# Patient Record
Sex: MALE | Race: WHITE | NOT HISPANIC OR LATINO | Employment: OTHER | URBAN - METROPOLITAN AREA
[De-identification: names, ages, dates, MRNs, and addresses within clinical notes are randomized per-mention and may not be internally consistent; named-entity substitution may affect disease eponyms.]

---

## 2017-02-07 ENCOUNTER — OFFICE VISIT (OUTPATIENT)
Dept: CARDIOLOGY | Facility: HOSPITAL | Age: 67
End: 2017-02-07

## 2017-02-07 VITALS
WEIGHT: 215 LBS | HEIGHT: 67 IN | BODY MASS INDEX: 33.74 KG/M2 | HEART RATE: 68 BPM | TEMPERATURE: 97 F | RESPIRATION RATE: 22 BRPM | OXYGEN SATURATION: 99 % | SYSTOLIC BLOOD PRESSURE: 113 MMHG | DIASTOLIC BLOOD PRESSURE: 68 MMHG

## 2017-02-07 DIAGNOSIS — I10 ESSENTIAL HYPERTENSION: Chronic | ICD-10-CM

## 2017-02-07 DIAGNOSIS — I25.5 ISCHEMIC CARDIOMYOPATHY: Primary | Chronic | ICD-10-CM

## 2017-02-07 DIAGNOSIS — R00.1 BRADYCARDIA: ICD-10-CM

## 2017-02-07 DIAGNOSIS — R53.82 CHRONIC FATIGUE: ICD-10-CM

## 2017-02-07 DIAGNOSIS — J44.9 CHRONIC OBSTRUCTIVE PULMONARY DISEASE, UNSPECIFIED COPD TYPE (HCC): ICD-10-CM

## 2017-02-07 DIAGNOSIS — G47.10 HYPERSOMNOLENCE: ICD-10-CM

## 2017-02-07 DIAGNOSIS — E66.9 OBESITY (BMI 30.0-34.9): ICD-10-CM

## 2017-02-07 DIAGNOSIS — E55.9 VITAMIN D DEFICIENCY: ICD-10-CM

## 2017-02-07 DIAGNOSIS — N28.9 RENAL INSUFFICIENCY: ICD-10-CM

## 2017-02-07 PROBLEM — E66.811 OBESITY (BMI 30.0-34.9): Status: ACTIVE | Noted: 2017-02-07

## 2017-02-07 PROCEDURE — 99213 OFFICE O/P EST LOW 20 MIN: CPT | Performed by: NURSE PRACTITIONER

## 2017-02-07 RX ORDER — ALLOPURINOL 300 MG/1
300 TABLET ORAL DAILY
COMMUNITY
End: 2019-07-26

## 2017-02-07 NOTE — PROGRESS NOTES
[facility]      Encounter Date:02/07/2017     Antony Monahan  [address] [place] [address]  [phone]    1950    Franky Carrington MD    Antony Monahan is a 66 y.o. male.      Subjective:     Chief Complaint:  Follow-up (ICM:  Patient complaints of Fatigue)       HPI     Patient with a history of ischemic cardiomyopathy, reported last EF 30% 7/30/2016. Patient underwent angioplasty and stenting for PAD on 9/7/2016. Being seen for follow-up of ischemic cardiomyopathy.  Life vest has been removed.  Pt reported improved EF 45% (data deficient).  Last office visit patient had noted to be bradycardic (40-50).   Coreg decreased to 6.25 mg twice a day.  Patient now notes HR 60-70, less dizziness.  Chest pain:  2-3 times per week, seconds induration, none worsening, occurs at rest.  Dyspnea with exertions:  Mild -moderate, intermittent, associated with wheezing.  Does not use inhalers regularly.  Able to walk in today without difficulty or dyspnea.  Fatigue is baseline moderate.  Has not improved, but has not worsened.  Has noticed weight gain, 12 lbs in 4 months.  Good appetite, not following special diet.  Just started on new med for new dx of DM II.  Reports occasional SOB at night, snoring.  No hx of sleep study.      Patient Active Problem List   Diagnosis   • ST elevation myocardial infarction involving left anterior descending (LAD) coronary artery   • Hyperlipidemia LDL goal <70   • PAD (peripheral artery disease)   • COPD (chronic obstructive pulmonary disease)   • Ischemic cardiomyopathy   • Hypothyroid   • Essential hypertension   • Coronary artery disease involving native coronary artery of native heart   • Claudication   • Bradycardia   • Renal insufficiency   • Chronic fatigue   • Vitamin D deficiency   • Hypersomnolence         Past Surgical History   Procedure Laterality Date   • Cardiac catheterization N/A 7/29/2016     Procedure: Left  Heart Cath;  Surgeon: Drew Gurrola MD;  Location:  [facility];  Service:    • Knee surgery Left 08/17/2015   • Wrist surgery     • Interventional radiology procedure N/A 9/14/2016     Procedure: AORTIC Aortagram with Runoff;  Surgeon: Drew Gurrola MD;  Location:  [facility];  Service:        No Known Allergies      Current Outpatient Prescriptions:   •  allopurinol (ZYLOPRIM) 300 MG tablet, Take 300 mg by mouth Daily., Disp: , Rfl:   •  aspirin 81 MG chewable tablet, Chew 81 mg daily. Non-compliant, Disp: , Rfl:   •  carvedilol (COREG) 12.5 MG tablet, Take 0.5 tablets by mouth 2 (Two) Times a Day., Disp: 30 tablet, Rfl: 3  •  clopidogrel (PLAVIX) 75 MG tablet, Take 1 tablet by mouth daily., Disp: 30 tablet, Rfl: 11  •  Empagliflozin 10 MG tablet, Take 10 mg by mouth Daily., Disp: , Rfl:   •  febuxostat (ULORIC) 80 MG tablet tablet, Take 80 mg by mouth daily., Disp: , Rfl:   •  furosemide (LASIX) 40 MG tablet, Take 40 mg by mouth 2 (Two) Times a Day., Disp: , Rfl:   •  levothyroxine (SYNTHROID, LEVOTHROID) 88 MCG tablet, Take 88 mcg by mouth daily., Disp: , Rfl:   •  mometasone-formoterol (DULERA 200) 200-5 MCG/ACT inhaler, Inhale 2 puffs 2 (two) times a day. EXTREMELY NON-COMPLIANT, Disp: , Rfl:   •  NIFEdipine XL (PROCARDIA XL) 90 MG 24 hr tablet, Take 90 mg by mouth daily., Disp: , Rfl:   •  nitroglycerin (NITROSTAT) 0.4 MG SL tablet, Place 1 tablet under the tongue every 5 (five) minutes as needed for chest pain. Take no more than 3 doses in 15 minutes., Disp: 25 tablet, Rfl: 12  •  pantoprazole (PROTONIX) 40 MG EC tablet, Take 40 mg by mouth Daily., Disp: , Rfl:   •  potassium chloride (K-DUR,KLOR-CON) 20 MEQ CR tablet, Take 20 mEq by mouth 2 (Two) Times a Day As Needed., Disp: , Rfl:   •  ranitidine (ZANTAC) 150 MG tablet, Take 150 mg by mouth 2 (two) times a day. For GERD, Disp: , Rfl:   •  rosuvastatin (CRESTOR) 40 MG tablet, Take 40 mg by mouth every  night., Disp: , Rfl:   •  sacubitril-valsartan (ENTRESTO) 24-26 MG tablet, Take 1 tablet by mouth every 12 (twelve) hours., Disp: 60 tablet, Rfl: 11  •  spironolactone (ALDACTONE) 25 MG tablet, Take 1 tablet by mouth daily., Disp: 30 tablet, Rfl: 11  •  Tiotropium Bromide Monohydrate (SPIRIVA RESPIMAT) 2.5 MCG/ACT aerosol solution, Inhale 1 puff 2 (two) times a day., Disp: , Rfl:     The following portions of the patient's history were reviewed and updated as appropriate: allergies, current medications, past family history, past medical history, past social history, past surgical history and problem list.    Review of Systems   Constitution: Positive for weakness, malaise/fatigue and weight gain (12 lbs). Negative for chills, decreased appetite, diaphoresis, fever, night sweats and weight loss.   HENT: Positive for headaches. Negative for congestion and nosebleeds.    Eyes: Positive for blurred vision. Negative for visual disturbance and visual halos.   Cardiovascular: Positive for chest pain (occassional, seconds in duration, non-worsening, 2-3 times per week), dyspnea on exertion (Mild-moderate with exertion) and orthopnea. Negative for claudication, cyanosis, irregular heartbeat, leg swelling, near-syncope, palpitations, paroxysmal nocturnal dyspnea and syncope.   Respiratory: Positive for snoring and wheezing. Negative for cough, hemoptysis, shortness of breath, sleep disturbances due to breathing and sputum production.    Endocrine: Positive for polyuria. Negative for cold intolerance, heat intolerance, polydipsia and polyphagia.   Hematologic/Lymphatic: Does not bruise/bleed easily.   Skin: Positive for dry skin, itching and rash.   Musculoskeletal: Positive for joint pain, muscle cramps and muscle weakness. Negative for falls, joint swelling and myalgias.   Gastrointestinal: Negative for bloating, abdominal pain, constipation, diarrhea, dysphagia, heartburn, melena, nausea and vomiting.   Genitourinary:  "Positive for nocturia. Negative for dysuria, flank pain and hematuria.   Neurological: Positive for difficulty with concentration, dizziness (improved) and loss of balance (improved). Negative for excessive daytime sleepiness.   Psychiatric/Behavioral: Positive for depression. Negative for altered mental status. The patient is nervous/anxious.    Allergic/Immunologic: Negative for environmental allergies.       Objective:     Vitals:    02/07/17 0823 02/07/17 0824 02/07/17 0825   BP: 112/60 114/62 113/68   BP Location: Right arm Left arm Left arm   Patient Position: Sitting Sitting Standing   Cuff Size: Adult     Pulse: 65  68   Resp: 22     Temp: 97 °F (36.1 °C)     TempSrc: Temporal Artery      SpO2: 99%     Weight: 215 lb (97.5 kg)     Height: 67\" (170.2 cm)           Physical Exam   Constitutional: He is oriented to person, place, and time. He appears well-developed and well-nourished. No distress.   HENT:   Mouth/Throat: Oropharynx is clear and moist.   Eyes: Conjunctivae are normal. No scleral icterus.   Neck: No hepatojugular reflux and no JVD present. Carotid bruit is not present. No tracheal deviation present.   Cardiovascular: Normal rate, regular rhythm, normal heart sounds and intact distal pulses.  Exam reveals no friction rub.    No murmur heard.  Pulmonary/Chest: Effort normal and breath sounds normal.   Abdominal: Soft. Bowel sounds are normal. He exhibits no distension. There is no tenderness.   Musculoskeletal: He exhibits no edema.   Lymphadenopathy:     He has no cervical adenopathy.   Neurological: He is alert and oriented to person, place, and time.   Skin: Skin is warm, dry and intact. No rash noted. No cyanosis or erythema. No pallor.   Psychiatric: He has a normal mood and affect. His behavior is normal. Thought content normal.   Vitals reviewed.      Lab and Diagnostic Review:    11/9/2016:    Sodium 145, potassium 4.1, chloride 111, carboxy 27, glucose 139, BUN 30, creatinine 1.4  AST 21, " ALT 29  Uric acid 9.4  Vitamin D 23  WBC 5.2, hemoglobin 12.4, hematocrit 38.3, platelet 162  Hemoglobin A1c 6.4  LDL 74, HDL 39, triglyceride 194, cholesterol 152    Reported having labs drawn yesterday with PCP.  Will request for review.  Assessment and Plan:         1. Ischemic cardiomyopathy  BB, ASA, Plavix, statin, entresto, aldactone    stable    2. Bradycardia  HRs have improved.  Today 68    3. Essential hypertension  controlled    4. Chronic obstructive pulmonary disease, unspecified COPD type  Encouraged to use inhalers as prescribed.    5. Renal insufficiency  Last Creatinine in november 1.4.  Requesting labs from PCP to review.    6.  Fatigue  Low Vitamine D noted in November.  Not being replaced.  F/u with PCP  Hx of snoring, and day time fatigue.  Should consider sleep study.  Pt has declined sleep study in the past.  Pt wants to consider.    7.   Obesity:  Discussed diet and exercise modifications for weight loss.  Discussed dietitian.  Pt declined at this time.           *Please note that portions of this note were completed with a voice recognition program. Efforts were made to edit the dictations, but occasionally words are mistranscribed.

## 2017-02-10 ENCOUNTER — DOCUMENTATION (OUTPATIENT)
Dept: CARDIOLOGY | Facility: HOSPITAL | Age: 67
End: 2017-02-10

## 2017-02-10 NOTE — PROGRESS NOTES
Received recent lab work from primary care provider's office completed on 2/6/2017 and reviewed today.    Vitamin B12 434  Sodium 143, potassium 4.3, chloride 106, carbon dioxide 28, glucose 106, BUN 22, creatinine 1.2, AST 19, ALT 35  T3 free 3 0.3, T4 free 1.05, T3 uptake 29.8, T4 9 0.4, TSH 2.9  Uric acid 5.2  Vitamin D 25 hydroxy 12  WBC 4.9, hemoglobin 12.6, hematocrit 40.1, platelet 168  Hemoglobin A1c 6.5

## 2017-05-08 ENCOUNTER — TRANSCRIBE ORDERS (OUTPATIENT)
Dept: ADMINISTRATIVE | Facility: HOSPITAL | Age: 67
End: 2017-05-08

## 2017-05-08 DIAGNOSIS — I70.219 EXTREMITY ATHEROSCLEROSIS WITH INTERMITTENT CLAUDICATION (HCC): Primary | ICD-10-CM

## 2017-05-09 ENCOUNTER — HOSPITAL ENCOUNTER (OUTPATIENT)
Facility: HOSPITAL | Age: 67
Setting detail: HOSPITAL OUTPATIENT SURGERY
Discharge: HOME | End: 2017-05-09
Attending: INTERNAL MEDICINE | Admitting: INTERNAL MEDICINE

## 2017-05-09 VITALS
TEMPERATURE: 96.5 F | HEIGHT: 67 IN | WEIGHT: 211.64 LBS | SYSTOLIC BLOOD PRESSURE: 144 MMHG | DIASTOLIC BLOOD PRESSURE: 100 MMHG | RESPIRATION RATE: 18 BRPM | HEART RATE: 67 BPM | OXYGEN SATURATION: 95 % | BODY MASS INDEX: 33.22 KG/M2

## 2017-05-09 DIAGNOSIS — I70.219 EXTREMITY ATHEROSCLEROSIS WITH INTERMITTENT CLAUDICATION (HCC): ICD-10-CM

## 2017-05-09 LAB
ANION GAP SERPL CALCULATED.3IONS-SCNC: 5 MMOL/L (ref 3–11)
BUN BLD-MCNC: 42 MG/DL (ref 9–23)
BUN/CREAT SERPL: 28 (ref 7–25)
CALCIUM SPEC-SCNC: 10.1 MG/DL (ref 8.7–10.4)
CHLORIDE SERPL-SCNC: 112 MMOL/L (ref 99–109)
CO2 SERPL-SCNC: 25 MMOL/L (ref 20–31)
CREAT BLD-MCNC: 1.5 MG/DL (ref 0.6–1.3)
DEPRECATED RDW RBC AUTO: 55.7 FL (ref 37–54)
ERYTHROCYTE [DISTWIDTH] IN BLOOD BY AUTOMATED COUNT: 16.1 % (ref 11.3–14.5)
GFR SERPL CREATININE-BSD FRML MDRD: 47 ML/MIN/1.73
GLUCOSE BLD-MCNC: 115 MG/DL (ref 70–100)
HCT VFR BLD AUTO: 44.9 % (ref 38.9–50.9)
HGB BLD-MCNC: 14.2 G/DL (ref 13.1–17.5)
MCH RBC QN AUTO: 29.7 PG (ref 27–31)
MCHC RBC AUTO-ENTMCNC: 31.6 G/DL (ref 32–36)
MCV RBC AUTO: 93.9 FL (ref 80–99)
PLATELET # BLD AUTO: 177 10*3/MM3 (ref 150–450)
PMV BLD AUTO: 12.6 FL (ref 6–12)
POTASSIUM BLD-SCNC: 4.5 MMOL/L (ref 3.5–5.5)
RBC # BLD AUTO: 4.78 10*6/MM3 (ref 4.2–5.76)
SODIUM BLD-SCNC: 142 MMOL/L (ref 132–146)
WBC NRBC COR # BLD: 5.86 10*3/MM3 (ref 3.5–10.8)

## 2017-05-09 PROCEDURE — C1769 GUIDE WIRE: HCPCS | Performed by: INTERNAL MEDICINE

## 2017-05-09 PROCEDURE — C1725 CATH, TRANSLUMIN NON-LASER: HCPCS | Performed by: INTERNAL MEDICINE

## 2017-05-09 PROCEDURE — 75716 ARTERY X-RAYS ARMS/LEGS: CPT | Performed by: INTERNAL MEDICINE

## 2017-05-09 PROCEDURE — 25010000002 FENTANYL CITRATE (PF) 100 MCG/2ML SOLUTION: Performed by: INTERNAL MEDICINE

## 2017-05-09 PROCEDURE — C1876 STENT, NON-COA/NON-COV W/DEL: HCPCS | Performed by: INTERNAL MEDICINE

## 2017-05-09 PROCEDURE — 0 IOPAMIDOL PER 1 ML: Performed by: INTERNAL MEDICINE

## 2017-05-09 PROCEDURE — 25010000002 MIDAZOLAM PER 1 MG: Performed by: INTERNAL MEDICINE

## 2017-05-09 PROCEDURE — 85027 COMPLETE CBC AUTOMATED: CPT | Performed by: INTERNAL MEDICINE

## 2017-05-09 PROCEDURE — C1894 INTRO/SHEATH, NON-LASER: HCPCS | Performed by: INTERNAL MEDICINE

## 2017-05-09 PROCEDURE — 80048 BASIC METABOLIC PNL TOTAL CA: CPT | Performed by: INTERNAL MEDICINE

## 2017-05-09 PROCEDURE — 25010000002 BIVALIRUDIN PER 1 MG: Performed by: INTERNAL MEDICINE

## 2017-05-09 PROCEDURE — 36415 COLL VENOUS BLD VENIPUNCTURE: CPT

## 2017-05-09 DEVICE — IMPLANTABLE DEVICE
Type: IMPLANTABLE DEVICE | Status: FUNCTIONAL
Brand: CORDIS S.M.A.R.T. CONTROL VASCULAR STENT SYSTEM

## 2017-05-09 RX ORDER — NICOTINE POLACRILEX 4 MG
15 LOZENGE BUCCAL
Status: DISCONTINUED | OUTPATIENT
Start: 2017-05-09 | End: 2017-05-10 | Stop reason: HOSPADM

## 2017-05-09 RX ORDER — TEMAZEPAM 7.5 MG/1
7.5 CAPSULE ORAL NIGHTLY PRN
Status: DISCONTINUED | OUTPATIENT
Start: 2017-05-09 | End: 2017-05-10 | Stop reason: HOSPADM

## 2017-05-09 RX ORDER — ACETAMINOPHEN 325 MG/1
650 TABLET ORAL EVERY 4 HOURS PRN
Status: DISCONTINUED | OUTPATIENT
Start: 2017-05-09 | End: 2017-05-10 | Stop reason: HOSPADM

## 2017-05-09 RX ORDER — LIDOCAINE HYDROCHLORIDE 10 MG/ML
INJECTION, SOLUTION INFILTRATION; PERINEURAL AS NEEDED
Status: DISCONTINUED | OUTPATIENT
Start: 2017-05-09 | End: 2017-05-09 | Stop reason: HOSPADM

## 2017-05-09 RX ORDER — MIDAZOLAM HYDROCHLORIDE 1 MG/ML
INJECTION INTRAMUSCULAR; INTRAVENOUS AS NEEDED
Status: DISCONTINUED | OUTPATIENT
Start: 2017-05-09 | End: 2017-05-09 | Stop reason: HOSPADM

## 2017-05-09 RX ORDER — SODIUM CHLORIDE 9 MG/ML
250 INJECTION, SOLUTION INTRAVENOUS CONTINUOUS
Status: ACTIVE | OUTPATIENT
Start: 2017-05-09 | End: 2017-05-09

## 2017-05-09 RX ORDER — ALPRAZOLAM 0.25 MG/1
0.25 TABLET ORAL 3 TIMES DAILY PRN
Status: DISCONTINUED | OUTPATIENT
Start: 2017-05-09 | End: 2017-05-10 | Stop reason: HOSPADM

## 2017-05-09 RX ORDER — ASPIRIN 325 MG
325 TABLET, DELAYED RELEASE (ENTERIC COATED) ORAL DAILY
Status: DISCONTINUED | OUTPATIENT
Start: 2017-05-09 | End: 2017-05-10 | Stop reason: HOSPADM

## 2017-05-09 RX ORDER — OXYCODONE AND ACETAMINOPHEN 10; 325 MG/1; MG/1
1 TABLET ORAL EVERY 4 HOURS PRN
Status: DISCONTINUED | OUTPATIENT
Start: 2017-05-09 | End: 2017-05-10 | Stop reason: HOSPADM

## 2017-05-09 RX ORDER — HYDROCODONE BITARTRATE AND ACETAMINOPHEN 5; 325 MG/1; MG/1
1 TABLET ORAL EVERY 4 HOURS PRN
Status: DISCONTINUED | OUTPATIENT
Start: 2017-05-09 | End: 2017-05-10 | Stop reason: HOSPADM

## 2017-05-09 RX ORDER — ASPIRIN 81 MG/1
81 TABLET ORAL DAILY
Status: ON HOLD | COMMUNITY
End: 2021-07-12

## 2017-05-09 RX ORDER — FENTANYL CITRATE 50 UG/ML
INJECTION, SOLUTION INTRAMUSCULAR; INTRAVENOUS AS NEEDED
Status: DISCONTINUED | OUTPATIENT
Start: 2017-05-09 | End: 2017-05-09 | Stop reason: HOSPADM

## 2017-05-09 RX ORDER — DEXTROSE MONOHYDRATE 25 G/50ML
25 INJECTION, SOLUTION INTRAVENOUS
Status: DISCONTINUED | OUTPATIENT
Start: 2017-05-09 | End: 2017-05-10 | Stop reason: HOSPADM

## 2017-05-09 RX ORDER — FEBUXOSTAT 40 MG/1
40 TABLET, FILM COATED ORAL EVERY MORNING
COMMUNITY
End: 2019-07-26

## 2017-05-09 RX ADMIN — ASPIRIN 325 MG: 325 TABLET, DELAYED RELEASE ORAL at 14:08

## 2018-08-29 ENCOUNTER — TRANSCRIBE ORDERS (OUTPATIENT)
Dept: ADMINISTRATIVE | Facility: HOSPITAL | Age: 68
End: 2018-08-29

## 2018-08-29 DIAGNOSIS — I70.213 INTERMITTENT CLAUDICATION OF BOTH LOWER EXTREMITIES DUE TO ATHEROSCLEROSIS (HCC): Primary | ICD-10-CM

## 2018-09-05 ENCOUNTER — HOSPITAL ENCOUNTER (OUTPATIENT)
Facility: HOSPITAL | Age: 68
Setting detail: HOSPITAL OUTPATIENT SURGERY
Discharge: HOME | End: 2018-09-05
Attending: INTERNAL MEDICINE | Admitting: INTERNAL MEDICINE

## 2018-09-05 VITALS
SYSTOLIC BLOOD PRESSURE: 171 MMHG | RESPIRATION RATE: 16 BRPM | OXYGEN SATURATION: 100 % | BODY MASS INDEX: 31.07 KG/M2 | DIASTOLIC BLOOD PRESSURE: 87 MMHG | HEART RATE: 47 BPM | WEIGHT: 197.97 LBS | HEIGHT: 67 IN | TEMPERATURE: 97 F

## 2018-09-05 DIAGNOSIS — I70.213 INTERMITTENT CLAUDICATION OF BOTH LOWER EXTREMITIES DUE TO ATHEROSCLEROSIS (HCC): ICD-10-CM

## 2018-09-05 LAB
ANION GAP SERPL CALCULATED.3IONS-SCNC: 2 MMOL/L (ref 3–11)
BUN BLD-MCNC: 32 MG/DL (ref 9–23)
BUN/CREAT SERPL: 31.4 (ref 7–25)
CALCIUM SPEC-SCNC: 8.7 MG/DL (ref 8.7–10.4)
CHLORIDE SERPL-SCNC: 111 MMOL/L (ref 99–109)
CO2 SERPL-SCNC: 24 MMOL/L (ref 20–31)
CREAT BLD-MCNC: 1.02 MG/DL (ref 0.6–1.3)
DEPRECATED RDW RBC AUTO: 49.7 FL (ref 37–54)
ERYTHROCYTE [DISTWIDTH] IN BLOOD BY AUTOMATED COUNT: 14.9 % (ref 11.3–14.5)
GFR SERPL CREATININE-BSD FRML MDRD: 72 ML/MIN/1.73
GLUCOSE BLD-MCNC: 106 MG/DL (ref 70–100)
HCT VFR BLD AUTO: 46 % (ref 38.9–50.9)
HGB BLD-MCNC: 15 G/DL (ref 13.1–17.5)
MCH RBC QN AUTO: 29.9 PG (ref 27–31)
MCHC RBC AUTO-ENTMCNC: 32.6 G/DL (ref 32–36)
MCV RBC AUTO: 91.8 FL (ref 80–99)
PLATELET # BLD AUTO: 157 10*3/MM3 (ref 150–450)
PMV BLD AUTO: 12.8 FL (ref 6–12)
POTASSIUM BLD-SCNC: 4.5 MMOL/L (ref 3.5–5.5)
RBC # BLD AUTO: 5.01 10*6/MM3 (ref 4.2–5.76)
SODIUM BLD-SCNC: 137 MMOL/L (ref 132–146)
WBC NRBC COR # BLD: 9.26 10*3/MM3 (ref 3.5–10.8)

## 2018-09-05 PROCEDURE — 0 IOPAMIDOL PER 1 ML: Performed by: INTERNAL MEDICINE

## 2018-09-05 PROCEDURE — 36415 COLL VENOUS BLD VENIPUNCTURE: CPT

## 2018-09-05 PROCEDURE — C1874 STENT, COATED/COV W/DEL SYS: HCPCS | Performed by: INTERNAL MEDICINE

## 2018-09-05 PROCEDURE — C1769 GUIDE WIRE: HCPCS | Performed by: INTERNAL MEDICINE

## 2018-09-05 PROCEDURE — 75716 ARTERY X-RAYS ARMS/LEGS: CPT | Performed by: INTERNAL MEDICINE

## 2018-09-05 PROCEDURE — 80048 BASIC METABOLIC PNL TOTAL CA: CPT | Performed by: INTERNAL MEDICINE

## 2018-09-05 PROCEDURE — 75774 ARTERY X-RAY EACH VESSEL: CPT | Performed by: INTERNAL MEDICINE

## 2018-09-05 PROCEDURE — C1894 INTRO/SHEATH, NON-LASER: HCPCS | Performed by: INTERNAL MEDICINE

## 2018-09-05 PROCEDURE — C1887 CATHETER, GUIDING: HCPCS | Performed by: INTERNAL MEDICINE

## 2018-09-05 PROCEDURE — 25010000002 BIVALIRUDIN 5 MG/ML: Performed by: INTERNAL MEDICINE

## 2018-09-05 PROCEDURE — C1725 CATH, TRANSLUMIN NON-LASER: HCPCS | Performed by: INTERNAL MEDICINE

## 2018-09-05 PROCEDURE — 25010000002 FENTANYL CITRATE (PF) 100 MCG/2ML SOLUTION: Performed by: INTERNAL MEDICINE

## 2018-09-05 PROCEDURE — 25010000002 MIDAZOLAM PER 1 MG: Performed by: INTERNAL MEDICINE

## 2018-09-05 PROCEDURE — 85027 COMPLETE CBC AUTOMATED: CPT | Performed by: INTERNAL MEDICINE

## 2018-09-05 DEVICE — STNT PERIPH ZILVER PTX 6F 7X100MM 125CM BX/1: Type: IMPLANTABLE DEVICE | Status: FUNCTIONAL

## 2018-09-05 DEVICE — STNT PERIPH ZILVER PTX 6F 8X40MM 125CM BX/1: Type: IMPLANTABLE DEVICE | Status: FUNCTIONAL

## 2018-09-05 RX ORDER — DEXTROSE MONOHYDRATE 25 G/50ML
25 INJECTION, SOLUTION INTRAVENOUS
Status: DISCONTINUED | OUTPATIENT
Start: 2018-09-05 | End: 2018-09-06 | Stop reason: HOSPADM

## 2018-09-05 RX ORDER — FENTANYL CITRATE 50 UG/ML
INJECTION, SOLUTION INTRAMUSCULAR; INTRAVENOUS AS NEEDED
Status: DISCONTINUED | OUTPATIENT
Start: 2018-09-05 | End: 2018-09-05 | Stop reason: HOSPADM

## 2018-09-05 RX ORDER — OXYCODONE AND ACETAMINOPHEN 10; 325 MG/1; MG/1
1 TABLET ORAL EVERY 4 HOURS PRN
Status: DISCONTINUED | OUTPATIENT
Start: 2018-09-05 | End: 2018-09-06 | Stop reason: HOSPADM

## 2018-09-05 RX ORDER — MIDAZOLAM HYDROCHLORIDE 1 MG/ML
INJECTION INTRAMUSCULAR; INTRAVENOUS AS NEEDED
Status: DISCONTINUED | OUTPATIENT
Start: 2018-09-05 | End: 2018-09-05 | Stop reason: HOSPADM

## 2018-09-05 RX ORDER — LIDOCAINE HYDROCHLORIDE 10 MG/ML
INJECTION, SOLUTION EPIDURAL; INFILTRATION; INTRACAUDAL; PERINEURAL AS NEEDED
Status: DISCONTINUED | OUTPATIENT
Start: 2018-09-05 | End: 2018-09-05 | Stop reason: HOSPADM

## 2018-09-05 RX ORDER — SODIUM CHLORIDE 9 MG/ML
250 INJECTION, SOLUTION INTRAVENOUS ONCE AS NEEDED
Status: DISCONTINUED | OUTPATIENT
Start: 2018-09-05 | End: 2018-09-06 | Stop reason: HOSPADM

## 2018-09-05 RX ORDER — TEMAZEPAM 7.5 MG/1
7.5 CAPSULE ORAL NIGHTLY PRN
Status: DISCONTINUED | OUTPATIENT
Start: 2018-09-05 | End: 2018-09-06 | Stop reason: HOSPADM

## 2018-09-05 RX ORDER — ASPIRIN 325 MG
325 TABLET, DELAYED RELEASE (ENTERIC COATED) ORAL DAILY
Status: DISCONTINUED | OUTPATIENT
Start: 2018-09-05 | End: 2018-09-06 | Stop reason: HOSPADM

## 2018-09-05 RX ORDER — ACETAMINOPHEN 325 MG/1
650 TABLET ORAL EVERY 4 HOURS PRN
Status: DISCONTINUED | OUTPATIENT
Start: 2018-09-05 | End: 2018-09-06 | Stop reason: HOSPADM

## 2018-09-05 RX ORDER — NICOTINE POLACRILEX 4 MG
15 LOZENGE BUCCAL
Status: DISCONTINUED | OUTPATIENT
Start: 2018-09-05 | End: 2018-09-06 | Stop reason: HOSPADM

## 2018-09-05 RX ORDER — CLOPIDOGREL BISULFATE 75 MG/1
75 TABLET ORAL DAILY
Qty: 30 TABLET | Refills: 11 | Status: SHIPPED | OUTPATIENT
Start: 2018-09-05

## 2018-09-05 RX ORDER — SODIUM CHLORIDE 9 MG/ML
250 INJECTION, SOLUTION INTRAVENOUS CONTINUOUS
Status: ACTIVE | OUTPATIENT
Start: 2018-09-05 | End: 2018-09-05

## 2018-09-05 RX ORDER — CLOPIDOGREL BISULFATE 75 MG/1
TABLET ORAL AS NEEDED
Status: DISCONTINUED | OUTPATIENT
Start: 2018-09-05 | End: 2018-09-05 | Stop reason: HOSPADM

## 2018-09-05 RX ORDER — CARVEDILOL 25 MG/1
37.5 TABLET ORAL 2 TIMES DAILY WITH MEALS
COMMUNITY

## 2018-09-05 RX ORDER — HYDROCODONE BITARTRATE AND ACETAMINOPHEN 5; 325 MG/1; MG/1
1 TABLET ORAL EVERY 4 HOURS PRN
Status: DISCONTINUED | OUTPATIENT
Start: 2018-09-05 | End: 2018-09-06 | Stop reason: HOSPADM

## 2018-09-05 RX ORDER — ALPRAZOLAM 0.25 MG/1
0.25 TABLET ORAL 3 TIMES DAILY PRN
Status: DISCONTINUED | OUTPATIENT
Start: 2018-09-05 | End: 2018-09-06 | Stop reason: HOSPADM

## 2018-09-05 RX ADMIN — ASPIRIN 325 MG: 325 TABLET, DELAYED RELEASE ORAL at 11:57

## 2019-07-25 ENCOUNTER — APPOINTMENT (OUTPATIENT)
Dept: GENERAL RADIOLOGY | Facility: HOSPITAL | Age: 69
End: 2019-07-25

## 2019-07-25 ENCOUNTER — HOSPITAL ENCOUNTER (EMERGENCY)
Facility: HOSPITAL | Age: 69
Discharge: HOME | End: 2019-07-25
Attending: EMERGENCY MEDICINE | Admitting: EMERGENCY MEDICINE

## 2019-07-25 VITALS
HEIGHT: 67 IN | RESPIRATION RATE: 16 BRPM | HEART RATE: 54 BPM | DIASTOLIC BLOOD PRESSURE: 72 MMHG | OXYGEN SATURATION: 96 % | BODY MASS INDEX: 30.45 KG/M2 | WEIGHT: 194 LBS | TEMPERATURE: 97.7 F | SYSTOLIC BLOOD PRESSURE: 113 MMHG

## 2019-07-25 DIAGNOSIS — R42 DIZZINESS: Primary | ICD-10-CM

## 2019-07-25 DIAGNOSIS — E86.0 DEHYDRATION: ICD-10-CM

## 2019-07-25 DIAGNOSIS — I95.1 ORTHOSTATIC HYPOTENSION: ICD-10-CM

## 2019-07-25 DIAGNOSIS — N28.9 RENAL INSUFFICIENCY: ICD-10-CM

## 2019-07-25 LAB
ALBUMIN SERPL-MCNC: 4.4 G/DL (ref 3.5–5.2)
ALBUMIN/GLOB SERPL: 1.3 G/DL
ALP SERPL-CCNC: 71 U/L (ref 39–117)
ALT SERPL W P-5'-P-CCNC: 16 U/L (ref 1–41)
ANION GAP SERPL CALCULATED.3IONS-SCNC: 12 MMOL/L (ref 5–15)
AST SERPL-CCNC: 20 U/L (ref 1–40)
BACTERIA UR QL AUTO: NORMAL /HPF
BASOPHILS # BLD AUTO: 0.03 10*3/MM3 (ref 0–0.2)
BASOPHILS NFR BLD AUTO: 0.5 % (ref 0–1.5)
BILIRUB SERPL-MCNC: 0.3 MG/DL (ref 0.2–1.2)
BILIRUB UR QL STRIP: NEGATIVE
BUN BLD-MCNC: 35 MG/DL (ref 8–23)
BUN/CREAT SERPL: 24.8 (ref 7–25)
CALCIUM SPEC-SCNC: 9.4 MG/DL (ref 8.6–10.5)
CHLORIDE SERPL-SCNC: 103 MMOL/L (ref 98–107)
CLARITY UR: CLEAR
CO2 SERPL-SCNC: 24 MMOL/L (ref 22–29)
COLOR UR: YELLOW
CREAT BLD-MCNC: 1.41 MG/DL (ref 0.76–1.27)
DEPRECATED RDW RBC AUTO: 49.1 FL (ref 37–54)
EOSINOPHIL # BLD AUTO: 0.27 10*3/MM3 (ref 0–0.4)
EOSINOPHIL NFR BLD AUTO: 4.4 % (ref 0.3–6.2)
ERYTHROCYTE [DISTWIDTH] IN BLOOD BY AUTOMATED COUNT: 14.6 % (ref 12.3–15.4)
GFR SERPL CREATININE-BSD FRML MDRD: 50 ML/MIN/1.73
GLOBULIN UR ELPH-MCNC: 3.5 GM/DL
GLUCOSE BLD-MCNC: 104 MG/DL (ref 65–99)
GLUCOSE UR STRIP-MCNC: NEGATIVE MG/DL
HCT VFR BLD AUTO: 47.4 % (ref 37.5–51)
HGB BLD-MCNC: 14.9 G/DL (ref 13–17.7)
HGB UR QL STRIP.AUTO: ABNORMAL
HOLD SPECIMEN: NORMAL
HOLD SPECIMEN: NORMAL
HYALINE CASTS UR QL AUTO: NORMAL /LPF
IMM GRANULOCYTES # BLD AUTO: 0.01 10*3/MM3 (ref 0–0.05)
IMM GRANULOCYTES NFR BLD AUTO: 0.2 % (ref 0–0.5)
KETONES UR QL STRIP: NEGATIVE
LEUKOCYTE ESTERASE UR QL STRIP.AUTO: NEGATIVE
LYMPHOCYTES # BLD AUTO: 1.46 10*3/MM3 (ref 0.7–3.1)
LYMPHOCYTES NFR BLD AUTO: 24.1 % (ref 19.6–45.3)
MAGNESIUM SERPL-MCNC: 2.5 MG/DL (ref 1.6–2.4)
MCH RBC QN AUTO: 28.5 PG (ref 26.6–33)
MCHC RBC AUTO-ENTMCNC: 31.4 G/DL (ref 31.5–35.7)
MCV RBC AUTO: 90.8 FL (ref 79–97)
MONOCYTES # BLD AUTO: 0.51 10*3/MM3 (ref 0.1–0.9)
MONOCYTES NFR BLD AUTO: 8.4 % (ref 5–12)
NEUTROPHILS # BLD AUTO: 3.79 10*3/MM3 (ref 1.7–7)
NEUTROPHILS NFR BLD AUTO: 62.4 % (ref 42.7–76)
NITRITE UR QL STRIP: NEGATIVE
NRBC BLD AUTO-RTO: 0 /100 WBC (ref 0–0.2)
PH UR STRIP.AUTO: <=5 [PH] (ref 5–8)
PLATELET # BLD AUTO: 147 10*3/MM3 (ref 140–450)
PMV BLD AUTO: 13 FL (ref 6–12)
POTASSIUM BLD-SCNC: 4.5 MMOL/L (ref 3.5–5.2)
PROT SERPL-MCNC: 7.9 G/DL (ref 6–8.5)
PROT UR QL STRIP: NEGATIVE
RBC # BLD AUTO: 5.22 10*6/MM3 (ref 4.14–5.8)
RBC # UR: NORMAL /HPF
REF LAB TEST METHOD: NORMAL
SODIUM BLD-SCNC: 139 MMOL/L (ref 136–145)
SP GR UR STRIP: 1.01 (ref 1–1.03)
SQUAMOUS #/AREA URNS HPF: NORMAL /HPF
TROPONIN T SERPL-MCNC: <0.01 NG/ML (ref 0–0.03)
UROBILINOGEN UR QL STRIP: ABNORMAL
WBC NRBC COR # BLD: 6.07 10*3/MM3 (ref 3.4–10.8)
WBC UR QL AUTO: NORMAL /HPF
WHOLE BLOOD HOLD SPECIMEN: NORMAL
WHOLE BLOOD HOLD SPECIMEN: NORMAL

## 2019-07-25 PROCEDURE — 80053 COMPREHEN METABOLIC PANEL: CPT | Performed by: EMERGENCY MEDICINE

## 2019-07-25 PROCEDURE — 85025 COMPLETE CBC W/AUTO DIFF WBC: CPT | Performed by: EMERGENCY MEDICINE

## 2019-07-25 PROCEDURE — 71045 X-RAY EXAM CHEST 1 VIEW: CPT

## 2019-07-25 PROCEDURE — 93005 ELECTROCARDIOGRAM TRACING: CPT

## 2019-07-25 PROCEDURE — 93005 ELECTROCARDIOGRAM TRACING: CPT | Performed by: EMERGENCY MEDICINE

## 2019-07-25 PROCEDURE — 84484 ASSAY OF TROPONIN QUANT: CPT | Performed by: EMERGENCY MEDICINE

## 2019-07-25 PROCEDURE — 81001 URINALYSIS AUTO W/SCOPE: CPT | Performed by: EMERGENCY MEDICINE

## 2019-07-25 PROCEDURE — 83735 ASSAY OF MAGNESIUM: CPT | Performed by: EMERGENCY MEDICINE

## 2019-07-25 PROCEDURE — 99284 EMERGENCY DEPT VISIT MOD MDM: CPT

## 2019-07-25 RX ORDER — SODIUM CHLORIDE 0.9 % (FLUSH) 0.9 %
10 SYRINGE (ML) INJECTION AS NEEDED
Status: DISCONTINUED | OUTPATIENT
Start: 2019-07-25 | End: 2019-07-25 | Stop reason: HOSPADM

## 2019-07-25 RX ADMIN — SODIUM CHLORIDE 1000 ML: 9 INJECTION, SOLUTION INTRAVENOUS at 16:20

## 2019-07-26 ENCOUNTER — HOSPITAL ENCOUNTER (INPATIENT)
Facility: HOSPITAL | Age: 69
LOS: 5 days | Discharge: HOME | End: 2019-07-31
Attending: EMERGENCY MEDICINE | Admitting: INTERNAL MEDICINE

## 2019-07-26 ENCOUNTER — APPOINTMENT (OUTPATIENT)
Dept: GENERAL RADIOLOGY | Facility: HOSPITAL | Age: 69
End: 2019-07-26

## 2019-07-26 ENCOUNTER — APPOINTMENT (OUTPATIENT)
Dept: CT IMAGING | Facility: HOSPITAL | Age: 69
End: 2019-07-26

## 2019-07-26 DIAGNOSIS — R55 NEAR SYNCOPE: ICD-10-CM

## 2019-07-26 DIAGNOSIS — R94.31 ABNORMAL EKG: ICD-10-CM

## 2019-07-26 DIAGNOSIS — I49.3 FREQUENT PVCS: ICD-10-CM

## 2019-07-26 DIAGNOSIS — R51.9 HEADACHE, UNSPECIFIED HEADACHE TYPE: ICD-10-CM

## 2019-07-26 DIAGNOSIS — R42 DIZZINESS: Primary | ICD-10-CM

## 2019-07-26 DIAGNOSIS — I47.20 VT (VENTRICULAR TACHYCARDIA) (HCC): ICD-10-CM

## 2019-07-26 PROBLEM — I49.9 ARRHYTHMIA: Status: ACTIVE | Noted: 2019-07-26

## 2019-07-26 PROBLEM — I95.9 HYPOTENSION: Status: ACTIVE | Noted: 2019-07-26

## 2019-07-26 LAB
ALBUMIN SERPL-MCNC: 4.5 G/DL (ref 3.5–5.2)
ALBUMIN/GLOB SERPL: 1.3 G/DL
ALP SERPL-CCNC: 71 U/L (ref 39–117)
ALT SERPL W P-5'-P-CCNC: 16 U/L (ref 1–41)
ANION GAP SERPL CALCULATED.3IONS-SCNC: 12 MMOL/L (ref 5–15)
AST SERPL-CCNC: 22 U/L (ref 1–40)
BASOPHILS # BLD AUTO: 0.05 10*3/MM3 (ref 0–0.2)
BASOPHILS NFR BLD AUTO: 0.8 % (ref 0–1.5)
BILIRUB SERPL-MCNC: 0.3 MG/DL (ref 0.2–1.2)
BUN BLD-MCNC: 30 MG/DL (ref 8–23)
BUN/CREAT SERPL: 28 (ref 7–25)
CALCIUM SPEC-SCNC: 9.8 MG/DL (ref 8.6–10.5)
CHLORIDE SERPL-SCNC: 105 MMOL/L (ref 98–107)
CO2 SERPL-SCNC: 24 MMOL/L (ref 22–29)
CREAT BLD-MCNC: 1.07 MG/DL (ref 0.76–1.27)
DEPRECATED RDW RBC AUTO: 48 FL (ref 37–54)
EOSINOPHIL # BLD AUTO: 0.24 10*3/MM3 (ref 0–0.4)
EOSINOPHIL NFR BLD AUTO: 4 % (ref 0.3–6.2)
ERYTHROCYTE [DISTWIDTH] IN BLOOD BY AUTOMATED COUNT: 14.6 % (ref 12.3–15.4)
GFR SERPL CREATININE-BSD FRML MDRD: 68 ML/MIN/1.73
GLOBULIN UR ELPH-MCNC: 3.5 GM/DL
GLUCOSE BLD-MCNC: 108 MG/DL (ref 65–99)
GLUCOSE BLDC GLUCOMTR-MCNC: 127 MG/DL (ref 70–130)
HCT VFR BLD AUTO: 48.3 % (ref 37.5–51)
HGB BLD-MCNC: 15.5 G/DL (ref 13–17.7)
IMM GRANULOCYTES # BLD AUTO: 0.02 10*3/MM3 (ref 0–0.05)
IMM GRANULOCYTES NFR BLD AUTO: 0.3 % (ref 0–0.5)
INR PPP: 1.02 (ref 0.85–1.16)
LYMPHOCYTES # BLD AUTO: 1.4 10*3/MM3 (ref 0.7–3.1)
LYMPHOCYTES NFR BLD AUTO: 23.3 % (ref 19.6–45.3)
MAGNESIUM SERPL-MCNC: 2.4 MG/DL (ref 1.6–2.4)
MCH RBC QN AUTO: 28.9 PG (ref 26.6–33)
MCHC RBC AUTO-ENTMCNC: 32.1 G/DL (ref 31.5–35.7)
MCV RBC AUTO: 89.9 FL (ref 79–97)
MONOCYTES # BLD AUTO: 0.4 10*3/MM3 (ref 0.1–0.9)
MONOCYTES NFR BLD AUTO: 6.7 % (ref 5–12)
NEUTROPHILS # BLD AUTO: 3.89 10*3/MM3 (ref 1.7–7)
NEUTROPHILS NFR BLD AUTO: 64.9 % (ref 42.7–76)
NRBC BLD AUTO-RTO: 0 /100 WBC (ref 0–0.2)
NT-PROBNP SERPL-MCNC: 1001 PG/ML (ref 5–900)
PLATELET # BLD AUTO: 138 10*3/MM3 (ref 140–450)
PMV BLD AUTO: 13.3 FL (ref 6–12)
POTASSIUM BLD-SCNC: 4.6 MMOL/L (ref 3.5–5.2)
PROT SERPL-MCNC: 8 G/DL (ref 6–8.5)
PROTHROMBIN TIME: 12.9 SECONDS (ref 11.2–14.3)
RBC # BLD AUTO: 5.37 10*6/MM3 (ref 4.14–5.8)
SODIUM BLD-SCNC: 141 MMOL/L (ref 136–145)
T4 FREE SERPL-MCNC: 1.29 NG/DL (ref 0.93–1.7)
TROPONIN T SERPL-MCNC: <0.01 NG/ML (ref 0–0.03)
TSH SERPL DL<=0.05 MIU/L-ACNC: 3.51 MIU/ML (ref 0.27–4.2)
WBC NRBC COR # BLD: 6 10*3/MM3 (ref 3.4–10.8)

## 2019-07-26 PROCEDURE — 85610 PROTHROMBIN TIME: CPT | Performed by: EMERGENCY MEDICINE

## 2019-07-26 PROCEDURE — 84443 ASSAY THYROID STIM HORMONE: CPT | Performed by: EMERGENCY MEDICINE

## 2019-07-26 PROCEDURE — 84439 ASSAY OF FREE THYROXINE: CPT | Performed by: EMERGENCY MEDICINE

## 2019-07-26 PROCEDURE — 70450 CT HEAD/BRAIN W/O DYE: CPT

## 2019-07-26 PROCEDURE — 85025 COMPLETE CBC W/AUTO DIFF WBC: CPT | Performed by: EMERGENCY MEDICINE

## 2019-07-26 PROCEDURE — 71045 X-RAY EXAM CHEST 1 VIEW: CPT

## 2019-07-26 PROCEDURE — 93005 ELECTROCARDIOGRAM TRACING: CPT

## 2019-07-26 PROCEDURE — 99285 EMERGENCY DEPT VISIT HI MDM: CPT

## 2019-07-26 PROCEDURE — 80053 COMPREHEN METABOLIC PANEL: CPT | Performed by: EMERGENCY MEDICINE

## 2019-07-26 PROCEDURE — G0378 HOSPITAL OBSERVATION PER HR: HCPCS

## 2019-07-26 PROCEDURE — 99223 1ST HOSP IP/OBS HIGH 75: CPT | Performed by: FAMILY MEDICINE

## 2019-07-26 PROCEDURE — 93005 ELECTROCARDIOGRAM TRACING: CPT | Performed by: EMERGENCY MEDICINE

## 2019-07-26 PROCEDURE — 83880 ASSAY OF NATRIURETIC PEPTIDE: CPT | Performed by: EMERGENCY MEDICINE

## 2019-07-26 PROCEDURE — 84484 ASSAY OF TROPONIN QUANT: CPT | Performed by: EMERGENCY MEDICINE

## 2019-07-26 PROCEDURE — 82962 GLUCOSE BLOOD TEST: CPT

## 2019-07-26 PROCEDURE — 63710000001 INSULIN LISPRO (HUMAN) PER 5 UNITS: Performed by: NURSE PRACTITIONER

## 2019-07-26 PROCEDURE — 83735 ASSAY OF MAGNESIUM: CPT | Performed by: EMERGENCY MEDICINE

## 2019-07-26 RX ORDER — NICOTINE POLACRILEX 4 MG
15 LOZENGE BUCCAL
Status: DISCONTINUED | OUTPATIENT
Start: 2019-07-26 | End: 2019-07-31 | Stop reason: HOSPADM

## 2019-07-26 RX ORDER — CLOPIDOGREL BISULFATE 75 MG/1
75 TABLET ORAL DAILY
Status: DISCONTINUED | OUTPATIENT
Start: 2019-07-27 | End: 2019-07-31 | Stop reason: HOSPADM

## 2019-07-26 RX ORDER — SODIUM CHLORIDE 0.9 % (FLUSH) 0.9 %
3 SYRINGE (ML) INJECTION EVERY 12 HOURS SCHEDULED
Status: DISCONTINUED | OUTPATIENT
Start: 2019-07-26 | End: 2019-07-31 | Stop reason: HOSPADM

## 2019-07-26 RX ORDER — TEMAZEPAM 15 MG/1
15 CAPSULE ORAL NIGHTLY PRN
Status: DISCONTINUED | OUTPATIENT
Start: 2019-07-26 | End: 2019-07-31 | Stop reason: HOSPADM

## 2019-07-26 RX ORDER — CHOLECALCIFEROL (VITAMIN D3) 125 MCG
5 CAPSULE ORAL NIGHTLY
Status: DISCONTINUED | OUTPATIENT
Start: 2019-07-26 | End: 2019-07-31 | Stop reason: HOSPADM

## 2019-07-26 RX ORDER — ASPIRIN 81 MG/1
81 TABLET ORAL EVERY MORNING
Status: DISCONTINUED | OUTPATIENT
Start: 2019-07-27 | End: 2019-07-31 | Stop reason: HOSPADM

## 2019-07-26 RX ORDER — FUROSEMIDE 20 MG/1
10 TABLET ORAL DAILY
Status: DISCONTINUED | OUTPATIENT
Start: 2019-07-27 | End: 2019-07-31 | Stop reason: HOSPADM

## 2019-07-26 RX ORDER — IPRATROPIUM BROMIDE AND ALBUTEROL SULFATE 2.5; .5 MG/3ML; MG/3ML
3 SOLUTION RESPIRATORY (INHALATION) EVERY 4 HOURS PRN
Status: DISCONTINUED | OUTPATIENT
Start: 2019-07-26 | End: 2019-07-31 | Stop reason: HOSPADM

## 2019-07-26 RX ORDER — PANTOPRAZOLE SODIUM 40 MG/1
40 TABLET, DELAYED RELEASE ORAL EVERY MORNING
Status: DISCONTINUED | OUTPATIENT
Start: 2019-07-27 | End: 2019-07-31 | Stop reason: HOSPADM

## 2019-07-26 RX ORDER — SODIUM CHLORIDE 0.9 % (FLUSH) 0.9 %
3-10 SYRINGE (ML) INJECTION AS NEEDED
Status: DISCONTINUED | OUTPATIENT
Start: 2019-07-26 | End: 2019-07-31 | Stop reason: HOSPADM

## 2019-07-26 RX ORDER — ONDANSETRON 2 MG/ML
4 INJECTION INTRAMUSCULAR; INTRAVENOUS EVERY 6 HOURS PRN
Status: DISCONTINUED | OUTPATIENT
Start: 2019-07-26 | End: 2019-07-31 | Stop reason: HOSPADM

## 2019-07-26 RX ORDER — ROSUVASTATIN CALCIUM 20 MG/1
40 TABLET, COATED ORAL NIGHTLY
Status: DISCONTINUED | OUTPATIENT
Start: 2019-07-26 | End: 2019-07-31 | Stop reason: HOSPADM

## 2019-07-26 RX ORDER — ONDANSETRON 4 MG/1
4 TABLET, FILM COATED ORAL EVERY 6 HOURS PRN
Status: DISCONTINUED | OUTPATIENT
Start: 2019-07-26 | End: 2019-07-31 | Stop reason: HOSPADM

## 2019-07-26 RX ORDER — DEXTROSE MONOHYDRATE 25 G/50ML
25 INJECTION, SOLUTION INTRAVENOUS
Status: DISCONTINUED | OUTPATIENT
Start: 2019-07-26 | End: 2019-07-31 | Stop reason: HOSPADM

## 2019-07-26 RX ORDER — NITROGLYCERIN 0.4 MG/1
0.4 TABLET SUBLINGUAL
Status: DISCONTINUED | OUTPATIENT
Start: 2019-07-26 | End: 2019-07-31 | Stop reason: HOSPADM

## 2019-07-26 RX ADMIN — SODIUM CHLORIDE 500 ML: 9 INJECTION, SOLUTION INTRAVENOUS at 18:50

## 2019-07-26 RX ADMIN — MELATONIN TAB 5 MG 5 MG: 5 TAB at 23:09

## 2019-07-26 RX ADMIN — ROSUVASTATIN CALCIUM 40 MG: 20 TABLET, FILM COATED ORAL at 23:11

## 2019-07-26 RX ADMIN — SODIUM CHLORIDE, PRESERVATIVE FREE 3 ML: 5 INJECTION INTRAVENOUS at 23:09

## 2019-07-26 RX ADMIN — TEMAZEPAM 15 MG: 15 CAPSULE ORAL at 23:08

## 2019-07-26 RX ADMIN — SACUBITRIL AND VALSARTAN 1 TABLET: 49; 51 TABLET, FILM COATED ORAL at 23:08

## 2019-07-27 ENCOUNTER — APPOINTMENT (OUTPATIENT)
Dept: CARDIOLOGY | Facility: HOSPITAL | Age: 69
End: 2019-07-27

## 2019-07-27 LAB
ANION GAP SERPL CALCULATED.3IONS-SCNC: 11 MMOL/L (ref 5–15)
BASOPHILS # BLD AUTO: 0.04 10*3/MM3 (ref 0–0.2)
BASOPHILS NFR BLD AUTO: 0.8 % (ref 0–1.5)
BH CV ECHO MEAS - AO MAX PG (FULL): 6.5 MMHG
BH CV ECHO MEAS - AO MAX PG: 9.6 MMHG
BH CV ECHO MEAS - AO MEAN PG (FULL): 3.7 MMHG
BH CV ECHO MEAS - AO MEAN PG: 5.2 MMHG
BH CV ECHO MEAS - AO ROOT AREA (BSA CORRECTED): 1.3
BH CV ECHO MEAS - AO ROOT AREA: 5 CM^2
BH CV ECHO MEAS - AO ROOT DIAM: 2.5 CM
BH CV ECHO MEAS - AO V2 MAX: 155.3 CM/SEC
BH CV ECHO MEAS - AO V2 MEAN: 103.7 CM/SEC
BH CV ECHO MEAS - AO V2 VTI: 38.7 CM
BH CV ECHO MEAS - ASC AORTA: 2.9 CM
BH CV ECHO MEAS - AVA(I,A): 1.6 CM^2
BH CV ECHO MEAS - AVA(I,D): 1.6 CM^2
BH CV ECHO MEAS - AVA(V,A): 1.9 CM^2
BH CV ECHO MEAS - AVA(V,D): 1.9 CM^2
BH CV ECHO MEAS - BSA(HAYCOCK): 2.1 M^2
BH CV ECHO MEAS - BSA: 2 M^2
BH CV ECHO MEAS - BZI_BMI: 30.7 KILOGRAMS/M^2
BH CV ECHO MEAS - BZI_METRIC_HEIGHT: 170.2 CM
BH CV ECHO MEAS - BZI_METRIC_WEIGHT: 88.9 KG
BH CV ECHO MEAS - EDV(CUBED): 169.5 ML
BH CV ECHO MEAS - EDV(MOD-SP2): 206 ML
BH CV ECHO MEAS - EDV(MOD-SP4): 175 ML
BH CV ECHO MEAS - EDV(TEICH): 149.5 ML
BH CV ECHO MEAS - EF(CUBED): 66.5 %
BH CV ECHO MEAS - EF(MOD-BP): 47 %
BH CV ECHO MEAS - EF(MOD-SP2): 48.5 %
BH CV ECHO MEAS - EF(MOD-SP4): 46.9 %
BH CV ECHO MEAS - EF(TEICH): 57.4 %
BH CV ECHO MEAS - ESV(CUBED): 56.9 ML
BH CV ECHO MEAS - ESV(MOD-SP2): 106 ML
BH CV ECHO MEAS - ESV(MOD-SP4): 93 ML
BH CV ECHO MEAS - ESV(TEICH): 63.7 ML
BH CV ECHO MEAS - FS: 30.5 %
BH CV ECHO MEAS - IVS/LVPW: 1
BH CV ECHO MEAS - IVSD: 1.1 CM
BH CV ECHO MEAS - LA DIMENSION: 4.6 CM
BH CV ECHO MEAS - LA/AO: 1.8
BH CV ECHO MEAS - LAD MAJOR: 5.5 CM
BH CV ECHO MEAS - LAT PEAK E' VEL: 6.1 CM/SEC
BH CV ECHO MEAS - LATERAL E/E' RATIO: 11.5
BH CV ECHO MEAS - LV DIASTOLIC VOL/BSA (35-75): 87.3 ML/M^2
BH CV ECHO MEAS - LV MASS(C)D: 246.2 GRAMS
BH CV ECHO MEAS - LV MASS(C)DI: 122.8 GRAMS/M^2
BH CV ECHO MEAS - LV MAX PG: 3.1 MMHG
BH CV ECHO MEAS - LV MEAN PG: 1.4 MMHG
BH CV ECHO MEAS - LV SYSTOLIC VOL/BSA (12-30): 46.4 ML/M^2
BH CV ECHO MEAS - LV V1 MAX: 88.4 CM/SEC
BH CV ECHO MEAS - LV V1 MEAN: 53.7 CM/SEC
BH CV ECHO MEAS - LV V1 VTI: 18.9 CM
BH CV ECHO MEAS - LVIDD: 5.5 CM
BH CV ECHO MEAS - LVIDS: 3.8 CM
BH CV ECHO MEAS - LVLD AP2: 9.2 CM
BH CV ECHO MEAS - LVLD AP4: 8.5 CM
BH CV ECHO MEAS - LVLS AP2: 7.1 CM
BH CV ECHO MEAS - LVLS AP4: 7.7 CM
BH CV ECHO MEAS - LVOT AREA (M): 3.1 CM^2
BH CV ECHO MEAS - LVOT AREA: 3.3 CM^2
BH CV ECHO MEAS - LVOT DIAM: 2 CM
BH CV ECHO MEAS - LVPWD: 1.1 CM
BH CV ECHO MEAS - MED PEAK E' VEL: 4.2 CM/SEC
BH CV ECHO MEAS - MEDIAL E/E' RATIO: 9.7
BH CV ECHO MEAS - MV A MAX VEL: 90.3 CM/SEC
BH CV ECHO MEAS - MV DEC TIME: 0.26 SEC
BH CV ECHO MEAS - MV E MAX VEL: 72.1 CM/SEC
BH CV ECHO MEAS - MV E/A: 0.8
BH CV ECHO MEAS - PA ACC SLOPE: 924.6 CM/SEC^2
BH CV ECHO MEAS - PA ACC TIME: 0.09 SEC
BH CV ECHO MEAS - PA MAX PG: 4.5 MMHG
BH CV ECHO MEAS - PA PR(ACCEL): 40.2 MMHG
BH CV ECHO MEAS - PA V2 MAX: 105.9 CM/SEC
BH CV ECHO MEAS - RVDD: 2.3 CM
BH CV ECHO MEAS - SI(AO): 96.5 ML/M^2
BH CV ECHO MEAS - SI(CUBED): 56.2 ML/M^2
BH CV ECHO MEAS - SI(LVOT): 30.8 ML/M^2
BH CV ECHO MEAS - SI(MOD-SP2): 49.9 ML/M^2
BH CV ECHO MEAS - SI(MOD-SP4): 40.9 ML/M^2
BH CV ECHO MEAS - SI(TEICH): 42.8 ML/M^2
BH CV ECHO MEAS - SV(AO): 193.5 ML
BH CV ECHO MEAS - SV(CUBED): 112.6 ML
BH CV ECHO MEAS - SV(LVOT): 61.7 ML
BH CV ECHO MEAS - SV(MOD-SP2): 100 ML
BH CV ECHO MEAS - SV(MOD-SP4): 82 ML
BH CV ECHO MEAS - SV(TEICH): 85.8 ML
BH CV ECHO MEAS - TAPSE (>1.6): 3 CM2
BH CV ECHO MEAS - TV MAX PG: 1.1 MMHG
BH CV ECHO MEAS - TV V2 MAX: 53.3 CM/SEC
BH CV ECHO MEASUREMENTS AVERAGE E/E' RATIO: 14
BH CV XLRA - RV BASE: 2.4 CM
BH CV XLRA - RV LENGTH: 7.1 CM
BH CV XLRA - RV MID: 2.3 CM
BH CV XLRA - TDI S': 14.7 CM/SEC
BUN BLD-MCNC: 29 MG/DL (ref 8–23)
BUN/CREAT SERPL: 26.1 (ref 7–25)
CALCIUM SPEC-SCNC: 9 MG/DL (ref 8.6–10.5)
CHLORIDE SERPL-SCNC: 107 MMOL/L (ref 98–107)
CHOLEST SERPL-MCNC: 108 MG/DL (ref 0–200)
CO2 SERPL-SCNC: 23 MMOL/L (ref 22–29)
CREAT BLD-MCNC: 1.11 MG/DL (ref 0.76–1.27)
DEPRECATED RDW RBC AUTO: 48 FL (ref 37–54)
EOSINOPHIL # BLD AUTO: 0.16 10*3/MM3 (ref 0–0.4)
EOSINOPHIL NFR BLD AUTO: 3.4 % (ref 0.3–6.2)
ERYTHROCYTE [DISTWIDTH] IN BLOOD BY AUTOMATED COUNT: 14.6 % (ref 12.3–15.4)
GFR SERPL CREATININE-BSD FRML MDRD: 65 ML/MIN/1.73
GLUCOSE BLD-MCNC: 102 MG/DL (ref 65–99)
GLUCOSE BLDC GLUCOMTR-MCNC: 103 MG/DL (ref 70–130)
GLUCOSE BLDC GLUCOMTR-MCNC: 110 MG/DL (ref 70–130)
GLUCOSE BLDC GLUCOMTR-MCNC: 126 MG/DL (ref 70–130)
GLUCOSE BLDC GLUCOMTR-MCNC: 137 MG/DL (ref 70–130)
GLUCOSE BLDC GLUCOMTR-MCNC: 95 MG/DL (ref 70–130)
HBA1C MFR BLD: 6.2 % (ref 4.8–5.6)
HCT VFR BLD AUTO: 42.5 % (ref 37.5–51)
HDLC SERPL-MCNC: 30 MG/DL (ref 40–60)
HGB BLD-MCNC: 13.6 G/DL (ref 13–17.7)
IMM GRANULOCYTES # BLD AUTO: 0.02 10*3/MM3 (ref 0–0.05)
IMM GRANULOCYTES NFR BLD AUTO: 0.4 % (ref 0–0.5)
LDLC SERPL CALC-MCNC: 46 MG/DL (ref 0–100)
LDLC/HDLC SERPL: 1.54 {RATIO}
LEFT ATRIUM VOLUME INDEX: 28.5 ML/M2
LYMPHOCYTES # BLD AUTO: 1.16 10*3/MM3 (ref 0.7–3.1)
LYMPHOCYTES NFR BLD AUTO: 24.4 % (ref 19.6–45.3)
MAXIMAL PREDICTED HEART RATE: 150 BPM
MCH RBC QN AUTO: 28.9 PG (ref 26.6–33)
MCHC RBC AUTO-ENTMCNC: 32 G/DL (ref 31.5–35.7)
MCV RBC AUTO: 90.2 FL (ref 79–97)
MONOCYTES # BLD AUTO: 0.41 10*3/MM3 (ref 0.1–0.9)
MONOCYTES NFR BLD AUTO: 8.6 % (ref 5–12)
NEUTROPHILS # BLD AUTO: 2.97 10*3/MM3 (ref 1.7–7)
NEUTROPHILS NFR BLD AUTO: 62.4 % (ref 42.7–76)
NRBC BLD AUTO-RTO: 0 /100 WBC (ref 0–0.2)
PLATELET # BLD AUTO: 121 10*3/MM3 (ref 140–450)
PMV BLD AUTO: 13 FL (ref 6–12)
POTASSIUM BLD-SCNC: 3.9 MMOL/L (ref 3.5–5.2)
RBC # BLD AUTO: 4.71 10*6/MM3 (ref 4.14–5.8)
SODIUM BLD-SCNC: 141 MMOL/L (ref 136–145)
STRESS TARGET HR: 128 BPM
TRIGL SERPL-MCNC: 159 MG/DL (ref 0–150)
TROPONIN T SERPL-MCNC: <0.01 NG/ML (ref 0–0.03)
TROPONIN T SERPL-MCNC: <0.01 NG/ML (ref 0–0.03)
VLDLC SERPL-MCNC: 31.8 MG/DL
WBC NRBC COR # BLD: 4.76 10*3/MM3 (ref 3.4–10.8)

## 2019-07-27 PROCEDURE — 83036 HEMOGLOBIN GLYCOSYLATED A1C: CPT | Performed by: NURSE PRACTITIONER

## 2019-07-27 PROCEDURE — 93306 TTE W/DOPPLER COMPLETE: CPT | Performed by: INTERNAL MEDICINE

## 2019-07-27 PROCEDURE — 80048 BASIC METABOLIC PNL TOTAL CA: CPT | Performed by: NURSE PRACTITIONER

## 2019-07-27 PROCEDURE — 84484 ASSAY OF TROPONIN QUANT: CPT | Performed by: NURSE PRACTITIONER

## 2019-07-27 PROCEDURE — 93005 ELECTROCARDIOGRAM TRACING: CPT | Performed by: NURSE PRACTITIONER

## 2019-07-27 PROCEDURE — 93880 EXTRACRANIAL BILAT STUDY: CPT

## 2019-07-27 PROCEDURE — 25010000002 SULFUR HEXAFLUORIDE MICROSPH 60.7-25 MG RECONSTITUTED SUSPENSION: Performed by: NURSE PRACTITIONER

## 2019-07-27 PROCEDURE — 93306 TTE W/DOPPLER COMPLETE: CPT

## 2019-07-27 PROCEDURE — 99233 SBSQ HOSP IP/OBS HIGH 50: CPT | Performed by: INTERNAL MEDICINE

## 2019-07-27 PROCEDURE — 82962 GLUCOSE BLOOD TEST: CPT

## 2019-07-27 PROCEDURE — 25010000002 ENOXAPARIN PER 10 MG: Performed by: NURSE PRACTITIONER

## 2019-07-27 PROCEDURE — 93010 ELECTROCARDIOGRAM REPORT: CPT | Performed by: INTERNAL MEDICINE

## 2019-07-27 PROCEDURE — 80061 LIPID PANEL: CPT | Performed by: NURSE PRACTITIONER

## 2019-07-27 PROCEDURE — 99222 1ST HOSP IP/OBS MODERATE 55: CPT | Performed by: INTERNAL MEDICINE

## 2019-07-27 PROCEDURE — 85025 COMPLETE CBC W/AUTO DIFF WBC: CPT | Performed by: NURSE PRACTITIONER

## 2019-07-27 RX ORDER — NICOTINE 21 MG/24HR
1 PATCH, TRANSDERMAL 24 HOURS TRANSDERMAL
Status: DISCONTINUED | OUTPATIENT
Start: 2019-07-27 | End: 2019-07-31 | Stop reason: HOSPADM

## 2019-07-27 RX ORDER — ACETAMINOPHEN 325 MG/1
650 TABLET ORAL EVERY 6 HOURS PRN
Status: DISCONTINUED | OUTPATIENT
Start: 2019-07-27 | End: 2019-07-31 | Stop reason: HOSPADM

## 2019-07-27 RX ADMIN — NICOTINE 1 PATCH: 21 PATCH, EXTENDED RELEASE TRANSDERMAL at 18:44

## 2019-07-27 RX ADMIN — SACUBITRIL AND VALSARTAN 1 TABLET: 49; 51 TABLET, FILM COATED ORAL at 21:41

## 2019-07-27 RX ADMIN — SODIUM CHLORIDE, PRESERVATIVE FREE 3 ML: 5 INJECTION INTRAVENOUS at 21:42

## 2019-07-27 RX ADMIN — MELATONIN TAB 5 MG 5 MG: 5 TAB at 21:42

## 2019-07-27 RX ADMIN — SULFUR HEXAFLUORIDE 2 ML: KIT at 10:00

## 2019-07-27 RX ADMIN — TEMAZEPAM 15 MG: 15 CAPSULE ORAL at 21:41

## 2019-07-27 RX ADMIN — ONDANSETRON HYDROCHLORIDE 4 MG: 4 TABLET, FILM COATED ORAL at 19:43

## 2019-07-27 RX ADMIN — ENOXAPARIN SODIUM 40 MG: 40 INJECTION SUBCUTANEOUS at 10:00

## 2019-07-27 RX ADMIN — PANTOPRAZOLE SODIUM 40 MG: 40 TABLET, DELAYED RELEASE ORAL at 06:23

## 2019-07-27 RX ADMIN — ROSUVASTATIN CALCIUM 40 MG: 20 TABLET, FILM COATED ORAL at 21:41

## 2019-07-27 RX ADMIN — SACUBITRIL AND VALSARTAN 1 TABLET: 49; 51 TABLET, FILM COATED ORAL at 09:59

## 2019-07-27 RX ADMIN — FUROSEMIDE 10 MG: 20 TABLET ORAL at 10:00

## 2019-07-27 RX ADMIN — ACETAMINOPHEN 650 MG: 325 TABLET, FILM COATED ORAL at 18:44

## 2019-07-27 RX ADMIN — SODIUM CHLORIDE, PRESERVATIVE FREE 3 ML: 5 INJECTION INTRAVENOUS at 10:02

## 2019-07-27 RX ADMIN — CLOPIDOGREL BISULFATE 75 MG: 75 TABLET ORAL at 09:59

## 2019-07-27 RX ADMIN — ASPIRIN 81 MG: 81 TABLET, COATED ORAL at 06:23

## 2019-07-28 ENCOUNTER — APPOINTMENT (OUTPATIENT)
Dept: CT IMAGING | Facility: HOSPITAL | Age: 69
End: 2019-07-28

## 2019-07-28 LAB
ANION GAP SERPL CALCULATED.3IONS-SCNC: 13 MMOL/L (ref 5–15)
BUN BLD-MCNC: 22 MG/DL (ref 8–23)
BUN/CREAT SERPL: 18.3 (ref 7–25)
CALCIUM SPEC-SCNC: 8.7 MG/DL (ref 8.6–10.5)
CHLORIDE SERPL-SCNC: 106 MMOL/L (ref 98–107)
CO2 SERPL-SCNC: 23 MMOL/L (ref 22–29)
CREAT BLD-MCNC: 1.2 MG/DL (ref 0.76–1.27)
GFR SERPL CREATININE-BSD FRML MDRD: 60 ML/MIN/1.73
GLUCOSE BLD-MCNC: 120 MG/DL (ref 65–99)
GLUCOSE BLDC GLUCOMTR-MCNC: 108 MG/DL (ref 70–130)
GLUCOSE BLDC GLUCOMTR-MCNC: 115 MG/DL (ref 70–130)
GLUCOSE BLDC GLUCOMTR-MCNC: 92 MG/DL (ref 70–130)
GLUCOSE BLDC GLUCOMTR-MCNC: 98 MG/DL (ref 70–130)
POTASSIUM BLD-SCNC: 3.9 MMOL/L (ref 3.5–5.2)
SODIUM BLD-SCNC: 142 MMOL/L (ref 136–145)

## 2019-07-28 PROCEDURE — 82962 GLUCOSE BLOOD TEST: CPT

## 2019-07-28 PROCEDURE — 70498 CT ANGIOGRAPHY NECK: CPT

## 2019-07-28 PROCEDURE — 99221 1ST HOSP IP/OBS SF/LOW 40: CPT | Performed by: PSYCHIATRY & NEUROLOGY

## 2019-07-28 PROCEDURE — 70496 CT ANGIOGRAPHY HEAD: CPT

## 2019-07-28 PROCEDURE — 99232 SBSQ HOSP IP/OBS MODERATE 35: CPT | Performed by: INTERNAL MEDICINE

## 2019-07-28 PROCEDURE — 80048 BASIC METABOLIC PNL TOTAL CA: CPT | Performed by: INTERNAL MEDICINE

## 2019-07-28 PROCEDURE — 0 IOPAMIDOL PER 1 ML: Performed by: INTERNAL MEDICINE

## 2019-07-28 PROCEDURE — 25010000002 ENOXAPARIN PER 10 MG: Performed by: NURSE PRACTITIONER

## 2019-07-28 RX ADMIN — SODIUM CHLORIDE, PRESERVATIVE FREE 3 ML: 5 INJECTION INTRAVENOUS at 09:24

## 2019-07-28 RX ADMIN — ROSUVASTATIN CALCIUM 40 MG: 20 TABLET, FILM COATED ORAL at 21:56

## 2019-07-28 RX ADMIN — CLOPIDOGREL BISULFATE 75 MG: 75 TABLET ORAL at 09:20

## 2019-07-28 RX ADMIN — SODIUM CHLORIDE, PRESERVATIVE FREE 3 ML: 5 INJECTION INTRAVENOUS at 21:57

## 2019-07-28 RX ADMIN — TEMAZEPAM 15 MG: 15 CAPSULE ORAL at 21:56

## 2019-07-28 RX ADMIN — SACUBITRIL AND VALSARTAN 1 TABLET: 49; 51 TABLET, FILM COATED ORAL at 21:56

## 2019-07-28 RX ADMIN — IOPAMIDOL 75 ML: 755 INJECTION, SOLUTION INTRAVENOUS at 16:26

## 2019-07-28 RX ADMIN — FUROSEMIDE 10 MG: 20 TABLET ORAL at 09:21

## 2019-07-28 RX ADMIN — ASPIRIN 81 MG: 81 TABLET, COATED ORAL at 06:12

## 2019-07-28 RX ADMIN — MELATONIN TAB 5 MG 5 MG: 5 TAB at 21:56

## 2019-07-28 RX ADMIN — ENOXAPARIN SODIUM 40 MG: 40 INJECTION SUBCUTANEOUS at 09:21

## 2019-07-28 RX ADMIN — PANTOPRAZOLE SODIUM 40 MG: 40 TABLET, DELAYED RELEASE ORAL at 06:12

## 2019-07-28 RX ADMIN — SACUBITRIL AND VALSARTAN 1 TABLET: 49; 51 TABLET, FILM COATED ORAL at 09:20

## 2019-07-29 ENCOUNTER — APPOINTMENT (OUTPATIENT)
Dept: CARDIOLOGY | Facility: HOSPITAL | Age: 69
End: 2019-07-29

## 2019-07-29 LAB
ANION GAP SERPL CALCULATED.3IONS-SCNC: 11 MMOL/L (ref 5–15)
BH CV XLRA MEAS LEFT CCA RATIO VEL: 65.6 CM/SEC
BH CV XLRA MEAS LEFT DIST CCA EDV: 14.9 CM/SEC
BH CV XLRA MEAS LEFT DIST CCA PSV: 51.3 CM/SEC
BH CV XLRA MEAS LEFT DIST ICA EDV: 59.1 CM/SEC
BH CV XLRA MEAS LEFT DIST ICA PSV: 135.8 CM/SEC
BH CV XLRA MEAS LEFT ICA RATIO VEL: 278 CM/SEC
BH CV XLRA MEAS LEFT ICA/CCA RATIO: 4.2
BH CV XLRA MEAS LEFT MID CCA EDV: 18.7 CM/SEC
BH CV XLRA MEAS LEFT MID CCA PSV: 66.2 CM/SEC
BH CV XLRA MEAS LEFT MID ICA EDV: 111.2 CM/SEC
BH CV XLRA MEAS LEFT MID ICA PSV: 280.4 CM/SEC
BH CV XLRA MEAS LEFT PROX CCA EDV: 18.7 CM/SEC
BH CV XLRA MEAS LEFT PROX CCA PSV: 80.5 CM/SEC
BH CV XLRA MEAS LEFT PROX ECA EDV: 16.8 CM/SEC
BH CV XLRA MEAS LEFT PROX ECA PSV: 217.8 CM/SEC
BH CV XLRA MEAS LEFT PROX ICA EDV: 21.5 CM/SEC
BH CV XLRA MEAS LEFT PROX ICA PSV: 64.5 CM/SEC
BH CV XLRA MEAS LEFT PROX SCLA PSV: 99.3 CM/SEC
BH CV XLRA MEAS LEFT VERTEBRAL A EDV: 24.7 CM/SEC
BH CV XLRA MEAS LEFT VERTEBRAL A PSV: 79.7 CM/SEC
BH CV XLRA MEAS RIGHT CCA RATIO VEL: 90.4 CM/SEC
BH CV XLRA MEAS RIGHT DIST CCA EDV: 13.8 CM/SEC
BH CV XLRA MEAS RIGHT DIST CCA PSV: 60.7 CM/SEC
BH CV XLRA MEAS RIGHT DIST ICA EDV: 56.7 CM/SEC
BH CV XLRA MEAS RIGHT DIST ICA PSV: 136.2 CM/SEC
BH CV XLRA MEAS RIGHT ICA RATIO VEL: 199 CM/SEC
BH CV XLRA MEAS RIGHT ICA/CCA RATIO: 2.2
BH CV XLRA MEAS RIGHT MID CCA EDV: 13.2 CM/SEC
BH CV XLRA MEAS RIGHT MID CCA PSV: 91 CM/SEC
BH CV XLRA MEAS RIGHT MID ICA EDV: 88.4 CM/SEC
BH CV XLRA MEAS RIGHT MID ICA PSV: 200.4 CM/SEC
BH CV XLRA MEAS RIGHT PROX CCA EDV: 13.2 CM/SEC
BH CV XLRA MEAS RIGHT PROX CCA PSV: 82.2 CM/SEC
BH CV XLRA MEAS RIGHT PROX ECA EDV: 14.3 CM/SEC
BH CV XLRA MEAS RIGHT PROX ECA PSV: 103.7 CM/SEC
BH CV XLRA MEAS RIGHT PROX ICA EDV: 20.4 CM/SEC
BH CV XLRA MEAS RIGHT PROX ICA PSV: 61.2 CM/SEC
BH CV XLRA MEAS RIGHT PROX SCLA PSV: 62.3 CM/SEC
BH CV XLRA MEAS RIGHT VERTEBRAL A EDV: 16 CM/SEC
BH CV XLRA MEAS RIGHT VERTEBRAL A PSV: 43.6 CM/SEC
BUN BLD-MCNC: 23 MG/DL (ref 8–23)
BUN/CREAT SERPL: 18.4 (ref 7–25)
CALCIUM SPEC-SCNC: 9.2 MG/DL (ref 8.6–10.5)
CHLORIDE SERPL-SCNC: 106 MMOL/L (ref 98–107)
CO2 SERPL-SCNC: 25 MMOL/L (ref 22–29)
CREAT BLD-MCNC: 1.25 MG/DL (ref 0.76–1.27)
DEPRECATED RDW RBC AUTO: 48.9 FL (ref 37–54)
ERYTHROCYTE [DISTWIDTH] IN BLOOD BY AUTOMATED COUNT: 14.5 % (ref 12.3–15.4)
GFR SERPL CREATININE-BSD FRML MDRD: 57 ML/MIN/1.73
GLUCOSE BLD-MCNC: 103 MG/DL (ref 65–99)
GLUCOSE BLDC GLUCOMTR-MCNC: 101 MG/DL (ref 70–130)
GLUCOSE BLDC GLUCOMTR-MCNC: 102 MG/DL (ref 70–130)
GLUCOSE BLDC GLUCOMTR-MCNC: 114 MG/DL (ref 70–130)
GLUCOSE BLDC GLUCOMTR-MCNC: 121 MG/DL (ref 70–130)
HCT VFR BLD AUTO: 43.8 % (ref 37.5–51)
HGB BLD-MCNC: 13.9 G/DL (ref 13–17.7)
MAGNESIUM SERPL-MCNC: 2.1 MG/DL (ref 1.6–2.4)
MCH RBC QN AUTO: 28.8 PG (ref 26.6–33)
MCHC RBC AUTO-ENTMCNC: 31.7 G/DL (ref 31.5–35.7)
MCV RBC AUTO: 90.9 FL (ref 79–97)
PLATELET # BLD AUTO: 131 10*3/MM3 (ref 140–450)
PMV BLD AUTO: 12.7 FL (ref 6–12)
POTASSIUM BLD-SCNC: 4.2 MMOL/L (ref 3.5–5.2)
RBC # BLD AUTO: 4.82 10*6/MM3 (ref 4.14–5.8)
SODIUM BLD-SCNC: 142 MMOL/L (ref 136–145)
TROPONIN T SERPL-MCNC: <0.01 NG/ML (ref 0–0.03)
WBC NRBC COR # BLD: 5.61 10*3/MM3 (ref 3.4–10.8)

## 2019-07-29 PROCEDURE — 36223 PLACE CATH CAROTID/INOM ART: CPT | Performed by: RADIOLOGY

## 2019-07-29 PROCEDURE — 25010000002 AMIODARONE IN DEXTROSE 5% 150-4.21 MG/100ML-% SOLUTION: Performed by: INTERNAL MEDICINE

## 2019-07-29 PROCEDURE — 25010000002 ENOXAPARIN PER 10 MG: Performed by: NURSE PRACTITIONER

## 2019-07-29 PROCEDURE — 80048 BASIC METABOLIC PNL TOTAL CA: CPT | Performed by: INTERNAL MEDICINE

## 2019-07-29 PROCEDURE — 85027 COMPLETE CBC AUTOMATED: CPT | Performed by: INTERNAL MEDICINE

## 2019-07-29 PROCEDURE — C1725 CATH, TRANSLUMIN NON-LASER: HCPCS | Performed by: INTERNAL MEDICINE

## 2019-07-29 PROCEDURE — 82962 GLUCOSE BLOOD TEST: CPT

## 2019-07-29 PROCEDURE — 25010000002 HEPARIN (PORCINE) PER 1000 UNITS: Performed by: INTERNAL MEDICINE

## 2019-07-29 PROCEDURE — 93005 ELECTROCARDIOGRAM TRACING: CPT | Performed by: INTERNAL MEDICINE

## 2019-07-29 PROCEDURE — B240ZZ3 ULTRASONOGRAPHY OF SINGLE CORONARY ARTERY, INTRAVASCULAR: ICD-10-PCS | Performed by: RADIOLOGY

## 2019-07-29 PROCEDURE — 83735 ASSAY OF MAGNESIUM: CPT | Performed by: INTERNAL MEDICINE

## 2019-07-29 PROCEDURE — B2151ZZ FLUOROSCOPY OF LEFT HEART USING LOW OSMOLAR CONTRAST: ICD-10-PCS | Performed by: RADIOLOGY

## 2019-07-29 PROCEDURE — 93010 ELECTROCARDIOGRAM REPORT: CPT | Performed by: INTERNAL MEDICINE

## 2019-07-29 PROCEDURE — 93458 L HRT ARTERY/VENTRICLE ANGIO: CPT | Performed by: INTERNAL MEDICINE

## 2019-07-29 PROCEDURE — 84484 ASSAY OF TROPONIN QUANT: CPT | Performed by: INTERNAL MEDICINE

## 2019-07-29 PROCEDURE — 36225 PLACE CATH SUBCLAVIAN ART: CPT | Performed by: RADIOLOGY

## 2019-07-29 PROCEDURE — 25010000002 FENTANYL CITRATE (PF) 100 MCG/2ML SOLUTION: Performed by: INTERNAL MEDICINE

## 2019-07-29 PROCEDURE — 25010000003 LIDOCAINE 1 % SOLUTION: Performed by: INTERNAL MEDICINE

## 2019-07-29 PROCEDURE — B2111ZZ FLUOROSCOPY OF MULTIPLE CORONARY ARTERIES USING LOW OSMOLAR CONTRAST: ICD-10-PCS | Performed by: RADIOLOGY

## 2019-07-29 PROCEDURE — 92978 ENDOLUMINL IVUS OCT C 1ST: CPT | Performed by: INTERNAL MEDICINE

## 2019-07-29 PROCEDURE — 0 IODIXANOL PER 1 ML: Performed by: INTERNAL MEDICINE

## 2019-07-29 PROCEDURE — C1874 STENT, COATED/COV W/DEL SYS: HCPCS | Performed by: INTERNAL MEDICINE

## 2019-07-29 PROCEDURE — 99221 1ST HOSP IP/OBS SF/LOW 40: CPT | Performed by: PHYSICIAN ASSISTANT

## 2019-07-29 PROCEDURE — 25010000002 AMIODARONE IN DEXTROSE 5% 360-4.14 MG/200ML-% SOLUTION: Performed by: INTERNAL MEDICINE

## 2019-07-29 PROCEDURE — C1769 GUIDE WIRE: HCPCS | Performed by: INTERNAL MEDICINE

## 2019-07-29 PROCEDURE — 99231 SBSQ HOSP IP/OBS SF/LOW 25: CPT | Performed by: PSYCHIATRY & NEUROLOGY

## 2019-07-29 PROCEDURE — 027034Z DILATION OF CORONARY ARTERY, ONE ARTERY WITH DRUG-ELUTING INTRALUMINAL DEVICE, PERCUTANEOUS APPROACH: ICD-10-PCS | Performed by: RADIOLOGY

## 2019-07-29 PROCEDURE — C9600 PERC DRUG-EL COR STENT SING: HCPCS | Performed by: INTERNAL MEDICINE

## 2019-07-29 PROCEDURE — 99233 SBSQ HOSP IP/OBS HIGH 50: CPT | Performed by: INTERNAL MEDICINE

## 2019-07-29 PROCEDURE — 25010000002 MIDAZOLAM PER 1 MG: Performed by: INTERNAL MEDICINE

## 2019-07-29 PROCEDURE — C1753 CATH, INTRAVAS ULTRASOUND: HCPCS | Performed by: INTERNAL MEDICINE

## 2019-07-29 PROCEDURE — 4A023N7 MEASUREMENT OF CARDIAC SAMPLING AND PRESSURE, LEFT HEART, PERCUTANEOUS APPROACH: ICD-10-PCS | Performed by: RADIOLOGY

## 2019-07-29 PROCEDURE — C1887 CATHETER, GUIDING: HCPCS | Performed by: INTERNAL MEDICINE

## 2019-07-29 PROCEDURE — 25010000002 PHENYLEPHRINE PER 1 ML: Performed by: INTERNAL MEDICINE

## 2019-07-29 PROCEDURE — 25010000002 BIVALIRUDIN TRIFLUOROACETATE 250 MG RECONSTITUTED SOLUTION 1 EACH VIAL: Performed by: INTERNAL MEDICINE

## 2019-07-29 PROCEDURE — 0 IOPAMIDOL PER 1 ML: Performed by: INTERNAL MEDICINE

## 2019-07-29 PROCEDURE — C1894 INTRO/SHEATH, NON-LASER: HCPCS | Performed by: INTERNAL MEDICINE

## 2019-07-29 DEVICE — XIENCE SIERRA™ EVEROLIMUS ELUTING CORONARY STENT SYSTEM 2.50 MM X 08 MM / RAPID-EXCHANGE
Type: IMPLANTABLE DEVICE | Status: FUNCTIONAL
Brand: XIENCE SIERRA™

## 2019-07-29 RX ORDER — ALPRAZOLAM 0.25 MG/1
0.25 TABLET ORAL 3 TIMES DAILY PRN
Status: DISCONTINUED | OUTPATIENT
Start: 2019-07-29 | End: 2019-07-31 | Stop reason: HOSPADM

## 2019-07-29 RX ORDER — IODIXANOL 320 MG/ML
INJECTION, SOLUTION INTRAVASCULAR AS NEEDED
Status: DISCONTINUED | OUTPATIENT
Start: 2019-07-29 | End: 2019-07-29 | Stop reason: HOSPADM

## 2019-07-29 RX ORDER — TEMAZEPAM 7.5 MG/1
7.5 CAPSULE ORAL NIGHTLY PRN
Status: DISCONTINUED | OUTPATIENT
Start: 2019-07-29 | End: 2019-07-31 | Stop reason: HOSPADM

## 2019-07-29 RX ORDER — HYDROCODONE BITARTRATE AND ACETAMINOPHEN 5; 325 MG/1; MG/1
1 TABLET ORAL EVERY 4 HOURS PRN
Status: DISCONTINUED | OUTPATIENT
Start: 2019-07-29 | End: 2019-07-31 | Stop reason: HOSPADM

## 2019-07-29 RX ORDER — MIDAZOLAM HYDROCHLORIDE 1 MG/ML
INJECTION INTRAMUSCULAR; INTRAVENOUS AS NEEDED
Status: DISCONTINUED | OUTPATIENT
Start: 2019-07-29 | End: 2019-07-29 | Stop reason: HOSPADM

## 2019-07-29 RX ORDER — ACETAMINOPHEN 325 MG/1
650 TABLET ORAL EVERY 4 HOURS PRN
Status: DISCONTINUED | OUTPATIENT
Start: 2019-07-29 | End: 2019-07-31 | Stop reason: HOSPADM

## 2019-07-29 RX ORDER — LIDOCAINE HYDROCHLORIDE 10 MG/ML
INJECTION, SOLUTION INFILTRATION; PERINEURAL AS NEEDED
Status: DISCONTINUED | OUTPATIENT
Start: 2019-07-29 | End: 2019-07-29 | Stop reason: HOSPADM

## 2019-07-29 RX ORDER — FENTANYL CITRATE 50 UG/ML
INJECTION, SOLUTION INTRAMUSCULAR; INTRAVENOUS AS NEEDED
Status: DISCONTINUED | OUTPATIENT
Start: 2019-07-29 | End: 2019-07-29 | Stop reason: HOSPADM

## 2019-07-29 RX ADMIN — SACUBITRIL AND VALSARTAN 1 TABLET: 49; 51 TABLET, FILM COATED ORAL at 20:19

## 2019-07-29 RX ADMIN — AMIODARONE HYDROCHLORIDE 1 MG/MIN: 1.8 INJECTION, SOLUTION INTRAVENOUS at 20:19

## 2019-07-29 RX ADMIN — CLOPIDOGREL BISULFATE 75 MG: 75 TABLET ORAL at 08:29

## 2019-07-29 RX ADMIN — ROSUVASTATIN CALCIUM 40 MG: 20 TABLET, FILM COATED ORAL at 20:19

## 2019-07-29 RX ADMIN — PANTOPRAZOLE SODIUM 40 MG: 40 TABLET, DELAYED RELEASE ORAL at 08:29

## 2019-07-29 RX ADMIN — ASPIRIN 81 MG: 81 TABLET, COATED ORAL at 08:29

## 2019-07-29 RX ADMIN — AMIODARONE HYDROCHLORIDE 150 MG: 1.5 INJECTION, SOLUTION INTRAVENOUS at 20:19

## 2019-07-29 RX ADMIN — FUROSEMIDE 10 MG: 20 TABLET ORAL at 08:29

## 2019-07-29 RX ADMIN — MELATONIN TAB 5 MG 5 MG: 5 TAB at 20:19

## 2019-07-30 PROBLEM — I47.20 VT (VENTRICULAR TACHYCARDIA) (HCC): Status: ACTIVE | Noted: 2019-07-26

## 2019-07-30 LAB
ANION GAP SERPL CALCULATED.3IONS-SCNC: 13 MMOL/L (ref 5–15)
BUN BLD-MCNC: 23 MG/DL (ref 8–23)
BUN/CREAT SERPL: 21.7 (ref 7–25)
CALCIUM SPEC-SCNC: 8.9 MG/DL (ref 8.6–10.5)
CHLORIDE SERPL-SCNC: 104 MMOL/L (ref 98–107)
CO2 SERPL-SCNC: 23 MMOL/L (ref 22–29)
CREAT BLD-MCNC: 1.06 MG/DL (ref 0.76–1.27)
DEPRECATED RDW RBC AUTO: 48.3 FL (ref 37–54)
ERYTHROCYTE [DISTWIDTH] IN BLOOD BY AUTOMATED COUNT: 14.6 % (ref 12.3–15.4)
GFR SERPL CREATININE-BSD FRML MDRD: 69 ML/MIN/1.73
GLUCOSE BLD-MCNC: 100 MG/DL (ref 65–99)
GLUCOSE BLDC GLUCOMTR-MCNC: 108 MG/DL (ref 70–130)
GLUCOSE BLDC GLUCOMTR-MCNC: 118 MG/DL (ref 70–130)
GLUCOSE BLDC GLUCOMTR-MCNC: 207 MG/DL (ref 70–130)
GLUCOSE BLDC GLUCOMTR-MCNC: 78 MG/DL (ref 70–130)
GLUCOSE BLDC GLUCOMTR-MCNC: 97 MG/DL (ref 70–130)
HCT VFR BLD AUTO: 41.3 % (ref 37.5–51)
HGB BLD-MCNC: 13.5 G/DL (ref 13–17.7)
MCH RBC QN AUTO: 29.5 PG (ref 26.6–33)
MCHC RBC AUTO-ENTMCNC: 32.7 G/DL (ref 31.5–35.7)
MCV RBC AUTO: 90.2 FL (ref 79–97)
PLATELET # BLD AUTO: 114 10*3/MM3 (ref 140–450)
PMV BLD AUTO: 13.7 FL (ref 6–12)
POTASSIUM BLD-SCNC: 4.2 MMOL/L (ref 3.5–5.2)
RBC # BLD AUTO: 4.58 10*6/MM3 (ref 4.14–5.8)
SODIUM BLD-SCNC: 140 MMOL/L (ref 136–145)
WBC NRBC COR # BLD: 5.84 10*3/MM3 (ref 3.4–10.8)

## 2019-07-30 PROCEDURE — 25010000002 AMIODARONE IN DEXTROSE 5% 360-4.14 MG/200ML-% SOLUTION: Performed by: INTERNAL MEDICINE

## 2019-07-30 PROCEDURE — 33249 INSJ/RPLCMT DEFIB W/LEAD(S): CPT | Performed by: INTERNAL MEDICINE

## 2019-07-30 PROCEDURE — 99232 SBSQ HOSP IP/OBS MODERATE 35: CPT | Performed by: RADIOLOGY

## 2019-07-30 PROCEDURE — 25010000002 MIDAZOLAM PER 1 MG: Performed by: INTERNAL MEDICINE

## 2019-07-30 PROCEDURE — 25010000002 ONDANSETRON PER 1 MG: Performed by: INTERNAL MEDICINE

## 2019-07-30 PROCEDURE — 82962 GLUCOSE BLOOD TEST: CPT

## 2019-07-30 PROCEDURE — 99152 MOD SED SAME PHYS/QHP 5/>YRS: CPT | Performed by: INTERNAL MEDICINE

## 2019-07-30 PROCEDURE — 93641 EP EVL 1/2CHMB PAC CVDFB TST: CPT | Performed by: INTERNAL MEDICINE

## 2019-07-30 PROCEDURE — 25010000003 LIDOCAINE 1 % SOLUTION: Performed by: INTERNAL MEDICINE

## 2019-07-30 PROCEDURE — 25010000002 VANCOMYCIN: Performed by: INTERNAL MEDICINE

## 2019-07-30 PROCEDURE — 25010000002 FENTANYL CITRATE (PF) 100 MCG/2ML SOLUTION: Performed by: INTERNAL MEDICINE

## 2019-07-30 PROCEDURE — C1721 AICD, DUAL CHAMBER: HCPCS | Performed by: INTERNAL MEDICINE

## 2019-07-30 PROCEDURE — 02H63KZ INSERTION OF DEFIBRILLATOR LEAD INTO RIGHT ATRIUM, PERCUTANEOUS APPROACH: ICD-10-PCS | Performed by: INTERNAL MEDICINE

## 2019-07-30 PROCEDURE — 02HK3KZ INSERTION OF DEFIBRILLATOR LEAD INTO RIGHT VENTRICLE, PERCUTANEOUS APPROACH: ICD-10-PCS | Performed by: INTERNAL MEDICINE

## 2019-07-30 PROCEDURE — 85027 COMPLETE CBC AUTOMATED: CPT | Performed by: INTERNAL MEDICINE

## 2019-07-30 PROCEDURE — 99222 1ST HOSP IP/OBS MODERATE 55: CPT | Performed by: INTERNAL MEDICINE

## 2019-07-30 PROCEDURE — 0JH608Z INSERTION OF DEFIBRILLATOR GENERATOR INTO CHEST SUBCUTANEOUS TISSUE AND FASCIA, OPEN APPROACH: ICD-10-PCS | Performed by: INTERNAL MEDICINE

## 2019-07-30 PROCEDURE — 99231 SBSQ HOSP IP/OBS SF/LOW 25: CPT | Performed by: PSYCHIATRY & NEUROLOGY

## 2019-07-30 PROCEDURE — 99233 SBSQ HOSP IP/OBS HIGH 50: CPT | Performed by: HOSPITALIST

## 2019-07-30 PROCEDURE — C1892 INTRO/SHEATH,FIXED,PEEL-AWAY: HCPCS | Performed by: INTERNAL MEDICINE

## 2019-07-30 PROCEDURE — 80048 BASIC METABOLIC PNL TOTAL CA: CPT | Performed by: INTERNAL MEDICINE

## 2019-07-30 PROCEDURE — C1898 LEAD, PMKR, OTHER THAN TRANS: HCPCS | Performed by: INTERNAL MEDICINE

## 2019-07-30 PROCEDURE — C1777 LEAD, AICD, ENDO SINGLE COIL: HCPCS | Performed by: INTERNAL MEDICINE

## 2019-07-30 PROCEDURE — 99153 MOD SED SAME PHYS/QHP EA: CPT | Performed by: INTERNAL MEDICINE

## 2019-07-30 DEVICE — ICD FORTIFY ASSURA NEXTGEN DR DF4 CONN: Type: IMPLANTABLE DEVICE | Status: FUNCTIONAL

## 2019-07-30 DEVICE — LD DEFIB DURATA SJ4 58CM 7122Q58: Type: IMPLANTABLE DEVICE | Status: FUNCTIONAL

## 2019-07-30 DEVICE — LD PM TENDRIL STS 6F52CM 2088TC52: Type: IMPLANTABLE DEVICE | Status: FUNCTIONAL

## 2019-07-30 RX ORDER — ONDANSETRON 2 MG/ML
INJECTION INTRAMUSCULAR; INTRAVENOUS AS NEEDED
Status: DISCONTINUED | OUTPATIENT
Start: 2019-07-30 | End: 2019-07-30 | Stop reason: HOSPADM

## 2019-07-30 RX ORDER — FENTANYL CITRATE 50 UG/ML
INJECTION, SOLUTION INTRAMUSCULAR; INTRAVENOUS AS NEEDED
Status: DISCONTINUED | OUTPATIENT
Start: 2019-07-30 | End: 2019-07-30 | Stop reason: HOSPADM

## 2019-07-30 RX ORDER — SODIUM CHLORIDE 0.9 % (FLUSH) 0.9 %
3-10 SYRINGE (ML) INJECTION AS NEEDED
Status: DISCONTINUED | OUTPATIENT
Start: 2019-07-30 | End: 2019-07-31 | Stop reason: HOSPADM

## 2019-07-30 RX ORDER — SODIUM CHLORIDE 0.9 % (FLUSH) 0.9 %
3 SYRINGE (ML) INJECTION EVERY 12 HOURS SCHEDULED
Status: DISCONTINUED | OUTPATIENT
Start: 2019-07-30 | End: 2019-07-31 | Stop reason: HOSPADM

## 2019-07-30 RX ORDER — CARVEDILOL 6.25 MG/1
6.25 TABLET ORAL 2 TIMES DAILY WITH MEALS
Status: DISCONTINUED | OUTPATIENT
Start: 2019-07-30 | End: 2019-07-31 | Stop reason: HOSPADM

## 2019-07-30 RX ORDER — LIDOCAINE HYDROCHLORIDE 10 MG/ML
INJECTION, SOLUTION INFILTRATION; PERINEURAL AS NEEDED
Status: DISCONTINUED | OUTPATIENT
Start: 2019-07-30 | End: 2019-07-30 | Stop reason: HOSPADM

## 2019-07-30 RX ORDER — ACETAMINOPHEN 325 MG/1
650 TABLET ORAL EVERY 4 HOURS PRN
Status: DISCONTINUED | OUTPATIENT
Start: 2019-07-30 | End: 2019-07-31 | Stop reason: HOSPADM

## 2019-07-30 RX ORDER — BUPIVACAINE HYDROCHLORIDE 5 MG/ML
INJECTION, SOLUTION EPIDURAL; INTRACAUDAL AS NEEDED
Status: DISCONTINUED | OUTPATIENT
Start: 2019-07-30 | End: 2019-07-30 | Stop reason: HOSPADM

## 2019-07-30 RX ORDER — PROMETHAZINE HYDROCHLORIDE 25 MG/ML
12.5 INJECTION, SOLUTION INTRAMUSCULAR; INTRAVENOUS EVERY 4 HOURS PRN
Status: DISCONTINUED | OUTPATIENT
Start: 2019-07-30 | End: 2019-07-31 | Stop reason: HOSPADM

## 2019-07-30 RX ORDER — AMIODARONE HYDROCHLORIDE 200 MG/1
400 TABLET ORAL EVERY 12 HOURS SCHEDULED
Status: DISCONTINUED | OUTPATIENT
Start: 2019-07-30 | End: 2019-07-31 | Stop reason: HOSPADM

## 2019-07-30 RX ORDER — ONDANSETRON 2 MG/ML
4 INJECTION INTRAMUSCULAR; INTRAVENOUS EVERY 6 HOURS PRN
Status: DISCONTINUED | OUTPATIENT
Start: 2019-07-30 | End: 2019-07-31 | Stop reason: HOSPADM

## 2019-07-30 RX ORDER — CEFAZOLIN SODIUM IN 0.9 % NACL 3 G/100 ML
3 INTRAVENOUS SOLUTION, PIGGYBACK (ML) INTRAVENOUS
Status: DISCONTINUED | OUTPATIENT
Start: 2019-07-31 | End: 2019-07-31 | Stop reason: HOSPADM

## 2019-07-30 RX ORDER — HYDROCODONE BITARTRATE AND ACETAMINOPHEN 5; 325 MG/1; MG/1
1 TABLET ORAL EVERY 4 HOURS PRN
Status: DISCONTINUED | OUTPATIENT
Start: 2019-07-30 | End: 2019-07-31 | Stop reason: HOSPADM

## 2019-07-30 RX ORDER — SODIUM CHLORIDE 9 MG/ML
INJECTION, SOLUTION INTRAVENOUS CONTINUOUS PRN
Status: COMPLETED | OUTPATIENT
Start: 2019-07-30 | End: 2019-07-30

## 2019-07-30 RX ORDER — MIDAZOLAM HYDROCHLORIDE 1 MG/ML
INJECTION INTRAMUSCULAR; INTRAVENOUS AS NEEDED
Status: DISCONTINUED | OUTPATIENT
Start: 2019-07-30 | End: 2019-07-30 | Stop reason: HOSPADM

## 2019-07-30 RX ADMIN — ACETAMINOPHEN 650 MG: 325 TABLET, FILM COATED ORAL at 09:47

## 2019-07-30 RX ADMIN — AMIODARONE HYDROCHLORIDE 0.5 MG/MIN: 1.8 INJECTION, SOLUTION INTRAVENOUS at 13:03

## 2019-07-30 RX ADMIN — CARVEDILOL 6.25 MG: 6.25 TABLET, FILM COATED ORAL at 17:41

## 2019-07-30 RX ADMIN — PANTOPRAZOLE SODIUM 40 MG: 40 TABLET, DELAYED RELEASE ORAL at 05:15

## 2019-07-30 RX ADMIN — HYDROCODONE BITARTRATE AND ACETAMINOPHEN 1 TABLET: 5; 325 TABLET ORAL at 17:54

## 2019-07-30 RX ADMIN — ACETAMINOPHEN 650 MG: 325 TABLET, FILM COATED ORAL at 20:55

## 2019-07-30 RX ADMIN — SACUBITRIL AND VALSARTAN 1 TABLET: 49; 51 TABLET, FILM COATED ORAL at 20:40

## 2019-07-30 RX ADMIN — VANCOMYCIN HYDROCHLORIDE 1750 MG: 10 INJECTION, POWDER, LYOPHILIZED, FOR SOLUTION INTRAVENOUS at 15:36

## 2019-07-30 RX ADMIN — SODIUM CHLORIDE, PRESERVATIVE FREE 3 ML: 5 INJECTION INTRAVENOUS at 09:36

## 2019-07-30 RX ADMIN — SACUBITRIL AND VALSARTAN 1 TABLET: 49; 51 TABLET, FILM COATED ORAL at 09:35

## 2019-07-30 RX ADMIN — CLOPIDOGREL BISULFATE 75 MG: 75 TABLET ORAL at 09:36

## 2019-07-30 RX ADMIN — FUROSEMIDE 10 MG: 20 TABLET ORAL at 09:36

## 2019-07-30 RX ADMIN — MELATONIN TAB 5 MG 5 MG: 5 TAB at 20:39

## 2019-07-30 RX ADMIN — AMIODARONE HYDROCHLORIDE 400 MG: 200 TABLET ORAL at 20:39

## 2019-07-30 RX ADMIN — ASPIRIN 81 MG: 81 TABLET, COATED ORAL at 05:16

## 2019-07-30 RX ADMIN — AMIODARONE HYDROCHLORIDE 1 MG/MIN: 1.8 INJECTION, SOLUTION INTRAVENOUS at 02:19

## 2019-07-30 RX ADMIN — ROSUVASTATIN CALCIUM 40 MG: 20 TABLET, FILM COATED ORAL at 20:40

## 2019-07-30 RX ADMIN — ALPRAZOLAM 0.25 MG: 0.25 TABLET ORAL at 09:47

## 2019-07-31 ENCOUNTER — APPOINTMENT (OUTPATIENT)
Dept: GENERAL RADIOLOGY | Facility: HOSPITAL | Age: 69
End: 2019-07-31

## 2019-07-31 VITALS
DIASTOLIC BLOOD PRESSURE: 73 MMHG | RESPIRATION RATE: 16 BRPM | TEMPERATURE: 98 F | HEIGHT: 67 IN | WEIGHT: 192.9 LBS | SYSTOLIC BLOOD PRESSURE: 116 MMHG | HEART RATE: 60 BPM | BODY MASS INDEX: 30.28 KG/M2 | OXYGEN SATURATION: 94 %

## 2019-07-31 LAB
GLUCOSE BLDC GLUCOMTR-MCNC: 157 MG/DL (ref 70–130)
GLUCOSE BLDC GLUCOMTR-MCNC: 174 MG/DL (ref 70–130)

## 2019-07-31 PROCEDURE — 71046 X-RAY EXAM CHEST 2 VIEWS: CPT

## 2019-07-31 PROCEDURE — 99239 HOSP IP/OBS DSCHRG MGMT >30: CPT | Performed by: HOSPITALIST

## 2019-07-31 PROCEDURE — 82962 GLUCOSE BLOOD TEST: CPT

## 2019-07-31 PROCEDURE — 99024 POSTOP FOLLOW-UP VISIT: CPT | Performed by: INTERNAL MEDICINE

## 2019-07-31 PROCEDURE — 25010000002 VANCOMYCIN 10 G RECONSTITUTED SOLUTION: Performed by: INTERNAL MEDICINE

## 2019-07-31 RX ORDER — AMIODARONE HYDROCHLORIDE 400 MG/1
400 TABLET ORAL EVERY 12 HOURS SCHEDULED
Qty: 10 TABLET | Refills: 0 | Status: SHIPPED | OUTPATIENT
Start: 2019-07-31 | End: 2019-08-05

## 2019-07-31 RX ORDER — AMIODARONE HYDROCHLORIDE 200 MG/1
200 TABLET ORAL DAILY
Qty: 30 TABLET | Refills: 5 | Status: SHIPPED | OUTPATIENT
Start: 2019-08-05 | End: 2020-11-23

## 2019-07-31 RX ORDER — AMIODARONE HYDROCHLORIDE 400 MG/1
400 TABLET ORAL EVERY 12 HOURS SCHEDULED
Qty: 10 TABLET | Refills: 0 | Status: SHIPPED | OUTPATIENT
Start: 2019-07-31 | End: 2019-07-31

## 2019-07-31 RX ORDER — AMIODARONE HYDROCHLORIDE 200 MG/1
200 TABLET ORAL DAILY
Qty: 30 TABLET | Refills: 5 | Status: SHIPPED | OUTPATIENT
Start: 2019-08-05 | End: 2019-07-31 | Stop reason: SDUPTHER

## 2019-07-31 RX ORDER — NICOTINE 21 MG/24HR
1 PATCH, TRANSDERMAL 24 HOURS TRANSDERMAL
Qty: 30 PATCH | Refills: 0 | Status: SHIPPED | OUTPATIENT
Start: 2019-08-01 | End: 2019-08-31

## 2019-07-31 RX ADMIN — CLOPIDOGREL BISULFATE 75 MG: 75 TABLET ORAL at 08:20

## 2019-07-31 RX ADMIN — CARVEDILOL 6.25 MG: 6.25 TABLET, FILM COATED ORAL at 08:20

## 2019-07-31 RX ADMIN — SODIUM CHLORIDE, PRESERVATIVE FREE 3 ML: 5 INJECTION INTRAVENOUS at 08:20

## 2019-07-31 RX ADMIN — INSULIN LISPRO 2 UNITS: 100 INJECTION, SOLUTION INTRAVENOUS; SUBCUTANEOUS at 09:10

## 2019-07-31 RX ADMIN — SODIUM CHLORIDE, PRESERVATIVE FREE 3 ML: 5 INJECTION INTRAVENOUS at 08:21

## 2019-07-31 RX ADMIN — PANTOPRAZOLE SODIUM 40 MG: 40 TABLET, DELAYED RELEASE ORAL at 06:24

## 2019-07-31 RX ADMIN — AMIODARONE HYDROCHLORIDE 400 MG: 200 TABLET ORAL at 08:20

## 2019-07-31 RX ADMIN — FUROSEMIDE 10 MG: 20 TABLET ORAL at 08:20

## 2019-07-31 RX ADMIN — ASPIRIN 81 MG: 81 TABLET, COATED ORAL at 06:24

## 2019-07-31 RX ADMIN — VANCOMYCIN HYDROCHLORIDE 1250 MG: 10 INJECTION, POWDER, LYOPHILIZED, FOR SOLUTION INTRAVENOUS at 04:24

## 2019-07-31 RX ADMIN — ALPRAZOLAM 0.25 MG: 0.25 TABLET ORAL at 09:10

## 2019-07-31 RX ADMIN — HYDROCODONE BITARTRATE AND ACETAMINOPHEN 1 TABLET: 5; 325 TABLET ORAL at 06:24

## 2019-07-31 RX ADMIN — SACUBITRIL AND VALSARTAN 1 TABLET: 49; 51 TABLET, FILM COATED ORAL at 08:20

## 2019-08-01 ENCOUNTER — READMISSION MANAGEMENT (OUTPATIENT)
Dept: CALL CENTER | Facility: HOSPITAL | Age: 69
End: 2019-08-01

## 2019-08-02 ENCOUNTER — READMISSION MANAGEMENT (OUTPATIENT)
Dept: CALL CENTER | Facility: HOSPITAL | Age: 69
End: 2019-08-02

## 2019-08-02 NOTE — OUTREACH NOTE
Medical Week 1 Survey      Responses   Facility patient discharged from?  [facility]   Does the patient have one of the following disease processes/diagnoses(primary or secondary)?  Other   Is there a successful TCM telephone encounter documented?  No   Week 1 attempt successful?  Yes   Call start time  1225   Call end time  1232   Discharge diagnosis  Cardiac related syncope,  heart cath,  carotid angiography,  and ICD placement   Is patient permission given to speak with other caregiver?  No   Meds reviewed with patient/caregiver?  Yes   Is the patient having any side effects they believe may be caused by any medication additions or changes?  No   Does the patient have all medications ordered at discharge?  Yes   Is the patient taking all medications as directed (includes completed medication regime)?  Yes   Does the patient have a primary care provider?   Yes   Does the patient have an appointment with their PCP within 7 days of discharge?  Yes   Comments regarding PCP  Franky Carrington MD, Aug 7, 2019 10:00AM    Has the patient kept scheduled appointments due by today?  N/A   Comments  Reviewed all upcoming appts with patient.    Has home health visited the patient within 72 hours of discharge?  N/A   Psychosocial issues?  No   Did the patient receive a copy of their discharge instructions?  Yes   Nursing interventions  Reviewed instructions with patient   What is the patient's perception of their health status since discharge?  Improving   Is the patient/caregiver able to teach back signs and symptoms related to disease process for when to call PCP?  Yes   Is the patient/caregiver able to teach back signs and symptoms related to disease process for when to call 911?  Yes   Is the patient/caregiver able to teach back the hierarchy of who to call/visit for symptoms/problems? PCP, Specialist, Home health nurse, Urgent Care, ED, 911  Yes   Additional teach back comments  Reviewed activity restrictions post  ICD placement. Patient states aware not to raise arm above head or do any heaving lifting, strenuous push / pull with affected arm.    Week 1 call completed?  Yes   Wrap up additional comments  Patient states ICD site sore, but no other signs of infection.           Lizette Mi RN

## 2019-08-02 NOTE — OUTREACH NOTE
Prep Survey      Responses   Facility patient discharged from?  [facility]   Is patient eligible?  Yes   Discharge diagnosis  Cardiac related syncope,  heart cath,  carotid angiography,  and ICD placement   Does the patient have one of the following disease processes/diagnoses(primary or secondary)?  Other   Does the patient have Home health ordered?  No   Is there a DME ordered?  No   Prep survey completed?  Yes          Lilian Hammond RN

## 2019-08-08 ENCOUNTER — OFFICE VISIT (OUTPATIENT)
Dept: CARDIOLOGY | Facility: CLINIC | Age: 69
End: 2019-08-08

## 2019-08-08 DIAGNOSIS — I47.20 VT (VENTRICULAR TACHYCARDIA) (HCC): Primary | ICD-10-CM

## 2019-08-08 NOTE — PROGRESS NOTES
WOUND CHECK    2019    Antony SHANE Monahan, : 1950    WOUND CHECK    B/P: 148/86 right arm (Sitting)     Pulse: 62    Patient has fever: [] Temperature if indicated:     Wound Location: left infraclavicular    Dressing Removed [x]        Old Dressing Appearance:  Clean, dry [x]                 Old, bloody drainage []                            Moist, serous drainage []                Moist, thick yellow/green drainage []       Wound Appearance: Redness []                  Drainage []                  Culture obtained []        Color: N/A     Consistency: none     Amount: none         Gloves used, wound cleansed with sterile 4x4 and peroxide [x]       MD notified [] MD orders:     Antibiotic started []  If checked, type   Other:     Appointment for follow-up scheduled for 3 months post procedure [x]    Future Appointments   Date Time Provider Department Center   2019  1:30 PM Claude Atkins MD [facility] None           Karen Lewis MA, 19      MD Signature:______________________________ Completed By/Date:

## 2019-08-09 ENCOUNTER — READMISSION MANAGEMENT (OUTPATIENT)
Dept: CALL CENTER | Facility: HOSPITAL | Age: 69
End: 2019-08-09

## 2019-08-09 NOTE — OUTREACH NOTE
Medical Week 2 Survey      Responses   Facility patient discharged from?  [facility]   Does the patient have one of the following disease processes/diagnoses(primary or secondary)?  Other   Week 2 attempt successful?  Yes   Call start time  1551   Discharge diagnosis  Cardiac related syncope,  heart cath,  carotid angiography,  and ICD placement   Call end time  1556   Meds reviewed with patient/caregiver?  Yes   Is the patient having any side effects they believe may be caused by any medication additions or changes?  No   Does the patient have all medications ordered at discharge?  Yes   Is the patient taking all medications as directed (includes completed medication regime)?  Yes   Does the patient have a primary care provider?   Yes   Does the patient have an appointment with their PCP within 7 days of discharge?  Yes   Has the patient kept scheduled appointments due by today?  Yes   Has home health visited the patient within 72 hours of discharge?  N/A   Psychosocial issues?  No   Comments  pt states has been having elevated BP's, saw MD and BP meds dosage adjusted, plans to continue monitor and document BP's to be checked at appt on 8/12/19   Did the patient receive a copy of their discharge instructions?  Yes   Nursing interventions  Reviewed instructions with patient   What is the patient's perception of their health status since discharge?  Improving   Is the patient/caregiver able to teach back signs and symptoms related to disease process for when to call PCP?  Yes   Is the patient/caregiver able to teach back signs and symptoms related to disease process for when to call 911?  Yes   Is the patient/caregiver able to teach back the hierarchy of who to call/visit for symptoms/problems? PCP, Specialist, Home health nurse, Urgent Care, ED, 911  Yes   Week 2 Call Completed?  Yes          Karly Collins RN

## 2019-08-16 ENCOUNTER — READMISSION MANAGEMENT (OUTPATIENT)
Dept: CALL CENTER | Facility: HOSPITAL | Age: 69
End: 2019-08-16

## 2019-08-16 NOTE — OUTREACH NOTE
Medical Week 3 Survey      Responses   Facility patient discharged from?  [facility]   Does the patient have one of the following disease processes/diagnoses(primary or secondary)?  Other   Week 3 attempt successful?  No   Unsuccessful attempts  Attempt 1          Efrain Oneill RN

## 2019-08-19 ENCOUNTER — READMISSION MANAGEMENT (OUTPATIENT)
Dept: CALL CENTER | Facility: HOSPITAL | Age: 69
End: 2019-08-19

## 2019-08-28 ENCOUNTER — OFFICE VISIT (OUTPATIENT)
Dept: NEUROSURGERY | Facility: CLINIC | Age: 69
End: 2019-08-28

## 2019-08-28 VITALS
HEIGHT: 67 IN | SYSTOLIC BLOOD PRESSURE: 146 MMHG | DIASTOLIC BLOOD PRESSURE: 72 MMHG | WEIGHT: 197 LBS | BODY MASS INDEX: 30.92 KG/M2 | TEMPERATURE: 97 F

## 2019-08-28 DIAGNOSIS — I65.22 CAROTID STENOSIS, ASYMPTOMATIC, LEFT: Primary | ICD-10-CM

## 2019-08-28 PROCEDURE — 99024 POSTOP FOLLOW-UP VISIT: CPT | Performed by: RADIOLOGY

## 2019-08-28 NOTE — PROGRESS NOTES
NAME: YAKOV GEE   DOS: 2019  : 1950  PCP: Franky Carrington MD    Chief Complaint:    Chief Complaint   Patient presents with   • Carotid Artery Disease       History of Present Illness:  69 y.o. male who was recently hospitalized in [facility] with complaints of dizziness and near syncope, with ultimately runs of ventricular tachycardia.  As part of his work-up/evaluation, he had noninvasive imaging study suggesting high-grade bilateral carotid stenoses.  He did undergo coronary angiography with PCI on 2019, and had a diagnostic carotid angiogram performed at the same setting.  This demonstrated significant plaque at the bilateral carotid bifurcations, with a 70% left carotid lesion with associated large ulceration.  He ultimately had an ICD placed for his arrhythmia, and presents today for follow-up of his carotid artery disease.  He denies any symptoms of unilateral weakness/numbness, no speech or vision changes.      Past Medical History:  Past Medical History:   Diagnosis Date   • CAD (coronary artery disease)    • COPD (chronic obstructive pulmonary disease) (CMS/[facility]) 2016   • HTN (hypertension) 2016   • Hypercholesterolemia 2016   • Hypothyroid 2016   • Obesity    • PAD (peripheral artery disease) (CMS/[facility]) 2016   • Systolic CHF (CMS/[facility]) 2016   • Tobacco abuse 2016       Past Surgical History:  Past Surgical History:   Procedure Laterality Date   • CARDIAC CATHETERIZATION N/A 2016    Procedure: Left Heart Cath;  Surgeon: Drew Gurrola MD;  Location:  [facility];  Service:    • CARDIAC CATHETERIZATION N/A 2019    Procedure: LEFT HEART CATH;  Surgeon: Drew Gurrola MD;  Location:  [facility];  Service: Cardiovascular   • CARDIAC ELECTROPHYSIOLOGY PROCEDURE N/A 2019    Procedure: ICD SC new;  Surgeon: Musa Harden MD;  Location:  [facility] EP [facility];   Service: Cardiology   • INTERVENTIONAL RADIOLOGY PROCEDURE N/A 9/14/2016    Procedure: AORTIC Aortagram with Runoff;  Surgeon: Drew Gurrola MD;  Location:  [facility];  Service:    • INTERVENTIONAL RADIOLOGY PROCEDURE N/A 5/9/2017    Procedure: Abdominal Aortagram with Runoff;  Surgeon: Drew Gurrola MD;  Location:  [facility];  Service:    • INTERVENTIONAL RADIOLOGY PROCEDURE N/A 9/5/2018    Procedure: Abdominal Aortagram with Runoff;  Surgeon: Drew Gurrola MD;  Location:  [facility];  Service: Cardiovascular   • INTERVENTIONAL RADIOLOGY PROCEDURE Bilateral 7/29/2019    Procedure: Carotid Cerebral Angiogram;  Surgeon: Claude Atkins MD;  Location:  [facility];  Service: Interventional Radiology   • KNEE SURGERY Left 08/17/2015   • OTHER SURGICAL HISTORY      L LEG STENT   • WRIST SURGERY         Review of Systems:        Review of Systems   Constitutional: Positive for chills, diaphoresis and fatigue.   HENT: Positive for tinnitus.    Eyes: Positive for photophobia, pain, discharge, redness, itching and visual disturbance.   Respiratory: Positive for shortness of breath and wheezing.    Gastrointestinal: Positive for nausea.   Musculoskeletal: Positive for arthralgias, back pain, gait problem, myalgias, neck pain and neck stiffness.   Neurological: Positive for dizziness, weakness, light-headedness, numbness and headaches.   All other systems reviewed and are negative.       Medications    Current Outpatient Medications:   •  amiodarone (PACERONE) 200 MG tablet, Take 1 tablet by mouth Daily. (Patient taking differently: Take 100 mg by mouth 2 (Two) Times a Day.), Disp: 30 tablet, Rfl: 5  •  sacubitril-valsartan (ENTRESTO) 49-51 MG tablet, Take 1 tablet by mouth 2 (Two) Times a Day. Patient takes 2 tablets bid, Disp: , Rfl:   •  Semaglutide (OZEMPIC) 1 MG/DOSE solution pen-injector, Inject 0.5 mg under the skin into the  appropriate area as directed 1 (One) Time Per Week. Thursday , Disp: , Rfl:   •  aspirin 81 MG EC tablet, Take 81 mg by mouth Every Morning., Disp: , Rfl:   •  carvedilol (COREG) 12.5 MG tablet, Take 12.5 mg by mouth 2 (Two) Times a Day With Meals., Disp: , Rfl:   •  clopidogrel (PLAVIX) 75 MG tablet, Take 1 tablet by mouth Daily., Disp: 30 tablet, Rfl: 11  •  furosemide (LASIX) 40 MG tablet, Take 40 mg by mouth 2 (Two) Times a Day., Disp: , Rfl:   •  nicotine (NICODERM CQ) 21 MG/24HR patch, Place 1 patch on the skin as directed by provider Daily for 30 days., Disp: 30 patch, Rfl: 0  •  nitroglycerin (NITROSTAT) 0.4 MG SL tablet, Place 1 tablet under the tongue every 5 (five) minutes as needed for chest pain. Take no more than 3 doses in 15 minutes., Disp: 25 tablet, Rfl: 12  •  pantoprazole (PROTONIX) 40 MG EC tablet, Take 40 mg by mouth Every Morning., Disp: , Rfl:   •  rosuvastatin (CRESTOR) 40 MG tablet, Take 40 mg by mouth every night., Disp: , Rfl:     Allergies:  No Known Allergies    Social Hx:  Social History     Tobacco Use   • Smoking status: Former Smoker     Packs/day: 2.00     Years: 50.00     Pack years: 100.00     Types: Cigarettes     Last attempt to quit: 07/2016     Years since quitting: 3.1   • Smokeless tobacco: Never Used   Substance Use Topics   • Alcohol use: Yes     Comment: drinks on rare occasions   • Drug use: No       Family Hx:  Family History   Problem Relation Age of Onset   • Hypertension Mother    • Hypertension Father    • Heart failure Father    • Heart attack Father    • Diabetes Sister    • Arrhythmia Sister        Review of Imaging:  Carotid angiogram dated 7/29/2019 was reviewed along with its corresponding radiologic report.  Again seen is a high-grade (greater than 80%) stenosis involving the left internal carotid artery origin, with associated ulceration.  There is heterogeneous plaque formation at the right carotid bifurcation, but without hemodynamically significant (less  "than 60%) stenosis.    Physical Examination:  Vitals:    08/28/19 1319   BP: 146/72   Temp: 97 °F (36.1 °C)        General Appearance:   Well developed, well nourished, well groomed, alert, and cooperative.  Cardiovascular: Regular rate and rhythm. No carotid bruits      Neurological examination:  Neurologic Exam     Mental Status   Oriented to person, place, and time.   Speech: speech is normal   Level of consciousness: alert    Cranial Nerves   Cranial nerves II through XII intact.     Motor Exam     Strength   Strength 5/5 throughout.     Sensory Exam   Light touch normal.     Gait, Coordination, and Reflexes     Gait  Gait: normal      Diagnoses/Plan:    Mr. Monahan is a 69 y.o. male asymptomatic, high-grade left carotid stenosis found during evaluation of symptomatic ventricular tachycardia..  He has undergone recent coronary intervention with PCI, as well as ICD placement.  He remains neurologically intact, and doing well on a dual antiplatelet/statin regimen.  He is scheduled to follow-up with Dr. Gurrola in the next month or so, and if he is \"cleared\" from a cardiac standpoint, we would pursue left carotid angioplasty/stent placement thereafter.  He will contact our office to schedule the procedure after seeing Dr. Gurrola.  Given his extensive cardiac history, I do think he is a relative \"high surgical risk\" for endarterectomy, and the patient/family agree and wish to pursue stent placement rather than not CEA.              "

## 2019-09-11 DIAGNOSIS — I65.22 CAROTID STENOSIS, ASYMPTOMATIC, LEFT: Primary | ICD-10-CM

## 2019-09-11 DIAGNOSIS — I65.22 SYMPTOMATIC STENOSIS OF LEFT CAROTID ARTERY WITHOUT INFARCTION: ICD-10-CM

## 2019-09-13 PROBLEM — I65.22 SYMPTOMATIC STENOSIS OF LEFT CAROTID ARTERY WITHOUT INFARCTION: Status: ACTIVE | Noted: 2019-09-13

## 2019-09-25 ENCOUNTER — APPOINTMENT (OUTPATIENT)
Dept: CARDIOLOGY | Facility: HOSPITAL | Age: 69
End: 2019-09-25

## 2019-09-25 ENCOUNTER — HOSPITAL ENCOUNTER (INPATIENT)
Facility: HOSPITAL | Age: 69
LOS: 1 days | Discharge: HOME | End: 2019-09-26
Attending: RADIOLOGY | Admitting: RADIOLOGY

## 2019-09-25 DIAGNOSIS — I65.22 SYMPTOMATIC STENOSIS OF LEFT CAROTID ARTERY WITHOUT INFARCTION: ICD-10-CM

## 2019-09-25 LAB
ACT BLD: 230 SECONDS (ref 82–152)
ANION GAP SERPL CALCULATED.3IONS-SCNC: 10 MMOL/L (ref 5–15)
BUN BLD-MCNC: 20 MG/DL (ref 8–23)
BUN/CREAT SERPL: 17.7 (ref 7–25)
CALCIUM SPEC-SCNC: 8.8 MG/DL (ref 8.6–10.5)
CHLORIDE SERPL-SCNC: 108 MMOL/L (ref 98–107)
CO2 SERPL-SCNC: 28 MMOL/L (ref 22–29)
CREAT BLD-MCNC: 1.13 MG/DL (ref 0.76–1.27)
DEPRECATED RDW RBC AUTO: 54 FL (ref 37–54)
ERYTHROCYTE [DISTWIDTH] IN BLOOD BY AUTOMATED COUNT: 15.7 % (ref 12.3–15.4)
GFR SERPL CREATININE-BSD FRML MDRD: 64 ML/MIN/1.73
GLUCOSE BLD-MCNC: 112 MG/DL (ref 65–99)
GLUCOSE BLDC GLUCOMTR-MCNC: 138 MG/DL (ref 70–130)
GLUCOSE BLDC GLUCOMTR-MCNC: 94 MG/DL (ref 70–130)
GLUCOSE BLDC GLUCOMTR-MCNC: 95 MG/DL (ref 70–130)
HCT VFR BLD AUTO: 40.8 % (ref 37.5–51)
HGB BLD-MCNC: 12.4 G/DL (ref 13–17.7)
MCH RBC QN AUTO: 28.4 PG (ref 26.6–33)
MCHC RBC AUTO-ENTMCNC: 30.4 G/DL (ref 31.5–35.7)
MCV RBC AUTO: 93.6 FL (ref 79–97)
PLATELET # BLD AUTO: 161 10*3/MM3 (ref 140–450)
PMV BLD AUTO: 12.3 FL (ref 6–12)
POTASSIUM BLD-SCNC: 3.9 MMOL/L (ref 3.5–5.2)
RBC # BLD AUTO: 4.36 10*6/MM3 (ref 4.14–5.8)
SODIUM BLD-SCNC: 146 MMOL/L (ref 136–145)
WBC NRBC COR # BLD: 6.31 10*3/MM3 (ref 3.4–10.8)

## 2019-09-25 PROCEDURE — 99232 SBSQ HOSP IP/OBS MODERATE 35: CPT | Performed by: INTERNAL MEDICINE

## 2019-09-25 PROCEDURE — C1760 CLOSURE DEV, VASC: HCPCS | Performed by: RADIOLOGY

## 2019-09-25 PROCEDURE — C1769 GUIDE WIRE: HCPCS | Performed by: RADIOLOGY

## 2019-09-25 PROCEDURE — 80048 BASIC METABOLIC PNL TOTAL CA: CPT | Performed by: RADIOLOGY

## 2019-09-25 PROCEDURE — 37215 TRANSCATH STENT CCA W/EPS: CPT | Performed by: RADIOLOGY

## 2019-09-25 PROCEDURE — C1725 CATH, TRANSLUMIN NON-LASER: HCPCS | Performed by: RADIOLOGY

## 2019-09-25 PROCEDURE — 36415 COLL VENOUS BLD VENIPUNCTURE: CPT

## 2019-09-25 PROCEDURE — 85347 COAGULATION TIME ACTIVATED: CPT

## 2019-09-25 PROCEDURE — 85027 COMPLETE CBC AUTOMATED: CPT | Performed by: RADIOLOGY

## 2019-09-25 PROCEDURE — 25010000002 FENTANYL CITRATE (PF) 100 MCG/2ML SOLUTION: Performed by: RADIOLOGY

## 2019-09-25 PROCEDURE — C1894 INTRO/SHEATH, NON-LASER: HCPCS | Performed by: RADIOLOGY

## 2019-09-25 PROCEDURE — 25010000002 HEPARIN (PORCINE) PER 1000 UNITS: Performed by: RADIOLOGY

## 2019-09-25 PROCEDURE — 82962 GLUCOSE BLOOD TEST: CPT

## 2019-09-25 PROCEDURE — C1884 EMBOLIZATION PROTECT SYST: HCPCS | Performed by: RADIOLOGY

## 2019-09-25 PROCEDURE — 93880 EXTRACRANIAL BILAT STUDY: CPT

## 2019-09-25 PROCEDURE — 0 IODIXANOL PER 1 ML: Performed by: RADIOLOGY

## 2019-09-25 PROCEDURE — 25010000002 MIDAZOLAM PER 1 MG: Performed by: RADIOLOGY

## 2019-09-25 PROCEDURE — 037L3ZZ DILATION OF LEFT INTERNAL CAROTID ARTERY, PERCUTANEOUS APPROACH: ICD-10-PCS | Performed by: RADIOLOGY

## 2019-09-25 PROCEDURE — C1876 STENT, NON-COA/NON-COV W/DEL: HCPCS | Performed by: RADIOLOGY

## 2019-09-25 DEVICE — CLOSED CELL SELF-EXPANDING STENT
Type: IMPLANTABLE DEVICE | Status: FUNCTIONAL
Brand: CAROTID WALLSTENT®

## 2019-09-25 RX ORDER — SODIUM CHLORIDE 0.9 % (FLUSH) 0.9 %
10 SYRINGE (ML) INJECTION AS NEEDED
Status: DISCONTINUED | OUTPATIENT
Start: 2019-09-25 | End: 2019-09-26 | Stop reason: HOSPADM

## 2019-09-25 RX ORDER — HYDROCODONE BITARTRATE AND ACETAMINOPHEN 5; 325 MG/1; MG/1
1 TABLET ORAL EVERY 6 HOURS PRN
Status: DISCONTINUED | OUTPATIENT
Start: 2019-09-25 | End: 2019-09-26 | Stop reason: HOSPADM

## 2019-09-25 RX ORDER — LEVOTHYROXINE SODIUM 0.2 MG/1
200 TABLET ORAL DAILY
Status: ON HOLD | COMMUNITY
End: 2021-07-12

## 2019-09-25 RX ORDER — LEVOTHYROXINE SODIUM 0.1 MG/1
100 TABLET ORAL
Status: DISCONTINUED | OUTPATIENT
Start: 2019-09-26 | End: 2019-09-26 | Stop reason: HOSPADM

## 2019-09-25 RX ORDER — HEPARIN SODIUM 1000 [USP'U]/ML
INJECTION, SOLUTION INTRAVENOUS; SUBCUTANEOUS AS NEEDED
Status: DISCONTINUED | OUTPATIENT
Start: 2019-09-25 | End: 2019-09-25 | Stop reason: HOSPADM

## 2019-09-25 RX ORDER — SODIUM CHLORIDE 9 MG/ML
50 INJECTION, SOLUTION INTRAVENOUS CONTINUOUS
Status: DISCONTINUED | OUTPATIENT
Start: 2019-09-25 | End: 2019-09-25

## 2019-09-25 RX ORDER — CLOPIDOGREL BISULFATE 75 MG/1
75 TABLET ORAL DAILY
Status: DISCONTINUED | OUTPATIENT
Start: 2019-09-26 | End: 2019-09-26 | Stop reason: HOSPADM

## 2019-09-25 RX ORDER — ASPIRIN 81 MG/1
81 TABLET ORAL EVERY MORNING
Status: DISCONTINUED | OUTPATIENT
Start: 2019-09-25 | End: 2019-09-25 | Stop reason: SDUPTHER

## 2019-09-25 RX ORDER — SODIUM CHLORIDE 0.9 % (FLUSH) 0.9 %
3 SYRINGE (ML) INJECTION EVERY 12 HOURS SCHEDULED
Status: DISCONTINUED | OUTPATIENT
Start: 2019-09-25 | End: 2019-09-26 | Stop reason: HOSPADM

## 2019-09-25 RX ORDER — CARVEDILOL 12.5 MG/1
12.5 TABLET ORAL 2 TIMES DAILY WITH MEALS
Status: DISCONTINUED | OUTPATIENT
Start: 2019-09-25 | End: 2019-09-26 | Stop reason: HOSPADM

## 2019-09-25 RX ORDER — NITROGLYCERIN 0.4 MG/1
0.4 TABLET SUBLINGUAL
Status: DISCONTINUED | OUTPATIENT
Start: 2019-09-25 | End: 2019-09-26 | Stop reason: HOSPADM

## 2019-09-25 RX ORDER — SODIUM CHLORIDE 9 MG/ML
50 INJECTION, SOLUTION INTRAVENOUS CONTINUOUS
Status: ACTIVE | OUTPATIENT
Start: 2019-09-25 | End: 2019-09-25

## 2019-09-25 RX ORDER — ROSUVASTATIN CALCIUM 20 MG/1
40 TABLET, COATED ORAL NIGHTLY
Status: DISCONTINUED | OUTPATIENT
Start: 2019-09-25 | End: 2019-09-26 | Stop reason: HOSPADM

## 2019-09-25 RX ORDER — PHENYLEPHRINE HCL IN 0.9% NACL 0.5 MG/5ML
.5-3 SYRINGE (ML) INTRAVENOUS
Status: DISCONTINUED | OUTPATIENT
Start: 2019-09-25 | End: 2019-09-26 | Stop reason: HOSPADM

## 2019-09-25 RX ORDER — MIDAZOLAM HYDROCHLORIDE 1 MG/ML
INJECTION INTRAMUSCULAR; INTRAVENOUS AS NEEDED
Status: DISCONTINUED | OUTPATIENT
Start: 2019-09-25 | End: 2019-09-25 | Stop reason: HOSPADM

## 2019-09-25 RX ORDER — PANTOPRAZOLE SODIUM 40 MG/1
40 TABLET, DELAYED RELEASE ORAL EVERY MORNING
Status: DISCONTINUED | OUTPATIENT
Start: 2019-09-25 | End: 2019-09-26 | Stop reason: HOSPADM

## 2019-09-25 RX ORDER — FUROSEMIDE 40 MG/1
40 TABLET ORAL 2 TIMES DAILY
Status: DISCONTINUED | OUTPATIENT
Start: 2019-09-25 | End: 2019-09-26 | Stop reason: HOSPADM

## 2019-09-25 RX ORDER — AMIODARONE HYDROCHLORIDE 200 MG/1
200 TABLET ORAL DAILY
Status: DISCONTINUED | OUTPATIENT
Start: 2019-09-26 | End: 2019-09-26 | Stop reason: HOSPADM

## 2019-09-25 RX ORDER — IODIXANOL 320 MG/ML
INJECTION, SOLUTION INTRAVASCULAR AS NEEDED
Status: DISCONTINUED | OUTPATIENT
Start: 2019-09-25 | End: 2019-09-25 | Stop reason: HOSPADM

## 2019-09-25 RX ORDER — LIDOCAINE HYDROCHLORIDE 10 MG/ML
INJECTION, SOLUTION EPIDURAL; INFILTRATION; INTRACAUDAL; PERINEURAL AS NEEDED
Status: DISCONTINUED | OUTPATIENT
Start: 2019-09-25 | End: 2019-09-25 | Stop reason: HOSPADM

## 2019-09-25 RX ORDER — ASPIRIN 325 MG
325 TABLET, DELAYED RELEASE (ENTERIC COATED) ORAL DAILY
Status: DISCONTINUED | OUTPATIENT
Start: 2019-09-25 | End: 2019-09-26 | Stop reason: HOSPADM

## 2019-09-25 RX ORDER — FENTANYL CITRATE 50 UG/ML
INJECTION, SOLUTION INTRAMUSCULAR; INTRAVENOUS AS NEEDED
Status: DISCONTINUED | OUTPATIENT
Start: 2019-09-25 | End: 2019-09-25 | Stop reason: HOSPADM

## 2019-09-25 RX ADMIN — CARVEDILOL 12.5 MG: 12.5 TABLET, FILM COATED ORAL at 17:59

## 2019-09-25 RX ADMIN — SACUBITRIL AND VALSARTAN 2 TABLET: 49; 51 TABLET, FILM COATED ORAL at 21:13

## 2019-09-25 RX ADMIN — ROSUVASTATIN CALCIUM 40 MG: 20 TABLET, COATED ORAL at 21:13

## 2019-09-25 RX ADMIN — HYDROCODONE BITARTRATE AND ACETAMINOPHEN 1 TABLET: 5; 325 TABLET ORAL at 12:12

## 2019-09-25 RX ADMIN — FUROSEMIDE 40 MG: 40 TABLET ORAL at 21:13

## 2019-09-25 RX ADMIN — SODIUM CHLORIDE 50 ML/HR: 9 INJECTION, SOLUTION INTRAVENOUS at 09:57

## 2019-09-26 VITALS
DIASTOLIC BLOOD PRESSURE: 87 MMHG | SYSTOLIC BLOOD PRESSURE: 159 MMHG | TEMPERATURE: 98.2 F | RESPIRATION RATE: 16 BRPM | WEIGHT: 199 LBS | HEIGHT: 67 IN | HEART RATE: 63 BPM | OXYGEN SATURATION: 100 % | BODY MASS INDEX: 31.23 KG/M2

## 2019-09-26 LAB
ANION GAP SERPL CALCULATED.3IONS-SCNC: 11 MMOL/L (ref 5–15)
BASOPHILS # BLD AUTO: 0.03 10*3/MM3 (ref 0–0.2)
BASOPHILS NFR BLD AUTO: 0.5 % (ref 0–1.5)
BH CV MID LEFT ICA HIDDEN LRR: 1 CM
BH CV VAS CAROTID LEFT DISTAL STENT EDV: 49.5 CM/S
BH CV VAS CAROTID LEFT DISTAL STENT: 136 CM/S
BH CV VAS CAROTID LEFT DISTAL TO STENT EDV: 64.4 CM/S
BH CV VAS CAROTID LEFT DISTAL TO STENT: 159 CM/S
BH CV VAS CAROTID LEFT MID STENT EDV: 50.3 CM/S
BH CV VAS CAROTID LEFT MID STENT: 111 CM/S
BH CV VAS CAROTID LEFT PROXIMAL STENT EDV: 12.8 CM/S
BH CV VAS CAROTID LEFT PROXIMAL STENT: 36.5 CM/S
BH CV VAS CAROTID LEFT PROXIMAL TO STENT: 41.2 CM/S
BH CV VAS CAROTID LEFT STENT NATIVE VESSEL PROXIMAL ED: 10.3 CM/S
BH CV XLRA MEAS LEFT DIST CCA EDV: 13.6 CM/SEC
BH CV XLRA MEAS LEFT DIST CCA PSV: 48.2 CM/SEC
BH CV XLRA MEAS LEFT DIST ICA EDV: 14.9 CM/SEC
BH CV XLRA MEAS LEFT DIST ICA PSV: 64.4 CM/SEC
BH CV XLRA MEAS LEFT ICA/CCA RATIO: 2.2
BH CV XLRA MEAS LEFT MID CCA EDV: 19.6 CM/SEC
BH CV XLRA MEAS LEFT MID CCA PSV: 62 CM/SEC
BH CV XLRA MEAS LEFT MID ICA EDV: 50.3 CM/SEC
BH CV XLRA MEAS LEFT MID ICA PSV: 136.7 CM/SEC
BH CV XLRA MEAS LEFT PROX CCA EDV: 18.7 CM/SEC
BH CV XLRA MEAS LEFT PROX CCA PSV: 64.3 CM/SEC
BH CV XLRA MEAS LEFT PROX ECA EDV: 21 CM/SEC
BH CV XLRA MEAS LEFT PROX ECA PSV: 139 CM/SEC
BH CV XLRA MEAS LEFT PROX ICA EDV: 13.1 CM/SEC
BH CV XLRA MEAS LEFT PROX ICA PSV: 36.7 CM/SEC
BH CV XLRA MEAS LEFT PROX SCLA EDV: 13.1 CM/SEC
BH CV XLRA MEAS LEFT PROX SCLA PSV: 111.8 CM/SEC
BH CV XLRA MEAS LEFT VERTEBRAL A EDV: 32.8 CM/SEC
BH CV XLRA MEAS LEFT VERTEBRAL A PSV: 78.2 CM/SEC
BH CV XLRA MEAS RIGHT DIST CCA EDV: 12.6 CM/SEC
BH CV XLRA MEAS RIGHT DIST CCA PSV: 60.7 CM/SEC
BH CV XLRA MEAS RIGHT DIST ICA EDV: 30.3 CM/SEC
BH CV XLRA MEAS RIGHT DIST ICA PSV: 71.1 CM/SEC
BH CV XLRA MEAS RIGHT ICA/CCA RATIO: 2.5
BH CV XLRA MEAS RIGHT MID CCA EDV: 12.8 CM/SEC
BH CV XLRA MEAS RIGHT MID CCA PSV: 86.4 CM/SEC
BH CV XLRA MEAS RIGHT MID ICA EDV: 33.9 CM/SEC
BH CV XLRA MEAS RIGHT MID ICA PSV: 101.8 CM/SEC
BH CV XLRA MEAS RIGHT PROX CCA EDV: 16.8 CM/SEC
BH CV XLRA MEAS RIGHT PROX CCA PSV: 108.2 CM/SEC
BH CV XLRA MEAS RIGHT PROX ECA EDV: 9.3 CM/SEC
BH CV XLRA MEAS RIGHT PROX ECA PSV: 58.4 CM/SEC
BH CV XLRA MEAS RIGHT PROX ICA EDV: 70.7 CM/SEC
BH CV XLRA MEAS RIGHT PROX ICA PSV: 216 CM/SEC
BH CV XLRA MEAS RIGHT PROX SCLA EDV: 0.79 CM/SEC
BH CV XLRA MEAS RIGHT PROX SCLA PSV: 101.4 CM/SEC
BH CV XLRA MEAS RIGHT VERTEBRAL A EDV: 22.1 CM/SEC
BH CV XLRA MEAS RIGHT VERTEBRAL A PSV: 67.8 CM/SEC
BH CVPROX LEFT ICA HIDDEN LRR: 1 CM
BUN BLD-MCNC: 18 MG/DL (ref 8–23)
BUN/CREAT SERPL: 17.8 (ref 7–25)
CALCIUM SPEC-SCNC: 8.6 MG/DL (ref 8.6–10.5)
CHLORIDE SERPL-SCNC: 107 MMOL/L (ref 98–107)
CO2 SERPL-SCNC: 26 MMOL/L (ref 22–29)
CREAT BLD-MCNC: 1.01 MG/DL (ref 0.76–1.27)
DEPRECATED RDW RBC AUTO: 54.2 FL (ref 37–54)
EOSINOPHIL # BLD AUTO: 0.2 10*3/MM3 (ref 0–0.4)
EOSINOPHIL NFR BLD AUTO: 3.6 % (ref 0.3–6.2)
ERYTHROCYTE [DISTWIDTH] IN BLOOD BY AUTOMATED COUNT: 15.9 % (ref 12.3–15.4)
GFR SERPL CREATININE-BSD FRML MDRD: 73 ML/MIN/1.73
GLUCOSE BLD-MCNC: 126 MG/DL (ref 65–99)
GLUCOSE BLDC GLUCOMTR-MCNC: 131 MG/DL (ref 70–130)
HCT VFR BLD AUTO: 38.3 % (ref 37.5–51)
HGB BLD-MCNC: 11.8 G/DL (ref 13–17.7)
IMM GRANULOCYTES # BLD AUTO: 0.02 10*3/MM3 (ref 0–0.05)
IMM GRANULOCYTES NFR BLD AUTO: 0.4 % (ref 0–0.5)
LEFT ARM BP: NORMAL MMHG
LYMPHOCYTES # BLD AUTO: 0.9 10*3/MM3 (ref 0.7–3.1)
LYMPHOCYTES NFR BLD AUTO: 16.3 % (ref 19.6–45.3)
MCH RBC QN AUTO: 28.6 PG (ref 26.6–33)
MCHC RBC AUTO-ENTMCNC: 30.8 G/DL (ref 31.5–35.7)
MCV RBC AUTO: 93 FL (ref 79–97)
MONOCYTES # BLD AUTO: 0.4 10*3/MM3 (ref 0.1–0.9)
MONOCYTES NFR BLD AUTO: 7.2 % (ref 5–12)
NEUTROPHILS # BLD AUTO: 3.98 10*3/MM3 (ref 1.7–7)
NEUTROPHILS NFR BLD AUTO: 72 % (ref 42.7–76)
NRBC BLD AUTO-RTO: 0 /100 WBC (ref 0–0.2)
PLATELET # BLD AUTO: 146 10*3/MM3 (ref 140–450)
PMV BLD AUTO: 12.6 FL (ref 6–12)
POTASSIUM BLD-SCNC: 3.9 MMOL/L (ref 3.5–5.2)
RBC # BLD AUTO: 4.12 10*6/MM3 (ref 4.14–5.8)
SODIUM BLD-SCNC: 144 MMOL/L (ref 136–145)
WBC NRBC COR # BLD: 5.53 10*3/MM3 (ref 3.4–10.8)

## 2019-09-26 PROCEDURE — 99024 POSTOP FOLLOW-UP VISIT: CPT | Performed by: RADIOLOGY

## 2019-09-26 PROCEDURE — 85025 COMPLETE CBC W/AUTO DIFF WBC: CPT | Performed by: RADIOLOGY

## 2019-09-26 PROCEDURE — 80048 BASIC METABOLIC PNL TOTAL CA: CPT | Performed by: RADIOLOGY

## 2019-09-26 PROCEDURE — 82962 GLUCOSE BLOOD TEST: CPT

## 2019-09-26 RX ADMIN — LEVOTHYROXINE SODIUM 100 MCG: 100 TABLET ORAL at 08:27

## 2019-09-26 RX ADMIN — AMIODARONE HYDROCHLORIDE 200 MG: 200 TABLET ORAL at 08:24

## 2019-09-26 RX ADMIN — SACUBITRIL AND VALSARTAN 2 TABLET: 49; 51 TABLET, FILM COATED ORAL at 08:24

## 2019-09-26 RX ADMIN — ASPIRIN 325 MG: 325 TABLET, COATED ORAL at 08:24

## 2019-09-26 RX ADMIN — CARVEDILOL 12.5 MG: 12.5 TABLET, FILM COATED ORAL at 08:24

## 2019-09-26 RX ADMIN — FUROSEMIDE 40 MG: 40 TABLET ORAL at 08:24

## 2019-09-26 RX ADMIN — CLOPIDOGREL BISULFATE 75 MG: 75 TABLET ORAL at 08:24

## 2019-09-26 RX ADMIN — PANTOPRAZOLE SODIUM 40 MG: 40 TABLET, DELAYED RELEASE ORAL at 08:24

## 2019-09-27 ENCOUNTER — READMISSION MANAGEMENT (OUTPATIENT)
Dept: CALL CENTER | Facility: HOSPITAL | Age: 69
End: 2019-09-27

## 2019-09-27 NOTE — OUTREACH NOTE
Prep Survey      Responses   Facility patient discharged from?  [facility]   Is patient eligible?  Yes   Discharge diagnosis  Carotid stenosis, asymptomatic, left, s/p carotid stent    Does the patient have one of the following disease processes/diagnoses(primary or secondary)?  Other   Does the patient have Home health ordered?  No   Is there a DME ordered?  No   Comments regarding appointments  Please see AVS   Prep survey completed?  Yes          Cheryl Ramos RN

## 2019-09-30 ENCOUNTER — READMISSION MANAGEMENT (OUTPATIENT)
Dept: CALL CENTER | Facility: HOSPITAL | Age: 69
End: 2019-09-30

## 2019-09-30 NOTE — OUTREACH NOTE
Medical Week 1 Survey      Responses   Facility patient discharged from?  [facility]   Does the patient have one of the following disease processes/diagnoses(primary or secondary)?  Other   Is there a successful TCM telephone encounter documented?  No   Week 1 attempt successful?  Yes   Call start time  1549   Call end time  1552   Discharge diagnosis  Carotid stenosis, asymptomatic, left, s/p carotid stent    Meds reviewed with patient/caregiver?  Yes   Is the patient having any side effects they believe may be caused by any medication additions or changes?  No   Does the patient have all medications ordered at discharge?  Yes   Is the patient taking all medications as directed (includes completed medication regime)?  Yes   Does the patient have a primary care provider?   Yes   Comments regarding PCP  Pt states he rescheduled today's appt with Dr. Carrington in 2 weeks   Has the patient kept scheduled appointments due by today?  Yes   Comments  Pt is aware of appts   Psychosocial issues?  No   Comments  Pt states he is doing well.  Good appetite.  No medication issues.  He checks BP.  Denies concerns/questions during this call.    Did the patient receive a copy of their discharge instructions?  Yes   Nursing interventions  Reviewed instructions with patient   What is the patient's perception of their health status since discharge?  Improving   Is the patient/caregiver able to teach back signs and symptoms related to disease process for when to call PCP?  Yes   Is the patient/caregiver able to teach back signs and symptoms related to disease process for when to call 911?  Yes   Is the patient/caregiver able to teach back the hierarchy of who to call/visit for symptoms/problems? PCP, Specialist, Home health nurse, Urgent Care, ED, 911  Yes   Week 1 call completed?  Yes          Ashleigh Alba RN

## 2019-10-09 ENCOUNTER — READMISSION MANAGEMENT (OUTPATIENT)
Dept: CALL CENTER | Facility: HOSPITAL | Age: 69
End: 2019-10-09

## 2019-10-09 NOTE — OUTREACH NOTE
Medical Week 2 Survey      Responses   Facility patient discharged from?  [facility]   Does the patient have one of the following disease processes/diagnoses(primary or secondary)?  Other   Week 2 attempt successful?  Yes   Call start time  0946   Discharge diagnosis  Carotid stenosis, asymptomatic, left, s/p carotid stent    Call end time  0949   Meds reviewed with patient/caregiver?  Yes   Is the patient having any side effects they believe may be caused by any medication additions or changes?  No   Does the patient have all medications ordered at discharge?  Yes   Is the patient taking all medications as directed (includes completed medication regime)?  Yes   Does the patient have a primary care provider?   Yes   Does the patient have an appointment with their PCP within 7 days of discharge?  Yes   Has the patient kept scheduled appointments due by today?  Yes   Has home health visited the patient within 72 hours of discharge?  N/A   Psychosocial issues?  No   Comments  Pt states he is doing well.  Good appetite.  No medication issues.  He checks BP.  Denies concerns/questions during this call.    Did the patient receive a copy of their discharge instructions?  Yes   Nursing interventions  Reviewed instructions with patient   What is the patient's perception of their health status since discharge?  Improving   Is the patient/caregiver able to teach back signs and symptoms related to disease process for when to call PCP?  Yes   Is the patient/caregiver able to teach back signs and symptoms related to disease process for when to call 911?  Yes   Is the patient/caregiver able to teach back the hierarchy of who to call/visit for symptoms/problems? PCP, Specialist, Home health nurse, Urgent Care, ED, 911  Yes   Week 2 Call Completed?  Yes          Efrain Oneill RN

## 2019-10-18 ENCOUNTER — READMISSION MANAGEMENT (OUTPATIENT)
Dept: CALL CENTER | Facility: HOSPITAL | Age: 69
End: 2019-10-18

## 2019-10-18 NOTE — OUTREACH NOTE
Medical Week 3 Survey      Responses   Facility patient discharged from?  [facility]   Does the patient have one of the following disease processes/diagnoses(primary or secondary)?  Other   Week 3 attempt successful?  Yes   Call start time  1414   Call end time  1416   Discharge diagnosis  Carotid stenosis, asymptomatic, left, s/p carotid stent    Person spoke with today (if not patient) and relationship  Jessica-wife   Meds reviewed with patient/caregiver?  Yes   Is the patient having any side effects they believe may be caused by any medication additions or changes?  No   Is the patient taking all medications as directed (includes completed medication regime)?  Yes   Has the patient kept scheduled appointments due by today?  Yes   Psychosocial issues?  No   What is the patient's perception of their health status since discharge?  Returned to baseline/stable   If the patient is a current smoker, are they able to teach back resources for cessation?  -- [Pt is a smoker sometimes.]   Week 3 Call Completed?  Yes   Wrap up additional comments  Pt has cardiac rehab deisy dickens, f          Lilian Hammond RN

## 2019-10-28 ENCOUNTER — READMISSION MANAGEMENT (OUTPATIENT)
Dept: CALL CENTER | Facility: HOSPITAL | Age: 69
End: 2019-10-28

## 2019-10-28 NOTE — OUTREACH NOTE
Medical Week 4 Survey      Responses   Facility patient discharged from?  [facility]   Does the patient have one of the following disease processes/diagnoses(primary or secondary)?  Other   Week 4 attempt successful?  Yes   Call start time  0924   Call end time  0926   Discharge diagnosis  Carotid stenosis, asymptomatic, left, s/p carotid stent    Is patient permission given to speak with other caregiver?  Yes   Person spoke with today (if not patient) and relationship  Jessica-wife   Meds reviewed with patient/caregiver?  Yes   Is the patient having any side effects they believe may be caused by any medication additions or changes?  No   Is the patient taking all medications as directed (includes completed medication regime)?  Yes   Has the patient kept scheduled appointments due by today?  Yes   Comments  Participating in cardiac rehab    Is the patient still receiving Home Health Services?  N/A   Psychosocial issues?  No   Comments  Wife reports he is doing good. No further eposides of dizziness or syncope. Healing well from ICD placement.    What is the patient's perception of their health status since discharge?  Improving   Is the patient/caregiver able to teach back signs and symptoms related to disease process for when to call PCP?  Yes   Is the patient/caregiver able to teach back signs and symptoms related to disease process for when to call 911?  Yes   Is the patient/caregiver able to teach back the hierarchy of who to call/visit for symptoms/problems? PCP, Specialist, Home health nurse, Urgent Care, ED, 911  Yes   Week 4 Call Completed?  Yes   Would the patient like one additional call?  No   Graduated  Yes   Did the patient feel the follow up calls were helpful during their recovery period?  Yes   Was the number of calls appropriate?  Yes   Wrap up additional comments  Pt has cardiac rehab deisy dickens, f          Efrain Oneill RN

## 2019-12-09 ENCOUNTER — OFFICE VISIT (OUTPATIENT)
Dept: NEUROSURGERY | Facility: CLINIC | Age: 69
End: 2019-12-09

## 2019-12-09 VITALS
SYSTOLIC BLOOD PRESSURE: 110 MMHG | HEIGHT: 67 IN | TEMPERATURE: 97.4 F | WEIGHT: 210 LBS | BODY MASS INDEX: 32.96 KG/M2 | DIASTOLIC BLOOD PRESSURE: 70 MMHG

## 2019-12-09 DIAGNOSIS — I65.22 CAROTID STENOSIS, ASYMPTOMATIC, LEFT: Primary | ICD-10-CM

## 2019-12-09 PROCEDURE — 99024 POSTOP FOLLOW-UP VISIT: CPT | Performed by: RADIOLOGY

## 2019-12-09 NOTE — PROGRESS NOTES
"NAME: YAKOV GEE   DOS: 2019  : 1950  PCP: Franky Carrington MD    Chief Complaint:    Chief Complaint   Patient presents with   • Carotid Artery Disease     s/p left carotid stent 19       History of Present Illness:  69 y.o. male status post angioplasty/stent placement for high-grade left internal carotid stenosis (with sizable ulceration) on 2019.  He had been experiencing some syncope and dizziness, but this was attributed to his cardiac disease and this was deemed a \"asymptomatic\" lesion.  He is done very well since his surgery, and he has occasional vertiginous symptoms when lying flat (likely inner ear related), but denies any stroke or TIA-like symptoms.  Specifically, no unilateral weakness/numbness, no speech or vision changes.  He is on lifelong dual antiplatelet regimen.  He presents today for routine follow-up.    Past Medical History:  Past Medical History:   Diagnosis Date   • CAD (coronary artery disease)    • COPD (chronic obstructive pulmonary disease) (CMS/[facility]) 2016   • Diabetes mellitus (CMS/[facility])    • HTN (hypertension) 2016   • Hypercholesterolemia 2016   • Hypothyroid 2016   • Obesity    • PAD (peripheral artery disease) (CMS/[facility]) 2016   • Systolic CHF (CMS/[facility]) 2016   • Tobacco abuse 2016       Past Surgical History:  Past Surgical History:   Procedure Laterality Date   • CARDIAC CATHETERIZATION N/A 2016    Procedure: Left Heart Cath;  Surgeon: Drew Gurrola MD;  Location:  [facility];  Service:    • CARDIAC CATHETERIZATION N/A 2019    Procedure: LEFT HEART CATH;  Surgeon: Drew Gurrola MD;  Location:  [facility];  Service: Cardiovascular   • CARDIAC ELECTROPHYSIOLOGY PROCEDURE N/A 2019    Procedure: ICD SC new;  Surgeon: Musa Harden MD;  Location:  [facility] EP INVASIVE [facility];  Service: Cardiology   • INTERVENTIONAL RADIOLOGY PROCEDURE N/A 2016 "    Procedure: AORTIC Aortagram with Runoff;  Surgeon: Drew Gurrola MD;  Location:  [facility];  Service:    • INTERVENTIONAL RADIOLOGY PROCEDURE N/A 5/9/2017    Procedure: Abdominal Aortagram with Runoff;  Surgeon: Drew Gurrola MD;  Location:  [facility];  Service:    • INTERVENTIONAL RADIOLOGY PROCEDURE N/A 9/5/2018    Procedure: Abdominal Aortagram with Runoff;  Surgeon: Drew Gurrola MD;  Location:  [facility];  Service: Cardiovascular   • INTERVENTIONAL RADIOLOGY PROCEDURE Bilateral 7/29/2019    Procedure: Carotid Cerebral Angiogram;  Surgeon: Claude Atkins MD;  Location:  [facility];  Service: Interventional Radiology   • KNEE SURGERY Left 08/17/2015   • OTHER SURGICAL HISTORY      L LEG STENT   • ND TCAT IV STENT CRV CRTD ART EMBOLIC PROTECJ N/A 9/25/2019    Left Carotid Stent; Dr. Claude Atkins   • WRIST SURGERY         Review of Systems:        Review of Systems   Eyes: Positive for discharge.   Respiratory: Positive for stridor.    Musculoskeletal: Positive for back pain.   Neurological: Positive for dizziness, light-headedness and numbness.   All other systems reviewed and are negative.       Medications    Current Outpatient Medications:   •  amiodarone (PACERONE) 200 MG tablet, Take 1 tablet by mouth Daily. (Patient taking differently: Take 100 mg by mouth 2 (Two) Times a Day.), Disp: 30 tablet, Rfl: 5  •  aspirin 81 MG EC tablet, Take 81 mg by mouth Every Morning., Disp: , Rfl:   •  carvedilol (COREG) 12.5 MG tablet, Take 12.5 mg by mouth 2 (Two) Times a Day With Meals., Disp: , Rfl:   •  clopidogrel (PLAVIX) 75 MG tablet, Take 1 tablet by mouth Daily., Disp: 30 tablet, Rfl: 11  •  furosemide (LASIX) 40 MG tablet, Take 40 mg by mouth 2 (Two) Times a Day., Disp: , Rfl:   •  levothyroxine (SYNTHROID, LEVOTHROID) 100 MCG tablet, Take 100 mcg by mouth Daily., Disp: , Rfl:   •  nitroglycerin (NITROSTAT) 0.4 MG  SL tablet, Place 1 tablet under the tongue every 5 (five) minutes as needed for chest pain. Take no more than 3 doses in 15 minutes., Disp: 25 tablet, Rfl: 12  •  pantoprazole (PROTONIX) 40 MG EC tablet, Take 40 mg by mouth Every Morning., Disp: , Rfl:   •  rosuvastatin (CRESTOR) 40 MG tablet, Take 40 mg by mouth every night., Disp: , Rfl:   •  sacubitril-valsartan (ENTRESTO) 49-51 MG tablet, Take 2 tablets by mouth 2 (Two) Times a Day., Disp: , Rfl:   •  Semaglutide (OZEMPIC) 1 MG/DOSE solution pen-injector, Inject 0.25 mg under the skin into the appropriate area as directed 1 (One) Time Per Week. Thursday , Disp: , Rfl:     Allergies:  No Known Allergies    Social Hx:  Social History     Tobacco Use   • Smoking status: Former Smoker     Packs/day: 2.00     Years: 50.00     Pack years: 100.00     Types: Cigarettes     Last attempt to quit: 07/2016     Years since quitting: 3.4   • Smokeless tobacco: Never Used   Substance Use Topics   • Alcohol use: Yes     Frequency: Never     Comment: drinks on rare occasions   • Drug use: No       Family Hx:  Family History   Problem Relation Age of Onset   • Hypertension Mother    • Hypertension Father    • Heart failure Father    • Heart attack Father    • Diabetes Sister    • Arrhythmia Sister        Review of Imaging:  No new imaging    Physical Examination:  Vitals:    12/09/19 1243   BP: 110/70   Temp: 97.4 °F (36.3 °C)        General Appearance:   Well developed, well nourished, well groomed, alert, and cooperative.  Cardiovascular: Regular rate and rhythm. No carotid bruits      Neurological examination:  Neurologic Exam     Mental Status   Oriented to person, place, and time.   Speech: speech is normal   Level of consciousness: alert    Cranial Nerves   Cranial nerves II through XII intact.     Motor Exam     Strength   Strength 5/5 throughout.     Sensory Exam   Light touch normal.     Gait, Coordination, and Reflexes     Gait  Gait: normal      Diagnoses/Plan:      Hero is a 69 y.o. male status post angioplasty/stent placement for high-grade, asymptomatic left carotid stenosis on 9/29/2019.  He is done well since his procedure, with no stroke or TIA-like symptoms.  He is on a lifelong dual antiplatelet regimen.  I plan on seeing him back in mid April 2020 (same day as Dr. Gurrola's cardiology clinic visit), with carotid duplex to ensure stability and exclude any recurrent/progression of disease and might necessitate further treatment/intervention.

## 2020-07-30 ENCOUNTER — TELEPHONE (OUTPATIENT)
Dept: NEUROSURGERY | Facility: CLINIC | Age: 70
End: 2020-07-30

## 2020-08-03 ENCOUNTER — OFFICE VISIT (OUTPATIENT)
Dept: NEUROSURGERY | Facility: CLINIC | Age: 70
End: 2020-08-03

## 2020-08-03 VITALS
DIASTOLIC BLOOD PRESSURE: 70 MMHG | BODY MASS INDEX: 35.31 KG/M2 | TEMPERATURE: 97.5 F | HEIGHT: 67 IN | WEIGHT: 225 LBS | SYSTOLIC BLOOD PRESSURE: 132 MMHG

## 2020-08-03 DIAGNOSIS — I65.22 CAROTID STENOSIS, ASYMPTOMATIC, LEFT: Primary | ICD-10-CM

## 2020-08-03 PROCEDURE — 99213 OFFICE O/P EST LOW 20 MIN: CPT | Performed by: RADIOLOGY

## 2020-08-03 NOTE — PROGRESS NOTES
"NAME: YAKOV GEE   DOS: 8/3/2020  : 1950  PCP: Franky Carrington MD    Chief Complaint:    Chief Complaint   Patient presents with   • Carotid Artery Disease     s/p left carotid stent 19       History of Present Illness:  70 y.o. male status post angioplasty/stent placement for high-grade left internal carotid stenosis (with sizable ulceration) on 2019.  He had been experiencing some syncope and dizziness, but this was attributed to his cardiac disease and this was deemed a \"asymptomatic\" lesion.  He is done very well since his surgery, denying any stroke or TIA-like symptoms.  Specifically, no unilateral weakness/numbness, no speech or vision changes.  He is on lifelong dual antiplatelet regimen.  He presents today for routine follow-up.    Past Medical History:  Past Medical History:   Diagnosis Date   • CAD (coronary artery disease)    • COPD (chronic obstructive pulmonary disease) (CMS/[facility]) 2016   • Diabetes mellitus (CMS/[facility])    • HTN (hypertension) 2016   • Hypercholesterolemia 2016   • Hypothyroid 2016   • Obesity    • PAD (peripheral artery disease) (CMS/[facility]) 2016   • Systolic CHF (CMS/[facility]) 2016   • Tobacco abuse 2016       Past Surgical History:  Past Surgical History:   Procedure Laterality Date   • CARDIAC CATHETERIZATION N/A 2016    Procedure: Left Heart Cath;  Surgeon: Drew Gurrola MD;  Location:  [facility];  Service:    • CARDIAC CATHETERIZATION N/A 2019    Procedure: LEFT HEART CATH;  Surgeon: Drew Gurrola MD;  Location:  [facility];  Service: Cardiovascular   • CARDIAC ELECTROPHYSIOLOGY PROCEDURE N/A 2019    Procedure: ICD SC new;  Surgeon: Musa Harden MD;  Location:  [facility];  Service: Cardiology   • INTERVENTIONAL RADIOLOGY PROCEDURE N/A 2016    Procedure: AORTIC Aortagram with Runoff;  Surgeon: Drew Gurrola MD;  Location:  " [facility];  Service:    • INTERVENTIONAL RADIOLOGY PROCEDURE N/A 5/9/2017    Procedure: Abdominal Aortagram with Runoff;  Surgeon: Drew Gurrola MD;  Location:  [facility];  Service:    • INTERVENTIONAL RADIOLOGY PROCEDURE N/A 9/5/2018    Procedure: Abdominal Aortagram with Runoff;  Surgeon: Drew Gurrola MD;  Location:  [facility];  Service: Cardiovascular   • INTERVENTIONAL RADIOLOGY PROCEDURE Bilateral 7/29/2019    Procedure: Carotid Cerebral Angiogram;  Surgeon: Claude Atkins MD;  Location:  [facility];  Service: Interventional Radiology   • KNEE SURGERY Left 08/17/2015   • OTHER SURGICAL HISTORY      L LEG STENT   • UT TCAT IV STENT CRV CRTD ART EMBOLIC PROTECJ N/A 9/25/2019    Left Carotid Stent; Dr. Claude Atkins   • WRIST SURGERY         Review of Systems:        Review of Systems   HENT: Positive for congestion.    Eyes: Positive for discharge.   Respiratory: Positive for shortness of breath and wheezing.    Cardiovascular: Positive for leg swelling.   Musculoskeletal: Positive for arthralgias, back pain and neck pain.   Allergic/Immunologic: Positive for environmental allergies.   Neurological: Positive for numbness.   Psychiatric/Behavioral: Positive for sleep disturbance.   All other systems reviewed and are negative.       Medications    Current Outpatient Medications:   •  amiodarone (PACERONE) 200 MG tablet, Take 1 tablet by mouth Daily. (Patient taking differently: Take 100 mg by mouth 2 (Two) Times a Day.), Disp: 30 tablet, Rfl: 5  •  aspirin 81 MG EC tablet, Take 81 mg by mouth Every Morning., Disp: , Rfl:   •  carvedilol (COREG) 12.5 MG tablet, Take 12.5 mg by mouth 2 (Two) Times a Day With Meals., Disp: , Rfl:   •  clopidogrel (PLAVIX) 75 MG tablet, Take 1 tablet by mouth Daily., Disp: 30 tablet, Rfl: 11  •  furosemide (LASIX) 40 MG tablet, Take 40 mg by mouth 2 (Two) Times a Day., Disp: , Rfl:   •   levothyroxine (SYNTHROID, LEVOTHROID) 100 MCG tablet, Take 100 mcg by mouth Daily., Disp: , Rfl:   •  nitroglycerin (NITROSTAT) 0.4 MG SL tablet, Place 1 tablet under the tongue every 5 (five) minutes as needed for chest pain. Take no more than 3 doses in 15 minutes., Disp: 25 tablet, Rfl: 12  •  pantoprazole (PROTONIX) 40 MG EC tablet, Take 40 mg by mouth Every Morning., Disp: , Rfl:   •  rosuvastatin (CRESTOR) 40 MG tablet, Take 40 mg by mouth every night., Disp: , Rfl:   •  sacubitril-valsartan (ENTRESTO) 49-51 MG tablet, Take 2 tablets by mouth 2 (Two) Times a Day., Disp: , Rfl:   •  Semaglutide (OZEMPIC) 1 MG/DOSE solution pen-injector, Inject 0.25 mg under the skin into the appropriate area as directed 1 (One) Time Per Week. Thursday , Disp: , Rfl:     Allergies:  No Known Allergies    Social Hx:  Social History     Tobacco Use   • Smoking status: Former Smoker     Packs/day: 2.00     Years: 50.00     Pack years: 100.00     Types: Cigarettes     Last attempt to quit: 2016     Years since quittin.0   • Smokeless tobacco: Never Used   Substance Use Topics   • Alcohol use: Yes     Frequency: Never     Comment: drinks on rare occasions   • Drug use: No       Family Hx:  Family History   Problem Relation Age of Onset   • Hypertension Mother    • Hypertension Father    • Heart failure Father    • Heart attack Father    • Diabetes Sister    • Arrhythmia Sister        Review of Imaging:  Carotid duplex from the [place] [facility] office dated 2020 was reviewed along with its corresponding radiologic report.  Comparison is made to carotid duplex from [facility] following stent on 2019.  While the left carotid stent remains patent, there are increasing velocities concerning for recurrent, high-grade stenosis.  Peak velocities within the left carotid vasculature are 237/121 cm/s, with an ICA/CCA ratio of 7.5 (was 137/50 cm/s, ratio 2.2).  Given differences in technique, there is  stable plaque formation at the right carotid bifurcation, previously shown to be an approximately 50% stenosis on catheter angiography, with peak velocities of 217/66 cm/s, ratio 3.0 (was 216/71 cm/s, ratio 2.5).    Physical Examination:  Vitals:    08/03/20 1323   BP: 132/70   Temp: 97.5 °F (36.4 °C)        General Appearance:   Well developed, well nourished, well groomed, alert, and cooperative.  Cardiovascular: Regular rate and rhythm. No carotid bruits      Neurological examination:  Neurologic Exam     Mental Status   Oriented to person, place, and time.   Speech: speech is normal   Level of consciousness: alert    Cranial Nerves   Cranial nerves II through XII intact.     Motor Exam     Strength   Strength 5/5 throughout.     Sensory Exam   Light touch normal.     Gait, Coordination, and Reflexes     Gait  Gait: normal      Diagnoses/Plan:    Mr. Monahan is a 70 y.o. male status post angioplasty/stent placement for high-grade, asymptomatic left carotid stenosis on 9/29/2019.  He is done well since his procedure, with no stroke or TIA-like symptoms.  Carotid duplex on 7/23/2020 demonstrates increasing velocities within the left carotid stent, concerning for recurrent, high-grade stenosis.  I discussed this at length with Mr. Monahan, and gave him the option of having a catheter angiogram +/- additional intervention versus short interim follow-up with carotid duplex in 3 months time.  Given the current coronavirus pandemic, he wishes to avoid hospitalization (if at all possible), and has opted for the short interim follow-up.  I did review the signs/symptoms of TIA/stroke with Mr. Monahan, and he will contact our office in/or call 911 if he experiences any symptoms during the interim.  He is on lifelong dual antiplatelet/statin therapy, and will remain on these indefinitely.

## 2020-08-10 ENCOUNTER — TRANSCRIBE ORDERS (OUTPATIENT)
Dept: ADMINISTRATIVE | Facility: HOSPITAL | Age: 70
End: 2020-08-10

## 2020-08-10 DIAGNOSIS — I70.213 ATHEROSCLEROSIS OF NATIVE ARTERY OF BOTH LOWER EXTREMITIES WITH INTERMITTENT CLAUDICATION (HCC): Primary | ICD-10-CM

## 2020-08-18 ENCOUNTER — HOSPITAL ENCOUNTER (OUTPATIENT)
Facility: HOSPITAL | Age: 70
Setting detail: HOSPITAL OUTPATIENT SURGERY
Discharge: HOME | End: 2020-08-18
Attending: INTERNAL MEDICINE | Admitting: INTERNAL MEDICINE

## 2020-08-18 VITALS
WEIGHT: 224.65 LBS | DIASTOLIC BLOOD PRESSURE: 92 MMHG | BODY MASS INDEX: 35.26 KG/M2 | HEIGHT: 67 IN | SYSTOLIC BLOOD PRESSURE: 172 MMHG | OXYGEN SATURATION: 94 % | HEART RATE: 60 BPM | RESPIRATION RATE: 16 BRPM | TEMPERATURE: 97.4 F

## 2020-08-18 DIAGNOSIS — I70.213 ATHEROSCLEROSIS OF NATIVE ARTERY OF BOTH LOWER EXTREMITIES WITH INTERMITTENT CLAUDICATION (HCC): ICD-10-CM

## 2020-08-18 LAB
ANION GAP SERPL CALCULATED.3IONS-SCNC: 9 MMOL/L (ref 5–15)
BUN SERPL-MCNC: 27 MG/DL (ref 8–23)
BUN/CREAT SERPL: 19.7 (ref 7–25)
CALCIUM SPEC-SCNC: 8.7 MG/DL (ref 8.6–10.5)
CHLORIDE SERPL-SCNC: 104 MMOL/L (ref 98–107)
CO2 SERPL-SCNC: 30 MMOL/L (ref 22–29)
CREAT SERPL-MCNC: 1.37 MG/DL (ref 0.76–1.27)
DEPRECATED RDW RBC AUTO: 51.8 FL (ref 37–54)
ERYTHROCYTE [DISTWIDTH] IN BLOOD BY AUTOMATED COUNT: 15.3 % (ref 12.3–15.4)
GFR SERPL CREATININE-BSD FRML MDRD: 51 ML/MIN/1.73
GLUCOSE SERPL-MCNC: 146 MG/DL (ref 65–99)
HCT VFR BLD AUTO: 46 % (ref 37.5–51)
HGB BLD-MCNC: 14.2 G/DL (ref 13–17.7)
MCH RBC QN AUTO: 28.6 PG (ref 26.6–33)
MCHC RBC AUTO-ENTMCNC: 30.9 G/DL (ref 31.5–35.7)
MCV RBC AUTO: 92.6 FL (ref 79–97)
PLATELET # BLD AUTO: 137 10*3/MM3 (ref 140–450)
PMV BLD AUTO: 12.6 FL (ref 6–12)
POTASSIUM SERPL-SCNC: 3.9 MMOL/L (ref 3.5–5.2)
RBC # BLD AUTO: 4.97 10*6/MM3 (ref 4.14–5.8)
SODIUM SERPL-SCNC: 143 MMOL/L (ref 136–145)
WBC # BLD AUTO: 4.28 10*3/MM3 (ref 3.4–10.8)

## 2020-08-18 PROCEDURE — 80048 BASIC METABOLIC PNL TOTAL CA: CPT | Performed by: INTERNAL MEDICINE

## 2020-08-18 PROCEDURE — 36415 COLL VENOUS BLD VENIPUNCTURE: CPT

## 2020-08-18 PROCEDURE — C1894 INTRO/SHEATH, NON-LASER: HCPCS | Performed by: INTERNAL MEDICINE

## 2020-08-18 PROCEDURE — 85027 COMPLETE CBC AUTOMATED: CPT | Performed by: INTERNAL MEDICINE

## 2020-08-18 PROCEDURE — 0 IOPAMIDOL PER 1 ML: Performed by: INTERNAL MEDICINE

## 2020-08-18 PROCEDURE — C1769 GUIDE WIRE: HCPCS | Performed by: INTERNAL MEDICINE

## 2020-08-18 PROCEDURE — 25010000002 MIDAZOLAM PER 1 MG: Performed by: INTERNAL MEDICINE

## 2020-08-18 PROCEDURE — 75716 ARTERY X-RAYS ARMS/LEGS: CPT | Performed by: INTERNAL MEDICINE

## 2020-08-18 PROCEDURE — 25010000002 FENTANYL CITRATE (PF) 100 MCG/2ML SOLUTION: Performed by: INTERNAL MEDICINE

## 2020-08-18 RX ORDER — ACETAMINOPHEN 325 MG/1
650 TABLET ORAL EVERY 4 HOURS PRN
Status: DISCONTINUED | OUTPATIENT
Start: 2020-08-18 | End: 2020-08-18 | Stop reason: HOSPADM

## 2020-08-18 RX ORDER — CHLORAL HYDRATE 500 MG
1000 CAPSULE ORAL
COMMUNITY
End: 2020-11-23

## 2020-08-18 RX ORDER — MIDAZOLAM HYDROCHLORIDE 1 MG/ML
INJECTION INTRAMUSCULAR; INTRAVENOUS AS NEEDED
Status: DISCONTINUED | OUTPATIENT
Start: 2020-08-18 | End: 2020-08-18 | Stop reason: HOSPADM

## 2020-08-18 RX ORDER — SODIUM CHLORIDE 9 MG/ML
250 INJECTION, SOLUTION INTRAVENOUS ONCE AS NEEDED
Status: DISCONTINUED | OUTPATIENT
Start: 2020-08-18 | End: 2020-08-18 | Stop reason: HOSPADM

## 2020-08-18 RX ORDER — LIDOCAINE HYDROCHLORIDE 10 MG/ML
INJECTION, SOLUTION EPIDURAL; INFILTRATION; INTRACAUDAL; PERINEURAL AS NEEDED
Status: DISCONTINUED | OUTPATIENT
Start: 2020-08-18 | End: 2020-08-18 | Stop reason: HOSPADM

## 2020-08-18 RX ORDER — HYDROCODONE BITARTRATE AND ACETAMINOPHEN 5; 325 MG/1; MG/1
1 TABLET ORAL EVERY 4 HOURS PRN
Status: DISCONTINUED | OUTPATIENT
Start: 2020-08-18 | End: 2020-08-18 | Stop reason: HOSPADM

## 2020-08-18 RX ORDER — FENTANYL CITRATE 50 UG/ML
INJECTION, SOLUTION INTRAMUSCULAR; INTRAVENOUS AS NEEDED
Status: DISCONTINUED | OUTPATIENT
Start: 2020-08-18 | End: 2020-08-18 | Stop reason: HOSPADM

## 2020-08-18 RX ORDER — ASPIRIN 325 MG
325 TABLET, DELAYED RELEASE (ENTERIC COATED) ORAL DAILY
Status: DISCONTINUED | OUTPATIENT
Start: 2020-08-18 | End: 2020-08-18 | Stop reason: HOSPADM

## 2020-08-18 RX ORDER — OXYCODONE AND ACETAMINOPHEN 10; 325 MG/1; MG/1
1 TABLET ORAL EVERY 4 HOURS PRN
Status: DISCONTINUED | OUTPATIENT
Start: 2020-08-18 | End: 2020-08-18 | Stop reason: HOSPADM

## 2020-08-18 RX ORDER — TEMAZEPAM 7.5 MG/1
7.5 CAPSULE ORAL NIGHTLY PRN
Status: DISCONTINUED | OUTPATIENT
Start: 2020-08-18 | End: 2020-08-18 | Stop reason: HOSPADM

## 2020-08-18 RX ORDER — ALPRAZOLAM 0.25 MG/1
0.25 TABLET ORAL 3 TIMES DAILY PRN
Status: DISCONTINUED | OUTPATIENT
Start: 2020-08-18 | End: 2020-08-18 | Stop reason: HOSPADM

## 2020-08-18 RX ORDER — SODIUM CHLORIDE 9 MG/ML
250 INJECTION, SOLUTION INTRAVENOUS CONTINUOUS
Status: ACTIVE | OUTPATIENT
Start: 2020-08-18 | End: 2020-08-18

## 2020-08-18 RX ADMIN — ASPIRIN 325 MG: 325 TABLET, COATED ORAL at 12:29

## 2020-08-18 NOTE — H&P
Pre-Catheterization Report  Cardiovascular Laboratory  [facility]      Patient:  Antony Monahan  :  1950  PCP:  Franky Carrington MD  PHONE:  [phone]    DATE: 2020    BRIEF HPI:  Antony Monahan is a 70 y.o. male with hypertension, hypercholesterolemia, systolic CHF status post previous ICD, coronary artery disease status post previous stents, and peripheral arterial disease.  He is complaining of lower extremity edema that is been increasing for the past 2 weeks.  He says his legs feel swollen.  It is moderate in severity.  He also has some shortness of breath, dyspnea on exertion, and occasional chest pain.  His legs have also been aching which increases with walking.  He recently underwent an abnormal lower extremity arterial duplex and now presents for AA with runoff.    Cardiac Risk Factors:  advanced age (older than 55 for men, 65 for women), dyslipidemia, family history of premature cardiovascular disease, hypertension, male gender, obesity (BMI >= 30 kg/m2)    Anginal class in last 2 weeks:  CCS class I    CHF Class in last 2 weeks:  NYHA Class II    Cardiogenic shock:  no    Cardiac arrest <24 hours:  no    Stress test within last 6 months:   yes   Details:    Previous cardiac catheterization:  yes  Details:     Previous CABG:  no  Details:      Allergies:     IV contrast allergy:  no  No Known Allergies    MEDICATIONS:  Prior to Admission medications    Medication Sig Start Date End Date Taking? Authorizing Provider   galcanezumab-gnlm (EMGALITY) 120 MG/ML prefilled syringe Inject  under the skin into the appropriate area as directed Every 30 (Thirty) Days.   Yes ProviderWilbur MD   Omega-3 Fatty Acids (FISH OIL) 1000 MG capsule capsule Take 1,000 mg by mouth Daily With Breakfast.   Yes ProviderWilbur MD   amiodarone (PACERONE) 200 MG tablet Take 1 tablet by mouth Daily.  Patient taking differently: Take 100 mg by mouth Daily. 19   Tere  MD Jonas   aspirin 81 MG EC tablet Take 162 mg by mouth Daily.    Wilbur Fine MD   carvedilol (COREG) 12.5 MG tablet Take 25 mg by mouth 2 (Two) Times a Day With Meals.    Wilbur Fine MD   clopidogrel (PLAVIX) 75 MG tablet Take 1 tablet by mouth Daily. 9/5/18   Drew Gurrola MD   furosemide (LASIX) 40 MG tablet Take 40 mg by mouth Daily.    Wilbur Fine MD   levothyroxine (SYNTHROID, LEVOTHROID) 100 MCG tablet Take 100 mcg by mouth Daily.    Wilbur Fine MD   nitroglycerin (NITROSTAT) 0.4 MG SL tablet Place 1 tablet under the tongue every 5 (five) minutes as needed for chest pain. Take no more than 3 doses in 15 minutes. 8/1/16   Tommy Lee PA   pantoprazole (PROTONIX) 40 MG EC tablet Take 40 mg by mouth Every Morning.    Wilbur Fine MD   rosuvastatin (CRESTOR) 40 MG tablet Take 40 mg by mouth every night.    Wilbur Fine MD   sacubitril-valsartan (ENTRESTO) 49-51 MG tablet Take 2 tablets by mouth 2 (Two) Times a Day.    Wilbur Fine MD   Semaglutide (OZEMPIC) 1 MG/DOSE solution pen-injector Inject 0.25 mg under the skin into the appropriate area as directed 1 (One) Time Per Week. Thursday     Wilbur Fine MD       Past medical & surgical history, social and family history reviewed in the electronic medical record.    ROS:  Cardiovascular ROS: positive for - chest pain, dyspnea on exertion, edema, shortness of breath and lower extremity claudication    Physical Exam:    Vitals: There were no vitals filed for this visit. There were no vitals filed for this visit.    General Appearance:    Alert, cooperative, in no acute distress   Head:    Normocephalic, without obvious abnormality, atraumatic   Eyes:            Lids and lashes normal, conjunctivae and sclerae normal, no   icterus, no pallor, corneas clear, PERRLA   Ears:    Ears appear intact with no abnormalities noted   Neck:   No adenopathy, supple, trachea midline, no  thyromegaly, no   carotid bruit, no JVD   Back:     No kyphosis present, no scoliosis present, range of motion normal   Lungs:     Clear to auscultation,respirations regular, even and                unlabored    Heart:    Regular rhythm and normal rate, normal S1 and S2, no         murmur, no gallop, no rub, no click.  Lateral PMI   Chest Wall:    No abnormalities observed   Abdomen:     Normal bowel sounds, no masses, no organomegaly, soft     nontender, nondistended, no guarding, no rebound                tenderness   Rectal:     Deferred   Extremities:   Moves all extremities well, + edema, no cyanosis, no           redness   Pulses:  Pulses not palpable    Skin:   No bleeding, bruising or rash   Neurologic:   Cranial nerves 2 - 12 grossly intact, sensation intact     Barbaeu Test:  Left: Not Assessed  (oxymetric Allens) Right: Not Assessed             Lab Results   Component Value Date    TRIG 159 (H) 07/27/2019    HDL 30 (L) 07/27/2019    AST 22 07/26/2019    ALT 16 07/26/2019           Impression      · Abnormal lower extremity arterial duplex    Plan     · Procedure to perform: AA with runoff  · Planned access: Per RUDY Tracey  08/18/20  12:09

## 2020-11-09 ENCOUNTER — OFFICE VISIT (OUTPATIENT)
Dept: NEUROSURGERY | Facility: CLINIC | Age: 70
End: 2020-11-09

## 2020-11-09 VITALS
WEIGHT: 232 LBS | DIASTOLIC BLOOD PRESSURE: 60 MMHG | BODY MASS INDEX: 36.41 KG/M2 | TEMPERATURE: 97.8 F | HEIGHT: 67 IN | SYSTOLIC BLOOD PRESSURE: 100 MMHG

## 2020-11-09 DIAGNOSIS — I65.22 CAROTID STENOSIS, ASYMPTOMATIC, LEFT: Primary | ICD-10-CM

## 2020-11-09 DIAGNOSIS — E66.01 MORBIDLY OBESE (HCC): ICD-10-CM

## 2020-11-09 PROCEDURE — 99214 OFFICE O/P EST MOD 30 MIN: CPT | Performed by: PHYSICIAN ASSISTANT

## 2020-11-09 RX ORDER — SODIUM CHLORIDE 9 MG/ML
50 INJECTION, SOLUTION INTRAVENOUS CONTINUOUS
Status: CANCELLED | OUTPATIENT
Start: 2020-11-09

## 2020-11-09 NOTE — PROGRESS NOTES
"NAME: YAKOV MONAHAN   DOS: 2020  : 1950  PCP: Franky Carrington MD    Chief Complaint:  Follow-up      History of Present Illness: Mr. Monahan is a 70 y.o. male is seen today status post angioplasty/stent placement for high-grade left internal carotid stenosis on 2019.  Patient has been followed due to elevated velocities per carotid duplex and has deferred intervention due to the coronavirus epidemic.  Patient notes he had been experience some syncope and dizziness, was but this was attributed to his cardiac disease and his carotic stenosis was deemed \"asymptomatic\".  Patient denies any stroke or TIA-like symptoms, specifically no unilateral weakness/numbness, no speech or vision changes.  Patient has remained on lifelong dual antiplatelet regiment without issues.  Patient remains on Crestor.  He presents today for follow-up.      Past Medical History:   Diagnosis Date   • Anxiety    • Arthritis    • CAD (coronary artery disease)    • COPD (chronic obstructive pulmonary disease) (CMS/[facility]) 2016   • Diabetes mellitus (CMS/[facility])    • HTN (hypertension) 2016   • Hypercholesterolemia 2016   • Hypothyroid 2016   • Obesity    • PAD (peripheral artery disease) (CMS/[facility]) 2016   • Systolic CHF (CMS/[facility]) 2016   • Tobacco abuse 2016       Past Surgical History:   Procedure Laterality Date   • CARDIAC CATHETERIZATION N/A 2016    Procedure: Left Heart Cath;  Surgeon: Drew Gurrola MD;  Location:  [facility];  Service:    • CARDIAC CATHETERIZATION N/A 2019    Procedure: LEFT HEART CATH;  Surgeon: Drew Gurrola MD;  Location:  [facility];  Service: Cardiovascular   • CARDIAC ELECTROPHYSIOLOGY PROCEDURE N/A 2019    Procedure: ICD SC new;  Surgeon: Musa Harden MD;  Location:  [facility];  Service: Cardiology   • INTERVENTIONAL RADIOLOGY PROCEDURE N/A 2016    Procedure: AORTIC " Aortagram with Runoff;  Surgeon: Drew Gurrola MD;  Location:  [facility];  Service:    • INTERVENTIONAL RADIOLOGY PROCEDURE N/A 5/9/2017    Procedure: Abdominal Aortagram with Runoff;  Surgeon: Drew Gurrola MD;  Location:  [facility];  Service:    • INTERVENTIONAL RADIOLOGY PROCEDURE N/A 9/5/2018    Procedure: Abdominal Aortagram with Runoff;  Surgeon: Drew Gurrola MD;  Location:  [facility];  Service: Cardiovascular   • INTERVENTIONAL RADIOLOGY PROCEDURE Bilateral 7/29/2019    Procedure: Carotid Cerebral Angiogram;  Surgeon: Claude Atkins MD;  Location:  [facility];  Service: Interventional Radiology   • INTERVENTIONAL RADIOLOGY PROCEDURE N/A 8/18/2020    Procedure: AARO+/-;  Surgeon: Drew Gurrola MD;  Location:  [facility];  Service: Cardiovascular;  Laterality: N/A;   • KNEE SURGERY Left 08/17/2015   • OTHER SURGICAL HISTORY      L LEG STENT   • DC TCAT IV STENT CRV CRTD ART EMBOLIC PROTECJ N/A 9/25/2019    Left Carotid Stent; Dr. Claude Atkins   • WRIST SURGERY               Review of Systems   All other systems reviewed and are negative.       Medications:    Current Outpatient Medications:   •  amiodarone (PACERONE) 200 MG tablet, Take 1 tablet by mouth Daily. (Patient taking differently: Take 100 mg by mouth Daily.), Disp: 30 tablet, Rfl: 5  •  aspirin 81 MG EC tablet, Take 162 mg by mouth Daily., Disp: , Rfl:   •  carvedilol (COREG) 12.5 MG tablet, Take 25 mg by mouth 2 (Two) Times a Day With Meals., Disp: , Rfl:   •  clopidogrel (PLAVIX) 75 MG tablet, Take 1 tablet by mouth Daily., Disp: 30 tablet, Rfl: 11  •  furosemide (LASIX) 40 MG tablet, Take 40 mg by mouth Daily., Disp: , Rfl:   •  galcanezumab-gnlm (EMGALITY) 120 MG/ML prefilled syringe, Inject  under the skin into the appropriate area as directed Every 30 (Thirty) Days., Disp: , Rfl:   •  levothyroxine (SYNTHROID, LEVOTHROID)  100 MCG tablet, Take 100 mcg by mouth Daily., Disp: , Rfl:   •  nitroglycerin (NITROSTAT) 0.4 MG SL tablet, Place 1 tablet under the tongue every 5 (five) minutes as needed for chest pain. Take no more than 3 doses in 15 minutes., Disp: 25 tablet, Rfl: 12  •  Omega-3 Fatty Acids (FISH OIL) 1000 MG capsule capsule, Take 1,000 mg by mouth Daily With Breakfast., Disp: , Rfl:   •  pantoprazole (PROTONIX) 40 MG EC tablet, Take 40 mg by mouth Every Morning., Disp: , Rfl:   •  rosuvastatin (CRESTOR) 40 MG tablet, Take 40 mg by mouth every night., Disp: , Rfl:   •  sacubitril-valsartan (ENTRESTO) 49-51 MG tablet, Take 2 tablets by mouth 2 (Two) Times a Day., Disp: , Rfl:     Allergies:  No Known Allergies    Social History     Tobacco Use   • Smoking status: Former Smoker     Packs/day: 2.00     Years: 50.00     Pack years: 100.00     Types: Cigarettes     Quit date: 2016     Years since quittin.3   • Smokeless tobacco: Never Used   Substance Use Topics   • Alcohol use: Yes     Frequency: Never     Comment: 2-3 Beers per Week   • Drug use: No       Family History   Problem Relation Age of Onset   • Hypertension Mother    • Hypertension Father    • Heart failure Father    • Heart attack Father    • Diabetes Sister    • Arrhythmia Sister        Review of Imaging:  Carotid duplex from the [facility] office dated 10/30/2020 was reviewed along with its corresponding radiology report.  Comparison was made to prior carotid duplex that was performed on 2020.  The study demonstrates increasing velocities concerning for recurrent, high-grade stenosis.  Peak velocities within the left carotid vasculature are 439/219 cm/sec., with an ICA/CCA ratio of 13.3 (it was 237/121 cm/sec., ratio 7.5).  Peak velocities within the right carotid vasculature are 282/123 cm/sec. with a ratio of 3.8.     Vitals:    20 0939   BP: 100/60   Temp: 97.8 °F (36.6 °C)     Body mass index is 36.34 kg/m².    Physical  Exam  Constitutional:       Appearance: He is obese.   HENT:      Head: Normocephalic and atraumatic.      Mouth/Throat:      Mouth: Mucous membranes are moist.      Pharynx: Oropharynx is clear.   Eyes:      Extraocular Movements: Extraocular movements intact and EOM normal.      Conjunctiva/sclera: Conjunctivae normal.   Pulmonary:      Effort: Pulmonary effort is normal. No respiratory distress.   Skin:     General: Skin is warm and dry.   Neurological:      General: No focal deficit present.      Mental Status: He is alert and oriented to person, place, and time. Mental status is at baseline.      Coordination: Finger-Nose-Finger Test normal.      Gait: Gait is intact.      Deep Tendon Reflexes: Strength normal.   Psychiatric:         Speech: Speech normal.       Neurologic Exam     Mental Status   Oriented to person, place, and time.   Speech: speech is normal     Cranial Nerves   Cranial nerves II through XII intact.     CN II   Visual fields full to confrontation.     CN III, IV, VI   Extraocular motions are normal.     CN V   Facial sensation intact.     CN VII   Facial expression full, symmetric.     CN VIII   Hearing: intact    CN IX, X   Palate: symmetric    CN XI   Right trapezius strength: normal  Left trapezius strength: normal    CN XII   Tongue: not atrophic  Fasciculations: absent  Tongue deviation: none    Motor Exam   Muscle bulk: normal  Overall muscle tone: normal  Right arm pronator drift: absent  Left arm pronator drift: absent    Strength   Strength 5/5 throughout.     Sensory Exam   Light touch normal.     Gait, Coordination, and Reflexes     Gait  Gait: normal    Coordination   Finger to nose coordination: normal      Diagnoses/Plan:    Mr. Monahan is a 70 y.o. male status post angioplasty/stent placement for high-grade, asymptomatic left carotid stenosis on 9/29/2019.  He has done well since the procedure, denying any stroke or TIA-like symptoms.  However, patient's carotid duplex on  10/30 continues to demonstrate increasing velocities within the left carotid stent, which is concerning for recurrent, high-grade stenosis.  This was discussed at length with Mr. Monahan and was given the option of a catheter angiogram with possible intervention versus short-term follow-up.  Patient decided he wanted to proceed with the angiogram with possible intervention at this time.  Therefore, we will schedule this in the upcoming weeks.  Patient was instructed to remain on his dual antiplatelet/statin therapy.  Signs and symptoms were reviewed with patient of a TIA/stroke, and patient was instructed to contact her office and/or call 911 if he experiences any of the symptoms in the interim.  Patient was understand the risk and benefits and stated that he was willing to proceed.      Patient's Body mass index is 36.34 kg/m². BMI is above normal parameters. Recommendations include: educational material and Patient has been referred to weight loss by Dr. Gurrola.  Patient notes that he is ready to lose some weight..     Antony Monahan  reports that he quit smoking about 4 years ago. His smoking use included cigarettes. He has a 100.00 pack-year smoking history. He has never used smokeless tobacco.. I have educated him on the risk of diseases from using tobacco products such as cancer, COPD and heart disease.       Arielle Neville PA-C

## 2020-11-09 NOTE — H&P (VIEW-ONLY)
"NAME: YAKOV MONAHAN   DOS: 2020  : 1950  PCP: Franky Carrington MD    Chief Complaint:  Follow-up      History of Present Illness: Mr. Monahan is a 70 y.o. male is seen today status post angioplasty/stent placement for high-grade left internal carotid stenosis on 2019.  Patient has been followed due to elevated velocities per carotid duplex and has deferred intervention due to the coronavirus epidemic.  Patient notes he had been experience some syncope and dizziness, was but this was attributed to his cardiac disease and his carotic stenosis was deemed \"asymptomatic\".  Patient denies any stroke or TIA-like symptoms, specifically no unilateral weakness/numbness, no speech or vision changes.  Patient has remained on lifelong dual antiplatelet regiment without issues.  Patient remains on Crestor.  He presents today for follow-up.      Past Medical History:   Diagnosis Date   • Anxiety    • Arthritis    • CAD (coronary artery disease)    • COPD (chronic obstructive pulmonary disease) (CMS/[facility]) 2016   • Diabetes mellitus (CMS/[facility])    • HTN (hypertension) 2016   • Hypercholesterolemia 2016   • Hypothyroid 2016   • Obesity    • PAD (peripheral artery disease) (CMS/[facility]) 2016   • Systolic CHF (CMS/[facility]) 2016   • Tobacco abuse 2016       Past Surgical History:   Procedure Laterality Date   • CARDIAC CATHETERIZATION N/A 2016    Procedure: Left Heart Cath;  Surgeon: Drew Gurrola MD;  Location:  [facility];  Service:    • CARDIAC CATHETERIZATION N/A 2019    Procedure: LEFT HEART CATH;  Surgeon: Drew Gurrola MD;  Location:  [facility];  Service: Cardiovascular   • CARDIAC ELECTROPHYSIOLOGY PROCEDURE N/A 2019    Procedure: ICD SC new;  Surgeon: Musa Harden MD;  Location:  [facility];  Service: Cardiology   • INTERVENTIONAL RADIOLOGY PROCEDURE N/A 2016    Procedure: AORTIC " Aortagram with Runoff;  Surgeon: Drew Gurrola MD;  Location:  [facility];  Service:    • INTERVENTIONAL RADIOLOGY PROCEDURE N/A 5/9/2017    Procedure: Abdominal Aortagram with Runoff;  Surgeon: Drew Gurrola MD;  Location:  [facility];  Service:    • INTERVENTIONAL RADIOLOGY PROCEDURE N/A 9/5/2018    Procedure: Abdominal Aortagram with Runoff;  Surgeon: Drew Gurrola MD;  Location:  [facility];  Service: Cardiovascular   • INTERVENTIONAL RADIOLOGY PROCEDURE Bilateral 7/29/2019    Procedure: Carotid Cerebral Angiogram;  Surgeon: Claude Atkins MD;  Location:  [facility];  Service: Interventional Radiology   • INTERVENTIONAL RADIOLOGY PROCEDURE N/A 8/18/2020    Procedure: AARO+/-;  Surgeon: Drew Gurrola MD;  Location:  [facility];  Service: Cardiovascular;  Laterality: N/A;   • KNEE SURGERY Left 08/17/2015   • OTHER SURGICAL HISTORY      L LEG STENT   • AK TCAT IV STENT CRV CRTD ART EMBOLIC PROTECJ N/A 9/25/2019    Left Carotid Stent; Dr. Claude Atkins   • WRIST SURGERY               Review of Systems   All other systems reviewed and are negative.       Medications:    Current Outpatient Medications:   •  amiodarone (PACERONE) 200 MG tablet, Take 1 tablet by mouth Daily. (Patient taking differently: Take 100 mg by mouth Daily.), Disp: 30 tablet, Rfl: 5  •  aspirin 81 MG EC tablet, Take 162 mg by mouth Daily., Disp: , Rfl:   •  carvedilol (COREG) 12.5 MG tablet, Take 25 mg by mouth 2 (Two) Times a Day With Meals., Disp: , Rfl:   •  clopidogrel (PLAVIX) 75 MG tablet, Take 1 tablet by mouth Daily., Disp: 30 tablet, Rfl: 11  •  furosemide (LASIX) 40 MG tablet, Take 40 mg by mouth Daily., Disp: , Rfl:   •  galcanezumab-gnlm (EMGALITY) 120 MG/ML prefilled syringe, Inject  under the skin into the appropriate area as directed Every 30 (Thirty) Days., Disp: , Rfl:   •  levothyroxine (SYNTHROID, LEVOTHROID)  100 MCG tablet, Take 100 mcg by mouth Daily., Disp: , Rfl:   •  nitroglycerin (NITROSTAT) 0.4 MG SL tablet, Place 1 tablet under the tongue every 5 (five) minutes as needed for chest pain. Take no more than 3 doses in 15 minutes., Disp: 25 tablet, Rfl: 12  •  Omega-3 Fatty Acids (FISH OIL) 1000 MG capsule capsule, Take 1,000 mg by mouth Daily With Breakfast., Disp: , Rfl:   •  pantoprazole (PROTONIX) 40 MG EC tablet, Take 40 mg by mouth Every Morning., Disp: , Rfl:   •  rosuvastatin (CRESTOR) 40 MG tablet, Take 40 mg by mouth every night., Disp: , Rfl:   •  sacubitril-valsartan (ENTRESTO) 49-51 MG tablet, Take 2 tablets by mouth 2 (Two) Times a Day., Disp: , Rfl:     Allergies:  No Known Allergies    Social History     Tobacco Use   • Smoking status: Former Smoker     Packs/day: 2.00     Years: 50.00     Pack years: 100.00     Types: Cigarettes     Quit date: 2016     Years since quittin.3   • Smokeless tobacco: Never Used   Substance Use Topics   • Alcohol use: Yes     Frequency: Never     Comment: 2-3 Beers per Week   • Drug use: No       Family History   Problem Relation Age of Onset   • Hypertension Mother    • Hypertension Father    • Heart failure Father    • Heart attack Father    • Diabetes Sister    • Arrhythmia Sister        Review of Imaging:  Carotid duplex from the [place] [facility] office dated 10/30/2020 was reviewed along with its corresponding radiology report.  Comparison was made to prior carotid duplex that was performed on 2020.  The study demonstrates increasing velocities concerning for recurrent, high-grade stenosis.  Peak velocities within the left carotid vasculature are 439/219 cm/sec., with an ICA/CCA ratio of 13.3 (it was 237/121 cm/sec., ratio 7.5).  Peak velocities within the right carotid vasculature are 282/123 cm/sec. with a ratio of 3.8.     Vitals:    20 0939   BP: 100/60   Temp: 97.8 °F (36.6 °C)     Body mass index is 36.34 kg/m².    Physical  Exam  Constitutional:       Appearance: He is obese.   HENT:      Head: Normocephalic and atraumatic.      Mouth/Throat:      Mouth: Mucous membranes are moist.      Pharynx: Oropharynx is clear.   Eyes:      Extraocular Movements: Extraocular movements intact and EOM normal.      Conjunctiva/sclera: Conjunctivae normal.   Pulmonary:      Effort: Pulmonary effort is normal. No respiratory distress.   Skin:     General: Skin is warm and dry.   Neurological:      General: No focal deficit present.      Mental Status: He is alert and oriented to person, place, and time. Mental status is at baseline.      Coordination: Finger-Nose-Finger Test normal.      Gait: Gait is intact.      Deep Tendon Reflexes: Strength normal.   Psychiatric:         Speech: Speech normal.       Neurologic Exam     Mental Status   Oriented to person, place, and time.   Speech: speech is normal     Cranial Nerves   Cranial nerves II through XII intact.     CN II   Visual fields full to confrontation.     CN III, IV, VI   Extraocular motions are normal.     CN V   Facial sensation intact.     CN VII   Facial expression full, symmetric.     CN VIII   Hearing: intact    CN IX, X   Palate: symmetric    CN XI   Right trapezius strength: normal  Left trapezius strength: normal    CN XII   Tongue: not atrophic  Fasciculations: absent  Tongue deviation: none    Motor Exam   Muscle bulk: normal  Overall muscle tone: normal  Right arm pronator drift: absent  Left arm pronator drift: absent    Strength   Strength 5/5 throughout.     Sensory Exam   Light touch normal.     Gait, Coordination, and Reflexes     Gait  Gait: normal    Coordination   Finger to nose coordination: normal      Diagnoses/Plan:    Mr. Monahan is a 70 y.o. male status post angioplasty/stent placement for high-grade, asymptomatic left carotid stenosis on 9/29/2019.  He has done well since the procedure, denying any stroke or TIA-like symptoms.  However, patient's carotid duplex on  10/30 continues to demonstrate increasing velocities within the left carotid stent, which is concerning for recurrent, high-grade stenosis.  This was discussed at length with Mr. Monahan and was given the option of a catheter angiogram with possible intervention versus short-term follow-up.  Patient decided he wanted to proceed with the angiogram with possible intervention at this time.  Therefore, we will schedule this in the upcoming weeks.  Patient was instructed to remain on his dual antiplatelet/statin therapy.  Signs and symptoms were reviewed with patient of a TIA/stroke, and patient was instructed to contact her office and/or call 911 if he experiences any of the symptoms in the interim.  Patient was understand the risk and benefits and stated that he was willing to proceed.      Patient's Body mass index is 36.34 kg/m². BMI is above normal parameters. Recommendations include: educational material and Patient has been referred to weight loss by Dr. Gurrola.  Patient notes that he is ready to lose some weight..     Antony Monahan  reports that he quit smoking about 4 years ago. His smoking use included cigarettes. He has a 100.00 pack-year smoking history. He has never used smokeless tobacco.. I have educated him on the risk of diseases from using tobacco products such as cancer, COPD and heart disease.       Arielle Neville PA-C

## 2020-11-17 ENCOUNTER — APPOINTMENT (OUTPATIENT)
Dept: PREADMISSION TESTING | Facility: HOSPITAL | Age: 70
End: 2020-11-17

## 2020-11-17 PROCEDURE — U0004 COV-19 TEST NON-CDC HGH THRU: HCPCS

## 2020-11-17 PROCEDURE — C9803 HOPD COVID-19 SPEC COLLECT: HCPCS

## 2020-11-18 LAB — SARS-COV-2 RNA RESP QL NAA+PROBE: NOT DETECTED

## 2020-11-20 ENCOUNTER — HOSPITAL ENCOUNTER (INPATIENT)
Facility: HOSPITAL | Age: 70
LOS: 1 days | Discharge: HOME | End: 2020-11-21
Attending: RADIOLOGY | Admitting: RADIOLOGY

## 2020-11-20 ENCOUNTER — APPOINTMENT (OUTPATIENT)
Dept: CARDIOLOGY | Facility: HOSPITAL | Age: 70
End: 2020-11-20

## 2020-11-20 DIAGNOSIS — I65.22 CAROTID STENOSIS, ASYMPTOMATIC, LEFT: ICD-10-CM

## 2020-11-20 LAB
ANION GAP SERPL CALCULATED.3IONS-SCNC: 11 MMOL/L (ref 5–15)
BH CV DISTAL LEFT ICA HIDDEN LRR: 1 CM
BH CV LEFT CCA HIDDEN LRR: 1 CM/S
BH CV MID LEFT ICA HIDDEN LRR: 1 CM
BH CV VAS CAROTID LEFT DISTAL STENT EDV: 67.2 CM/S
BH CV VAS CAROTID LEFT DISTAL STENT: 177 CM/S
BH CV VAS CAROTID LEFT DISTAL TO STENT EDV: 52.6 CM/S
BH CV VAS CAROTID LEFT DISTAL TO STENT: 170 CM/S
BH CV VAS CAROTID LEFT MID STENT EDV: 23.2 CM/S
BH CV VAS CAROTID LEFT MID STENT: 53.8 CM/S
BH CV VAS CAROTID LEFT PROXIMAL STENT EDV: 12.2 CM/S
BH CV VAS CAROTID LEFT PROXIMAL STENT: 42 CM/S
BH CV VAS CAROTID LEFT PROXIMAL TO STENT: 51.1 CM/S
BH CV VAS CAROTID LEFT STENT NATIVE VESSEL PROXIMAL ED: 16.5 CM/S
BH CV XLRA MEAS LEFT CCA RATIO VEL: 54.2 CM/SEC
BH CV XLRA MEAS LEFT DIST CCA EDV: 16.9 CM/SEC
BH CV XLRA MEAS LEFT DIST CCA PSV: 51.5 CM/SEC
BH CV XLRA MEAS LEFT DIST ICA EDV: 68.1 CM/SEC
BH CV XLRA MEAS LEFT DIST ICA PSV: 178.1 CM/SEC
BH CV XLRA MEAS LEFT ICA RATIO VEL: 86.6 CM/SEC
BH CV XLRA MEAS LEFT ICA/CCA RATIO: 1.6
BH CV XLRA MEAS LEFT MID CCA EDV: 15.7 CM/SEC
BH CV XLRA MEAS LEFT MID CCA PSV: 54.6 CM/SEC
BH CV XLRA MEAS LEFT MID ICA EDV: 33.6 CM/SEC
BH CV XLRA MEAS LEFT MID ICA PSV: 87.1 CM/SEC
BH CV XLRA MEAS LEFT PROX CCA EDV: 13.4 CM/SEC
BH CV XLRA MEAS LEFT PROX CCA PSV: 44.8 CM/SEC
BH CV XLRA MEAS LEFT PROX ECA EDV: 8.6 CM/SEC
BH CV XLRA MEAS LEFT PROX ECA PSV: 143 CM/SEC
BH CV XLRA MEAS LEFT PROX ICA EDV: 23.6 CM/SEC
BH CV XLRA MEAS LEFT PROX ICA PSV: 54.2 CM/SEC
BH CV XLRA MEAS LEFT PROX SCLA PSV: 81.5 CM/SEC
BH CV XLRA MEAS LEFT VERTEBRAL A EDV: 12.3 CM/SEC
BH CV XLRA MEAS LEFT VERTEBRAL A PSV: 55.5 CM/SEC
BH CV XLRA MEAS RIGHT CCA RATIO VEL: 59.8 CM/SEC
BH CV XLRA MEAS RIGHT DIST CCA EDV: 13.8 CM/SEC
BH CV XLRA MEAS RIGHT DIST CCA PSV: 35.4 CM/SEC
BH CV XLRA MEAS RIGHT DIST ICA EDV: 28.1 CM/SEC
BH CV XLRA MEAS RIGHT DIST ICA PSV: 96 CM/SEC
BH CV XLRA MEAS RIGHT ICA RATIO VEL: 181 CM/SEC
BH CV XLRA MEAS RIGHT ICA/CCA RATIO: 3
BH CV XLRA MEAS RIGHT MID CCA EDV: 11.8 CM/SEC
BH CV XLRA MEAS RIGHT MID CCA PSV: 60.2 CM/SEC
BH CV XLRA MEAS RIGHT MID ICA EDV: 67.3 CM/SEC
BH CV XLRA MEAS RIGHT MID ICA PSV: 181 CM/SEC
BH CV XLRA MEAS RIGHT PROX CCA EDV: 22 CM/SEC
BH CV XLRA MEAS RIGHT PROX CCA PSV: 116.3 CM/SEC
BH CV XLRA MEAS RIGHT PROX ECA EDV: 6.4 CM/SEC
BH CV XLRA MEAS RIGHT PROX ECA PSV: 60.4 CM/SEC
BH CV XLRA MEAS RIGHT PROX ICA EDV: 24.9 CM/SEC
BH CV XLRA MEAS RIGHT PROX ICA PSV: 63.7 CM/SEC
BH CV XLRA MEAS RIGHT PROX SCLA EDV: 9.4 CM/SEC
BH CV XLRA MEAS RIGHT PROX SCLA PSV: 102.1 CM/SEC
BH CV XLRA MEAS RIGHT VERTEBRAL A EDV: 12.8 CM/SEC
BH CV XLRA MEAS RIGHT VERTEBRAL A PSV: 41.3 CM/SEC
BH CVPROX LEFT ICA HIDDEN LRR: 1 CM
BUN SERPL-MCNC: 21 MG/DL (ref 8–23)
BUN/CREAT SERPL: 16 (ref 7–25)
CALCIUM SPEC-SCNC: 8.7 MG/DL (ref 8.6–10.5)
CHLORIDE SERPL-SCNC: 109 MMOL/L (ref 98–107)
CO2 SERPL-SCNC: 28 MMOL/L (ref 22–29)
CREAT BLDA-MCNC: 1.2 MG/DL (ref 0.6–1.3)
CREAT SERPL-MCNC: 1.31 MG/DL (ref 0.76–1.27)
DEPRECATED RDW RBC AUTO: 51.8 FL (ref 37–54)
ERYTHROCYTE [DISTWIDTH] IN BLOOD BY AUTOMATED COUNT: 15.2 % (ref 12.3–15.4)
GFR SERPL CREATININE-BSD FRML MDRD: 54 ML/MIN/1.73
GLUCOSE SERPL-MCNC: 123 MG/DL (ref 65–99)
HCT VFR BLD AUTO: 41.5 % (ref 37.5–51)
HGB BLD-MCNC: 12.9 G/DL (ref 13–17.7)
LEFT ARM BP: NORMAL MMHG
MCH RBC QN AUTO: 29.1 PG (ref 26.6–33)
MCHC RBC AUTO-ENTMCNC: 31.1 G/DL (ref 31.5–35.7)
MCV RBC AUTO: 93.7 FL (ref 79–97)
PLATELET # BLD AUTO: 131 10*3/MM3 (ref 140–450)
PMV BLD AUTO: 12.6 FL (ref 6–12)
POTASSIUM SERPL-SCNC: 3.7 MMOL/L (ref 3.5–5.2)
RBC # BLD AUTO: 4.43 10*6/MM3 (ref 4.14–5.8)
SODIUM SERPL-SCNC: 148 MMOL/L (ref 136–145)
WBC # BLD AUTO: 4.16 10*3/MM3 (ref 3.4–10.8)

## 2020-11-20 PROCEDURE — 25010000002 HEPARIN (PORCINE) PER 1000 UNITS: Performed by: RADIOLOGY

## 2020-11-20 PROCEDURE — C1769 GUIDE WIRE: HCPCS | Performed by: RADIOLOGY

## 2020-11-20 PROCEDURE — C1725 CATH, TRANSLUMIN NON-LASER: HCPCS | Performed by: RADIOLOGY

## 2020-11-20 PROCEDURE — 25010000002 MIDAZOLAM PER 1 MG: Performed by: RADIOLOGY

## 2020-11-20 PROCEDURE — C1894 INTRO/SHEATH, NON-LASER: HCPCS | Performed by: RADIOLOGY

## 2020-11-20 PROCEDURE — 85347 COAGULATION TIME ACTIVATED: CPT

## 2020-11-20 PROCEDURE — C1874 STENT, COATED/COV W/DEL SYS: HCPCS | Performed by: RADIOLOGY

## 2020-11-20 PROCEDURE — B315YZZ FLUOROSCOPY OF BILATERAL COMMON CAROTID ARTERIES USING OTHER CONTRAST: ICD-10-PCS | Performed by: RADIOLOGY

## 2020-11-20 PROCEDURE — 85027 COMPLETE CBC AUTOMATED: CPT

## 2020-11-20 PROCEDURE — 25010000002 FENTANYL CITRATE (PF) 100 MCG/2ML SOLUTION: Performed by: RADIOLOGY

## 2020-11-20 PROCEDURE — 99232 SBSQ HOSP IP/OBS MODERATE 35: CPT | Performed by: INTERNAL MEDICINE

## 2020-11-20 PROCEDURE — 80048 BASIC METABOLIC PNL TOTAL CA: CPT

## 2020-11-20 PROCEDURE — 75898 FOLLOW-UP ANGIOGRAPHY: CPT | Performed by: RADIOLOGY

## 2020-11-20 PROCEDURE — C1884 EMBOLIZATION PROTECT SYST: HCPCS | Performed by: RADIOLOGY

## 2020-11-20 PROCEDURE — 37215 TRANSCATH STENT CCA W/EPS: CPT | Performed by: RADIOLOGY

## 2020-11-20 PROCEDURE — 0 IODIXANOL PER 1 ML: Performed by: RADIOLOGY

## 2020-11-20 PROCEDURE — 82565 ASSAY OF CREATININE: CPT

## 2020-11-20 PROCEDURE — C1760 CLOSURE DEV, VASC: HCPCS | Performed by: RADIOLOGY

## 2020-11-20 PROCEDURE — 93880 EXTRACRANIAL BILAT STUDY: CPT

## 2020-11-20 PROCEDURE — 037L35Z DILATION OF LEFT INTERNAL CAROTID ARTERY WITH TWO DRUG-ELUTING INTRALUMINAL DEVICES, PERCUTANEOUS APPROACH: ICD-10-PCS | Performed by: RADIOLOGY

## 2020-11-20 PROCEDURE — 36223 PLACE CATH CAROTID/INOM ART: CPT | Performed by: RADIOLOGY

## 2020-11-20 DEVICE — STNT PERIPH ZILVER PTX 6F 8X40MM 125CM BX/1: Type: IMPLANTABLE DEVICE | Status: FUNCTIONAL

## 2020-11-20 RX ORDER — ASPIRIN 81 MG/1
162 TABLET ORAL DAILY
Status: DISCONTINUED | OUTPATIENT
Start: 2020-11-21 | End: 2020-11-21 | Stop reason: HOSPADM

## 2020-11-20 RX ORDER — CLOPIDOGREL BISULFATE 75 MG/1
75 TABLET ORAL DAILY
Status: DISCONTINUED | OUTPATIENT
Start: 2020-11-21 | End: 2020-11-21 | Stop reason: HOSPADM

## 2020-11-20 RX ORDER — MIDAZOLAM HYDROCHLORIDE 1 MG/ML
INJECTION INTRAMUSCULAR; INTRAVENOUS AS NEEDED
Status: DISCONTINUED | OUTPATIENT
Start: 2020-11-20 | End: 2020-11-20 | Stop reason: HOSPADM

## 2020-11-20 RX ORDER — NITROGLYCERIN 0.4 MG/1
0.4 TABLET SUBLINGUAL
Status: DISCONTINUED | OUTPATIENT
Start: 2020-11-20 | End: 2020-11-21 | Stop reason: HOSPADM

## 2020-11-20 RX ORDER — PANTOPRAZOLE SODIUM 40 MG/1
40 TABLET, DELAYED RELEASE ORAL
Status: DISCONTINUED | OUTPATIENT
Start: 2020-11-21 | End: 2020-11-21 | Stop reason: HOSPADM

## 2020-11-20 RX ORDER — ACETAMINOPHEN 325 MG/1
650 TABLET ORAL EVERY 4 HOURS PRN
Status: DISCONTINUED | OUTPATIENT
Start: 2020-11-20 | End: 2020-11-21 | Stop reason: HOSPADM

## 2020-11-20 RX ORDER — IODIXANOL 320 MG/ML
INJECTION, SOLUTION INTRAVASCULAR AS NEEDED
Status: DISCONTINUED | OUTPATIENT
Start: 2020-11-20 | End: 2020-11-20 | Stop reason: HOSPADM

## 2020-11-20 RX ORDER — CARVEDILOL 12.5 MG/1
25 TABLET ORAL 2 TIMES DAILY WITH MEALS
Status: DISCONTINUED | OUTPATIENT
Start: 2020-11-20 | End: 2020-11-21 | Stop reason: HOSPADM

## 2020-11-20 RX ORDER — FUROSEMIDE 40 MG/1
40 TABLET ORAL DAILY
Status: DISCONTINUED | OUTPATIENT
Start: 2020-11-21 | End: 2020-11-21 | Stop reason: HOSPADM

## 2020-11-20 RX ORDER — SODIUM CHLORIDE 0.9 % (FLUSH) 0.9 %
10 SYRINGE (ML) INJECTION AS NEEDED
Status: DISCONTINUED | OUTPATIENT
Start: 2020-11-20 | End: 2020-11-21 | Stop reason: HOSPADM

## 2020-11-20 RX ORDER — AMIODARONE HYDROCHLORIDE 200 MG/1
200 TABLET ORAL DAILY
Status: DISCONTINUED | OUTPATIENT
Start: 2020-11-21 | End: 2020-11-21 | Stop reason: HOSPADM

## 2020-11-20 RX ORDER — HEPARIN SODIUM 1000 [USP'U]/ML
INJECTION, SOLUTION INTRAVENOUS; SUBCUTANEOUS AS NEEDED
Status: DISCONTINUED | OUTPATIENT
Start: 2020-11-20 | End: 2020-11-20 | Stop reason: HOSPADM

## 2020-11-20 RX ORDER — SODIUM CHLORIDE 9 MG/ML
75 INJECTION, SOLUTION INTRAVENOUS CONTINUOUS
Status: ACTIVE | OUTPATIENT
Start: 2020-11-20 | End: 2020-11-20

## 2020-11-20 RX ORDER — ROSUVASTATIN CALCIUM 20 MG/1
40 TABLET, COATED ORAL NIGHTLY
Status: DISCONTINUED | OUTPATIENT
Start: 2020-11-20 | End: 2020-11-21 | Stop reason: HOSPADM

## 2020-11-20 RX ORDER — FENTANYL CITRATE 50 UG/ML
INJECTION, SOLUTION INTRAMUSCULAR; INTRAVENOUS AS NEEDED
Status: DISCONTINUED | OUTPATIENT
Start: 2020-11-20 | End: 2020-11-20 | Stop reason: HOSPADM

## 2020-11-20 RX ORDER — ECHINACEA PURPUREA EXTRACT 125 MG
2 TABLET ORAL AS NEEDED
Status: DISCONTINUED | OUTPATIENT
Start: 2020-11-20 | End: 2020-11-21 | Stop reason: HOSPADM

## 2020-11-20 RX ORDER — SODIUM CHLORIDE 9 MG/ML
50 INJECTION, SOLUTION INTRAVENOUS CONTINUOUS
Status: DISCONTINUED | OUTPATIENT
Start: 2020-11-20 | End: 2020-11-21 | Stop reason: HOSPADM

## 2020-11-20 RX ORDER — LIDOCAINE HYDROCHLORIDE 10 MG/ML
INJECTION, SOLUTION EPIDURAL; INFILTRATION; INTRACAUDAL; PERINEURAL AS NEEDED
Status: DISCONTINUED | OUTPATIENT
Start: 2020-11-20 | End: 2020-11-20 | Stop reason: HOSPADM

## 2020-11-20 RX ORDER — LEVOTHYROXINE SODIUM 0.1 MG/1
100 TABLET ORAL
Status: DISCONTINUED | OUTPATIENT
Start: 2020-11-21 | End: 2020-11-21 | Stop reason: HOSPADM

## 2020-11-20 RX ORDER — PHENYLEPHRINE HCL IN 0.9% NACL 0.5 MG/5ML
.5-3 SYRINGE (ML) INTRAVENOUS
Status: DISCONTINUED | OUTPATIENT
Start: 2020-11-20 | End: 2020-11-21 | Stop reason: HOSPADM

## 2020-11-20 RX ORDER — SODIUM CHLORIDE 0.9 % (FLUSH) 0.9 %
3 SYRINGE (ML) INJECTION EVERY 12 HOURS SCHEDULED
Status: DISCONTINUED | OUTPATIENT
Start: 2020-11-20 | End: 2020-11-21 | Stop reason: HOSPADM

## 2020-11-20 RX ADMIN — SODIUM CHLORIDE 50 ML/HR: 9 INJECTION, SOLUTION INTRAVENOUS at 08:02

## 2020-11-20 RX ADMIN — NICARDIPINE HYDROCHLORIDE 2.5 MG/HR: 0.1 INJECTION, SOLUTION INTRAVENOUS at 20:33

## 2020-11-20 RX ADMIN — ROSUVASTATIN CALCIUM 40 MG: 20 TABLET, COATED ORAL at 20:33

## 2020-11-20 RX ADMIN — NICARDIPINE HYDROCHLORIDE 5 MG/HR: 0.1 INJECTION, SOLUTION INTRAVENOUS at 13:46

## 2020-11-20 RX ADMIN — SODIUM CHLORIDE 50 ML/HR: 9 INJECTION, SOLUTION INTRAVENOUS at 18:16

## 2020-11-20 RX ADMIN — Medication 3 ML: at 20:48

## 2020-11-20 RX ADMIN — SACUBITRIL AND VALSARTAN 2 TABLET: 49; 51 TABLET, FILM COATED ORAL at 20:34

## 2020-11-20 RX ADMIN — CARVEDILOL 25 MG: 12.5 TABLET, FILM COATED ORAL at 18:15

## 2020-11-20 RX ADMIN — ACETAMINOPHEN 650 MG: 325 TABLET, FILM COATED ORAL at 21:08

## 2020-11-20 RX ADMIN — SODIUM CHLORIDE 75 ML/HR: 9 INJECTION, SOLUTION INTRAVENOUS at 10:15

## 2020-11-20 NOTE — PROGRESS NOTES
Discharge Planning Assessment  [facility]     Patient Name: Antony Gil  MRN: [id number]  Today's Date: 11/20/2020    Admit Date: 11/20/2020    Discharge Needs Assessment     Row Name 11/20/20 1201       Living Environment    Lives With  spouse    Current Living Arrangements  home/apartment/condo    Primary Care Provided by  self    Provides Primary Care For  no one    Family Caregiver if Needed  child(maegan), adult;spouse    Able to Return to Prior Arrangements  yes       Resource/Environmental Concerns    Transportation Concerns  car, none       Transition Planning    Patient/Family Anticipates Transition to  home with family    Transportation Anticipated  family or friend will provide       Discharge Needs Assessment    Equipment Currently Used at Home  none    Concerns to be Addressed  no discharge needs identified        Discharge Plan     Row Name 11/20/20 1202       Plan    Plan  home    Patient/Family in Agreement with Plan  yes    Plan Comments  I met with Mr. Gil at bedside to discuss discharge planning.  He lives in [facility] with his spouse.  Independent with ADL's, still drives.  No DME  Has RX coverage.  no d/c needs identified. son to transport home.    Final Discharge Disposition Code  01 - home or self-care        Continued Care and Services - Admitted Since 11/20/2020    Coordination has not been started for this encounter.         Demographic Summary     Row Name 11/20/20 1200       General Information    Admission Type  inpatient    General Information Comments  PCP - Franky Carrington        Functional Status     Row Name 11/20/20 1200       Functional Status    Usual Activity Tolerance  good    Current Activity Tolerance  moderate       Functional Status, IADL    Medications  independent    Meal Preparation  independent    Housekeeping  independent    Laundry  independent    Shopping  independent        Psychosocial    No documentation.       Abuse/Neglect    No documentation.        Legal    No documentation.       Substance Abuse    No documentation.       Patient Forms    No documentation.           Elizabeth Lopez RN

## 2020-11-20 NOTE — PROGRESS NOTES
INTENSIVIST   PROGRESS NOTE     Hospital:  LOS: 0 days     Mr. Antony Monahan, 70 y.o. male is followed for a Chief Complaint of: Carotid stenosis      Subjective   S     Antony Monahan is a 70 y.o. male with PMH Lt KRUPA s/p stent 9/25/19, PAD w/multiple stents by Dr. Gurrola, CAD w/ cardiac stents, ICM EF 30%, HTN, HLP, COPD, GERD and Hypothyroidism who was admitted today for a Carotid Cerebral Angiogram by Dr. Atkins.  He has been followed by Dr. Atkins since his carotid stent last year.  He had a repeat bilateral carotid duplex 7/23/20 that revealed TONJA stenosis 50-70% and increased velocities in left carotid stent suggesting restenosis of 50-70%.  He opted to observe it during the Coronavirus virus pandemic, but presents today for Carotid Cerebral Angiogram and possible intervention by Dr. Atkins.    Interval History:  He was transferred to the ICU post operatively.  He is not currently on any drips. He is complaining of some nasal congestion.        The patient's relevant past medical, surgical and social history were reviewed and updated in Epic as appropriate.      ROS:   Constitutional: Negative for fever.   Respiratory: Negative for dyspnea.   Cardiovascular: Negative for chest pain.   Gastrointestinal: Negative for  nausea, vomiting and diarrhea.     Objective   O     Vitals:  Temp  Min: 96.4 °F (35.8 °C)  Max: 98 °F (36.7 °C)  BP  Min: 103/80  Max: 164/92  Pulse  Min: 60  Max: 68  Resp  Min: 16  Max: 20  SpO2  Min: 92 %  Max: 98 % No data recorded    Intake/Ouptut 24 hrs (7:00AM - 6:59 AM)  Intake & Output (last 3 days)     None          Medications (drips):  niCARdipine  phenylephrine  sodium chloride, Last Rate: 50 mL/hr (11/20/20 0802)  sodium chloride          Physical Examination  Telemetry:  Normal sinus rhythm.    Constitutional:  No acute distress.  Resting in bed comfortably.    Eyes: No scleral icterus.   PERRL, EOM intact.    Neck:  Supple, FROM   Cardiovascular: Normal rate, regular and  rhythm. Normal heart sounds.  No murmurs, gallop or rub.   Respiratory: No respiratory distress. Normal respiratory effort.  Coarse breath sounds bilaterally.    Abdominal:  Soft. No masses. Non-tender. No distension. No HSM.   Extremities: No digital cyanosis. No clubbing.  No peripheral edema.   Skin: No rashes, lesions or ulcers   Neurological:   Alert and Oriented to person, place, and time.           Results from last 7 days   Lab Units 11/20/20  0748   WBC 10*3/mm3 4.16   HEMOGLOBIN g/dL 12.9*   MCV fL 93.7   PLATELETS 10*3/mm3 131*     Results from last 7 days   Lab Units 11/20/20  0818 11/20/20  0748   SODIUM mmol/L  --  148*   POTASSIUM mmol/L  --  3.7   CO2 mmol/L  --  28.0   CREATININE mg/dL 1.20 1.31*   GLUCOSE mg/dL  --  123*     Estimated Creatinine Clearance: 64.9 mL/min (by C-G formula based on SCr of 1.2 mg/dL).              Images:  Imaging Results (Last 24 Hours)     ** No results found for the last 24 hours. **            Results: Reviewed.  I reviewed the patient's new laboratory and imaging results.  I independently reviewed the patient's new images.    Medications: Reviewed.    Assessment/Plan   A / P     Mr. Monahan is a 69yo M with a history of left carotid stenosis s/p stent 9/25/19, PAD with multiple stents by Dr. Gurrola, CAD s/p cardiac stents, chronic systolic heart failure with an EF of 30%, HTN, COPD and hypothyroidism who is admitted to the ICU after a left carotid stent placement. He is not currently on any drips. He has a past history of smoking and notes that he quit approximately 1 year ago.     Nutrition:   Diet Regular; Cardiac  Advance Directives:   Code Status and Medical Interventions:   Ordered at: 11/20/20 1012     Code Status:    CPR     Medical Interventions (Level of Support Prior to Arrest):    Full       Active Hospital Problems    Diagnosis   • **Carotid stenosis, asymptomatic, left, s/p carotid stent     S/p left Carotid Stent by Dr. Atkins 9/25/19     • Coronary  artery disease involving native coronary artery of native heart     7/29/16 PCI to stent in LAD   7/29/19 LCX: Distal in-stent restenosis of 90% with stenosis prior to the stent, post angioplasty and stenting      • Hyperlipidemia LDL goal <70   • PAD (peripheral artery disease) (CMS/formerly [facility])     09/14/16 Abd Aortagram w/runoffs by Dr. Gurrola - Severe PAD w/ chronic total occlusion of the left CFA and significant stenosis of the left external neck artery treated with balloon angioplasty and stenting.    5/9/17 Abd Aortagram w/runoffs by Dr. Gurrola - Severe PAD with in-stent restenosis the left CFA treated with a drug-eluting balloon and significant stenosis left external iliac to with balloon angioplasty and stenting.  Bilateral below the knee disease with two-vessel runoff.    9/5/18 Abd Aortagram w/runoffs by Dr. Gurrola - Severe peripheral arterial disease is a chronic total occlusion left external iliac artery and left common femoral artery treated with an angioplasty and 2 drug-eluting stents.  Bilateral one vessel runoff below the knees  8/8/20 Abd Aortagram w/runoffs by Dr. Gurrola - Peripheral arterial disease with bilateral below the knee disease with two-vessel runoff bilaterally. Patent stents in the left common iliac/left external iliac/left common femoral/left SFA     • Ischemic cardiomyopathy     Echo (7/30/16): LVEF 30% with anterior and anterior lateral hypokinesis.  Mild MR.      • Essential hypertension           • GERD (gastroesophageal reflux disease)   • COPD (chronic obstructive pulmonary disease) (CMS/formerly [facility])     Former Smoker, Quit 7/2016     • Hypothyroid       Assessment / Plan:    Monitor in the ICU  Monitor for neuro changes  Home medications reviewed.   Nasal saline for congestion  Blood pressure control with Cardene as needed.   AM labs    MARYLOU Gabriel, AGACNP-BC, FNP-BC  Pulmonary and Critical Care Service    High level of risk due to:  severe exacerbation of chronic  illness and illness with threat to life or bodily function.    Plan of care and goals reviewed during interdisciplinary rounds.  I discussed the patient's findings and my recommendations with patient, family and nursing staff      Sonam Elkins DO    Intensive Care Medicine and Pulmonary Medicine

## 2020-11-20 NOTE — BRIEF OP NOTE
CAROTID CEREBRAL ANGIOGRAM BILATERAL  Progress Note    Antony Monahan  11/20/2020    Pre-op Diagnosis:   Carotid stenosis, asymptomatic, left [I65.22]       Post-Op Diagnosis Codes:     * Carotid stenosis, asymptomatic, left [I65.22]    Procedure/CPT® Codes:        Procedure(s):  Carotid Cerebral Angiogram with stent    Surgeon(s):  Claude Atkins MD    Anesthesia: Local    Staff:   Scrub Person: Iman Dawkins  Documenter: Bethany Watt RN  Invasive Nurse: Winifred Landin RN         Estimated Blood Loss: minimal    Urine Voided: * No values recorded between 11/20/2020  8:30 AM and 11/20/2020  9:41 AM *    Specimens:                None          Drains: * No LDAs found *    Findings: There is recurrent, high-grade (greater than 80%) stenosis within the previous the left carotid stent and at the distal margin consistent with intimal hyperplasia/neoatherosclerosis.  This responded well to placement of overlapping 8 mm Zilver MIS stents, restoring a near normal caliber lumen and restoring normal flow (TICI 3) to the left cerebral hemisphere.    Advanced plaque remains present at the right carotid bifurcation, but this appears relatively stable compared to the prior study and likely a 60% lesion.    Complications: None apparent.          Claude Atkins MD     Date: 11/20/2020  Time: 09:44 EST

## 2020-11-20 NOTE — INTERVAL H&P NOTE
H&P reviewed. The patient was examined and there are no changes to the H&P.  Informed consent was obtained from patient again to undergo carotid cerebral angiogram with possible carotid stent.  Risks and benefits were discussed and patient stated understanding and was willing to proceed.

## 2020-11-20 NOTE — PLAN OF CARE
Goal Outcome Evaluation:  Plan of Care Reviewed With: patient, son     Outcome Summary: Patient arrived to unit around 1015 from Cath Lab. Neurologically intact. Tolerating RA. HR 60-68 atrial paced. SBP 110s-150s. On and off cardene to maintain SBP <140. Tolerating diet. Afebrile. Right groin site bruised with old drainage. Soft. No hematoma present. Plan to discharge home in am. Stroke education provided.

## 2020-11-21 VITALS
HEART RATE: 64 BPM | BODY MASS INDEX: 35.99 KG/M2 | SYSTOLIC BLOOD PRESSURE: 117 MMHG | OXYGEN SATURATION: 93 % | HEIGHT: 67 IN | DIASTOLIC BLOOD PRESSURE: 60 MMHG | TEMPERATURE: 97.8 F | RESPIRATION RATE: 18 BRPM | WEIGHT: 229.28 LBS

## 2020-11-21 LAB
ANION GAP SERPL CALCULATED.3IONS-SCNC: 9 MMOL/L (ref 5–15)
BASOPHILS # BLD AUTO: 0.03 10*3/MM3 (ref 0–0.2)
BASOPHILS NFR BLD AUTO: 0.7 % (ref 0–1.5)
BUN SERPL-MCNC: 19 MG/DL (ref 8–23)
BUN/CREAT SERPL: 14.6 (ref 7–25)
CALCIUM SPEC-SCNC: 8.7 MG/DL (ref 8.6–10.5)
CHLORIDE SERPL-SCNC: 107 MMOL/L (ref 98–107)
CO2 SERPL-SCNC: 27 MMOL/L (ref 22–29)
CREAT SERPL-MCNC: 1.3 MG/DL (ref 0.76–1.27)
DEPRECATED RDW RBC AUTO: 53 FL (ref 37–54)
EOSINOPHIL # BLD AUTO: 0.23 10*3/MM3 (ref 0–0.4)
EOSINOPHIL NFR BLD AUTO: 5.1 % (ref 0.3–6.2)
ERYTHROCYTE [DISTWIDTH] IN BLOOD BY AUTOMATED COUNT: 15.3 % (ref 12.3–15.4)
GFR SERPL CREATININE-BSD FRML MDRD: 54 ML/MIN/1.73
GLUCOSE BLDC GLUCOMTR-MCNC: 127 MG/DL (ref 70–130)
GLUCOSE SERPL-MCNC: 126 MG/DL (ref 65–99)
HCT VFR BLD AUTO: 40.3 % (ref 37.5–51)
HGB BLD-MCNC: 12.3 G/DL (ref 13–17.7)
IMM GRANULOCYTES # BLD AUTO: 0.02 10*3/MM3 (ref 0–0.05)
IMM GRANULOCYTES NFR BLD AUTO: 0.4 % (ref 0–0.5)
LYMPHOCYTES # BLD AUTO: 0.84 10*3/MM3 (ref 0.7–3.1)
LYMPHOCYTES NFR BLD AUTO: 18.8 % (ref 19.6–45.3)
MCH RBC QN AUTO: 28.9 PG (ref 26.6–33)
MCHC RBC AUTO-ENTMCNC: 30.5 G/DL (ref 31.5–35.7)
MCV RBC AUTO: 94.8 FL (ref 79–97)
MONOCYTES # BLD AUTO: 0.45 10*3/MM3 (ref 0.1–0.9)
MONOCYTES NFR BLD AUTO: 10 % (ref 5–12)
NEUTROPHILS NFR BLD AUTO: 2.91 10*3/MM3 (ref 1.7–7)
NEUTROPHILS NFR BLD AUTO: 65 % (ref 42.7–76)
NRBC BLD AUTO-RTO: 0 /100 WBC (ref 0–0.2)
PLATELET # BLD AUTO: 124 10*3/MM3 (ref 140–450)
PMV BLD AUTO: 12.6 FL (ref 6–12)
POTASSIUM SERPL-SCNC: 4.2 MMOL/L (ref 3.5–5.2)
RBC # BLD AUTO: 4.25 10*6/MM3 (ref 4.14–5.8)
SODIUM SERPL-SCNC: 143 MMOL/L (ref 136–145)
WBC # BLD AUTO: 4.48 10*3/MM3 (ref 3.4–10.8)

## 2020-11-21 PROCEDURE — 99024 POSTOP FOLLOW-UP VISIT: CPT | Performed by: RADIOLOGY

## 2020-11-21 PROCEDURE — 82962 GLUCOSE BLOOD TEST: CPT

## 2020-11-21 PROCEDURE — 85025 COMPLETE CBC W/AUTO DIFF WBC: CPT | Performed by: PHYSICIAN ASSISTANT

## 2020-11-21 PROCEDURE — 80048 BASIC METABOLIC PNL TOTAL CA: CPT | Performed by: PHYSICIAN ASSISTANT

## 2020-11-21 RX ADMIN — CARVEDILOL 25 MG: 12.5 TABLET, FILM COATED ORAL at 08:11

## 2020-11-21 RX ADMIN — FUROSEMIDE 40 MG: 40 TABLET ORAL at 08:11

## 2020-11-21 RX ADMIN — SACUBITRIL AND VALSARTAN 2 TABLET: 49; 51 TABLET, FILM COATED ORAL at 08:11

## 2020-11-21 RX ADMIN — PANTOPRAZOLE SODIUM 40 MG: 40 TABLET, DELAYED RELEASE ORAL at 05:20

## 2020-11-21 RX ADMIN — CLOPIDOGREL BISULFATE 75 MG: 75 TABLET ORAL at 08:11

## 2020-11-21 RX ADMIN — LEVOTHYROXINE SODIUM 100 MCG: 100 TABLET ORAL at 05:21

## 2020-11-21 RX ADMIN — ACETAMINOPHEN 650 MG: 325 TABLET, FILM COATED ORAL at 05:21

## 2020-11-21 RX ADMIN — AMIODARONE HYDROCHLORIDE 200 MG: 200 TABLET ORAL at 08:11

## 2020-11-21 RX ADMIN — ASPIRIN 162 MG: 81 TABLET, FILM COATED ORAL at 08:11

## 2020-11-21 NOTE — PLAN OF CARE
Goal Outcome Evaluation:    A&Ox4. ANDREA KHAN. YINAS. NIH=0. Rt groin site C/D/I. Slight ecchymosis noted. Pt c/o slight tenderness with palpation. NAD noted. Safety measures intact.

## 2020-11-21 NOTE — DISCHARGE SUMMARY
PATIENT:  YAKOV MONAHAN  YOB: 1950  VISIT ID:  [id number]  PRIMARY CARE:  Franky Carrington MD  ADMITTING PHYSICIAN:  Claude Atkins MD    DATE OF ADMISSION:  11/20/2020  DATE OF DISCHARGE:  11/21/20      ADMITTING DIAGNOSIS:  Recurrent, high-grade left carotid stenosis, asymptomatic    DISCHARGE DIAGNOSIS:  Recurrent, high-grade left carotid stenosis, asymptomatic    PROCEDURES:  1.  Diagnostic carotid angiography  2.  Left carotid angioplasty/stent placement, with EPD    BRIEF HOSPITAL COURSE:  Mr. Monahan is a 70 y.o. male who is well-known to the neuro interventional service, having undergone prior angioplasty/stent placement for high-grade left carotid stenosis in 2019.  He had follow-up carotid duplex ultrasounds demonstrating progressively increasing velocities within the left carotid stent consistent with recurrent/high-grade stenosis.  He denied any stroke or TIA-like symptoms.  He was admitted on 11/20/2020, and carotid angiogram confirmed neoatherosclerosis and intimal hyperplasia within the stent and at the distal margin producing a high-grade (greater than 80%) restenosis.  This was treated with angioplasty/stent placement with Zilver MIS stents, restoring a near normal caliber lumen and resulting in improved perfusion to the left cerebral hemisphere.    He made an uneventful recovery in the intensive care unit, and was discharged home at his neurologic baseline on 11/21/2020.  Follow-up carotid duplex following stent placement demonstrates a markedly improved velocities within the left carotid vasculature and widely patent left carotid stent.  The right cervical carotid vasculature demonstrates advanced, albeit stable plaque formation and producing only a 50-60% stenosis utilizing NASCET criteria.  He does have an element of chronic renal insufficiency, and contrast utilization was minimized, and his eGFR remained stable at 54.  He will continue on lifelong dual  antiplatelet therapy given his extensive carotid, coronary, and peripheral vascular disease.  The importance of smoking cessation was also reiterated to Mr. Monahan and family, and they expressed an understanding.  He will follow-up in the neuro interventional clinic in 1 week's time, via telephone visit.    Approximately 18 minutes was utilized for this discharge, to include (but not limited to): In reviewing labs/imaging studies, examining the patient, providing discharge instructions, and preparing discharge summary.        DISCHARGE MEDICATIONS:     Discharge Medications      Changes to Medications      Instructions Start Date   amiodarone 200 MG tablet  Commonly known as: PACERONE  What changed: how much to take   200 mg, Oral, Daily         Continue These Medications      Instructions Start Date   aspirin 81 MG EC tablet   162 mg, Oral, Daily      carvedilol 12.5 MG tablet  Commonly known as: COREG   25 mg, Oral, 2 Times Daily With Meals      clopidogrel 75 MG tablet  Commonly known as: PLAVIX   75 mg, Oral, Daily      Crestor 40 MG tablet  Generic drug: rosuvastatin   40 mg, Oral, Nightly      Entresto 49-51 MG tablet  Generic drug: sacubitril-valsartan   2 tablets, Oral, 2 Times Daily      fish oil 1000 MG capsule capsule   1,000 mg, Oral, Daily With Breakfast      furosemide 40 MG tablet  Commonly known as: LASIX   40 mg, Oral, Daily      levothyroxine 100 MCG tablet  Commonly known as: SYNTHROID, LEVOTHROID   100 mcg, Oral, Daily      nitroglycerin 0.4 MG SL tablet  Commonly known as: NITROSTAT   0.4 mg, Sublingual, Every 5 Minutes PRN, Take no more than 3 doses in 15 minutes.      pantoprazole 40 MG EC tablet  Commonly known as: PROTONIX   40 mg, Oral, Every Morning               ACTIVITY:  No strenuous activity or heavy lifting for 5-7 days.    DIET:  Cardiac diet    FOLLOW UP:    Follow-up Information     Arielle Neville PA-C Follow up.    Specialties: Physician Assistant, Neurosurgery  Why: F/u in  1 week telephone visit   Contact information:  [address] [address] [phone]             Franky Carrington MD .    Specialty: Family Medicine  Contact information:  [address] [facility] [address] [phone]

## 2020-11-22 ENCOUNTER — READMISSION MANAGEMENT (OUTPATIENT)
Dept: CALL CENTER | Facility: HOSPITAL | Age: 70
End: 2020-11-22

## 2020-11-22 ENCOUNTER — HOSPITAL ENCOUNTER (EMERGENCY)
Facility: HOSPITAL | Age: 70
Discharge: HOME | End: 2020-11-23
Attending: EMERGENCY MEDICINE | Admitting: EMERGENCY MEDICINE

## 2020-11-22 ENCOUNTER — APPOINTMENT (OUTPATIENT)
Dept: CT IMAGING | Facility: HOSPITAL | Age: 70
End: 2020-11-22

## 2020-11-22 DIAGNOSIS — R10.31 GROIN PAIN, RIGHT: ICD-10-CM

## 2020-11-22 DIAGNOSIS — G89.18 POST-OP PAIN: Primary | ICD-10-CM

## 2020-11-22 LAB
ALBUMIN SERPL-MCNC: 4.2 G/DL (ref 3.5–5.2)
ALBUMIN/GLOB SERPL: 1.8 G/DL
ALP SERPL-CCNC: 77 U/L (ref 39–117)
ALT SERPL W P-5'-P-CCNC: 14 U/L (ref 1–41)
ANION GAP SERPL CALCULATED.3IONS-SCNC: 9 MMOL/L (ref 5–15)
AST SERPL-CCNC: 17 U/L (ref 1–40)
BASOPHILS # BLD AUTO: 0.03 10*3/MM3 (ref 0–0.2)
BASOPHILS NFR BLD AUTO: 0.6 % (ref 0–1.5)
BILIRUB SERPL-MCNC: 0.2 MG/DL (ref 0–1.2)
BUN SERPL-MCNC: 24 MG/DL (ref 8–23)
BUN/CREAT SERPL: 17.4 (ref 7–25)
CALCIUM SPEC-SCNC: 9.5 MG/DL (ref 8.6–10.5)
CHLORIDE SERPL-SCNC: 105 MMOL/L (ref 98–107)
CO2 SERPL-SCNC: 30 MMOL/L (ref 22–29)
CREAT SERPL-MCNC: 1.38 MG/DL (ref 0.76–1.27)
DEPRECATED RDW RBC AUTO: 52.1 FL (ref 37–54)
EOSINOPHIL # BLD AUTO: 0.24 10*3/MM3 (ref 0–0.4)
EOSINOPHIL NFR BLD AUTO: 4.6 % (ref 0.3–6.2)
ERYTHROCYTE [DISTWIDTH] IN BLOOD BY AUTOMATED COUNT: 15.1 % (ref 12.3–15.4)
GFR SERPL CREATININE-BSD FRML MDRD: 51 ML/MIN/1.73
GLOBULIN UR ELPH-MCNC: 2.3 GM/DL
GLUCOSE SERPL-MCNC: 138 MG/DL (ref 65–99)
HCT VFR BLD AUTO: 40.5 % (ref 37.5–51)
HGB BLD-MCNC: 12.7 G/DL (ref 13–17.7)
IMM GRANULOCYTES # BLD AUTO: 0.02 10*3/MM3 (ref 0–0.05)
IMM GRANULOCYTES NFR BLD AUTO: 0.4 % (ref 0–0.5)
LYMPHOCYTES # BLD AUTO: 0.98 10*3/MM3 (ref 0.7–3.1)
LYMPHOCYTES NFR BLD AUTO: 18.8 % (ref 19.6–45.3)
MCH RBC QN AUTO: 29.3 PG (ref 26.6–33)
MCHC RBC AUTO-ENTMCNC: 31.4 G/DL (ref 31.5–35.7)
MCV RBC AUTO: 93.3 FL (ref 79–97)
MONOCYTES # BLD AUTO: 0.5 10*3/MM3 (ref 0.1–0.9)
MONOCYTES NFR BLD AUTO: 9.6 % (ref 5–12)
NEUTROPHILS NFR BLD AUTO: 3.45 10*3/MM3 (ref 1.7–7)
NEUTROPHILS NFR BLD AUTO: 66 % (ref 42.7–76)
NRBC BLD AUTO-RTO: 0 /100 WBC (ref 0–0.2)
PLATELET # BLD AUTO: 132 10*3/MM3 (ref 140–450)
PMV BLD AUTO: 12.8 FL (ref 6–12)
POTASSIUM SERPL-SCNC: 4 MMOL/L (ref 3.5–5.2)
PROT SERPL-MCNC: 6.5 G/DL (ref 6–8.5)
RBC # BLD AUTO: 4.34 10*6/MM3 (ref 4.14–5.8)
SODIUM SERPL-SCNC: 144 MMOL/L (ref 136–145)
WBC # BLD AUTO: 5.22 10*3/MM3 (ref 3.4–10.8)

## 2020-11-22 PROCEDURE — 75635 CT ANGIO ABDOMINAL ARTERIES: CPT

## 2020-11-22 PROCEDURE — 80053 COMPREHEN METABOLIC PANEL: CPT | Performed by: EMERGENCY MEDICINE

## 2020-11-22 PROCEDURE — 99284 EMERGENCY DEPT VISIT MOD MDM: CPT

## 2020-11-22 PROCEDURE — 0 IOPAMIDOL PER 1 ML: Performed by: EMERGENCY MEDICINE

## 2020-11-22 PROCEDURE — 85025 COMPLETE CBC W/AUTO DIFF WBC: CPT | Performed by: EMERGENCY MEDICINE

## 2020-11-22 PROCEDURE — 25010000002 ONDANSETRON PER 1 MG: Performed by: EMERGENCY MEDICINE

## 2020-11-22 PROCEDURE — 96375 TX/PRO/DX INJ NEW DRUG ADDON: CPT

## 2020-11-22 PROCEDURE — 25010000002 MORPHINE PER 10 MG: Performed by: EMERGENCY MEDICINE

## 2020-11-22 PROCEDURE — 96374 THER/PROPH/DIAG INJ IV PUSH: CPT

## 2020-11-22 RX ORDER — SODIUM CHLORIDE 0.9 % (FLUSH) 0.9 %
10 SYRINGE (ML) INJECTION AS NEEDED
Status: DISCONTINUED | OUTPATIENT
Start: 2020-11-22 | End: 2020-11-23 | Stop reason: HOSPADM

## 2020-11-22 RX ORDER — MORPHINE SULFATE 2 MG/ML
2 INJECTION, SOLUTION INTRAMUSCULAR; INTRAVENOUS
Status: DISCONTINUED | OUTPATIENT
Start: 2020-11-22 | End: 2020-11-23 | Stop reason: HOSPADM

## 2020-11-22 RX ORDER — ONDANSETRON 2 MG/ML
4 INJECTION INTRAMUSCULAR; INTRAVENOUS
Status: DISCONTINUED | OUTPATIENT
Start: 2020-11-22 | End: 2020-11-23 | Stop reason: HOSPADM

## 2020-11-22 RX ADMIN — MORPHINE SULFATE 2 MG: 2 INJECTION, SOLUTION INTRAMUSCULAR; INTRAVENOUS at 22:25

## 2020-11-22 RX ADMIN — IOPAMIDOL 125 ML: 755 INJECTION, SOLUTION INTRAVENOUS at 22:59

## 2020-11-22 RX ADMIN — ONDANSETRON 4 MG: 2 INJECTION INTRAMUSCULAR; INTRAVENOUS at 22:22

## 2020-11-22 NOTE — OUTREACH NOTE
Prep Survey      Responses   [facility] facility patient discharged from?  [place]   Is LACE score < 7 ?  No   Eligibility  Readm Mgmt   Discharge diagnosis  Recurrent, high-grade left carotid stenosis, asymptomatic s/p stent   Does the patient have one of the following disease processes/diagnoses(primary or secondary)?  Other   Does the patient have Home health ordered?  No   Is there a DME ordered?  No   Prep survey completed?  Yes          Edwina Bloom RN

## 2020-11-23 ENCOUNTER — TELEPHONE (OUTPATIENT)
Dept: NEUROSURGERY | Facility: CLINIC | Age: 70
End: 2020-11-23

## 2020-11-23 VITALS
SYSTOLIC BLOOD PRESSURE: 158 MMHG | WEIGHT: 229 LBS | DIASTOLIC BLOOD PRESSURE: 81 MMHG | HEIGHT: 67 IN | BODY MASS INDEX: 35.94 KG/M2 | RESPIRATION RATE: 16 BRPM | HEART RATE: 63 BPM | OXYGEN SATURATION: 96 % | TEMPERATURE: 97.5 F

## 2020-11-23 LAB — ACT BLD: 252 SECONDS (ref 82–152)

## 2020-11-23 RX ORDER — POLYETHYLENE GLYCOL 3350 17 G/17G
17 POWDER, FOR SOLUTION ORAL DAILY
Qty: 578 G | Refills: 0 | Status: ON HOLD | OUTPATIENT
Start: 2020-11-23 | End: 2021-06-01

## 2020-11-23 RX ORDER — DOCUSATE SODIUM 100 MG/1
100 CAPSULE, LIQUID FILLED ORAL 2 TIMES DAILY
Qty: 20 CAPSULE | Refills: 0 | Status: ON HOLD | OUTPATIENT
Start: 2020-11-23 | End: 2022-03-30

## 2020-11-23 RX ORDER — HYDROCODONE BITARTRATE AND ACETAMINOPHEN 5; 325 MG/1; MG/1
1 TABLET ORAL ONCE
Status: COMPLETED | OUTPATIENT
Start: 2020-11-23 | End: 2020-11-23

## 2020-11-23 RX ADMIN — HYDROCODONE BITARTRATE AND ACETAMINOPHEN 1 TABLET: 5; 325 TABLET ORAL at 00:34

## 2020-11-23 NOTE — DISCHARGE INSTRUCTIONS
Continue taking your hydrocodone medication at home, also your bowel regimen, call your specialist in the morning to establish a follow-up appointment.  Return to the ED with any worsening symptoms or concerns.

## 2020-11-23 NOTE — TELEPHONE ENCOUNTER
Pt's daughter, Kathleen called stating pt had procedure with Dr. Atkins last week and was released home Friday, but pt returned to [facility] ED Saturday for post op problems.    Daughter wanted to be sure Dr. Atkins is aware.

## 2020-11-23 NOTE — TELEPHONE ENCOUNTER
I have called and spoken with patient.  Patient notes that his pain is improving.  He notes that he did notice some increased swelling.  Therefore a CTA of the abdomen and was performed and demonstrated only mild soft tissue stranding in the right inguinal area, likely secondary to the recent interventional procedure.  There was no evidence of drainable soft tissue fluid collection or evidence of pseudoaneurysm.    Therefore, patient was instructed to continue to monitor symptoms.  If symptoms remain the same or worsen, patient was instructed to call our clinic/914 further advice.  Patient was understanding this plan and willing to proceed.

## 2020-11-24 NOTE — PROGRESS NOTES
"Enter Query Response Below      Query Response:   EGFR remained stable at 54, therefore I guess this would be 3a           If applicable, please update the problem list.      Patient: Antony Gil        : 1950  Account: [id number]           Admit Date: 2020        Options to Respond to Query:    1. Access the Encounter     a. From the To-Do Side bar, click Respond With New Note.     b. Answer query within the yellow box.                c. Update the Problem List if applicable.     Dr. Atkins    70 year old male admitted for Carotid Cerebral Angiogram with stent. The discharge summary notes \"He does have an element of chronic renal insufficiency,  and contrast utilization was minimized, and his eGFR remained stable at 54\"    Please clarify the stage of the patients chronic renal insufficiency.    KDIGO CKD staging [email or web address]  CKD Stage 1  > 90  CKD Stage 2  60-89  CKD Stage 3a  45-59  CKD Stage 3b  30-44  CKD Stage 4  15-29  CKD Stage 5   <15    By submitting this query, we are merely seeking further clarification of documentation to accurately reflect all conditions that you are monitoring, evaluating, treating or that extend the hospitalization or utilize additional resources of care. Please utilize your independent clinical judgment when addressing the question(s) above.     This query and your response, once completed, will be entered into the legal medical record.    Sincerely,  Janae HOUSTON, RN, CCDS  [email or web address]  Clinical Documentation Integrity Program   "

## 2020-11-25 ENCOUNTER — OFFICE VISIT (OUTPATIENT)
Dept: NEUROSURGERY | Facility: CLINIC | Age: 70
End: 2020-11-25

## 2020-11-25 ENCOUNTER — READMISSION MANAGEMENT (OUTPATIENT)
Dept: CALL CENTER | Facility: HOSPITAL | Age: 70
End: 2020-11-25

## 2020-11-25 VITALS — WEIGHT: 229 LBS | TEMPERATURE: 97.6 F | HEIGHT: 67 IN | BODY MASS INDEX: 35.94 KG/M2

## 2020-11-25 DIAGNOSIS — I65.22 CAROTID STENOSIS, ASYMPTOMATIC, LEFT: Primary | ICD-10-CM

## 2020-11-25 PROCEDURE — 99024 POSTOP FOLLOW-UP VISIT: CPT | Performed by: RADIOLOGY

## 2020-11-25 NOTE — TELEPHONE ENCOUNTER
Female left message stating the patient is still in pain, with the holiday weekend coming up- she was wanting to know of patient could be given a telehealth appointment with Dr Atkins today.     Please advise.

## 2020-11-25 NOTE — PROGRESS NOTES
NAME: YAKOV GEE   DOS: 2020  : 1950  PCP: Franky Carrington MD    Chief Complaint:    Chief Complaint   Patient presents with   • Carotid Artery Disease     s/p left carotid stent 20     You have chosen to receive care through a telephone visit. Do you consent to use a telephone visit for your medical care today? Yes      History of Present Illness:  70 y.o. male who is well-known to the neuro interventional service, having undergone prior angioplasty/stent placement for high-grade left carotid stenosis in 2019.  He had follow-up carotid duplex ultrasounds demonstrating progressively increasing velocities within the left carotid stent consistent with recurrent/high-grade stenosis.  He denied any stroke or TIA-like symptoms.  He was admitted on 2020, and carotid angiogram confirmed neoatherosclerosis and intimal hyperplasia within the stent and at the distal margin producing a high-grade (greater than 80%) restenosis.  This was treated with angioplasty/stent placement with Zilver MIS stents, restoring a near normal caliber lumen and resulting in improved perfusion to the left cerebral hemisphere.     He made an uneventful recovery in the intensive care unit, and was discharged home at his neurologic baseline on 2020.  Follow-up carotid duplex following stent placement demonstrates a markedly improved velocities within the left carotid vasculature and widely patent left carotid stent.  The right cervical carotid vasculature demonstrates advanced, albeit stable plaque formation and producing only a 50-60% stenosis utilizing NASCET criteria.  He does have an element of chronic renal insufficiency, and contrast utilization was minimized, and his eGFR remained stable at 54.      Since his follow-up, he has been experiencing some pain in the right groin access site region, which radiates medially along the inner thigh.  His pain is not exacerbated with walking.  He was seen in  the emergency department on 11/22/2020, and had CT angiogram which demonstrated diffuse atherosclerotic change, but widely patent common femoral artery without flow-limiting stenosis or pseudoaneurysm.  I am seeing him on a phone follow-up today for his left carotid stent and right groin pain.    Past Medical History:  Past Medical History:   Diagnosis Date   • Anxiety    • Arthritis    • CAD (coronary artery disease)    • COPD (chronic obstructive pulmonary disease) (CMS/[facility]) 7/29/2016   • HTN (hypertension) 7/29/2016   • Hypercholesterolemia 7/29/2016   • Hypothyroid 7/29/2016   • Obesity    • PAD (peripheral artery disease) (CMS/[facility]) 7/29/2016   • Systolic CHF (CMS/[facility]) 7/29/2016   • Tobacco abuse 7/29/2016       Past Surgical History:  Past Surgical History:   Procedure Laterality Date   • CARDIAC CATHETERIZATION N/A 7/29/2016    Procedure: Left Heart Cath;  Surgeon: Drew Gurrola MD;  Location:  [facility];  Service:    • CARDIAC CATHETERIZATION N/A 7/29/2019    Procedure: LEFT HEART CATH;  Surgeon: Drew Gurrola MD;  Location:  [facility];  Service: Cardiovascular   • CARDIAC ELECTROPHYSIOLOGY PROCEDURE N/A 7/30/2019    Procedure: ICD SC new;  Surgeon: Musa Harden MD;  Location:  [facility] EP INVASIVE LOC[facility];  Service: Cardiology   • INTERVENTIONAL RADIOLOGY PROCEDURE N/A 9/14/2016    Procedure: AORTIC Aortagram with Runoff;  Surgeon: Drew Gurrola MD;  Location:  [facility];  Service:    • INTERVENTIONAL RADIOLOGY PROCEDURE N/A 5/9/2017    Procedure: Abdominal Aortagram with Runoff;  Surgeon: Drew Gurrola MD;  Location:  [facility];  Service:    • INTERVENTIONAL RADIOLOGY PROCEDURE N/A 9/5/2018    Procedure: Abdominal Aortagram with Runoff;  Surgeon: Drew Gurrola MD;  Location:  [facility];  Service: Cardiovascular   • INTERVENTIONAL RADIOLOGY PROCEDURE Bilateral 7/29/2019     Procedure: Carotid Cerebral Angiogram;  Surgeon: Claude Atkins MD;  Location:  [facility];  Service: Interventional Radiology   • INTERVENTIONAL RADIOLOGY PROCEDURE N/A 8/18/2020    Procedure: AARO+/-;  Surgeon: Drew Gurrola MD;  Location:  [facility];  Service: Cardiovascular;  Laterality: N/A;   • INTERVENTIONAL RADIOLOGY PROCEDURE Bilateral 11/20/2020    Procedure: Carotid Cerebral Angiogram with stent;  Surgeon: Claude Atkins MD;  Location:  [facility];  Service: Interventional Radiology;  Laterality: Bilateral;   • KNEE SURGERY Left 08/17/2015   • OTHER SURGICAL HISTORY      L LEG STENT   • [place] TCAT IV STENT CRV CRTD ART EMBOLIC PROTECJ N/A 9/25/2019    Left Carotid Stent; Dr. Claude Atkins   • WRIST SURGERY         Review of Systems:        Review of Systems   Cardiovascular: Positive for leg swelling.   All other systems reviewed and are negative.       Medications    Current Outpatient Medications:   •  aspirin 81 MG EC tablet, Take 162 mg by mouth Daily., Disp: , Rfl:   •  carvedilol (COREG) 12.5 MG tablet, Take 25 mg by mouth 2 (Two) Times a Day With Meals., Disp: , Rfl:   •  clopidogrel (PLAVIX) 75 MG tablet, Take 1 tablet by mouth Daily., Disp: 30 tablet, Rfl: 11  •  docusate sodium (COLACE) 100 MG capsule, Take 1 capsule by mouth 2 (Two) Times a Day., Disp: 20 capsule, Rfl: 0  •  levothyroxine (SYNTHROID, LEVOTHROID) 100 MCG tablet, Take 100 mcg by mouth Daily., Disp: , Rfl:   •  nitroglycerin (NITROSTAT) 0.4 MG SL tablet, Place 1 tablet under the tongue every 5 (five) minutes as needed for chest pain. Take no more than 3 doses in 15 minutes., Disp: 25 tablet, Rfl: 12  •  pantoprazole (PROTONIX) 40 MG EC tablet, Take 40 mg by mouth Every Morning., Disp: , Rfl:   •  polyethylene glycol (MIRALAX) 17 GM/SCOOP powder, Take 17 g by mouth Daily., Disp: 578 g, Rfl: 0  •  rosuvastatin (CRESTOR) 40 MG tablet, Take 40 mg by mouth every night.,  "Disp: , Rfl:   •  sacubitril-valsartan (ENTRESTO) 49-51 MG tablet, Take 2 tablets by mouth 2 (Two) Times a Day., Disp: , Rfl:     Allergies:  No Known Allergies    Social Hx:  Social History     Tobacco Use   • Smoking status: Former Smoker     Packs/day: 2.00     Years: 50.00     Pack years: 100.00     Types: Cigarettes     Quit date: 2016     Years since quittin.4   • Smokeless tobacco: Never Used   Substance Use Topics   • Alcohol use: Yes     Frequency: Never     Comment: 2-3 Beers per Week   • Drug use: No       Family Hx:  Family History   Problem Relation Age of Onset   • Hypertension Mother    • Hypertension Father    • Heart failure Father    • Heart attack Father    • Diabetes Sister    • Arrhythmia Sister        Review of Imaging:  CT angiogram of the abdomen/pelvis and lower extremities from 2020 was reviewed along with its corresponding radiologic report.  Diffuse atherosclerotic changes again noted.  Left iliac and femoral stents remain widely patent.  The right common femoral access site is widely patent without flow-limiting stenosis nor pseudoaneurysm.  No complicating features are evident.  Incidentally noted is an \"indeterminate\" left adrenal mass.    Physical Examination:  This examination is being performed via telephone.  The patient speech is clear.  He is sounds in no acute distress.  He denies any unilateral weakness or numbness.  No speech or vision changes.  He does complain of 5/10 pain in the right groin region which radiates medially along the inner thigh.  He says there is some mild swelling at the access site, but no \"pulsatile\" masses or lesions.  His pain is not worsened with walking, is actually worsened with sitting.      Diagnoses/Plan:    Mr. Monahan is a 70 y.o. male status post drug-eluting stent placement for recurrent, high-grade left carotid stenosis.  He is done well from a neurologic standpoint, with no stroke or TIA-like symptoms.  He has been experiencing " "a right groin pain which radiates along the medial thigh, worsened with sitting.  He did have CT angiogram in the emergency department a few days ago which demonstrated widely patent right femoral vasculature and without pseudoaneurysm or complicating feature.  I suspect that he has an element of nerve irritation related to his a right common femoral access, and I have advised him to take nonsteroidal anti-inflammatories for the next couple of days and see if this helps alleviate his symptoms.      He is unaware of the \"indeterminate\" left adrenal lesion found on his a CT angiogram, and I reiterated the importance of him following up with his PCP for further work-up/evaluation, and he states that he has an appointment in the next couple of weeks or so.    I also plan on following up with him next week via telephone to ensure that his right groin symptoms are resolving, but during the interim, he will contact our office in/work come to the emergency department if he has any new concerns.    He will remain on lifelong dual antiplatelet therapy.    Approximately 14 minutes was utilized during this telephone visit, to include (but not limited to): Review of prior imaging studies, discussing patient symptoms via telephone, and developing a treatment plan.                  "

## 2020-11-25 NOTE — OUTREACH NOTE
Medical Week 1 Survey      Responses   [facility] patient discharged from?  [place]   Does the patient have one of the following disease processes/diagnoses(primary or secondary)?  Other   Week 1 attempt successful?  Yes   Call start time  1217   Rescheduled  Rescheduled-pt requested   Call end time  1217          Belinda Horne RN

## 2020-11-30 ENCOUNTER — DOCUMENTATION (OUTPATIENT)
Dept: NEUROSURGERY | Facility: CLINIC | Age: 70
End: 2020-11-30

## 2020-11-30 ENCOUNTER — READMISSION MANAGEMENT (OUTPATIENT)
Dept: CALL CENTER | Facility: HOSPITAL | Age: 70
End: 2020-11-30

## 2020-11-30 DIAGNOSIS — I73.9 CLAUDICATION OF RIGHT LOWER EXTREMITY (HCC): Primary | ICD-10-CM

## 2020-11-30 DIAGNOSIS — M79.604 LOWER EXTREMITY PAIN, DIFFUSE, RIGHT: ICD-10-CM

## 2020-11-30 NOTE — OUTREACH NOTE
Medical Week 1 Survey      Responses   [facility] patient discharged from?  [place]   Does the patient have one of the following disease processes/diagnoses(primary or secondary)?  Other   Week 1 attempt successful?  Yes   Call start time  1547   Call end time  1550   Medication alerts for this patient  no  medocation chnages   Meds reviewed with patient/caregiver?  Yes   Is the patient having any side effects they believe may be caused by any medication additions or changes?  No   Is the patient taking all medications as directed (includes completed medication regime)?  Yes   Comments regarding appointments  apointmnet next  week with the surgeon   Does the patient have a primary care provider?   Yes   Has home health visited the patient within 72 hours of discharge?  N/A   Did the patient receive a copy of their discharge instructions?  Yes   Nursing interventions  Reviewed instructions with patient   What is the patient's perception of their health status since discharge?  Same [has lot of pain in  legs where incision are will be seeing the surgeon nex week]   Is the patient/caregiver able to teach back signs and symptoms related to disease process for when to call PCP?  Yes   Is the patient/caregiver able to teach back signs and symptoms related to disease process for when to call 911?  Yes   Is the patient/caregiver able to teach back the hierarchy of who to call/visit for symptoms/problems? PCP, Specialist, Home health nurse, Urgent Care, ED, 911  Yes   If the patient is a current smoker, are they able to teach back resources for cessation?  Not a smoker   Week 1 call completed?  Yes   Wrap up additional comments  appointment scheduled next week          Silvina Pickard RN

## 2020-11-30 NOTE — PROGRESS NOTES
"I was able to follow-up with Mr. Monahan today via telephone in regards to his right lower extremity pain.  His pain has improved since I spoke to him last week, but it still does bother him.  To reiterate, his pain is worse when he is sitting, but he does also have some pain when he walks.  He has extensive history of peripheral vascular disease with multiple prior stents, and thus it difficult to ascertain whether this is nerve irritation related to his cath, or conceivably exacerbation of his peripheral vascular disease.  He did have a CT angiogram done a week or so ago, that demonstrated patency of his right common femoral access site without pseudoaneurysm or complicating feature.    I am going to tentatively schedule Mr. Monahan to follow-up in 1 week's time with arterial study of his right lower extremity, to again exclude any flow-limiting lesion that might be exacerbating his peripheral vascular disease/claudication symptoms.  During the interim, if his symptoms resolve, he will contact our office and change his clinic visit to a \"telephone visit\".  "

## 2020-12-08 ENCOUNTER — READMISSION MANAGEMENT (OUTPATIENT)
Dept: CALL CENTER | Facility: HOSPITAL | Age: 70
End: 2020-12-08

## 2020-12-08 NOTE — OUTREACH NOTE
Medical Week 2 Survey      Responses   [facility] patient discharged from?  [place]   Does the patient have one of the following disease processes/diagnoses(primary or secondary)?  Other   Week 2 attempt successful?  Yes   Call start time  1148   Discharge diagnosis  Recurrent, high-grade left carotid stenosis, asymptomatic s/p stent   Call end time  1150   Is the patient taking all medications as directed (includes completed medication regime)?  Yes   Does the patient have a primary care provider?   Yes   Does the patient have an appointment with their PCP within 7 days of discharge?  Greater than 7 days   Has the patient kept scheduled appointments due by today?  N/A   Comments  States he has appt with Dr. Zamora next week and Dr. Carrington   Has home health visited the patient within 72 hours of discharge?  N/A   Psychosocial issues?  No   Did the patient receive a copy of their discharge instructions?  Yes   Nursing interventions  Reviewed instructions with patient   What is the patient's perception of their health status since discharge?  New symptoms unrelated to diagnosis   Is the patient/caregiver able to teach back signs and symptoms related to disease process for when to call PCP?  Yes   Is the patient/caregiver able to teach back signs and symptoms related to disease process for when to call 911?  Yes   Is the patient/caregiver able to teach back the hierarchy of who to call/visit for symptoms/problems? PCP, Specialist, Home health nurse, Urgent Care, ED, 911  Yes   If the patient is a current smoker, are they able to teach back resources for cessation?  Not a smoker   Additional teach back comments  States he is having some numbness to leg where they went in for the stent.  He is having an ultrasound down to it on Monday and then will follow up with the doctor after.     Week 2 Call Completed?  Yes   Wrap up additional comments  Denies questions or needs at this time          Liliane Francisco LPN

## 2020-12-14 ENCOUNTER — OFFICE VISIT (OUTPATIENT)
Dept: NEUROSURGERY | Facility: CLINIC | Age: 70
End: 2020-12-14

## 2020-12-14 VITALS
SYSTOLIC BLOOD PRESSURE: 118 MMHG | WEIGHT: 231 LBS | BODY MASS INDEX: 36.26 KG/M2 | DIASTOLIC BLOOD PRESSURE: 72 MMHG | HEIGHT: 67 IN | TEMPERATURE: 97.8 F

## 2020-12-14 DIAGNOSIS — I65.22 CAROTID STENOSIS, ASYMPTOMATIC, LEFT: Primary | ICD-10-CM

## 2020-12-14 PROCEDURE — 99024 POSTOP FOLLOW-UP VISIT: CPT | Performed by: RADIOLOGY

## 2020-12-14 NOTE — PROGRESS NOTES
NAME: YAKOV GEE   DOS: 2020  : 1950  PCP: Franky Carrington MD    Chief Complaint:    Chief Complaint   Patient presents with   • Carotid Artery Disease     s/p left carotid stent, right groin pain       History of Present Illness:  70 y.o. male who is well-known to the neuro interventional service, having undergone prior angioplasty/stent placement for high-grade left carotid stenosis in 2019.  He had follow-up carotid duplex ultrasounds demonstrating progressively increasing velocities within the left carotid stent consistent with recurrent/high-grade stenosis.  He denied any stroke or TIA-like symptoms.  He was admitted on 2020, and carotid angiogram confirmed neoatherosclerosis and intimal hyperplasia within the stent and at the distal margin producing a high-grade (greater than 80%) restenosis.  This was treated with angioplasty/stent placement with Zilver MIS stents, restoring a near normal caliber lumen and resulting in improved perfusion to the left cerebral hemisphere.     He made an uneventful recovery in the intensive care unit, and was discharged home at his neurologic baseline on 2020.  A few days following his procedure, he had right leg pain prompting him to be evaluated in the emergency department, where he had CT angiogram demonstrating diffuse atherosclerotic change, but widely patent common femoral artery without flow-limiting stenosis or pseudoaneurysm.      He presents today for a follow-up, he continues to have some groin pain, radiating along the inner thigh, but this has improved since I spoke to him last via telephone visit.  He had lower extremity arterial vascular study, and presents today for routine follow-up.  He remains asymptomatic from his carotid artery disease, denying any stroke or TIA-like symptoms.  He is on chronic dual antiplatelet therapy.      Past Medical History:  Past Medical History:   Diagnosis Date   • Anxiety    • Arthritis     • CAD (coronary artery disease)    • COPD (chronic obstructive pulmonary disease) (CMS/[facility]) 7/29/2016   • HTN (hypertension) 7/29/2016   • Hypercholesterolemia 7/29/2016   • Hypothyroid 7/29/2016   • Obesity    • PAD (peripheral artery disease) (CMS/[facility]) 7/29/2016   • Systolic CHF (CMS/[facility]) 7/29/2016   • Tobacco abuse 7/29/2016       Past Surgical History:  Past Surgical History:   Procedure Laterality Date   • CARDIAC CATHETERIZATION N/A 7/29/2016    Procedure: Left Heart Cath;  Surgeon: Drew Gurrola MD;  Location: [facility];  Service:    • CARDIAC CATHETERIZATION N/A 7/29/2019    Procedure: LEFT HEART CATH;  Surgeon: Drew Gurrola MD;  Location:  [facility];  Service: Cardiovascular   • CARDIAC ELECTROPHYSIOLOGY PROCEDURE N/A 7/30/2019    Procedure: ICD SC new;  Surgeon: Musa Harden MD;  Location:  [facility];  Service: Cardiology   • INTERVENTIONAL RADIOLOGY PROCEDURE N/A 9/14/2016    Procedure: AORTIC Aortagram with Runoff;  Surgeon: Drew Gurrola MD;  Location: [facility];  Service:    • INTERVENTIONAL RADIOLOGY PROCEDURE N/A 5/9/2017    Procedure: Abdominal Aortagram with Runoff;  Surgeon: Drew Gurrola MD;  Location: [facility];  Service:    • INTERVENTIONAL RADIOLOGY PROCEDURE N/A 9/5/2018    Procedure: Abdominal Aortagram with Runoff;  Surgeon: Drew Gurrola MD;  Location: [facility];  Service: Cardiovascular   • INTERVENTIONAL RADIOLOGY PROCEDURE Bilateral 7/29/2019    Procedure: Carotid Cerebral Angiogram;  Surgeon: Claude Atkins MD;  Location:  [facility];  Service: Interventional Radiology   • INTERVENTIONAL RADIOLOGY PROCEDURE N/A 8/18/2020    Procedure: AARO+/-;  Surgeon: Drew Gurrola MD;  Location: [facility];  Service: Cardiovascular;  Laterality: N/A;   • INTERVENTIONAL RADIOLOGY PROCEDURE  Bilateral 2020    Procedure: Carotid Cerebral Angiogram with stent;  Surgeon: Claude Atkins MD;  Location: [facility] INVASIVE LOC[facility];  Service: Interventional Radiology;  Laterality: Bilateral;   • KNEE SURGERY Left 2015   • OTHER SURGICAL HISTORY      L LEG STENT   • [place] TCAT IV STENT CRV CRTD ART EMBOLIC PROTECJ N/A 2019    Left Carotid Stent; Dr. Claude Atkins   • WRIST SURGERY         Review of Systems:        Review of Systems   Constitutional: Positive for fatigue.   All other systems reviewed and are negative.       Medications    Current Outpatient Medications:   •  aspirin 81 MG EC tablet, Take 162 mg by mouth Daily., Disp: , Rfl:   •  carvedilol (COREG) 12.5 MG tablet, Take 25 mg by mouth 2 (Two) Times a Day With Meals., Disp: , Rfl:   •  clopidogrel (PLAVIX) 75 MG tablet, Take 1 tablet by mouth Daily., Disp: 30 tablet, Rfl: 11  •  docusate sodium (COLACE) 100 MG capsule, Take 1 capsule by mouth 2 (Two) Times a Day., Disp: 20 capsule, Rfl: 0  •  levothyroxine (SYNTHROID, LEVOTHROID) 100 MCG tablet, Take 100 mcg by mouth Daily., Disp: , Rfl:   •  nitroglycerin (NITROSTAT) 0.4 MG SL tablet, Place 1 tablet under the tongue every 5 (five) minutes as needed for chest pain. Take no more than 3 doses in 15 minutes., Disp: 25 tablet, Rfl: 12  •  pantoprazole (PROTONIX) 40 MG EC tablet, Take 40 mg by mouth Every Morning., Disp: , Rfl:   •  polyethylene glycol (MIRALAX) 17 GM/SCOOP powder, Take 17 g by mouth Daily., Disp: 578 g, Rfl: 0  •  rosuvastatin (CRESTOR) 40 MG tablet, Take 40 mg by mouth every night., Disp: , Rfl:   •  sacubitril-valsartan (ENTRESTO) 49-51 MG tablet, Take 2 tablets by mouth 2 (Two) Times a Day., Disp: , Rfl:     Allergies:  No Known Allergies    Social Hx:  Social History     Tobacco Use   • Smoking status: Former Smoker     Packs/day: 2.00     Years: 50.00     Pack years: 100.00     Types: Cigarettes     Quit date: 2016     Years since quittin.4   • Smokeless  "tobacco: Never Used   Substance Use Topics   • Alcohol use: Yes     Frequency: Never     Comment: 2-3 Beers per Week   • Drug use: No       Family Hx:  Family History   Problem Relation Age of Onset   • Hypertension Mother    • Hypertension Father    • Heart failure Father    • Heart attack Father    • Diabetes Sister    • Arrhythmia Sister        Review of Imaging:  Lower extremity arterial duplex dated 12/14/2020 from the [place] surgeon's office was reviewed, the official report is \"pending\".  This study demonstrates diminished ABIs in the bilateral lower extremities (right 0.7, left 0.69), consistent with known history of advanced peripheral vascular disease.  Notably, the right common femoral and superficial femoral arteries are patent without pseudoaneurysm or complicating feature.    Physical Examination:  Vitals:    12/14/20 1137   BP: 118/72   Temp: 97.8 °F (36.6 °C)        General Appearance:   Well developed, well nourished, well groomed, alert, and cooperative.  Cardiovascular: Regular rate and rhythm. No carotid bruits      Neurological examination:  Neurologic Exam     Mental Status   Oriented to person, place, and time.   Speech: speech is normal   Level of consciousness: alert    Cranial Nerves   Cranial nerves II through XII intact.     Motor Exam     Strength   Strength 5/5 throughout.     Sensory Exam   Light touch normal.     Gait, Coordination, and Reflexes     Gait  Gait: normal      Musculoskeletal: Bilateral lower extremities are warm to touch.      Diagnoses/Plan:    Mr. Monahan is a 70 y.o. male status post drug-eluting stent placement for recurrent, high-grade left carotid stenosis.  He is done well from a neurologic standpoint, with no stroke or TIA-like symptoms.  He has been experiencing a right groin pain which radiates along the medial thigh, worsened with sitting.  However, his pain/symptoms have improved since our last \"telephone\" of visit.  He has subsequently had CT angiogram " "and lower extremity arterial duplex studies which demonstrate known, extensive peripheral vascular disease, but no complicating features at the right common femoral access site.  I suspect that he has an element of nerve irritation related to his a right common femoral access, and I he will continue nonsteroidal anti-inflammatories for the next couple of weeks.       His CT angiogram demonstrated an \"indeterminate\" left adrenal lesion, and he has followed up with his PCP (per patient) in this regard.     I plan on seeing him back in 6 months time with carotid duplex the same day.  We will try to coordinate this with his cardiology follow-up in the next 6 months or so.  He will remain on lifelong dual antiplatelet therapy.     Additionally, he is contemplating \"weight loss\" surgery, and I have instructed him to wait until at least February/March before pursuing any procedure/surgery that would require him coming off of Plavix for a short interim.                  "

## 2021-01-27 ENCOUNTER — OFFICE VISIT (OUTPATIENT)
Dept: BARIATRICS/WEIGHT MGMT | Facility: CLINIC | Age: 71
End: 2021-01-27

## 2021-01-27 VITALS
HEIGHT: 67 IN | SYSTOLIC BLOOD PRESSURE: 128 MMHG | RESPIRATION RATE: 18 BRPM | TEMPERATURE: 97.3 F | HEART RATE: 76 BPM | OXYGEN SATURATION: 98 % | BODY MASS INDEX: 36.34 KG/M2 | DIASTOLIC BLOOD PRESSURE: 74 MMHG | WEIGHT: 231.5 LBS

## 2021-01-27 DIAGNOSIS — N28.9 RENAL INSUFFICIENCY: ICD-10-CM

## 2021-01-27 DIAGNOSIS — E78.5 HYPERLIPIDEMIA LDL GOAL <70: Primary | Chronic | ICD-10-CM

## 2021-01-27 DIAGNOSIS — E78.00 HYPERCHOLESTEROLEMIA: ICD-10-CM

## 2021-01-27 DIAGNOSIS — I73.9 PAD (PERIPHERAL ARTERY DISEASE) (HCC): Chronic | ICD-10-CM

## 2021-01-27 DIAGNOSIS — E03.9 HYPOTHYROIDISM, UNSPECIFIED TYPE: ICD-10-CM

## 2021-01-27 DIAGNOSIS — E66.01 MORBIDLY OBESE (HCC): ICD-10-CM

## 2021-01-27 DIAGNOSIS — Z87.891 FORMER SMOKER: ICD-10-CM

## 2021-01-27 DIAGNOSIS — I25.5 ISCHEMIC CARDIOMYOPATHY: Chronic | ICD-10-CM

## 2021-01-27 DIAGNOSIS — Z95.0 PACEMAKER: ICD-10-CM

## 2021-01-27 DIAGNOSIS — I10 ESSENTIAL HYPERTENSION: Chronic | ICD-10-CM

## 2021-01-27 DIAGNOSIS — I25.118 CORONARY ARTERY DISEASE OF NATIVE ARTERY OF NATIVE HEART WITH STABLE ANGINA PECTORIS (HCC): Chronic | ICD-10-CM

## 2021-01-27 DIAGNOSIS — I15.9 SECONDARY HYPERTENSION: ICD-10-CM

## 2021-01-27 DIAGNOSIS — R73.03 BORDERLINE DIABETES: ICD-10-CM

## 2021-01-27 DIAGNOSIS — M54.9 BACK PAIN, UNSPECIFIED BACK LOCATION, UNSPECIFIED BACK PAIN LATERALITY, UNSPECIFIED CHRONICITY: ICD-10-CM

## 2021-01-27 DIAGNOSIS — F11.90 CHRONIC NARCOTIC USE: ICD-10-CM

## 2021-01-27 DIAGNOSIS — J44.9 CHRONIC OBSTRUCTIVE PULMONARY DISEASE, UNSPECIFIED COPD TYPE (HCC): ICD-10-CM

## 2021-01-27 PROCEDURE — 99215 OFFICE O/P EST HI 40 MIN: CPT | Performed by: PHYSICIAN ASSISTANT

## 2021-01-27 RX ORDER — AMIODARONE HYDROCHLORIDE 200 MG/1
200 TABLET ORAL DAILY
Status: ON HOLD | COMMUNITY
End: 2021-06-01

## 2021-01-27 RX ORDER — AMLODIPINE BESYLATE 5 MG/1
5 TABLET ORAL 2 TIMES DAILY
COMMUNITY
Start: 2020-12-11 | End: 2021-06-05 | Stop reason: HOSPADM

## 2021-01-27 RX ORDER — BUMETANIDE 1 MG/1
2 TABLET ORAL 2 TIMES DAILY
COMMUNITY
End: 2021-06-05 | Stop reason: HOSPADM

## 2021-01-27 RX ORDER — SPIRONOLACTONE 25 MG/1
25 TABLET ORAL DAILY
Status: ON HOLD | COMMUNITY
End: 2021-07-12

## 2021-01-27 RX ORDER — ALBUTEROL SULFATE 90 UG/1
2 AEROSOL, METERED RESPIRATORY (INHALATION) EVERY 4 HOURS PRN
Status: ON HOLD | COMMUNITY
End: 2021-06-01

## 2021-01-27 RX ORDER — HYDROCODONE BITARTRATE AND ACETAMINOPHEN 10; 325 MG/1; MG/1
1 TABLET ORAL EVERY 6 HOURS PRN
Status: ON HOLD | COMMUNITY
End: 2021-06-01

## 2021-01-27 NOTE — PROGRESS NOTES
"[facility] BARIATRIC SURGERY  [address]  [address]  [facility]ly [facility] [id number] [phone]      Patient  Name:  Antony Monahan  :  1950        Chief Complaint:  weight gain; unable to maintain weight loss    History of Present Illness:  Antony Monahan is a 70 y.o. male who presents today for evaluation, education and consultation regarding bariatric and metabolic surgery. The patient is interested in sleeve gastrectomy with Dr. Mondragon.     Antony has been overweight for at least 20 years, has been 35 pounds or more overweight for at least 10 years. Patient has has unsuccessful attempts at significant weighloss. Most weightloss achieved was 10lb with diet and exercise, he kept this off for 3 months.  His maximum lifetime weight is 235 pounds.Patient has been overweight for many years, with numerous failed dietary/weight loss attempts.  He now has obesity related comorbidities and as such has decided to pursue weight loss surgery.    He states he discussed MBS with Dr. Gurrola, cardiologist, who agreed this was a good option.      Patient has h/o PAD, carotid artery disease, CAD, ischemic cardiomyopathy with ICD with multiple interventional procedures/ angiograms and vascular stents, followed by Dr. Gurrola and Dr. Atkins on lifelong dual antiplatelet therapy (ASA/ plavix).  Also h/o HTN, HLD,  \"borderline DM\", hypothyroidism, joint pain/  Back pain/ arthritis taking prn hydrocodone, depression/ anxiety, COPD- takes daily maintenance Trelegy Ellipta inhaler, rarely needs rescue inhaler, does not see pulm.     GI: has h/o LETICIA in remote past, was treated and no longer with any reflux/ heartburn issues. Does not require antacids.  Denies other GI complaints. GB present.     Past tobacco use x 50 y 2ppd quit 2019.     All other past medical, surgical, social and family history have been obtained and discussed as pertinent to bariatric surgery as below.     Past Medical History: "   Diagnosis Date   • Anxiety    • Arthritis    • Back pain     past injections, years ago   • Borderline diabetes    • CAD (coronary artery disease)    • Chronic narcotic use     prn   • COPD (chronic obstructive pulmonary disease) (CMS/[facility]) 7/29/2016   • Depression     intermittent   • HTN (hypertension) 7/29/2016   • Hypercholesterolemia 7/29/2016   • Hypothyroid 7/29/2016   • Joint pain     hydrocodone prn   • Obesity    • Pacemaker    • PAD (peripheral artery disease) (CMS/[facility]) 7/29/2016   • Systolic CHF (CMS/[facility]) 7/29/2016   • Tobacco abuse 7/29/2016     Past Surgical History:   Procedure Laterality Date   • CARDIAC CATHETERIZATION N/A 7/29/2016    Procedure: Left Heart Cath;  Surgeon: Drew Gurrola MD;  Location:  [facility];  Service:    • CARDIAC CATHETERIZATION N/A 7/29/2019    Procedure: LEFT HEART CATH;  Surgeon: Drew Gurrola MD;  Location:  [facility];  Service: Cardiovascular   • CARDIAC ELECTROPHYSIOLOGY PROCEDURE N/A 7/30/2019    Procedure: ICD SC new;  Surgeon: Musa Harden MD;  Location:  [facility] EP INVASIVE LOCATION;  Service: Cardiology   • INTERVENTIONAL RADIOLOGY PROCEDURE N/A 9/14/2016    Procedure: AORTIC Aortagram with Runoff;  Surgeon: Drew Gurrola MD;  Location:  [facility];  Service:    • INTERVENTIONAL RADIOLOGY PROCEDURE N/A 5/9/2017    Procedure: Abdominal Aortagram with Runoff;  Surgeon: Drew Gurrola MD;  Location:  [facility];  Service:    • INTERVENTIONAL RADIOLOGY PROCEDURE N/A 9/5/2018    Procedure: Abdominal Aortagram with Runoff;  Surgeon: Drew Gurrola MD;  Location:  [facility];  Service: Cardiovascular   • INTERVENTIONAL RADIOLOGY PROCEDURE Bilateral 7/29/2019    Procedure: Carotid Cerebral Angiogram;  Surgeon: Claude Atkins MD;  Location:  [facility];  Service: Interventional Radiology   • INTERVENTIONAL RADIOLOGY PROCEDURE  N/A 8/18/2020    Procedure: AARO+/-;  Surgeon: Drew Gurrola MD;  Location:  [facility];  Service: Cardiovascular;  Laterality: N/A;   • INTERVENTIONAL RADIOLOGY PROCEDURE Bilateral 11/20/2020    Procedure: Carotid Cerebral Angiogram with stent;  Surgeon: Claude Atkins MD;  Location:  [facility];  Service: Interventional Radiology;  Laterality: Bilateral;   • KNEE SURGERY Left 08/17/2015   • OTHER SURGICAL HISTORY      L LEG STENT   • [place] TCAT IV STENT CRV CRTD ART EMBOLIC PROTECJ N/A 9/25/2019    Left Carotid Stent; Dr. Claude Atkins   • UMBILICAL HERNIA REPAIR  2006    no mesh, done in [facility]   • WRIST SURGERY         No Known Allergies    Current Outpatient Medications:   •  albuterol sulfate  (90 Base) MCG/ACT inhaler, Inhale 2 puffs Every 4 (Four) Hours As Needed for Wheezing., Disp: , Rfl:   •  amiodarone (PACERONE) 200 MG tablet, Take 200 mg by mouth Daily. 1/2 tablet once daily, Disp: , Rfl:   •  amLODIPine (NORVASC) 5 MG tablet, Take 5 mg by mouth Daily., Disp: , Rfl:   •  aspirin 81 MG EC tablet, Take 162 mg by mouth Daily., Disp: , Rfl:   •  bumetanide (BUMEX) 1 MG tablet, Take 1 mg by mouth Daily., Disp: , Rfl:   •  carvedilol (COREG) 12.5 MG tablet, Take 25 mg by mouth 2 (Two) Times a Day With Meals. Take 1 and 1/2 tables by mouth twice a day, Disp: , Rfl:   •  clopidogrel (PLAVIX) 75 MG tablet, Take 1 tablet by mouth Daily., Disp: 30 tablet, Rfl: 11  •  HYDROcodone-acetaminophen (NORCO)  MG per tablet, Take 1 tablet by mouth Every 6 (Six) Hours As Needed for Moderate Pain ., Disp: , Rfl:   •  levothyroxine (SYNTHROID, LEVOTHROID) 100 MCG tablet, Take 100 mcg by mouth Daily., Disp: , Rfl:   •  nitroglycerin (NITROSTAT) 0.4 MG SL tablet, Place 1 tablet under the tongue every 5 (five) minutes as needed for chest pain. Take no more than 3 doses in 15 minutes., Disp: 25 tablet, Rfl: 12  •  pantoprazole (PROTONIX) 40 MG EC tablet, Take 40 mg by  mouth Every Morning., Disp: , Rfl:   •  polyethylene glycol (MIRALAX) 17 GM/SCOOP powder, Take 17 g by mouth Daily., Disp: 578 g, Rfl: 0  •  rosuvastatin (CRESTOR) 40 MG tablet, Take 40 mg by mouth every night., Disp: , Rfl:   •  sacubitril-valsartan (Entresto)  MG tablet, Take 1 tablet by mouth 2 (Two) Times a Day., Disp: , Rfl:   •  spironolactone (ALDACTONE) 25 MG tablet, Take 25 mg by mouth Daily., Disp: , Rfl:   •  Trelegy Ellipta 100-62.5-25 MCG/INH aerosol powder , Inhale 25 mcg Daily., Disp: , Rfl:   •  vitamin D3 125 MCG (5000 UT) capsule capsule, Take 2,000 Units by mouth Daily., Disp: , Rfl:   •  docusate sodium (COLACE) 100 MG capsule, Take 1 capsule by mouth 2 (Two) Times a Day., Disp: 20 capsule, Rfl: 0    Social History     Socioeconomic History   • Marital status:      Spouse name: Not on file   • Number of children: Not on file   • Years of education: Not on file   • Highest education level: Not on file   Occupational History   • Occupation: disabled   Tobacco Use   • Smoking status: Former Smoker     Packs/day: 2.00     Years: 50.00     Pack years: 100.00     Types: Cigarettes     Quit date:      Years since quittin.0   • Smokeless tobacco: Never Used   Substance and Sexual Activity   • Alcohol use: Yes     Frequency: Never     Comment: 2-3 Beers per Week   • Drug use: No   • Sexual activity: Defer   Social History Narrative    Pt consumes caffeine: 2-3 drinks per day    L[place] [facility] [place]. prior .      Family History   Problem Relation Age of Onset   • Hypertension Mother    • Hypertension Father    • Heart failure Father    • Heart attack Father    • Diabetes Sister    • Arrhythmia Sister        Review of Systems:  Constitutional:  Reports fatigue, weight gain and denies fevers, chills.  HEENT:  denies headache, ear pain or loss of hearing, blurred or double vision, nasal discharge or sore throat.  Cardiovascular:  Reports HTN, HLD, CAD, edema and denies  CAD, Atrial Fib, heart murmur, hx MI, chest pain, palpitations, hx DVT.  Respiratory:  Reports dyspnea on exertion, wheezing, COPD and denies cough , asthma.  Gastrointestinal:  Reports none and denies dysphagia, heartburn, nausea, vomiting, abdominal pain, IBS, diarrhea, constipation, melena, blood in stool, gallbladder issues, liver disease, hx pancreatitis.  Genitourinary:  Reports none and denies history of  frequent UTI, incontinence, hematuria, dysuria, polyuria, polydipsia, renal insufficiency, renal failure.    Musculoskeletal:  Reports joint pain, back pain, arthritis and denies fibromyalgia and autoimmune disease.  Neurological:  Reports headaches and denies numbness /tingling, dizziness, confusion, seizure, stroke.  Psychiatric:  Reports hx depression and denies feeling anxious, hx anxiety, bipolar disorder, suicidal ideation, hx suicide attempt, hx self injury, hx substance abuse, eating disorder.  Endocrine:  Reports glucose intolerance and denies thyroid disease, gout.  Hematologic:  Reports none and denies bruising, bleeding disorder, hx anemia, hx blood transfusion.  Skin:  Reports none and denies rashes, hx MRSA.      Physical Exam:  Vital Signs:  Weight: 105 kg (231 lb 8 oz)   Body mass index is 36.26 kg/m².  Temp: 97.3 °F (36.3 °C)   Heart Rate: 76   BP: 128/74     Physical Exam  Vitals signs reviewed.   Constitutional:       Appearance: He is well-developed.   HENT:      Head: Normocephalic.      Comments: mask  Neck:      Musculoskeletal: Normal range of motion and neck supple.      Thyroid: No thyromegaly.   Cardiovascular:      Rate and Rhythm: Normal rate and regular rhythm.      Heart sounds: Normal heart sounds.   Pulmonary:      Effort: Pulmonary effort is normal. No respiratory distress.      Breath sounds: Normal breath sounds. No wheezing.      Comments: Diminished breath sounds throughout  Breathing heavy  Abdominal:      General: Bowel sounds are normal. There is no distension.       Palpations: Abdomen is soft.      Tenderness: There is no abdominal tenderness.      Comments: Inferior transverse umbilical scar  Protuberant, taut abdomen   Musculoskeletal: Normal range of motion.      Right lower leg: No edema.      Left lower leg: No edema.   Skin:     General: Skin is warm and dry.   Neurological:      Mental Status: He is alert and oriented to person, place, and time.   Psychiatric:         Mood and Affect: Mood normal.         Behavior: Behavior normal.         Thought Content: Thought content normal.         Judgment: Judgment normal.         Patient Active Problem List   Diagnosis   • ST elevation myocardial infarction involving left anterior descending (LAD) coronary artery (CMS/[facility])   • GERD (gastroesophageal reflux disease)   • Hyperlipidemia LDL goal <70   • PAD (peripheral artery disease) (CMS/[facility])   • COPD (chronic obstructive pulmonary disease) (CMS/[facility])   • Ischemic cardiomyopathy   • Hypothyroid   • Essential hypertension   • Coronary artery disease involving native coronary artery of native heart   • Claudication (CMS/[facility])   • Bradycardia   • Renal insufficiency   • Chronic fatigue   • Vitamin D deficiency   • Hypersomnolence   • Obesity (BMI 30.0-34.9)   • Intermittent claudication of both lower extremities due to atherosclerosis (CMS/[facility])   • Hypotension   • Arrhythmia   • Cardiac related syncope   • VT (ventricular tachycardia) (CMS/[facility])   • Symptomatic stenosis of left carotid artery without infarction   • Carotid stenosis, asymptomatic, left, s/p carotid stent   • Atherosclerosis of native artery of both lower extremities with intermittent claudication (CMS/[facility])   • Morbidly obese (CMS/[facility])   • HTN (hypertension)   • Hypercholesterolemia   • Systolic CHF (CMS/[facility])   • Pacemaker   • CAD (coronary artery disease)   • Back pain   • Chronic narcotic use   • Borderline diabetes       Assessment:    Antony Monahan is a 70 y.o. year old male with medically complicated obesity  pursuing sleeve gastrectomy.    Weight loss surgery is deemed medically necessary given the following obesity related comorbidities including hypertension, dyslipidemia, cardiovascular disease, osteoarthritis, back pain, knee pain, edema and depression with current Weight: 105 kg (231 lb 8 oz) and Body mass index is 36.26 kg/m²..    Plan:  The consultation plan and program requirements were reviewed with the patient.  The patient has been advised that a letter of medical support must be obtained from his primary care physician or referring provider. A psychological evaluation will be arranged.  A nutritional evaluation will be performed.  The patient was advised to start a high protein and low carbohydrate diet.  Necessary lifestyle modifications were discussed.  Instructions on how to access JOSE was given to the patient.  JOSE is an internet based educational video that explains the surgical procedure chosen and answers basic questions regarding that procedure.     Patient's Body mass index is 36.26 kg/m². BMI is above normal parameters. Recommendations include: exercise counseling, nutrition counseling and bariatric surgery.        Preoperative testing will include: CBC, CMP, Lipids, TSH, HgA1C, H.Pylori UBT, EKG and CXR, UGI    Additional preop clearances required prior to surgery: Cardiology, Pulmonary, Nephrology and Neurology.      The patient has been educated on expected postoperative lifestyle changes, including commitment to high protein diet, vitamin regimen, and exercise program.  They are aware that support groups are encouraged for optimal weight loss results. Patient understands that bariatric surgery is not cosmetic surgery but rather a tool to help make a lifelong commitment to lifestyle changes including diet, exercise, behavior modifications, and healthy habits. The procedure was discussed with the patient and all questions were answered. The importance of avoiding ASA/ NSAIDS/ steroids/ tobacco/  hormones/ immunomodulators perioperatively was discussed.         Edwina Myers PA-C      Time spent was 60 minutes with greater than 50% spent on counseling regarding expectations surrounding bariatric surgery.

## 2021-01-28 DIAGNOSIS — R10.13 DYSPEPSIA: Primary | ICD-10-CM

## 2021-01-28 LAB
ALBUMIN SERPL-MCNC: 4.4 G/DL (ref 3.7–4.7)
ALBUMIN/GLOB SERPL: 1.7 {RATIO} (ref 1.2–2.2)
ALP SERPL-CCNC: 77 IU/L (ref 39–117)
ALT SERPL-CCNC: 13 IU/L (ref 0–44)
AST SERPL-CCNC: 15 IU/L (ref 0–40)
BASOPHILS # BLD AUTO: 0.1 X10E3/UL (ref 0–0.2)
BASOPHILS NFR BLD AUTO: 1 %
BILIRUB SERPL-MCNC: 0.4 MG/DL (ref 0–1.2)
BUN SERPL-MCNC: 25 MG/DL (ref 8–27)
BUN/CREAT SERPL: 17 (ref 10–24)
CALCIUM SERPL-MCNC: 9.3 MG/DL (ref 8.6–10.2)
CHLORIDE SERPL-SCNC: 110 MMOL/L (ref 96–106)
CHOLEST SERPL-MCNC: 113 MG/DL (ref 100–199)
CO2 SERPL-SCNC: 24 MMOL/L (ref 20–29)
CREAT SERPL-MCNC: 1.45 MG/DL (ref 0.76–1.27)
EOSINOPHIL # BLD AUTO: 0.3 X10E3/UL (ref 0–0.4)
EOSINOPHIL NFR BLD AUTO: 5 %
ERYTHROCYTE [DISTWIDTH] IN BLOOD BY AUTOMATED COUNT: 14.8 % (ref 11.6–15.4)
GLOBULIN SER CALC-MCNC: 2.6 G/DL (ref 1.5–4.5)
GLUCOSE SERPL-MCNC: 104 MG/DL (ref 65–99)
HBA1C MFR BLD: 6.8 % (ref 4.8–5.6)
HCT VFR BLD AUTO: 40.6 % (ref 37.5–51)
HDLC SERPL-MCNC: 39 MG/DL
HGB BLD-MCNC: 13 G/DL (ref 13–17.7)
IMM GRANULOCYTES # BLD AUTO: 0 X10E3/UL (ref 0–0.1)
IMM GRANULOCYTES NFR BLD AUTO: 0 %
LDLC SERPL CALC-MCNC: 53 MG/DL (ref 0–99)
LYMPHOCYTES # BLD AUTO: 0.9 X10E3/UL (ref 0.7–3.1)
LYMPHOCYTES NFR BLD AUTO: 16 %
MCH RBC QN AUTO: 29.1 PG (ref 26.6–33)
MCHC RBC AUTO-ENTMCNC: 32 G/DL (ref 31.5–35.7)
MCV RBC AUTO: 91 FL (ref 79–97)
MONOCYTES # BLD AUTO: 0.4 X10E3/UL (ref 0.1–0.9)
MONOCYTES NFR BLD AUTO: 8 %
NEUTROPHILS # BLD AUTO: 3.9 X10E3/UL (ref 1.4–7)
NEUTROPHILS NFR BLD AUTO: 70 %
PLATELET # BLD AUTO: 140 X10E3/UL (ref 150–450)
POTASSIUM SERPL-SCNC: 4.8 MMOL/L (ref 3.5–5.2)
PROT SERPL-MCNC: 7 G/DL (ref 6–8.5)
RBC # BLD AUTO: 4.46 X10E6/UL (ref 4.14–5.8)
SODIUM SERPL-SCNC: 147 MMOL/L (ref 134–144)
TRIGL SERPL-MCNC: 115 MG/DL (ref 0–149)
TSH SERPL DL<=0.005 MIU/L-ACNC: 2.23 UIU/ML (ref 0.45–4.5)
VLDLC SERPL CALC-MCNC: 21 MG/DL (ref 5–40)
WBC # BLD AUTO: 5.6 X10E3/UL (ref 3.4–10.8)

## 2021-02-01 ENCOUNTER — TELEPHONE (OUTPATIENT)
Dept: BARIATRICS/WEIGHT MGMT | Facility: CLINIC | Age: 71
End: 2021-02-01

## 2021-02-01 DIAGNOSIS — R10.13 DYSPEPSIA: Primary | ICD-10-CM

## 2021-02-01 NOTE — TELEPHONE ENCOUNTER
Franky Mondragon MD Hitt, Edwina SANDERS PA-C             Yes, UGI.  But first check and see if he can hold his ASA a week pre and post op for the sleeve.  He can continue his Plavix.  Thanks!    Previous Messages

## 2021-02-02 ENCOUNTER — DOCUMENTATION (OUTPATIENT)
Dept: BARIATRICS/WEIGHT MGMT | Facility: CLINIC | Age: 71
End: 2021-02-02

## 2021-02-02 NOTE — PROGRESS NOTES
"Weight Loss Surgery  Presurgical Nutrition Assessment     Antony Monahan  01/27/2021  [id number]  [id number]  1950  male    Surgery desired: Sleeve Gastrectomy    Ht 170.2 cm (67\"); Wt 105 kg (231.5 #); BMI 36.26  Past Medical History:   Diagnosis Date   • Anxiety    • Arthritis    • Back pain     past injections, years ago   • Borderline diabetes    • CAD (coronary artery disease)    • Chronic narcotic use     prn   • COPD (chronic obstructive pulmonary disease) (CMS/[facility]) 7/29/2016   • Depression     intermittent   • HTN (hypertension) 7/29/2016   • Hypercholesterolemia 7/29/2016   • Hypothyroid 7/29/2016   • Joint pain     hydrocodone prn   • Obesity    • Pacemaker    • PAD (peripheral artery disease) (CMS/[facility]) 7/29/2016   • Systolic CHF (CMS/[facility]) 7/29/2016   • Tobacco abuse 7/29/2016     Past Surgical History:   Procedure Laterality Date   • CARDIAC CATHETERIZATION N/A 7/29/2016    Procedure: Left Heart Cath;  Surgeon: Drew Gurrola MD;  Location:  [facility];  Service:    • CARDIAC CATHETERIZATION N/A 7/29/2019    Procedure: LEFT HEART CATH;  Surgeon: Drew Gurrola MD;  Location:  [facility];  Service: Cardiovascular   • CARDIAC ELECTROPHYSIOLOGY PROCEDURE N/A 7/30/2019    Procedure: ICD SC new;  Surgeon: Musa Harden MD;  Location:  [facility] EP INVASIVE [facility];  Service: Cardiology   • INTERVENTIONAL RADIOLOGY PROCEDURE N/A 9/14/2016    Procedure: AORTIC Aortagram with Runoff;  Surgeon: Drew Gurrola MD;  Location:  [facility];  Service:    • INTERVENTIONAL RADIOLOGY PROCEDURE N/A 5/9/2017    Procedure: Abdominal Aortagram with Runoff;  Surgeon: Drew Gurrola MD;  Location:  [facility];  Service:    • INTERVENTIONAL RADIOLOGY PROCEDURE N/A 9/5/2018    Procedure: Abdominal Aortagram with Runoff;  Surgeon: Drew Gurrola MD;  Location:  [facility];  Service: Cardiovascular "   • INTERVENTIONAL RADIOLOGY PROCEDURE Bilateral 7/29/2019    Procedure: Carotid Cerebral Angiogram;  Surgeon: Claude Atkins MD;  Location:  [facility];  Service: Interventional Radiology   • INTERVENTIONAL RADIOLOGY PROCEDURE N/A 8/18/2020    Procedure: AARO+/-;  Surgeon: Drew Gurrola MD;  Location:  [facility];  Service: Cardiovascular;  Laterality: N/A;   • INTERVENTIONAL RADIOLOGY PROCEDURE Bilateral 11/20/2020    Procedure: Carotid Cerebral Angiogram with stent;  Surgeon: Claude Atkins MD;  Location:  [facility];  Service: Interventional Radiology;  Laterality: Bilateral;   • KNEE SURGERY Left 08/17/2015   • OTHER SURGICAL HISTORY      L LEG STENT   • [place] TCAT IV STENT CRV CRTD ART EMBOLIC PROTECJ N/A 9/25/2019    Left Carotid Stent; Dr. Claude Atkins   • UMBILICAL HERNIA REPAIR  2006    no mesh, done in [facility]   • WRIST SURGERY       No Known Allergies    Current Outpatient Medications:   •  albuterol sulfate  (90 Base) MCG/ACT inhaler, Inhale 2 puffs Every 4 (Four) Hours As Needed for Wheezing., Disp: , Rfl:   •  amiodarone (PACERONE) 200 MG tablet, Take 200 mg by mouth Daily. 1/2 tablet once daily, Disp: , Rfl:   •  amLODIPine (NORVASC) 5 MG tablet, Take 5 mg by mouth Daily., Disp: , Rfl:   •  aspirin 81 MG EC tablet, Take 162 mg by mouth Daily., Disp: , Rfl:   •  bumetanide (BUMEX) 1 MG tablet, Take 1 mg by mouth Daily., Disp: , Rfl:   •  carvedilol (COREG) 12.5 MG tablet, Take 25 mg by mouth 2 (Two) Times a Day With Meals. Take 1 and 1/2 tables by mouth twice a day, Disp: , Rfl:   •  clopidogrel (PLAVIX) 75 MG tablet, Take 1 tablet by mouth Daily., Disp: 30 tablet, Rfl: 11  •  docusate sodium (COLACE) 100 MG capsule, Take 1 capsule by mouth 2 (Two) Times a Day., Disp: 20 capsule, Rfl: 0  •  HYDROcodone-acetaminophen (NORCO)  MG per tablet, Take 1 tablet by mouth Every 6 (Six) Hours As Needed for Moderate Pain ., Disp: ,  Rfl:   •  levothyroxine (SYNTHROID, LEVOTHROID) 100 MCG tablet, Take 100 mcg by mouth Daily., Disp: , Rfl:   •  nitroglycerin (NITROSTAT) 0.4 MG SL tablet, Place 1 tablet under the tongue every 5 (five) minutes as needed for chest pain. Take no more than 3 doses in 15 minutes., Disp: 25 tablet, Rfl: 12  •  pantoprazole (PROTONIX) 40 MG EC tablet, Take 40 mg by mouth Every Morning., Disp: , Rfl:   •  polyethylene glycol (MIRALAX) 17 GM/SCOOP powder, Take 17 g by mouth Daily., Disp: 578 g, Rfl: 0  •  rosuvastatin (CRESTOR) 40 MG tablet, Take 40 mg by mouth every night., Disp: , Rfl:   •  sacubitril-valsartan (Entresto)  MG tablet, Take 1 tablet by mouth 2 (Two) Times a Day., Disp: , Rfl:   •  spironolactone (ALDACTONE) 25 MG tablet, Take 25 mg by mouth Daily., Disp: , Rfl:   •  Trelegy Ellipta 100-62.5-25 MCG/INH aerosol powder , Inhale 25 mcg Daily., Disp: , Rfl:   •  vitamin D3 125 MCG (5000 UT) capsule capsule, Take 2,000 Units by mouth Daily., Disp: , Rfl:       Nutrition Assessment    Estimated energy needs:  1760 kcal    Estimated calories for weight loss:  1500 kcal    IBW (Pounds):  160 #        Excess body weight (Pounds):  70 #       Nutrition Recall  24 Hour recall: (B) (L) (D) -  Reviewed and discussed with patient. Pt's  Food hx was only sketchily completed.  Brkfast = water, 2 eggs & ayoub & 1 c black coffee.  Second meal (time not specified) = Hamburger.  States he drinks 3 premier shakes qd for 90 g protein from supplements.  Recs include obtaining nutrition/meals & snks primarily through food rather than protein supplements until /p surgery. Pt states his daughter has been helping him /c diet.  Reviewed need for more protein foods throughout the day.        Exercise  never      Education    Provided information packet re:  Sleeve Gastrectomy  1. Reviewed guidelines for higher protein, limited carbohydrate diet to promote weight loss.  Encouraged patient to incorporate these principles of healthy  eating from now until approximately 2 weeks prior to bariatric surgery date, when an even lower carbohydrate “liver-shrinking” regimen will be followed. (Information sheet re pre-op diet given).  Explained that after recovery from surgery this diet will again be followed to ensure further loss and for weight maintenance.    2. Encouraged patient to choose an acceptable protein supplement powder or shake for post-surgery liquid diet.  Provided product guidelines and examples.    3. Explained importance of goal setting to help in changing eating behaviors that are not conducive to weight loss.  Targeted several on a worksheet which also included spaces for patient to work on issues specific to them.  4. Provided follow-up options for support, including contact information for dietitians here, if desired.  Web-based support information and apps for smart phones and computers given.  Noted that monthly support group is offered at this clinic, and that support is associated with successful weight loss.    Patient needs further nutrition education.  Recommend that team proceed with surgery and follow per protocol after he obtains this.      Nutrition Goals   Dietary Guidelines per information packet as described above  Protein goal:  grams per day   Carbohydrate goal:  100-140 grams per day  Eliminate soda, sweet tea, etc.     Exercise Goals  Continue current exercise routine   Add 15-30 minutes of activity per day as tolerated      Emy Cortés, SHANNAN  02/02/2021  16:30 EST

## 2021-02-08 ENCOUNTER — TELEPHONE (OUTPATIENT)
Dept: NEUROSURGERY | Facility: CLINIC | Age: 71
End: 2021-02-08

## 2021-02-08 NOTE — TELEPHONE ENCOUNTER
Caller: Antony Monahan    Relationship to patient: Self    Best call back number: [phone]    Chief complaint: PATIENT WAS ALREADY SCHEDULED PRIOR TO CALL BUT CALLED REQUESTING TO CANCEL/RESCHEDULE INITIAL APPT FOR A SOONER APPT. UNABLE TO COMPLETE REQUEST AT TIME OF CALL DUE TO REQUEST FOR A SOONER APPT. PER LAST OFFICE NOTE, PATIENT WAS TO FOLLOW UP IN 6 MOS WITH NEW IMAGING WITHIN THE SAME DAY. PATIENT STATES HE JUST SEEN DR. AMOR TODAY (CARDIO). ORDER IN CHART. PLEASE ADVISE IF OKAY FOR PATIENT TO BE SEEN SOONER?    PATIENT/CAREGIVER CAN BE CONTACTED WITH FURTHER ASSISTANCE.        Type of visit: FOLLOW UP EX & CAROTID DUPLEX US    Requested date: SOONER     If rescheduling, when is the original appointment: 06/14/21    Additional notes: N/A

## 2021-02-09 ENCOUNTER — APPOINTMENT (OUTPATIENT)
Dept: PREADMISSION TESTING | Facility: HOSPITAL | Age: 71
End: 2021-02-09

## 2021-02-09 ENCOUNTER — OFFICE VISIT (OUTPATIENT)
Dept: NEUROSURGERY | Facility: CLINIC | Age: 71
End: 2021-02-09

## 2021-02-09 VITALS — BODY MASS INDEX: 36.26 KG/M2 | TEMPERATURE: 97.8 F | HEIGHT: 67 IN | WEIGHT: 231 LBS

## 2021-02-09 DIAGNOSIS — I65.22 CAROTID STENOSIS, ASYMPTOMATIC, LEFT: Primary | ICD-10-CM

## 2021-02-09 DIAGNOSIS — E66.9 CLASS 2 OBESITY WITH BODY MASS INDEX (BMI) OF 36.0 TO 36.9 IN ADULT, UNSPECIFIED OBESITY TYPE, UNSPECIFIED WHETHER SERIOUS COMORBIDITY PRESENT: ICD-10-CM

## 2021-02-09 PROCEDURE — 99024 POSTOP FOLLOW-UP VISIT: CPT | Performed by: PHYSICIAN ASSISTANT

## 2021-02-09 NOTE — PROGRESS NOTES
NAME: YAKOV MONAHAN   DOS: 2021  : 1950  PCP: Franky Carrington MD  Type of Service: Appointment via telemedicine  You have chosen to receive care through a telehealth visit.  Do you consent to use a video/audio connection for your medical care today? Yes   Mode of Transmission: Telemedicine via 2 way interactive audio telecommunication    Chief Complaint:  Follow-up      History of Present Illness: Mr. Monahan is a 70 y.o. male who is well-known to the neuro interventional service, having undergone prior angioplasty/stent placement for high-grade left carotid stenosis in 2019.  He had follow-up carotid duplex ultrasounds demonstrating progressively increasing velocities within the left carotid stent consistent with recurrent/high-grade stenosis.  He denied any stroke or TIA-like symptoms.  He was admitted on 2020, and carotid angiogram confirmed neoatherosclerosis and intimal hyperplasia within the stent and at the distal margin producing a high-grade (greater than 80%) restenosis.  This was treated with angioplasty/stent placement with Zilver MIS stents, restoring a near normal caliber lumen and resulting in improved perfusion to the left cerebral hemisphere.  He did have right leg pain that prompted an emergency department evaluation, that demonstrated widely patent common femoral artery without flow-limiting stenosis or pseudoaneurysm.    Today, he is seen in follow-up to try to obtain surgical clearance for bariatric surgery.  He states that bariatric surgery wants him to discontinue Plavix and aspirin 2 weeks prior to the procedure.  He notes that he has done well since we last saw him in office.  Denies any TIA or strokelike symptoms.  He has remained on chronic dual antiplatelet therapy without issue.      Past Medical History:   Diagnosis Date   • Anxiety    • Arthritis    • Back pain     past injections, years ago   • Borderline diabetes    • CAD (coronary artery disease)    •  Chronic narcotic use     prn   • COPD (chronic obstructive pulmonary disease) (CMS/[facility]) 7/29/2016   • Depression     intermittent   • HTN (hypertension) 7/29/2016   • Hypercholesterolemia 7/29/2016   • Hypothyroid 7/29/2016   • Joint pain     hydrocodone prn   • Obesity    • Pacemaker    • PAD (peripheral artery disease) (CMS/[facility]) 7/29/2016   • Systolic CHF (CMS/[facility]) 7/29/2016   • Tobacco abuse 7/29/2016       Past Surgical History:   Procedure Laterality Date   • CARDIAC CATHETERIZATION N/A 7/29/2016    Procedure: Left Heart Cath;  Surgeon: Drew Gurrola MD;  Location: [facility] CATH [facility]VASIVE [facility]ATION;  Service:    • CARDIAC CATHETERIZATION N/A 7/29/2019    Procedure: LEFT HEART CATH;  Surgeon: Drew Gurrola MD;  Location: [facility] CATH INVASIVE LOCATION;  Service: Cardiovascular   • CARDIAC ELECTROPHYSIOLOGY PROCEDURE N/A 7/30/2019    Procedure: ICD SC new;  Surgeon: Musa Harden MD;  Location:  [facility] INVASIVE LOCATION;  Service: Cardiology   • INTERVENTIONAL RADIOLOGY PROCEDURE N/A 9/14/2016    Procedure: AORTIC Aortagram with Runoff;  Surgeon: Drew Gurrola MD;  Location: [facility] CATH INVASIVE [facility];  Service:    • INTERVENTIONAL RADIOLOGY PROCEDURE N/A 5/9/2017    Procedure: Abdominal Aortagram with Runoff;  Surgeon: Drew Gurrola MD;  Location: [facility] C[facility]VASIVE [facility];  Service:    • INTERVENTIONAL RADIOLOGY PROCEDURE N/A 9/5/2018    Procedure: Abdominal Aortagram with Runoff;  Surgeon: Drew Gurrola MD;  Location:  [facility] C[facility]IVE [facility];  Service: Cardiovascular   • INTERVENTIONAL RADIOLOGY PROCEDURE Bilateral 7/29/2019    Procedure: Carotid Cerebral Angiogram;  Surgeon: Claude Atkins MD;  Location:  [facility] C[facility]VASIVE [facility];  Service: Interventional Radiology   • INTERVENTIONAL RADIOLOGY PROCEDURE N/A 8/18/2020    Procedure: AARO+/-;  Surgeon: Drew Gurrola MD;  Location:  [facility] CATH INVASIVE LOCATION;  Service: Cardiovascular;   Laterality: N/A;   • INTERVENTIONAL RADIOLOGY PROCEDURE Bilateral 11/20/2020    Procedure: Carotid Cerebral Angiogram with stent;  Surgeon: Claude Atkins MD;  Location: [facility] INVASIVE LOC[facility];  Service: Interventional Radiology;  Laterality: Bilateral;   • KNEE SURGERY Left 08/17/2015   • OTHER SURGICAL HISTORY      L LEG STENT   • MI TCAT IV STENT CRV CRTD ART EMBOLIC PROTECJ N/A 9/25/2019    Left Carotid Stent; Dr. Claude Atkins   • UMBILICAL HERNIA REPAIR  2006    no mesh, done in [facility]   • WRIST SURGERY               Review of Systems   Eyes: Positive for visual disturbance.   All other systems reviewed and are negative.       Medications:    Current Outpatient Medications:   •  albuterol sulfate  (90 Base) MCG/ACT inhaler, Inhale 2 puffs Every 4 (Four) Hours As Needed for Wheezing., Disp: , Rfl:   •  amiodarone (PACERONE) 200 MG tablet, Take 200 mg by mouth Daily. 1/2 tablet once daily, Disp: , Rfl:   •  amLODIPine (NORVASC) 5 MG tablet, Take 5 mg by mouth Daily., Disp: , Rfl:   •  aspirin 81 MG EC tablet, Take 162 mg by mouth Daily., Disp: , Rfl:   •  bumetanide (BUMEX) 1 MG tablet, Take 1 mg by mouth Daily., Disp: , Rfl:   •  carvedilol (COREG) 12.5 MG tablet, Take 25 mg by mouth 2 (Two) Times a Day With Meals. Take 1 and 1/2 tables by mouth twice a day, Disp: , Rfl:   •  clopidogrel (PLAVIX) 75 MG tablet, Take 1 tablet by mouth Daily., Disp: 30 tablet, Rfl: 11  •  docusate sodium (COLACE) 100 MG capsule, Take 1 capsule by mouth 2 (Two) Times a Day., Disp: 20 capsule, Rfl: 0  •  HYDROcodone-acetaminophen (NORCO)  MG per tablet, Take 1 tablet by mouth Every 6 (Six) Hours As Needed for Moderate Pain ., Disp: , Rfl:   •  levothyroxine (SYNTHROID, LEVOTHROID) 100 MCG tablet, Take 100 mcg by mouth Daily., Disp: , Rfl:   •  nitroglycerin (NITROSTAT) 0.4 MG SL tablet, Place 1 tablet under the tongue every 5 (five) minutes as needed for chest pain. Take no more than 3 doses in 15  minutes., Disp: 25 tablet, Rfl: 12  •  pantoprazole (PROTONIX) 40 MG EC tablet, Take 40 mg by mouth Every Morning., Disp: , Rfl:   •  polyethylene glycol (MIRALAX) 17 GM/SCOOP powder, Take 17 g by mouth Daily., Disp: 578 g, Rfl: 0  •  rosuvastatin (CRESTOR) 40 MG tablet, Take 40 mg by mouth every night., Disp: , Rfl:   •  sacubitril-valsartan (Entresto)  MG tablet, Take 1 tablet by mouth 2 (Two) Times a Day., Disp: , Rfl:   •  spironolactone (ALDACTONE) 25 MG tablet, Take 25 mg by mouth Daily., Disp: , Rfl:   •  Trelegy Ellipta 100-62.5-25 MCG/INH aerosol powder , Inhale 25 mcg Daily., Disp: , Rfl:   •  vitamin D3 125 MCG (5000 UT) capsule capsule, Take 2,000 Units by mouth Daily., Disp: , Rfl:     Allergies:  No Known Allergies    Social History     Tobacco Use   • Smoking status: Former Smoker     Packs/day: 2.00     Years: 50.00     Pack years: 100.00     Types: Cigarettes     Quit date:      Years since quittin.1   • Smokeless tobacco: Never Used   Substance Use Topics   • Alcohol use: Yes     Frequency: Never     Comment: 2-3 Beers per Week   • Drug use: No       Family History   Problem Relation Age of Onset   • Hypertension Mother    • Hypertension Father    • Heart failure Father    • Heart attack Father    • Diabetes Sister    • Arrhythmia Sister        Review of Imaging:  No new imaging    Vitals:    21 1226   Temp: 97.8 °F (36.6 °C)     Body mass index is 36.18 kg/m².    Physical Exam  Neurologic Exam  Visit done via telephone due to covid 19  Diagnoses/Plan:    Mr. Monahan is a 70 y.o. male who is seen today status post drug-eluting stent placement for recurrent, high grade left carotid stenosis.  He has continued to do well from a neurological standpoint, with no stroke or TIA-like symptoms.  I have discussed with Dr. Atkins along with the patient, Dr. Atkins does not want patient to come off aspirin.  We are okay with patient discontinuing Plavix for 2 weeks prior to the procedure,  however, we state that he must remain on aspirin throughout.  We also recommend him to start Plavix following the procedure.  I have written this note and we will send this to patient and to Dr. Mondragon's office.  Dr. Atkins notes that if bariatric surgery will not perform the surgery with him remaining on aspirin, Dr. Mondragon is to call Dr. Atkins directly.  Patient was made aware of our recommendations.      Patient's Body mass index is 36.18 kg/m². BMI is above normal parameters. Recommendations include: educational material.         Ute Lea MA    Televisit Start Time: 1230  Televisit End Time: 1240

## 2021-02-12 ENCOUNTER — APPOINTMENT (OUTPATIENT)
Dept: GENERAL RADIOLOGY | Facility: HOSPITAL | Age: 71
End: 2021-02-12

## 2021-02-15 ENCOUNTER — APPOINTMENT (OUTPATIENT)
Dept: PREADMISSION TESTING | Facility: HOSPITAL | Age: 71
End: 2021-02-15

## 2021-02-18 ENCOUNTER — APPOINTMENT (OUTPATIENT)
Dept: GENERAL RADIOLOGY | Facility: HOSPITAL | Age: 71
End: 2021-02-18

## 2021-02-21 ENCOUNTER — LAB (OUTPATIENT)
Dept: PREADMISSION TESTING | Facility: HOSPITAL | Age: 71
End: 2021-02-21

## 2021-02-21 PROCEDURE — C9803 HOPD COVID-19 SPEC COLLECT: HCPCS

## 2021-02-21 PROCEDURE — U0004 COV-19 TEST NON-CDC HGH THRU: HCPCS

## 2021-02-22 LAB — SARS-COV-2 RNA RESP QL NAA+PROBE: NOT DETECTED

## 2021-02-24 ENCOUNTER — HOSPITAL ENCOUNTER (OUTPATIENT)
Dept: GENERAL RADIOLOGY | Facility: HOSPITAL | Age: 71
Discharge: HOME | End: 2021-02-24
Admitting: PHYSICIAN ASSISTANT

## 2021-02-24 DIAGNOSIS — R10.13 DYSPEPSIA: ICD-10-CM

## 2021-02-24 PROCEDURE — 63710000001 BARIUM SULFATE 96 % RECONSTITUTED SUSPENSION: Performed by: PHYSICIAN ASSISTANT

## 2021-02-24 PROCEDURE — A9270 NON-COVERED ITEM OR SERVICE: HCPCS | Performed by: PHYSICIAN ASSISTANT

## 2021-02-24 PROCEDURE — 74240 X-RAY XM UPR GI TRC 1CNTRST: CPT

## 2021-02-24 RX ADMIN — BARIUM SULFATE 183 ML: 960 POWDER, FOR SUSPENSION ORAL at 09:18

## 2021-05-31 ENCOUNTER — APPOINTMENT (OUTPATIENT)
Dept: GENERAL RADIOLOGY | Facility: HOSPITAL | Age: 71
End: 2021-05-31

## 2021-05-31 ENCOUNTER — HOSPITAL ENCOUNTER (INPATIENT)
Facility: HOSPITAL | Age: 71
LOS: 5 days | Discharge: HOME | End: 2021-06-05
Attending: EMERGENCY MEDICINE | Admitting: INTERNAL MEDICINE

## 2021-05-31 DIAGNOSIS — N28.9 RENAL INSUFFICIENCY: ICD-10-CM

## 2021-05-31 DIAGNOSIS — R60.9 PERIPHERAL EDEMA: ICD-10-CM

## 2021-05-31 DIAGNOSIS — I50.9 ACUTE ON CHRONIC CONGESTIVE HEART FAILURE, UNSPECIFIED HEART FAILURE TYPE (HCC): Primary | ICD-10-CM

## 2021-05-31 DIAGNOSIS — J44.1 COPD EXACERBATION (HCC): ICD-10-CM

## 2021-05-31 PROBLEM — E87.0 HYPERNATREMIA: Status: ACTIVE | Noted: 2021-05-31

## 2021-05-31 PROBLEM — N17.9 AKI (ACUTE KIDNEY INJURY) (HCC): Status: ACTIVE | Noted: 2021-05-31

## 2021-05-31 PROBLEM — R06.02 SHORTNESS OF BREATH: Status: ACTIVE | Noted: 2021-05-31

## 2021-05-31 LAB
ALBUMIN SERPL-MCNC: 4.3 G/DL (ref 3.5–5.2)
ALBUMIN/GLOB SERPL: 1.5 G/DL
ALP SERPL-CCNC: 74 U/L (ref 39–117)
ALT SERPL W P-5'-P-CCNC: 13 U/L (ref 1–41)
ANION GAP SERPL CALCULATED.3IONS-SCNC: 13 MMOL/L (ref 5–15)
AST SERPL-CCNC: 17 U/L (ref 1–40)
BASOPHILS # BLD AUTO: 0.05 10*3/MM3 (ref 0–0.2)
BASOPHILS NFR BLD AUTO: 0.8 % (ref 0–1.5)
BILIRUB SERPL-MCNC: 0.3 MG/DL (ref 0–1.2)
BUN SERPL-MCNC: 49 MG/DL (ref 8–23)
BUN/CREAT SERPL: 25.8 (ref 7–25)
CALCIUM SPEC-SCNC: 9.7 MG/DL (ref 8.6–10.5)
CHLORIDE SERPL-SCNC: 103 MMOL/L (ref 98–107)
CO2 SERPL-SCNC: 30 MMOL/L (ref 22–29)
CREAT SERPL-MCNC: 1.9 MG/DL (ref 0.76–1.27)
DEPRECATED RDW RBC AUTO: 52.5 FL (ref 37–54)
EOSINOPHIL # BLD AUTO: 0.2 10*3/MM3 (ref 0–0.4)
EOSINOPHIL NFR BLD AUTO: 3.4 % (ref 0.3–6.2)
ERYTHROCYTE [DISTWIDTH] IN BLOOD BY AUTOMATED COUNT: 15.5 % (ref 12.3–15.4)
GFR SERPL CREATININE-BSD FRML MDRD: 35 ML/MIN/1.73
GLOBULIN UR ELPH-MCNC: 2.8 GM/DL
GLUCOSE SERPL-MCNC: 125 MG/DL (ref 65–99)
HCT VFR BLD AUTO: 41.6 % (ref 37.5–51)
HGB BLD-MCNC: 12.8 G/DL (ref 13–17.7)
HOLD SPECIMEN: NORMAL
HOLD SPECIMEN: NORMAL
IMM GRANULOCYTES # BLD AUTO: 0.02 10*3/MM3 (ref 0–0.05)
IMM GRANULOCYTES NFR BLD AUTO: 0.3 % (ref 0–0.5)
LIPASE SERPL-CCNC: 18 U/L (ref 13–60)
LYMPHOCYTES # BLD AUTO: 0.91 10*3/MM3 (ref 0.7–3.1)
LYMPHOCYTES NFR BLD AUTO: 15.3 % (ref 19.6–45.3)
MCH RBC QN AUTO: 28.3 PG (ref 26.6–33)
MCHC RBC AUTO-ENTMCNC: 30.8 G/DL (ref 31.5–35.7)
MCV RBC AUTO: 92 FL (ref 79–97)
MONOCYTES # BLD AUTO: 0.46 10*3/MM3 (ref 0.1–0.9)
MONOCYTES NFR BLD AUTO: 7.7 % (ref 5–12)
NEUTROPHILS NFR BLD AUTO: 4.3 10*3/MM3 (ref 1.7–7)
NEUTROPHILS NFR BLD AUTO: 72.5 % (ref 42.7–76)
NRBC BLD AUTO-RTO: 0 /100 WBC (ref 0–0.2)
NT-PROBNP SERPL-MCNC: 1636 PG/ML (ref 0–900)
PLATELET # BLD AUTO: 150 10*3/MM3 (ref 140–450)
PMV BLD AUTO: 12.9 FL (ref 6–12)
POTASSIUM SERPL-SCNC: 3.8 MMOL/L (ref 3.5–5.2)
PROT SERPL-MCNC: 7.1 G/DL (ref 6–8.5)
QT INTERVAL: 480 MS
QTC INTERVAL: 518 MS
RBC # BLD AUTO: 4.52 10*6/MM3 (ref 4.14–5.8)
SODIUM SERPL-SCNC: 146 MMOL/L (ref 136–145)
TROPONIN T SERPL-MCNC: <0.01 NG/ML (ref 0–0.03)
WBC # BLD AUTO: 5.94 10*3/MM3 (ref 3.4–10.8)
WHOLE BLOOD HOLD SPECIMEN: NORMAL
WHOLE BLOOD HOLD SPECIMEN: NORMAL

## 2021-05-31 PROCEDURE — 93005 ELECTROCARDIOGRAM TRACING: CPT | Performed by: EMERGENCY MEDICINE

## 2021-05-31 PROCEDURE — 84484 ASSAY OF TROPONIN QUANT: CPT | Performed by: EMERGENCY MEDICINE

## 2021-05-31 PROCEDURE — 94640 AIRWAY INHALATION TREATMENT: CPT

## 2021-05-31 PROCEDURE — 94799 UNLISTED PULMONARY SVC/PX: CPT

## 2021-05-31 PROCEDURE — 83690 ASSAY OF LIPASE: CPT | Performed by: EMERGENCY MEDICINE

## 2021-05-31 PROCEDURE — 85025 COMPLETE CBC W/AUTO DIFF WBC: CPT | Performed by: EMERGENCY MEDICINE

## 2021-05-31 PROCEDURE — 80053 COMPREHEN METABOLIC PANEL: CPT | Performed by: EMERGENCY MEDICINE

## 2021-05-31 PROCEDURE — 71045 X-RAY EXAM CHEST 1 VIEW: CPT

## 2021-05-31 PROCEDURE — 85379 FIBRIN DEGRADATION QUANT: CPT | Performed by: FAMILY MEDICINE

## 2021-05-31 PROCEDURE — 83880 ASSAY OF NATRIURETIC PEPTIDE: CPT | Performed by: EMERGENCY MEDICINE

## 2021-05-31 PROCEDURE — 93005 ELECTROCARDIOGRAM TRACING: CPT

## 2021-05-31 PROCEDURE — 99283 EMERGENCY DEPT VISIT LOW MDM: CPT

## 2021-05-31 RX ORDER — FUROSEMIDE 40 MG/1
40 TABLET ORAL 2 TIMES DAILY
COMMUNITY
End: 2021-06-05 | Stop reason: HOSPADM

## 2021-05-31 RX ORDER — BUMETANIDE 0.25 MG/ML
2 INJECTION INTRAMUSCULAR; INTRAVENOUS ONCE
Status: COMPLETED | OUTPATIENT
Start: 2021-05-31 | End: 2021-06-01

## 2021-05-31 RX ORDER — MONTELUKAST SODIUM 10 MG/1
10 TABLET ORAL NIGHTLY
Status: ON HOLD | COMMUNITY
End: 2021-07-12

## 2021-05-31 RX ORDER — IPRATROPIUM BROMIDE AND ALBUTEROL SULFATE 2.5; .5 MG/3ML; MG/3ML
3 SOLUTION RESPIRATORY (INHALATION) ONCE
Status: COMPLETED | OUTPATIENT
Start: 2021-05-31 | End: 2021-05-31

## 2021-05-31 RX ORDER — GUAIFENESIN 600 MG/1
1200 TABLET, EXTENDED RELEASE ORAL 2 TIMES DAILY
Status: ON HOLD | COMMUNITY
End: 2021-06-01

## 2021-05-31 RX ORDER — ASPIRIN 81 MG/1
324 TABLET, CHEWABLE ORAL ONCE
Status: COMPLETED | OUTPATIENT
Start: 2021-05-31 | End: 2021-05-31

## 2021-05-31 RX ORDER — SODIUM CHLORIDE 0.9 % (FLUSH) 0.9 %
10 SYRINGE (ML) INJECTION AS NEEDED
Status: DISCONTINUED | OUTPATIENT
Start: 2021-05-31 | End: 2021-06-05 | Stop reason: HOSPADM

## 2021-05-31 RX ORDER — ALLOPURINOL 100 MG/1
100 TABLET ORAL 2 TIMES DAILY
COMMUNITY

## 2021-05-31 RX ADMIN — IPRATROPIUM BROMIDE AND ALBUTEROL SULFATE 3 ML: 2.5; .5 SOLUTION RESPIRATORY (INHALATION) at 23:57

## 2021-05-31 RX ADMIN — ASPIRIN 324 MG: 81 TABLET, CHEWABLE ORAL at 21:48

## 2021-06-01 ENCOUNTER — APPOINTMENT (OUTPATIENT)
Dept: CT IMAGING | Facility: HOSPITAL | Age: 71
End: 2021-06-01

## 2021-06-01 ENCOUNTER — APPOINTMENT (OUTPATIENT)
Dept: CARDIOLOGY | Facility: HOSPITAL | Age: 71
End: 2021-06-01

## 2021-06-01 PROBLEM — R14.0 ABDOMINAL DISTENTION: Status: ACTIVE | Noted: 2021-06-01

## 2021-06-01 LAB
ANION GAP SERPL CALCULATED.3IONS-SCNC: 11 MMOL/L (ref 5–15)
BASOPHILS # BLD AUTO: 0.05 10*3/MM3 (ref 0–0.2)
BASOPHILS NFR BLD AUTO: 0.8 % (ref 0–1.5)
BH CV ECHO MEAS - AO MAX PG (FULL): 8.6 MMHG
BH CV ECHO MEAS - AO MAX PG: 13.7 MMHG
BH CV ECHO MEAS - AO MEAN PG (FULL): 4 MMHG
BH CV ECHO MEAS - AO MEAN PG: 6 MMHG
BH CV ECHO MEAS - AO ROOT AREA (BSA CORRECTED): 1.6
BH CV ECHO MEAS - AO ROOT AREA: 9.6 CM^2
BH CV ECHO MEAS - AO ROOT DIAM: 3.5 CM
BH CV ECHO MEAS - AO V2 MAX: 185 CM/SEC
BH CV ECHO MEAS - AO V2 MEAN: 114 CM/SEC
BH CV ECHO MEAS - AO V2 VTI: 32 CM
BH CV ECHO MEAS - ASC AORTA: 3.5 CM
BH CV ECHO MEAS - AVA(I,A): 2.4 CM^2
BH CV ECHO MEAS - AVA(I,D): 2.4 CM^2
BH CV ECHO MEAS - AVA(V,A): 1.9 CM^2
BH CV ECHO MEAS - AVA(V,D): 1.9 CM^2
BH CV ECHO MEAS - BSA(HAYCOCK): 2.3 M^2
BH CV ECHO MEAS - BSA: 2.2 M^2
BH CV ECHO MEAS - BZI_BMI: 36.6 KILOGRAMS/M^2
BH CV ECHO MEAS - BZI_METRIC_HEIGHT: 170.2 CM
BH CV ECHO MEAS - BZI_METRIC_WEIGHT: 106.1 KG
BH CV ECHO MEAS - EDV(CUBED): 161.9 ML
BH CV ECHO MEAS - EDV(MOD-SP2): 170 ML
BH CV ECHO MEAS - EDV(MOD-SP4): 206 ML
BH CV ECHO MEAS - EDV(TEICH): 144.4 ML
BH CV ECHO MEAS - EF(CUBED): 43.7 %
BH CV ECHO MEAS - EF(MOD-SP2): 35.3 %
BH CV ECHO MEAS - EF(MOD-SP4): 33.5 %
BH CV ECHO MEAS - EF(TEICH): 36 %
BH CV ECHO MEAS - ESV(CUBED): 91.1 ML
BH CV ECHO MEAS - ESV(MOD-SP2): 110 ML
BH CV ECHO MEAS - ESV(MOD-SP4): 137 ML
BH CV ECHO MEAS - ESV(TEICH): 92.4 ML
BH CV ECHO MEAS - FS: 17.4 %
BH CV ECHO MEAS - IVS/LVPW: 1.1
BH CV ECHO MEAS - IVSD: 1.2 CM
BH CV ECHO MEAS - LA DIMENSION: 4.9 CM
BH CV ECHO MEAS - LA/AO: 1.4
BH CV ECHO MEAS - LAD MAJOR: 6.7 CM
BH CV ECHO MEAS - LAT PEAK E' VEL: 7.8 CM/SEC
BH CV ECHO MEAS - LATERAL E/E' RATIO: 13.4
BH CV ECHO MEAS - LV DIASTOLIC VOL/BSA (35-75): 95.3 ML/M^2
BH CV ECHO MEAS - LV MASS(C)D: 247.2 GRAMS
BH CV ECHO MEAS - LV MASS(C)DI: 114.4 GRAMS/M^2
BH CV ECHO MEAS - LV MAX PG: 5.1 MMHG
BH CV ECHO MEAS - LV MEAN PG: 2 MMHG
BH CV ECHO MEAS - LV SYSTOLIC VOL/BSA (12-30): 63.4 ML/M^2
BH CV ECHO MEAS - LV V1 MAX: 113 CM/SEC
BH CV ECHO MEAS - LV V1 MEAN: 68.8 CM/SEC
BH CV ECHO MEAS - LV V1 VTI: 24.2 CM
BH CV ECHO MEAS - LVIDD: 5.5 CM
BH CV ECHO MEAS - LVIDS: 4.5 CM
BH CV ECHO MEAS - LVLD AP2: 8.2 CM
BH CV ECHO MEAS - LVLD AP4: 8.8 CM
BH CV ECHO MEAS - LVLS AP2: 7.7 CM
BH CV ECHO MEAS - LVLS AP4: 8.1 CM
BH CV ECHO MEAS - LVOT AREA (M): 3.1 CM^2
BH CV ECHO MEAS - LVOT AREA: 3.1 CM^2
BH CV ECHO MEAS - LVOT DIAM: 2 CM
BH CV ECHO MEAS - LVPWD: 1.1 CM
BH CV ECHO MEAS - MED PEAK E' VEL: 5.5 CM/SEC
BH CV ECHO MEAS - MEDIAL E/E' RATIO: 19
BH CV ECHO MEAS - MV DEC TIME: 0.21 SEC
BH CV ECHO MEAS - MV E MAX VEL: 104 CM/SEC
BH CV ECHO MEAS - MV MAX PG: 4.9 MMHG
BH CV ECHO MEAS - MV MEAN PG: 3 MMHG
BH CV ECHO MEAS - MV V2 MAX: 111 CM/SEC
BH CV ECHO MEAS - MV V2 MEAN: 81 CM/SEC
BH CV ECHO MEAS - MV V2 VTI: 37.5 CM
BH CV ECHO MEAS - MVA(VTI): 2 CM^2
BH CV ECHO MEAS - PA ACC SLOPE: 793.5 CM/SEC^2
BH CV ECHO MEAS - PA ACC TIME: 0.14 SEC
BH CV ECHO MEAS - PA MAX PG: 6 MMHG
BH CV ECHO MEAS - PA PR(ACCEL): 16.5 MMHG
BH CV ECHO MEAS - PA V2 MAX: 122.5 CM/SEC
BH CV ECHO MEAS - RAP SYSTOLE: 8 MMHG
BH CV ECHO MEAS - RVDD: 2.4 CM
BH CV ECHO MEAS - RVSP: 37 MMHG
BH CV ECHO MEAS - SI(AO): 142.4 ML/M^2
BH CV ECHO MEAS - SI(CUBED): 32.7 ML/M^2
BH CV ECHO MEAS - SI(LVOT): 35.2 ML/M^2
BH CV ECHO MEAS - SI(MOD-SP2): 27.8 ML/M^2
BH CV ECHO MEAS - SI(MOD-SP4): 31.9 ML/M^2
BH CV ECHO MEAS - SI(TEICH): 24 ML/M^2
BH CV ECHO MEAS - SV(AO): 307.9 ML
BH CV ECHO MEAS - SV(CUBED): 70.8 ML
BH CV ECHO MEAS - SV(LVOT): 76 ML
BH CV ECHO MEAS - SV(MOD-SP2): 60 ML
BH CV ECHO MEAS - SV(MOD-SP4): 69 ML
BH CV ECHO MEAS - SV(TEICH): 51.9 ML
BH CV ECHO MEAS - TR MAX PG: 29 MMHG
BH CV ECHO MEAS - TR MAX VEL: 270.3 CM/SEC
BH CV ECHO MEAS - TV MAX PG: 0.66 MMHG
BH CV ECHO MEAS - TV V2 MAX: 40.6 CM/SEC
BH CV ECHO MEASUREMENTS AVERAGE E/E' RATIO: 15.64
BH CV VAS BP RIGHT ARM: NORMAL MMHG
BH CV XLRA - RV BASE: 4.9 CM
BH CV XLRA - RV LENGTH: 8.3 CM
BH CV XLRA - RV MID: 4.6 CM
BH CV XLRA - TDI S': 12.5 CM/SEC
BUN SERPL-MCNC: 46 MG/DL (ref 8–23)
BUN/CREAT SERPL: 25.3 (ref 7–25)
CALCIUM SPEC-SCNC: 8.7 MG/DL (ref 8.6–10.5)
CHLORIDE SERPL-SCNC: 101 MMOL/L (ref 98–107)
CHOLEST SERPL-MCNC: 104 MG/DL (ref 0–200)
CO2 SERPL-SCNC: 28 MMOL/L (ref 22–29)
CREAT SERPL-MCNC: 1.82 MG/DL (ref 0.76–1.27)
CREAT UR-MCNC: 50.3 MG/DL
D DIMER PPP FEU-MCNC: 0.91 MCGFEU/ML (ref 0–0.56)
DEPRECATED RDW RBC AUTO: 52.7 FL (ref 37–54)
EOSINOPHIL # BLD AUTO: 0.22 10*3/MM3 (ref 0–0.4)
EOSINOPHIL NFR BLD AUTO: 3.7 % (ref 0.3–6.2)
ERYTHROCYTE [DISTWIDTH] IN BLOOD BY AUTOMATED COUNT: 15.4 % (ref 12.3–15.4)
FLUAV RNA RESP QL NAA+PROBE: NOT DETECTED
FLUBV RNA RESP QL NAA+PROBE: NOT DETECTED
GFR SERPL CREATININE-BSD FRML MDRD: 37 ML/MIN/1.73
GLUCOSE SERPL-MCNC: 179 MG/DL (ref 65–99)
HBA1C MFR BLD: 6.6 % (ref 4.8–5.6)
HCT VFR BLD AUTO: 41.3 % (ref 37.5–51)
HDLC SERPL-MCNC: 39 MG/DL (ref 40–60)
HGB BLD-MCNC: 12.5 G/DL (ref 13–17.7)
HOLD SPECIMEN: NORMAL
IMM GRANULOCYTES # BLD AUTO: 0.03 10*3/MM3 (ref 0–0.05)
IMM GRANULOCYTES NFR BLD AUTO: 0.5 % (ref 0–0.5)
LDLC SERPL CALC-MCNC: 42 MG/DL (ref 0–100)
LDLC/HDLC SERPL: 1.02 {RATIO}
LYMPHOCYTES # BLD AUTO: 1.13 10*3/MM3 (ref 0.7–3.1)
LYMPHOCYTES NFR BLD AUTO: 18.9 % (ref 19.6–45.3)
MCH RBC QN AUTO: 28.1 PG (ref 26.6–33)
MCHC RBC AUTO-ENTMCNC: 30.3 G/DL (ref 31.5–35.7)
MCV RBC AUTO: 92.8 FL (ref 79–97)
MONOCYTES # BLD AUTO: 0.52 10*3/MM3 (ref 0.1–0.9)
MONOCYTES NFR BLD AUTO: 8.7 % (ref 5–12)
NEUTROPHILS NFR BLD AUTO: 4.04 10*3/MM3 (ref 1.7–7)
NEUTROPHILS NFR BLD AUTO: 67.4 % (ref 42.7–76)
NRBC BLD AUTO-RTO: 0 /100 WBC (ref 0–0.2)
PLATELET # BLD AUTO: 151 10*3/MM3 (ref 140–450)
PMV BLD AUTO: 12.5 FL (ref 6–12)
POTASSIUM SERPL-SCNC: 3.5 MMOL/L (ref 3.5–5.2)
RBC # BLD AUTO: 4.45 10*6/MM3 (ref 4.14–5.8)
SARS-COV-2 RNA RESP QL NAA+PROBE: NOT DETECTED
SODIUM SERPL-SCNC: 140 MMOL/L (ref 136–145)
SODIUM UR-SCNC: 85 MMOL/L
TRIGL SERPL-MCNC: 127 MG/DL (ref 0–150)
TROPONIN T SERPL-MCNC: <0.01 NG/ML (ref 0–0.03)
TROPONIN T SERPL-MCNC: <0.01 NG/ML (ref 0–0.03)
VLDLC SERPL-MCNC: 23 MG/DL (ref 5–40)
WBC # BLD AUTO: 5.99 10*3/MM3 (ref 3.4–10.8)

## 2021-06-01 PROCEDURE — 25010000002 METHYLPREDNISOLONE PER 40 MG: Performed by: FAMILY MEDICINE

## 2021-06-01 PROCEDURE — 85025 COMPLETE CBC W/AUTO DIFF WBC: CPT | Performed by: NURSE PRACTITIONER

## 2021-06-01 PROCEDURE — 94799 UNLISTED PULMONARY SVC/PX: CPT

## 2021-06-01 PROCEDURE — 99223 1ST HOSP IP/OBS HIGH 75: CPT | Performed by: FAMILY MEDICINE

## 2021-06-01 PROCEDURE — 71275 CT ANGIOGRAPHY CHEST: CPT

## 2021-06-01 PROCEDURE — 84484 ASSAY OF TROPONIN QUANT: CPT | Performed by: EMERGENCY MEDICINE

## 2021-06-01 PROCEDURE — 80048 BASIC METABOLIC PNL TOTAL CA: CPT | Performed by: NURSE PRACTITIONER

## 2021-06-01 PROCEDURE — 74176 CT ABD & PELVIS W/O CONTRAST: CPT

## 2021-06-01 PROCEDURE — 25010000002 HEPARIN (PORCINE) PER 1000 UNITS: Performed by: NURSE PRACTITIONER

## 2021-06-01 PROCEDURE — 84484 ASSAY OF TROPONIN QUANT: CPT | Performed by: NURSE PRACTITIONER

## 2021-06-01 PROCEDURE — 86335 IMMUNFIX E-PHORSIS/URINE/CSF: CPT | Performed by: NURSE PRACTITIONER

## 2021-06-01 PROCEDURE — 25010000002 METHYLPREDNISOLONE PER 40 MG: Performed by: NURSE PRACTITIONER

## 2021-06-01 PROCEDURE — 0 IOPAMIDOL PER 1 ML: Performed by: FAMILY MEDICINE

## 2021-06-01 PROCEDURE — 80061 LIPID PANEL: CPT | Performed by: NURSE PRACTITIONER

## 2021-06-01 PROCEDURE — 93306 TTE W/DOPPLER COMPLETE: CPT

## 2021-06-01 PROCEDURE — 83036 HEMOGLOBIN GLYCOSYLATED A1C: CPT | Performed by: NURSE PRACTITIONER

## 2021-06-01 PROCEDURE — 84300 ASSAY OF URINE SODIUM: CPT | Performed by: NURSE PRACTITIONER

## 2021-06-01 PROCEDURE — 87636 SARSCOV2 & INF A&B AMP PRB: CPT | Performed by: FAMILY MEDICINE

## 2021-06-01 PROCEDURE — 25010000002 SULFUR HEXAFLUORIDE MICROSPH 60.7-25 MG RECONSTITUTED SUSPENSION: Performed by: FAMILY MEDICINE

## 2021-06-01 PROCEDURE — 82570 ASSAY OF URINE CREATININE: CPT | Performed by: NURSE PRACTITIONER

## 2021-06-01 RX ORDER — ASPIRIN 81 MG/1
81 TABLET ORAL DAILY
Status: DISCONTINUED | OUTPATIENT
Start: 2021-06-01 | End: 2021-06-05 | Stop reason: HOSPADM

## 2021-06-01 RX ORDER — IPRATROPIUM BROMIDE AND ALBUTEROL SULFATE 2.5; .5 MG/3ML; MG/3ML
3 SOLUTION RESPIRATORY (INHALATION)
Status: DISCONTINUED | OUTPATIENT
Start: 2021-06-01 | End: 2021-06-05

## 2021-06-01 RX ORDER — CLOPIDOGREL BISULFATE 75 MG/1
75 TABLET ORAL DAILY
Status: DISCONTINUED | OUTPATIENT
Start: 2021-06-01 | End: 2021-06-01

## 2021-06-01 RX ORDER — LEVOTHYROXINE SODIUM 0.1 MG/1
200 TABLET ORAL
Status: DISCONTINUED | OUTPATIENT
Start: 2021-06-01 | End: 2021-06-05 | Stop reason: HOSPADM

## 2021-06-01 RX ORDER — METHYLPREDNISOLONE SODIUM SUCCINATE 40 MG/ML
40 INJECTION, POWDER, LYOPHILIZED, FOR SOLUTION INTRAMUSCULAR; INTRAVENOUS ONCE
Status: COMPLETED | OUTPATIENT
Start: 2021-06-01 | End: 2021-06-01

## 2021-06-01 RX ORDER — BUMETANIDE 1 MG/1
2 TABLET ORAL
Status: DISCONTINUED | OUTPATIENT
Start: 2021-06-01 | End: 2021-06-02

## 2021-06-01 RX ORDER — ROSUVASTATIN CALCIUM 20 MG/1
40 TABLET, COATED ORAL NIGHTLY
Status: DISCONTINUED | OUTPATIENT
Start: 2021-06-01 | End: 2021-06-05 | Stop reason: HOSPADM

## 2021-06-01 RX ORDER — ARFORMOTEROL TARTRATE 15 UG/2ML
15 SOLUTION RESPIRATORY (INHALATION)
Status: DISCONTINUED | OUTPATIENT
Start: 2021-06-01 | End: 2021-06-05 | Stop reason: HOSPADM

## 2021-06-01 RX ORDER — ACETAMINOPHEN 160 MG/5ML
650 SOLUTION ORAL EVERY 4 HOURS PRN
Status: DISCONTINUED | OUTPATIENT
Start: 2021-06-01 | End: 2021-06-05 | Stop reason: HOSPADM

## 2021-06-01 RX ORDER — ACETAMINOPHEN 325 MG/1
650 TABLET ORAL EVERY 4 HOURS PRN
Status: DISCONTINUED | OUTPATIENT
Start: 2021-06-01 | End: 2021-06-05 | Stop reason: HOSPADM

## 2021-06-01 RX ORDER — AMLODIPINE BESYLATE 5 MG/1
5 TABLET ORAL
Status: DISCONTINUED | OUTPATIENT
Start: 2021-06-01 | End: 2021-06-01

## 2021-06-01 RX ORDER — IPRATROPIUM BROMIDE AND ALBUTEROL SULFATE 2.5; .5 MG/3ML; MG/3ML
3 SOLUTION RESPIRATORY (INHALATION) EVERY 4 HOURS PRN
Status: DISCONTINUED | OUTPATIENT
Start: 2021-06-01 | End: 2021-06-05 | Stop reason: HOSPADM

## 2021-06-01 RX ORDER — SODIUM CHLORIDE 0.9 % (FLUSH) 0.9 %
10 SYRINGE (ML) INJECTION EVERY 12 HOURS SCHEDULED
Status: DISCONTINUED | OUTPATIENT
Start: 2021-06-01 | End: 2021-06-05 | Stop reason: HOSPADM

## 2021-06-01 RX ORDER — ALLOPURINOL 100 MG/1
100 TABLET ORAL 2 TIMES DAILY
Status: DISCONTINUED | OUTPATIENT
Start: 2021-06-01 | End: 2021-06-05 | Stop reason: HOSPADM

## 2021-06-01 RX ORDER — HEPARIN SODIUM 5000 [USP'U]/ML
5000 INJECTION, SOLUTION INTRAVENOUS; SUBCUTANEOUS EVERY 12 HOURS SCHEDULED
Status: DISCONTINUED | OUTPATIENT
Start: 2021-06-01 | End: 2021-06-01

## 2021-06-01 RX ORDER — METHYLPREDNISOLONE SODIUM SUCCINATE 40 MG/ML
40 INJECTION, POWDER, LYOPHILIZED, FOR SOLUTION INTRAMUSCULAR; INTRAVENOUS EVERY 8 HOURS SCHEDULED
Status: DISCONTINUED | OUTPATIENT
Start: 2021-06-01 | End: 2021-06-01

## 2021-06-01 RX ORDER — MELATONIN
2000 DAILY
Status: DISCONTINUED | OUTPATIENT
Start: 2021-06-01 | End: 2021-06-05 | Stop reason: HOSPADM

## 2021-06-01 RX ORDER — DOCUSATE SODIUM 100 MG/1
100 CAPSULE, LIQUID FILLED ORAL 2 TIMES DAILY
Status: DISCONTINUED | OUTPATIENT
Start: 2021-06-01 | End: 2021-06-05 | Stop reason: HOSPADM

## 2021-06-01 RX ORDER — BUMETANIDE 1 MG/1
2 TABLET ORAL 2 TIMES DAILY
Status: DISCONTINUED | OUTPATIENT
Start: 2021-06-01 | End: 2021-06-01

## 2021-06-01 RX ORDER — ACETAMINOPHEN 650 MG/1
650 SUPPOSITORY RECTAL EVERY 4 HOURS PRN
Status: DISCONTINUED | OUTPATIENT
Start: 2021-06-01 | End: 2021-06-05 | Stop reason: HOSPADM

## 2021-06-01 RX ORDER — BUDESONIDE 0.5 MG/2ML
0.5 INHALANT ORAL
Status: DISCONTINUED | OUTPATIENT
Start: 2021-06-01 | End: 2021-06-05 | Stop reason: HOSPADM

## 2021-06-01 RX ORDER — POTASSIUM CHLORIDE 750 MG/1
40 CAPSULE, EXTENDED RELEASE ORAL ONCE
Status: COMPLETED | OUTPATIENT
Start: 2021-06-01 | End: 2021-06-01

## 2021-06-01 RX ORDER — SODIUM CHLORIDE 0.9 % (FLUSH) 0.9 %
10 SYRINGE (ML) INJECTION AS NEEDED
Status: DISCONTINUED | OUTPATIENT
Start: 2021-06-01 | End: 2021-06-05 | Stop reason: HOSPADM

## 2021-06-01 RX ORDER — METHYLPREDNISOLONE SODIUM SUCCINATE 40 MG/ML
40 INJECTION, POWDER, LYOPHILIZED, FOR SOLUTION INTRAMUSCULAR; INTRAVENOUS EVERY 8 HOURS SCHEDULED
Status: DISCONTINUED | OUTPATIENT
Start: 2021-06-01 | End: 2021-06-02

## 2021-06-01 RX ORDER — PANTOPRAZOLE SODIUM 40 MG/1
40 TABLET, DELAYED RELEASE ORAL DAILY
Status: DISCONTINUED | OUTPATIENT
Start: 2021-06-01 | End: 2021-06-05 | Stop reason: HOSPADM

## 2021-06-01 RX ORDER — GUAIFENESIN 600 MG/1
1200 TABLET, EXTENDED RELEASE ORAL EVERY 12 HOURS SCHEDULED
Status: DISCONTINUED | OUTPATIENT
Start: 2021-06-01 | End: 2021-06-05 | Stop reason: HOSPADM

## 2021-06-01 RX ORDER — MONTELUKAST SODIUM 10 MG/1
10 TABLET ORAL NIGHTLY
Status: DISCONTINUED | OUTPATIENT
Start: 2021-06-01 | End: 2021-06-05 | Stop reason: HOSPADM

## 2021-06-01 RX ORDER — CARVEDILOL 12.5 MG/1
25 TABLET ORAL 2 TIMES DAILY WITH MEALS
Status: DISCONTINUED | OUTPATIENT
Start: 2021-06-01 | End: 2021-06-05 | Stop reason: HOSPADM

## 2021-06-01 RX ADMIN — APIXABAN 5 MG: 5 TABLET, FILM COATED ORAL at 10:38

## 2021-06-01 RX ADMIN — BUMETANIDE 2 MG: 0.25 INJECTION, SOLUTION INTRAMUSCULAR; INTRAVENOUS at 00:39

## 2021-06-01 RX ADMIN — DOCUSATE SODIUM 100 MG: 100 CAPSULE ORAL at 08:40

## 2021-06-01 RX ADMIN — METHYLPREDNISOLONE SODIUM SUCCINATE 40 MG: 40 INJECTION, POWDER, FOR SOLUTION INTRAMUSCULAR; INTRAVENOUS at 02:50

## 2021-06-01 RX ADMIN — LEVOTHYROXINE SODIUM 200 MCG: 100 TABLET ORAL at 06:02

## 2021-06-01 RX ADMIN — CLOPIDOGREL BISULFATE 75 MG: 75 TABLET ORAL at 08:40

## 2021-06-01 RX ADMIN — ALLOPURINOL 100 MG: 100 TABLET ORAL at 08:40

## 2021-06-01 RX ADMIN — BUDESONIDE 0.5 MG: 0.5 INHALANT RESPIRATORY (INHALATION) at 03:00

## 2021-06-01 RX ADMIN — SODIUM CHLORIDE 500 ML: 9 INJECTION, SOLUTION INTRAVENOUS at 02:50

## 2021-06-01 RX ADMIN — APIXABAN 5 MG: 5 TABLET, FILM COATED ORAL at 20:42

## 2021-06-01 RX ADMIN — Medication 2000 UNITS: at 08:40

## 2021-06-01 RX ADMIN — ARFORMOTEROL TARTRATE 15 MCG: 15 SOLUTION RESPIRATORY (INHALATION) at 19:54

## 2021-06-01 RX ADMIN — POTASSIUM CHLORIDE 40 MEQ: 750 CAPSULE, EXTENDED RELEASE ORAL at 10:38

## 2021-06-01 RX ADMIN — IPRATROPIUM BROMIDE AND ALBUTEROL SULFATE 3 ML: 2.5; .5 SOLUTION RESPIRATORY (INHALATION) at 19:54

## 2021-06-01 RX ADMIN — SODIUM CHLORIDE, PRESERVATIVE FREE 10 ML: 5 INJECTION INTRAVENOUS at 20:44

## 2021-06-01 RX ADMIN — IPRATROPIUM BROMIDE AND ALBUTEROL SULFATE 3 ML: 2.5; .5 SOLUTION RESPIRATORY (INHALATION) at 16:05

## 2021-06-01 RX ADMIN — BUMETANIDE 2 MG: 1 TABLET ORAL at 08:40

## 2021-06-01 RX ADMIN — IPRATROPIUM BROMIDE AND ALBUTEROL SULFATE 3 ML: 2.5; .5 SOLUTION RESPIRATORY (INHALATION) at 07:17

## 2021-06-01 RX ADMIN — ROSUVASTATIN CALCIUM 40 MG: 20 TABLET, COATED ORAL at 01:48

## 2021-06-01 RX ADMIN — MONTELUKAST SODIUM 10 MG: 10 TABLET, COATED ORAL at 01:48

## 2021-06-01 RX ADMIN — BUDESONIDE 0.5 MG: 0.5 INHALANT RESPIRATORY (INHALATION) at 07:18

## 2021-06-01 RX ADMIN — GUAIFENESIN 1200 MG: 600 TABLET, EXTENDED RELEASE ORAL at 20:42

## 2021-06-01 RX ADMIN — HEPARIN SODIUM 5000 UNITS: 5000 INJECTION INTRAVENOUS; SUBCUTANEOUS at 01:48

## 2021-06-01 RX ADMIN — AMLODIPINE BESYLATE 5 MG: 5 TABLET ORAL at 08:40

## 2021-06-01 RX ADMIN — SODIUM CHLORIDE, PRESERVATIVE FREE 10 ML: 5 INJECTION INTRAVENOUS at 01:49

## 2021-06-01 RX ADMIN — METHYLPREDNISOLONE SODIUM SUCCINATE 40 MG: 40 INJECTION, POWDER, FOR SOLUTION INTRAMUSCULAR; INTRAVENOUS at 20:42

## 2021-06-01 RX ADMIN — CARVEDILOL 25 MG: 12.5 TABLET, FILM COATED ORAL at 08:40

## 2021-06-01 RX ADMIN — CARVEDILOL 25 MG: 12.5 TABLET, FILM COATED ORAL at 17:12

## 2021-06-01 RX ADMIN — SULFUR HEXAFLUORIDE 2 ML: KIT at 10:00

## 2021-06-01 RX ADMIN — ARFORMOTEROL TARTRATE 15 MCG: 15 SOLUTION RESPIRATORY (INHALATION) at 03:00

## 2021-06-01 RX ADMIN — METHYLPREDNISOLONE SODIUM SUCCINATE 40 MG: 40 INJECTION, POWDER, FOR SOLUTION INTRAMUSCULAR; INTRAVENOUS at 10:38

## 2021-06-01 RX ADMIN — SACUBITRIL AND VALSARTAN 1 TABLET: 97; 103 TABLET, FILM COATED ORAL at 01:48

## 2021-06-01 RX ADMIN — BUDESONIDE 0.5 MG: 0.5 INHALANT RESPIRATORY (INHALATION) at 19:54

## 2021-06-01 RX ADMIN — MONTELUKAST SODIUM 10 MG: 10 TABLET, COATED ORAL at 20:46

## 2021-06-01 RX ADMIN — GUAIFENESIN 1200 MG: 600 TABLET, EXTENDED RELEASE ORAL at 01:48

## 2021-06-01 RX ADMIN — IOPAMIDOL 85 ML: 755 INJECTION, SOLUTION INTRAVENOUS at 03:55

## 2021-06-01 RX ADMIN — SACUBITRIL AND VALSARTAN 1 TABLET: 97; 103 TABLET, FILM COATED ORAL at 20:42

## 2021-06-01 RX ADMIN — ASPIRIN 81 MG: 81 TABLET, COATED ORAL at 08:40

## 2021-06-01 RX ADMIN — PANTOPRAZOLE SODIUM 40 MG: 40 TABLET, DELAYED RELEASE ORAL at 08:41

## 2021-06-01 RX ADMIN — ROSUVASTATIN CALCIUM 40 MG: 20 TABLET, COATED ORAL at 20:46

## 2021-06-01 RX ADMIN — BUMETANIDE 2 MG: 1 TABLET ORAL at 17:12

## 2021-06-01 RX ADMIN — DOCUSATE SODIUM 100 MG: 100 CAPSULE ORAL at 20:42

## 2021-06-01 RX ADMIN — IPRATROPIUM BROMIDE AND ALBUTEROL SULFATE 3 ML: 2.5; .5 SOLUTION RESPIRATORY (INHALATION) at 11:58

## 2021-06-01 RX ADMIN — ARFORMOTEROL TARTRATE 15 MCG: 15 SOLUTION RESPIRATORY (INHALATION) at 07:18

## 2021-06-01 RX ADMIN — METHYLPREDNISOLONE SODIUM SUCCINATE 40 MG: 40 INJECTION, POWDER, FOR SOLUTION INTRAMUSCULAR; INTRAVENOUS at 01:48

## 2021-06-01 RX ADMIN — ALLOPURINOL 100 MG: 100 TABLET ORAL at 20:43

## 2021-06-01 NOTE — PROGRESS NOTES
Patient is on Apixaban.  Education provided on 6/1/21 verbally and in writing.  Information provided includes effects of medication, drug-drug and drug-food interactions, and signs/symptoms of bleeding and clotting.  Patient verbalized understanding through teach back.  All pertinent questions were answered.     Coreen Bailon, JenniferD, BCPS  6/1/2021  14:54 EDT

## 2021-06-01 NOTE — H&P
[facility] Medicine Services  HISTORY AND PHYSICAL    Patient Name: Antony Monahan  : 1950  MRN: [id number]  Primary Care Physician: Franky Carrington MD  Date of admission: 2021    Subjective   Subjective     Chief Complaint:  Chest pain     HPI:  Antony Monahan is a 71 y.o. male with a past medical history significant for CAD, essential hypertension, hyperlipidemia, ischemic cardiomyopathy, systolic congestive heart failure, COPD, PAD, hypothyroidism, depression, anxiety, and arthritis presents to the ED with complaints of chest pain, shortness of breath and lower extremity edema that has been ongoing for the past week.  Patient states he was evaluated at [facility] on Friday.  They increased his Bumex from 1 mg to 2 mg twice daily for the next 5 days.  He reports compliance with his diuretics but continues to have worsening shortness of breath along with wheezing.  Patient states he only uses oxygen as needed at home but over the past couple of days he has been wearing it continuously without improvement of his symptoms.  Additionally he reports midsternal sharp nonradiating chest pain.  He denies any recent nausea, vomiting, abdominal pain, cough, fever, dysuria, hematuria or palpitations.  Patient was given a DuoNeb in the ED tonight in which he reports improvement of his shortness of breath.  Work-up the ED thus far reveals proBNP of 1636.0, sodium 146 and creatinine 1.90.  Patient will be admitted to [facility] under the care of the hospitalist for evaluation and treatment.      COVID Details:    Symptoms:    [] NONE [] Fever []  Cough [x] Shortness of breath [] Change in taste/smell      The patient has a COVID HM Topic on their chart, and they are fully vaccinated.      Review of Systems   Constitutional: Positive for activity change and fatigue. Negative for appetite change, chills, diaphoresis, fever and unexpected weight change.    Eyes: Negative for photophobia and visual disturbance.   Respiratory: Positive for shortness of breath and wheezing. Negative for cough.         Dyspnea with less than 20 feet    Cardiovascular: Positive for chest pain and leg swelling. Negative for palpitations.   Gastrointestinal: Positive for abdominal distention. Negative for abdominal pain, blood in stool, constipation, diarrhea, nausea and vomiting.   Genitourinary: Negative.    Musculoskeletal: Positive for arthralgias (left shoulder pain, chronic ) and back pain.   Skin: Negative.    Neurological: Positive for dizziness, weakness, light-headedness and headaches.   Psychiatric/Behavioral: Negative.       All other systems reviewed and are negative.     Personal History     Past Medical History:   Diagnosis Date   • Anxiety    • Arthritis    • Back pain     past injections, years ago   • Borderline diabetes    • CAD (coronary artery disease)    • Chronic narcotic use     prn   • COPD (chronic obstructive pulmonary disease) (CMS/[facility]) 7/29/2016   • Depression     intermittent   • HTN (hypertension) 7/29/2016   • Hypercholesterolemia 7/29/2016   • Hypothyroid 7/29/2016   • Joint pain     hydrocodone prn   • Obesity    • Pacemaker    • PAD (peripheral artery disease) (CMS/[facility]) 7/29/2016   • Systolic CHF (CMS/[facility]) 7/29/2016   • Tobacco abuse 7/29/2016       Past Surgical History:   Procedure Laterality Date   • CARDIAC CATHETERIZATION N/A 7/29/2016    Procedure: Left Heart Cath;  Surgeon: Drew Gurrola MD;  Location:  [facility] C[facility]VASIVE [facility];  Service:    • CARDIAC CATHETERIZATION N/A 7/29/2019    Procedure: LEFT HEART CATH;  Surgeon: Drew Gurrola MD;  Location:  [facility]VASIVE [facility];  Service: Cardiovascular   • CARDIAC ELECTROPHYSIOLOGY PROCEDURE N/A 7/30/2019    Procedure: ICD SC new;  Surgeon: Musa Harden MD;  Location:  [employer] [facility] INVASIVE LOCATION;  Service: Cardiology   • INTERVENTIONAL RADIOLOGY PROCEDURE N/A 9/14/2016     Procedure: AORTIC Aortagram with Runoff;  Surgeon: Drew Gurrola MD;  Location:  [facility] CATH INVASIVE LOCATION;  Service:    • INTERVENTIONAL RADIOLOGY PROCEDURE N/A 5/9/2017    Procedure: Abdominal Aortagram with Runoff;  Surgeon: Drew Gurrola MD;  Location:  [facility] C[facility] INVASIVE LOCATION;  Service:    • INTERVENTIONAL RADIOLOGY PROCEDURE N/A 9/5/2018    Procedure: Abdominal Aortagram with Runoff;  Surgeon: Drew Gurrola MD;  Location:  [facility] C[facility] INVASIVE LOCATION;  Service: Cardiovascular   • INTERVENTIONAL RADIOLOGY PROCEDURE Bilateral 7/29/2019    Procedure: Carotid Cerebral Angiogram;  Surgeon: Claude Atkins MD;  Location:  [facility]VASIVE LOC[facility];  Service: Interventional Radiology   • INTERVENTIONAL RADIOLOGY PROCEDURE N/A 8/18/2020    Procedure: AARO+/-;  Surgeon: Drew Gurrola MD;  Location:  [facility] C[facility] INVASIVE LOCATION;  Service: Cardiovascular;  Laterality: N/A;   • INTERVENTIONAL RADIOLOGY PROCEDURE Bilateral 11/20/2020    Procedure: Carotid Cerebral Angiogram with stent;  Surgeon: Claude Atkins MD;  Location:  [facility] CATH INVASIVE LOCATION;  Service: Interventional Radiology;  Laterality: Bilateral;   • KNEE SURGERY Left 08/17/2015   • OTHER SURGICAL HISTORY      L LEG STENT   • HI TCAT IV STENT CRV CRTD ART EMBOLIC PROTECJ N/A 9/25/2019    Left Carotid Stent; Dr. Claude Atkins   • UMBILICAL HERNIA REPAIR  2006    no mesh, done in [facility]   • WRIST SURGERY         Family History: family history includes Arrhythmia in his sister; Diabetes in his sister; Heart attack in his father; Heart failure in his father; Hypertension in his father and mother. Otherwise pertinent FHx was reviewed and unremarkable.     Social History:  reports that he quit smoking about 2 years ago. His smoking use included cigarettes. He has a 100.00 pack-year smoking history. He has never used smokeless tobacco. He reports current alcohol use. He reports that he does not use  drugs.  Social History     Social History Narrative    Pt consumes caffeine: 2-3 drinks per day    Lives in [place] with wife. prior .        Medications:  Fluticasone-Umeclidin-Vilant, HYDROcodone-acetaminophen, albuterol sulfate HFA, allopurinol, amLODIPine, amiodarone, aspirin, benzocaine, bumetanide, carvedilol, clopidogrel, docusate sodium, furosemide, guaiFENesin, levothyroxine, montelukast, nitroglycerin, pantoprazole, polyethylene glycol, rosuvastatin, sacubitril-valsartan, spironolactone, and vitamin D3    No Known Allergies    Objective   Objective     Vital Signs:   Temp:  [97.4 °F (36.3 °C)] 97.4 °F (36.3 °C)  Heart Rate:  [64-75] 64  Resp:  [16-18] 17  BP: (105-137)/(69-76) 105/76    Physical Exam  Vitals and nursing note reviewed.   Constitutional:       General: He is not in acute distress.     Appearance: Normal appearance. He is obese. He is not ill-appearing, toxic-appearing or diaphoretic.   HENT:      Head: Normocephalic and atraumatic.      Nose: Nose normal.      Mouth/Throat:      Mouth: Mucous membranes are dry.      Pharynx: Oropharynx is clear.   Eyes:      General: No scleral icterus.        Right eye: No discharge.         Left eye: No discharge.      Extraocular Movements: Extraocular movements intact.      Conjunctiva/sclera: Conjunctivae normal.      Pupils: Pupils are equal, round, and reactive to light.   Cardiovascular:      Rate and Rhythm: Normal rate and regular rhythm.      Pulses: Normal pulses.      Heart sounds: Normal heart sounds.   Pulmonary:      Effort: Pulmonary effort is normal.      Breath sounds: Normal breath sounds.   Abdominal:      General: Bowel sounds are normal. There is no distension.      Palpations: Abdomen is soft. There is no mass.      Tenderness: There is no abdominal tenderness. There is no right CVA tenderness, left CVA tenderness, guarding or rebound.      Hernia: No hernia is present.   Musculoskeletal:         General: No swelling,  tenderness, deformity or signs of injury. Normal range of motion.      Cervical back: Normal range of motion and neck supple.      Right lower leg: Edema present.      Left lower leg: Edema present.      Comments: 1+ pitting edema to bilateral lower extremities.    Skin:     General: Skin is warm and dry.   Neurological:      General: No focal deficit present.      Mental Status: He is alert and oriented to person, place, and time. Mental status is at baseline.   Psychiatric:         Mood and Affect: Mood normal.         Behavior: Behavior normal.         Thought Content: Thought content normal.         Judgment: Judgment normal.            Results Reviewed:  I have personally reviewed most recent indicated data and agree with findings including:  [x]  Laboratory  [x]  Radiology  [x]  EKG/Telemetry  []  Pathology  []  Cardiac/Vascular Studies  []  Old records  []  Other:  Most pertinent findings include:      LAB RESULTS:      Lab 05/31/21 2147   WBC 5.94   HEMOGLOBIN 12.8*   HEMATOCRIT 41.6   PLATELETS 150   NEUTROS ABS 4.30   IMMATURE GRANS (ABS) 0.02   LYMPHS ABS 0.91   MONOS ABS 0.46   EOS ABS 0.20   MCV 92.0         Lab 05/31/21 2147   SODIUM 146*   POTASSIUM 3.8   CHLORIDE 103   CO2 30.0*   ANION GAP 13.0   BUN 49*   CREATININE 1.90*   GLUCOSE 125*   CALCIUM 9.7         Lab 05/31/21 2147   TOTAL PROTEIN 7.1   ALBUMIN 4.30   GLOBULIN 2.8   ALT (SGPT) 13   AST (SGOT) 17   BILIRUBIN 0.3   ALK PHOS 74   LIPASE 18         Lab 05/31/21 2147   PROBNP 1,636.0*   TROPONIN T <0.010                 Brief Urine Lab Results     None        Microbiology Results (last 10 days)     ** No results found for the last 240 hours. **          XR Chest 1 View    Result Date: 5/31/2021  CR Chest 1 Vw INDICATION: Chest pain and shortness of air. COMPARISON:  7/31/2019 FINDINGS: Single portable AP view(s) of the chest.  The heart is enlarged. Left-sided pacer is unchanged. The lungs are clear. Pulmonary vascularity is normal. There  is no pneumothorax.     Impression: No acute cardiopulmonary findings. Signer Name: Antony Martell MD  Signed: 5/31/2021 11:38 PM  Workstation Name: [email or web address]  [facility]      Results for orders placed during the hospital encounter of 07/26/19    Adult Transthoracic Echo Complete With Contrast if Necessary Per Protocol (With Agitated Saline)    Interpretation Summary  · Left ventricular systolic function is mildly decreased. Calculated EF = 47.0%. Estimated EF appears to be in the range of 41 - 45%.  · Left ventricular wall thickness is consistent with borderline concentric hypertrophy.  · The following left ventricular wall segments are hypokinetic: basal inferolateral, mid inferolateral, mid inferior and mid inferoseptal.  · Left ventricular diastolic dysfunction (grade I a) consistent with impaired relaxation.  · Left ventricular systolic function is mildly decreased.      Assessment/Plan   Assessment & Plan       Hyperlipidemia LDL goal <70    COPD (chronic obstructive pulmonary disease) (CMS/[facility])    Essential hypertension    HTN (hypertension)    Systolic CHF (CMS/[facility])    Shortness of breath    Hypernatremia    SUNIL (acute kidney injury) (CMS/[facility])    Abdominal distention    71 year old male presents to the ED with complaints of exertional dyspnea bilateral lower extremity edema, and chest pain over the past week.      1) Dyspnea       COPD exacerbation   -CXR unremarkable   -continue duo-nebs scheduled and prn   -obtain ambulating RA oxygen saturation  -continuous pulse ox   -keep o2 sat >90%  -start solumedrol  -continue mucinex, trelegy    -check D-Dimer and if positive will obtain CTA of chest.     2) SUNIL   -baseline creatinine 1.3-1.4, currently 1.9  -hold nephrotoxic agents   -check urine studies   -monitor     3) Hypernatremia  -etiology unclear   -check urine studies   -repeat labs in am     4) Systolic congestive heart failure  -ProBNP 1,636.0  -CXR unremarkable   -strict I  &O  -s/p 2 mg of bumex in the ED  -echo 7/2019 EF: 47.0%.  Grade I a diastolic dysfunction   -follows with Dr. Gurrola: consult in am   -continue entresto, lasix     5) Atypical Chest pain   -troponin <0.010, continue to trend  -ASA given in the ED  -check FLP, Hgb A1c   -CXR negative  -EKG unremarkable   -cardiology to see in am     6) Essential hypertension   -on norvasc, coreg     7) Hyperlipidemia  -continue statin     8) Ischemic cardiomyopathy  -s/p ICD    9) Abdominal distension   -possible ascites       DVT prophylaxis:  Scds, heparin     CODE STATUS:  Full code   Code Status and Medical Interventions:   Ordered at: 06/01/21 0101     Level Of Support Discussed With:    Patient     Code Status:    CPR     Medical Interventions (Level of Support Prior to Arrest):    Full       This note has been completed as part of a split-shared workflow.     Signature: Electronically signed by MARYLOU Flowers, 06/01/21, 12:54 AM EDT.        Attending   Admission Attestation       I have seen and examined the patient, performing an independent face-to-face diagnostic evaluation with plan of care reviewed and developed with the advanced practice clinician (APC).      Brief Summary Statement:   Antony Monahan is a 71 y.o. male with PMHx of Etoh abuse, CAD, HTN, HLD, ischemic cardiomyopathy, systolic CHF, COPD, PAD, hypothyroidism, depression, anxiety and OA. Pt presents to [facility] ED with chest pain, shortness of breath and lower ext edema getting worse for the past week. Pt went to [facility] on Friday and was diuresed and his Bumex dose was increased to 2 mg BID, for 5 days, and was continued on Lasix 40 mg BID. Despite this treatment pt symptoms continued to worse. Upon arrival to ED he had severe VILLEGAS and wheezing. He states his abd has also become more distended and edematous. We will admit pt to telemetry and treat with nebs, solumedrol. We will cautiously diuresis. Also will obtain CT of abd and pelvis tgo  eval for ascites. And obtain CT of abd and pelvis.     Remainder of detailed HPI is as noted by APC and has been reviewed and/or edited by me for completeness.    Attending Physical Exam:  Constitutional: No acute distress, awake, alert  HENT: NCAT, mucous membranes moist  Respiratory: Clear to auscultation bilaterally, respiratory effort normal   Cardiovascular: RRR, no murmurs, rubs, or gallops  Gastrointestinal: Positive bowel sounds, soft, distended, non tendered   Musculoskeletal: bilateral ankle edema  Psychiatric: Appropriate affect, cooperative  Neurologic: Oriented x 3, strength symmetric in all extremities, Cranial Nerves grossly intact to confrontation, speech clear  Skin: No rashes      Brief Assessment/Plan :  See detailed assessment and plan developed with APC which I have reviewed and/or edited for completeness.      Admission Status: I believe that this patient meets INPATIENT status due to copd exac and ascites.  I feel patient’s risk for adverse outcomes and need for care warrant INPATIENT evaluation and I predict the patient’s care encounter to likely last beyond 2 midnights.      Venessa Genao,   06/01/21

## 2021-06-01 NOTE — PLAN OF CARE
Goal Outcome Evaluation:  Plan of Care Reviewed With: patient  Progress: no change  Outcome Summary: Admitted from the ed. Complains of chest pressure 2/10 on pain scale. A/O, on room air, Paced on the monitor. SOA with any activity. CTA of chest and CT of abdomin. Cards consult in the morning. No signs of acute distress.

## 2021-06-01 NOTE — ED PROVIDER NOTES
[place]    EMERGENCY DEPARTMENT ENCOUNTER      Pt Name: Antony Monahan  MRN: [id number]  YOB: 1950  Date of evaluation: 5/31/2021  Provider: Ankur Burns DO    CHIEF COMPLAINT       Chief Complaint   Patient presents with   • Chest Pain         HISTORY OF PRESENT ILLNESS  (Location/Symptom, Timing/Onset, Context/Setting, Quality, Duration, Modifying Factors, Severity.)   Antony Monahan is a 71 y.o. male who presents to the emergency department for evaluation of fever for complaints.  He does have a history of coronary disease, stents as well as congestive heart failure notes he has had increased swelling over the last week, some dyspnea with exertion as well as some very mild chest pain.  He follows with cardiologist, Dr. Gurrola and has been compliant with his medications which include multiple diuretics.  He was seen in outside facility recently and had his diuretics changed to a double dose of his Bumex including his Lasix which he has been taking but his wife continues to worsen.  He notes chest pain is more of a pressure sensation, uneasy feeling.  He does wear oxygen at home intermittently.  He has been making urine well as normal.  The patient denies any fever chills, cough or congestion or any type of infectious issues.  He has a history of a COPD. he denies any other acute systemic complaints at this time.      Nursing notes were reviewed.    REVIEW OF SYSTEMS    (2-9 systems for level 4, 10 or more for level 5)   ROS:  General:  No fevers, no chills, no weakness  Cardiovascular:  + chest pain, no palpitations  Respiratory:  + shortness of breath, no cough, + wheezing  Gastrointestinal:  No pain, no nausea, no vomiting, no diarrhea  Musculoskeletal:  No muscle pain, no joint pain, positive edema  Skin:  No rash  Neurologic:  No speech problems, no headache, no extremity numbness, no extremity tingling, no extremity weakness  Psychiatric:  No anxiety  Genitourinary:  No dysuria,  no hematuria    Except as noted above the remainder of the review of systems was reviewed and negative.       PAST MEDICAL HISTORY     Past Medical History:   Diagnosis Date   • Anxiety    • Arthritis    • Back pain     past injections, years ago   • Borderline diabetes    • CAD (coronary artery disease)    • Chronic narcotic use     prn   • COPD (chronic obstructive pulmonary disease) (CMS/[facility]) 7/29/2016   • Depression     intermittent   • HTN (hypertension) 7/29/2016   • Hypercholesterolemia 7/29/2016   • Hypothyroid 7/29/2016   • Joint pain     hydrocodone prn   • Obesity    • Pacemaker    • PAD (peripheral artery disease) (CMS/[facility]) 7/29/2016   • Systolic CHF (CMS/[facility]) 7/29/2016   • Tobacco abuse 7/29/2016         SURGICAL HISTORY       Past Surgical History:   Procedure Laterality Date   • CARDIAC CATHETERIZATION N/A 7/29/2016    Procedure: Left Heart Cath;  Surgeon: Drew Gurrola MD;  Location:  [facility];  Service:    • CARDIAC CATHETERIZATION N/A 7/29/2019    Procedure: LEFT HEART CATH;  Surgeon: Drew Gurrola MD;  Location:  [facility];  Service: Cardiovascular   • CARDIAC ELECTROPHYSIOLOGY PROCEDURE N/A 7/30/2019    Procedure: ICD SC new;  Surgeon: Musa Harden MD;  Location:  [facility] EP INVASIVE [facility];  Service: Cardiology   • INTERVENTIONAL RADIOLOGY PROCEDURE N/A 9/14/2016    Procedure: AORTIC Aortagram with Runoff;  Surgeon: Drew Gurrola MD;  Location:  [facility];  Service:    • INTERVENTIONAL RADIOLOGY PROCEDURE N/A 5/9/2017    Procedure: Abdominal Aortagram with Runoff;  Surgeon: Drew Gurrola MD;  Location:  [facility];  Service:    • INTERVENTIONAL RADIOLOGY PROCEDURE N/A 9/5/2018    Procedure: Abdominal Aortagram with Runoff;  Surgeon: Drew Gurrola MD;  Location:  [facility];  Service: Cardiovascular   • INTERVENTIONAL RADIOLOGY PROCEDURE Bilateral 7/29/2019     Procedure: Carotid Cerebral Angiogram;  Surgeon: Claude Atkins MD;  Location:  [facility];  Service: Interventional Radiology   • INTERVENTIONAL RADIOLOGY PROCEDURE N/A 8/18/2020    Procedure: AARO+/-;  Surgeon: Drew Gurrola MD;  Location:  [facility];  Service: Cardiovascular;  Laterality: N/A;   • INTERVENTIONAL RADIOLOGY PROCEDURE Bilateral 11/20/2020    Procedure: Carotid Cerebral Angiogram with stent;  Surgeon: Claude Atkins MD;  Location:  [facility];  Service: Interventional Radiology;  Laterality: Bilateral;   • KNEE SURGERY Left 08/17/2015   • OTHER SURGICAL HISTORY      L LEG STENT   • MN TCAT IV STENT CRV CRTD ART EMBOLIC PROTECJ N/A 9/25/2019    Left Carotid Stent; Dr. Claude Atkins   • UMBILICAL HERNIA REPAIR  2006    no mesh, done in [facility]   • WRIST SURGERY           CURRENT MEDICATIONS       Current Facility-Administered Medications:   •  bumetanide (BUMEX) injection 2 mg, 2 mg, Intravenous, Once, Ankur Burns,   •  ipratropium-albuterol (DUO-NEB) nebulizer solution 3 mL, 3 mL, Nebulization, Once, Ankur Bursn, DO  •  sodium chloride 0.9 % flush 10 mL, 10 mL, Intravenous, PRN, Ankur Burns DO    Current Outpatient Medications:   •  albuterol sulfate  (90 Base) MCG/ACT inhaler, Inhale 2 puffs Every 4 (Four) Hours As Needed for Wheezing., Disp: , Rfl:   •  allopurinol (ZYLOPRIM) 100 MG tablet, Take 100 mg by mouth 2 (Two) Times a Day., Disp: , Rfl:   •  amLODIPine (NORVASC) 5 MG tablet, Take 5 mg by mouth 2 (two) times a day., Disp: , Rfl:   •  aspirin 81 MG EC tablet, Take 81 mg by mouth Daily., Disp: , Rfl:   •  benzocaine (CHLORASEPTIC) 15 MG lozenge lozenge, Take 1 lozenge by mouth Every 2 (Two) Hours As Needed., Disp: , Rfl:   •  bumetanide (BUMEX) 1 MG tablet, Take 2 mg by mouth 2 (Two) Times a Day., Disp: , Rfl:   •  carvedilol (COREG) 12.5 MG tablet, Take 25 mg by mouth 2 (Two) Times a  Day With Meals. Take 1 and 1/2 tables by mouth twice a day, Disp: , Rfl:   •  clopidogrel (PLAVIX) 75 MG tablet, Take 1 tablet by mouth Daily., Disp: 30 tablet, Rfl: 11  •  docusate sodium (COLACE) 100 MG capsule, Take 1 capsule by mouth 2 (Two) Times a Day., Disp: 20 capsule, Rfl: 0  •  furosemide (LASIX) 40 MG tablet, Take 40 mg by mouth 2 (Two) Times a Day., Disp: , Rfl:   •  guaiFENesin (MUCINEX) 600 MG 12 hr tablet, Take 1,200 mg by mouth 2 (Two) Times a Day., Disp: , Rfl:   •  HYDROcodone-acetaminophen (NORCO)  MG per tablet, Take 1 tablet by mouth Every 6 (Six) Hours As Needed for Moderate Pain ., Disp: , Rfl:   •  levothyroxine (SYNTHROID, LEVOTHROID) 100 MCG tablet, Take 200 mcg by mouth Daily., Disp: , Rfl:   •  montelukast (SINGULAIR) 10 MG tablet, Take 10 mg by mouth Every Night., Disp: , Rfl:   •  nitroglycerin (NITROSTAT) 0.4 MG SL tablet, Place 1 tablet under the tongue every 5 (five) minutes as needed for chest pain. Take no more than 3 doses in 15 minutes., Disp: 25 tablet, Rfl: 12  •  pantoprazole (PROTONIX) 40 MG EC tablet, Take 40 mg by mouth Every Morning., Disp: , Rfl:   •  rosuvastatin (CRESTOR) 40 MG tablet, Take 40 mg by mouth every night., Disp: , Rfl:   •  sacubitril-valsartan (Entresto)  MG tablet, Take 1 tablet by mouth 2 (Two) Times a Day., Disp: , Rfl:   •  spironolactone (ALDACTONE) 25 MG tablet, Take 25 mg by mouth Daily., Disp: , Rfl:   •  Trelegy Ellipta 100-62.5-25 MCG/INH aerosol powder , Inhale 25 mcg Daily., Disp: , Rfl:   •  vitamin D3 125 MCG (5000 UT) capsule capsule, Take 2,000 Units by mouth Daily., Disp: , Rfl:   •  amiodarone (PACERONE) 200 MG tablet, Take 200 mg by mouth Daily. 1/2 tablet once daily, Disp: , Rfl:   •  polyethylene glycol (MIRALAX) 17 GM/SCOOP powder, Take 17 g by mouth Daily., Disp: 578 g, Rfl: 0    ALLERGIES     Patient has no known allergies.    FAMILY HISTORY       Family History   Problem Relation Age of Onset   • Hypertension Mother     • Hypertension Father    • Heart failure Father    • Heart attack Father    • Diabetes Sister    • Arrhythmia Sister           SOCIAL HISTORY       Social History     Socioeconomic History   • Marital status:      Spouse name: Not on file   • Number of children: Not on file   • Years of education: Not on file   • Highest education level: Not on file   Tobacco Use   • Smoking status: Former Smoker     Packs/day: 2.00     Years: 50.00     Pack years: 100.00     Types: Cigarettes     Quit date:      Years since quittin.4   • Smokeless tobacco: Never Used   Vaping Use   • Vaping Use: Never used   Substance and Sexual Activity   • Alcohol use: Yes     Comment: 2-3 Beers per Week   • Drug use: No   • Sexual activity: Defer         PHYSICAL EXAM    (up to 7 for level 4, 8 or more for level 5)     Vitals:    21 2143 21 2236 21 2245 21 2315   BP:  108/69 115/75 105/76   BP Location:       Patient Position:       Pulse:  72 64    Resp:  16  18   Temp:       TempSrc:       SpO2: 97% 95% 93% 96%   Weight:       Height:           Physical Exam  General : Patient is awake, alert, oriented, in no acute distress, nontoxic appearing  HEENT: Pupils are equally round and reactive to light, EOMI, conjunctivae clear, sclerae white, there is no injection no icterus.  Oral mucosa is moist, no exudate. Uvula is midline  Neck: Neck is supple, full range of motion, trachea midline  Cardiac: Heart irregularly irregular, paced rhythm, systolic ejection murmur  Lungs: Lungs with decreased breath sound bilaterally, scattered expiratory wheezes diffusely throughout the lung fields.  Chest wall: There is no tenderness to palpation over the chest wall or over ribs  Abdomen: Abdomen is soft, nontender, nondistended. There are no firm or pulsatile masses, no rebound rigidity or guarding.   Musculoskeletal: 2+ edema bilateral lower extremities, 5 out of 5 strength in all 4 extremities.  No focal muscle deficits  are appreciated  Neuro: Motor intact, sensory intact, level of consciousness is normal, GCS 15  Dermatology: Skin is warm and dry  Psych: Mentation is grossly normal, cognition is grossly normal. Affect is appropriate.      DIAGNOSTIC RESULTS     EKG: All EKG's are interpreted by the Emergency Department Physician who either signs or Co-signs this chart in the absence of a cardiologist.    ECG 12 Lead   Final Result   Test Reason : CHEST PAIN   Blood Pressure :   */*   mmHG   Vent. Rate :  70 BPM     Atrial Rate : 227 BPM      P-R Int :   * ms          QRS Dur : 164 ms       QT Int : 480 ms       P-R-T Axes :   * -56 121 degrees      QTc Int : 518 ms      Ventricular-paced rhythm   Abnormal ECG   When compared with ECG of 29-JUL-2019 19:58,   Electronic ventricular pacemaker has replaced Sinus rhythm   Confirmed by WASHINGTON NICOLE MD ([id number]) on 5/31/2021 10:08:36 PM      Referred By: EDMD           Confirmed By: WASHINGTON NICOLE MD      ECG 12 Lead    (Results Pending)       RADIOLOGY:   Non-plain film images such as CT, Ultrasound and MRI are read by the radiologist. Plain radiographic images are visualized and preliminarily interpreted by the emergency physician with the below findings:      [] Radiologist's Report Reviewed:  XR Chest 1 View   Final Result   No acute cardiopulmonary findings.      Signer Name: Antony Martell MD    Signed: 5/31/2021 11:38 PM    Workstation Name: [email or web address]     [facility]            ED BEDSIDE ULTRASOUND:   Performed by ED Physician - none    LABS:    I have reviewed and interpreted all of the currently available lab results from this visit (if applicable):  Results for orders placed or performed during the hospital encounter of 05/31/21   Troponin    Specimen: Blood   Result Value Ref Range    Troponin T <0.010 0.000 - 0.030 ng/mL   Comprehensive Metabolic Panel    Specimen: Blood   Result Value Ref Range    Glucose 125 (H) 65 - 99 mg/dL    BUN 49 (H) 8 - 23  mg/dL    Creatinine 1.90 (H) 0.76 - 1.27 mg/dL    Sodium 146 (H) 136 - 145 mmol/L    Potassium 3.8 3.5 - 5.2 mmol/L    Chloride 103 98 - 107 mmol/L    CO2 30.0 (H) 22.0 - 29.0 mmol/L    Calcium 9.7 8.6 - 10.5 mg/dL    Total Protein 7.1 6.0 - 8.5 g/dL    Albumin 4.30 3.50 - 5.20 g/dL    ALT (SGPT) 13 1 - 41 U/L    AST (SGOT) 17 1 - 40 U/L    Alkaline Phosphatase 74 39 - 117 U/L    Total Bilirubin 0.3 0.0 - 1.2 mg/dL    eGFR Non African Amer 35 (L) >60 mL/min/1.73    Globulin 2.8 gm/dL    A/G Ratio 1.5 g/dL    BUN/Creatinine Ratio 25.8 (H) 7.0 - 25.0    Anion Gap 13.0 5.0 - 15.0 mmol/L   Lipase    Specimen: Blood   Result Value Ref Range    Lipase 18 13 - 60 U/L   BNP    Specimen: Blood   Result Value Ref Range    proBNP 1,636.0 (H) 0.0 - 900.0 pg/mL   CBC Auto Differential    Specimen: Blood   Result Value Ref Range    WBC 5.94 3.40 - 10.80 10*3/mm3    RBC 4.52 4.14 - 5.80 10*6/mm3    Hemoglobin 12.8 (L) 13.0 - 17.7 g/dL    Hematocrit 41.6 37.5 - 51.0 %    MCV 92.0 79.0 - 97.0 fL    MCH 28.3 26.6 - 33.0 pg    MCHC 30.8 (L) 31.5 - 35.7 g/dL    RDW 15.5 (H) 12.3 - 15.4 %    RDW-SD 52.5 37.0 - 54.0 fl    MPV 12.9 (H) 6.0 - 12.0 fL    Platelets 150 140 - 450 10*3/mm3    Neutrophil % 72.5 42.7 - 76.0 %    Lymphocyte % 15.3 (L) 19.6 - 45.3 %    Monocyte % 7.7 5.0 - 12.0 %    Eosinophil % 3.4 0.3 - 6.2 %    Basophil % 0.8 0.0 - 1.5 %    Immature Grans % 0.3 0.0 - 0.5 %    Neutrophils, Absolute 4.30 1.70 - 7.00 10*3/mm3    Lymphocytes, Absolute 0.91 0.70 - 3.10 10*3/mm3    Monocytes, Absolute 0.46 0.10 - 0.90 10*3/mm3    Eosinophils, Absolute 0.20 0.00 - 0.40 10*3/mm3    Basophils, Absolute 0.05 0.00 - 0.20 10*3/mm3    Immature Grans, Absolute 0.02 0.00 - 0.05 10*3/mm3    nRBC 0.0 0.0 - 0.2 /100 WBC   ECG 12 Lead   Result Value Ref Range    QT Interval 480 ms    QTC Interval 518 ms   Light Blue Top   Result Value Ref Range    Extra Tube hold for add-on    Green Top (Gel)   Result Value Ref Range    Extra Tube Hold for  add-ons.    Lavender Top   Result Value Ref Range    Extra Tube hold for add-on    Gold Top - SST   Result Value Ref Range    Extra Tube Hold for add-ons.         All other labs were within normal range or not returned as of this dictation.      EMERGENCY DEPARTMENT COURSE and DIFFERENTIAL DIAGNOSIS/MDM:   Vitals:    Vitals:    05/31/21 2143 05/31/21 2236 05/31/21 2245 05/31/21 2315   BP:  108/69 115/75 105/76   BP Location:       Patient Position:       Pulse:  72 64    Resp:  16  18   Temp:       TempSrc:       SpO2: 97% 95% 93% 96%   Weight:       Height:                Patient with intermittent chest pain shortness of breath with a history of coronary disease and congestive heart failure who has had increasing dyspnea and edema even on a double dose of his diuretics over the last week or so.  He does have wheezing with a history of COPD as well.  We did obtain IV, labs, imaging for further evaluation with results as above, BNP of 1600, troponin is normal, creatinine elevated at 1.9, slightly worse renal insufficiency compared with baseline 1.5.  Patient was given IV diuresis.  Will plan for admission secondary to CHF deprivation, COPD and renal insufficiency.  Case discussed with hospitalist team for admission.          MEDICATIONS ADMINISTERED IN ED:  Medications   sodium chloride 0.9 % flush 10 mL (has no administration in time range)   bumetanide (BUMEX) injection 2 mg (has no administration in time range)   ipratropium-albuterol (DUO-NEB) nebulizer solution 3 mL (has no administration in time range)   aspirin chewable tablet 324 mg (324 mg Oral Given 5/31/21 2148)       PROCEDURES:  Procedures    CRITICAL CARE TIME    Total Critical Care time was 0 minutes, excluding separately reportable procedures.   There was a high probability of clinically significant/life threatening deterioration in the patient's condition which required my urgent intervention.      FINAL IMPRESSION      1. Acute on chronic congestive  heart failure, unspecified heart failure type (CMS/HCC)    2. COPD exacerbation (CMS/[facility])    3. Peripheral edema    4. Renal insufficiency          DISPOSITION/PLAN     ED Disposition     ED Disposition Condition Comment    Decision to Admit            PATIENT REFERRED TO:  No follow-up provider specified.    DISCHARGE MEDICATIONS:     Medication List      ASK your doctor about these medications    albuterol sulfate  (90 Base) MCG/ACT inhaler  Commonly known as: PROVENTIL HFA;VENTOLIN HFA;PROAIR HFA     allopurinol 100 MG tablet  Commonly known as: ZYLOPRIM     amiodarone 200 MG tablet  Commonly known as: PACERONE     amLODIPine 5 MG tablet  Commonly known as: NORVASC     aspirin 81 MG EC tablet     benzocaine 15 MG lozenge lozenge  Commonly known as: CHLORASEPTIC     bumetanide 1 MG tablet  Commonly known as: BUMEX     carvedilol 12.5 MG tablet  Commonly known as: COREG     clopidogrel 75 MG tablet  Commonly known as: PLAVIX  Take 1 tablet by mouth Daily.     Crestor 40 MG tablet  Generic drug: rosuvastatin     docusate sodium 100 MG capsule  Commonly known as: COLACE  Take 1 capsule by mouth 2 (Two) Times a Day.     Entresto  MG tablet  Generic drug: sacubitril-valsartan     furosemide 40 MG tablet  Commonly known as: LASIX     guaiFENesin 600 MG 12 hr tablet  Commonly known as: MUCINEX     HYDROcodone-acetaminophen  MG per tablet  Commonly known as: NORCO     levothyroxine 100 MCG tablet  Commonly known as: SYNTHROID, LEVOTHROID     montelukast 10 MG tablet  Commonly known as: SINGULAIR     nitroglycerin 0.4 MG SL tablet  Commonly known as: NITROSTAT  Place 1 tablet under the tongue every 5 (five) minutes as needed for chest pain. Take no more than 3 doses in 15 minutes.     pantoprazole 40 MG EC tablet  Commonly known as: PROTONIX     polyethylene glycol 17 GM/SCOOP powder  Commonly known as: MIRALAX  Take 17 g by mouth Daily.     spironolactone 25 MG tablet  Commonly known as: ALDACTONE      Trelecielo Ellipta 100-62.5-25 MCG/INH inhaler  Generic drug: Fluticasone-Umeclidin-Vilant     vitamin D3 125 MCG (5000 UT) capsule capsule                Comment: Please note this report has been produced using speech recognition software.      Ankur Burns DO  Attending Emergency Physician               Ankur Burns DO  05/31/21 1294

## 2021-06-01 NOTE — CASE MANAGEMENT/SOCIAL WORK
Discharge Planning Assessment  [facility]     Patient Name: Antony Monahan  MRN: [id number]  Today's Date: 6/1/2021    Admit Date: 5/31/2021    Discharge Needs Assessment     Row Name 06/01/21 1504       Living Environment    Lives With  spouse    Current Living Arrangements  home/apartment/condo    Primary Care Provided by  self    Provides Primary Care For  spouse    Family Caregiver if Needed  child(maegan), adult    Quality of Family Relationships  helpful       Resource/Environmental Concerns    Resource/Environmental Concerns  none       Transition Planning    Patient/Family Anticipates Transition to  home with family    Patient/Family Anticipated Services at Transition  none    Transportation Anticipated  family or friend will provide       Discharge Needs Assessment    Readmission Within the Last 30 Days  no previous admission in last 30 days    Equipment Currently Used at Home  oxygen    Concerns to be Addressed  discharge planning    Anticipated Changes Related to Illness  none    Equipment Needed After Discharge  oxygen        Discharge Plan     Row Name 06/01/21 1506       Plan    Plan  Spoke with the patient for a discharge planning assessment. The patients daughter Kathleen Vazquez  answered questions for the patient Kathleen, [phone]. She said that he lives in [place] with his spouse who the family cares for because she has dementia.  He has [employer] Medicare insurance. His primary care provider is Dr. Franky Carrington.  He uses home oxygen with [employer]. The goal is for the patient to return to his home where he lives with his spouse and his family providing care to his spouse and assistance to him. Case management will continue to follow for discharge planning needs.        Continued Care and Services - Admitted Since 5/31/2021    Coordination has not been started for this encounter.         Demographic Summary     Row Name 06/01/21 1503       General Information    Admission Type  inpatient     Arrived From  home    Referral Source  patient;family    Reason for Consult  discharge planning    Preferred Language  English       Contact Information    Permission Granted to Share Info With      Contact Information Obtained for          Functional Status     Row Name 06/01/21 1504       Functional Status    Usual Activity Tolerance  good    Current Activity Tolerance  good       Functional Status, IADL    Medications  independent    Meal Preparation  independent    Housekeeping  independent    Laundry  independent    Shopping  independent       Mental Status Summary    Recent Changes in Mental Status/Cognitive Functioning  no changes        Psychosocial    No documentation.       Abuse/Neglect    No documentation.       Legal    No documentation.       Substance Abuse    No documentation.       Patient Forms    No documentation.           PATRIC Farah

## 2021-06-01 NOTE — PROGRESS NOTES
[facility] Medicine Services  PROGRESS NOTE    Patient Name: Antony Monahan  : 1950  MRN: [id number]    Date of Admission: 2021  Primary Care Physician: Franky Carrington MD    Subjective   Subjective     CC:  dyspnea    HPI:  Pt admitted earlier this am by my colleague, Dr. Genao. States that he feels much better already. Getting TTE at bedside.     ROS:  Gen- No fevers, chills  CV- No chest pain, palpitations  Resp- No cough, dyspnea  GI- No N/V/D, abd pain        Objective   Objective     Vital Signs:   Temp:  [97.4 °F (36.3 °C)-97.8 °F (36.6 °C)] 97.8 °F (36.6 °C)  Heart Rate:  [64-90] 69  Resp:  [16-20] 20  BP: ()/(67-91) 106/73        Physical Exam:  Did not examine as pt was getting TTE and was admitted earlier this am.     Results Reviewed:  Results from last 7 days   Lab Units 21  0218 21  2147   WBC 10*3/mm3 5.99 5.94   HEMOGLOBIN g/dL 12.5* 12.8*   HEMATOCRIT % 41.3 41.6   PLATELETS 10*3/mm3 151 150     Results from last 7 days   Lab Units 21  0638 21  0110 21  2147   SODIUM mmol/L  --   --  146*   POTASSIUM mmol/L  --   --  3.8   CHLORIDE mmol/L  --   --  103   CO2 mmol/L  --   --  30.0*   BUN mg/dL  --   --  49*   CREATININE mg/dL  --   --  1.90*   GLUCOSE mg/dL  --   --  125*   CALCIUM mg/dL  --   --  9.7   ALT (SGPT) U/L  --   --  13   AST (SGOT) U/L  --   --  17   TROPONIN T ng/mL <0.010 <0.010 <0.010   PROBNP pg/mL  --   --  1,636.0*     Estimated Creatinine Clearance: 40.8 mL/min (A) (by C-G formula based on SCr of 1.9 mg/dL (H)).    Microbiology Results Abnormal     Procedure Component Value - Date/Time    COVID PRE-OP / PRE-PROCEDURE SCREENING ORDER (NO ISOLATION) - Swab, Nasopharynx [[id number]]  (Normal) Collected: 21 0028    Lab Status: Final result Specimen: Swab from Nasopharynx Updated: 211    Narrative:      The following orders were created for panel order COVID PRE-OP / PRE-PROCEDURE  SCREENING ORDER (NO ISOLATION) - Swab, Nasopharynx.  Procedure                               Abnormality         Status                     ---------                               -----------         ------                     COVID-19 and FLU A/B PCR...[[id number]]  Normal              Final result                 Please view results for these tests on the individual orders.    COVID-19 and FLU A/B PCR - Swab, Nasopharynx [[id number]]  (Normal) Collected: 06/01/21 0028    Lab Status: Final result Specimen: Swab from Nasopharynx Updated: 06/01/21 0121     COVID19 Not Detected     Influenza A PCR Not Detected     Influenza B PCR Not Detected    Narrative:      Fact sheet for providers: https://www.fda.gov/media/594614/download    Fact sheet for patients: https://www.fda.gov/media/048164/download    Test performed by PCR.          Imaging Results (Last 24 Hours)     Procedure Component Value Units Date/Time    CT Angiogram Chest With & Without Contrast [[id number]] Collected: 06/01/21 0412     Updated: 06/01/21 0414    Narrative:      CT ANGIOGRAM CHEST W WO CONTRAST    INDICATION:   Shortness of air with elevated d-dimer level.    TECHNIQUE:   CT angiogram of the chest with IV contrast. 3-D reconstructions were obtained and reviewed.   Radiation dose reduction techniques included automated exposure control or exposure modulation based on body size. Count of known CT and cardiac nuc med studies  performed in previous 12 months: 1.     COMPARISON:   None available.    FINDINGS:   Please see abdomen and pelvis CT report dictated separately. No pulmonary embolism or aortic dissection is seen. There is atherosclerotic disease and coronary artery disease. The heart is enlarged. There is a left-sided pacer. No pleural or pericardial  effusion is seen. There is marked bilateral gynecomastia. There is a small lymph node adjacent to the lower descending thoracic aorta measuring about 1.3 cm. This is stable from the abdominal CTA  of 11/22/2020 suggesting that it is benign. No other  adenopathy is seen.    Lung windows demonstrate mild COPD. There is dependent atelectasis in both lungs. The lungs are otherwise clear. No CT findings present to indicate pneumonia. Note: CT may be negative in the earliest stages of COVID-19.      Impression:        1. No PE or aortic dissection.  2. Negative for pneumonia.  3. Atherosclerotic disease and coronary artery disease.  4. COPD.    Signer Name: Antony Martell MD   Signed: 6/1/2021 4:12 AM   Workstation Name: [email or web address]    [facility]    CT Abdomen Pelvis Without Contrast [[id number]] Collected: 06/01/21 0406     Updated: 06/01/21 0409    Narrative:      CT Abdomen Pelvis WO    INDICATION:   Abdominal bloating and distention.    TECHNIQUE:   CT of the abdomen and pelvis without IV contrast. Coronal and sagittal reconstructions were obtained.  Radiation dose reduction techniques included automated exposure control or exposure modulation based on body size. Count of known CT and cardiac nuc  med studies performed in previous 12 months: 1.     COMPARISON:   11/22/2020    FINDINGS:  Please see chest CTA report dictated separately. Gallbladder is within normal limits. No biliary obstruction is seen. There is diffuse atherosclerotic disease, but there is no aortic aneurysm. There is bilateral renal cortical thinning, and there are  bilateral renal cysts. No renal or ureteral stones are identified, and there is no hydronephrosis. There is fatty atrophy of the pancreas. Left adrenal mass is unchanged measuring 4.0 x 2.7 cm. This remains indeterminate, but stability favors a benign  adenoma. This can be better assessed with adrenal protocol CT on a nonemergent basis if indicated. Solid organs are otherwise normal.    Urinary bladder is normal. Mild prostate enlargement is again seen. There is diffuse colonic diverticulosis without acute diverticulitis. The appendix is normal. No small  bowel obstruction is seen. There is no ascites or adenopathy. Skin thickening and  subcutaneous fat stranding in the left lower quadrant abdominal wall may reflect contusion or possibly a site of medication injection. There is lumbar degenerative disease. There are small bilateral fat-containing inguinal hernias.      Impression:        1. No evidence of ascites.  2. Colonic diverticulosis without diverticulitis. No small bowel obstruction. Normal appendix.  3. No renal or ureteral stones. No hydronephrosis.  4. Soft tissue induration in the left lower quadrant ventral wall, possibly contusion or medication injection site.  5. Stable appearance of a nonspecific left adrenal mass. Stability favors that it is a benign adenoma. This can be further assessed with nonemergent adrenal protocol CT if indicated.  6. Additional findings as above.          Signer Name: Antony Martell MD   Signed: 6/1/2021 4:06 AM   Workstation Name: [facility]    [facility]    XR Chest 1 View [[id number]] Collected: 05/31/21 2338     Updated: 05/31/21 2340    Narrative:      CR Chest 1 Vw    INDICATION:   Chest pain and shortness of air.     COMPARISON:    7/31/2019    FINDINGS:  Single portable AP view(s) of the chest.  The heart is enlarged. Left-sided pacer is unchanged. The lungs are clear. Pulmonary vascularity is normal. There is no pneumothorax.      Impression:      No acute cardiopulmonary findings.    Signer Name: Antony Martell MD   Signed: 5/31/2021 11:38 PM   Workstation Name: [facility]    Radiology [facility]          Results for orders placed during the hospital encounter of 07/26/19    Adult Transthoracic Echo Complete With Contrast if Necessary Per Protocol (With Agitated Saline)    Interpretation Summary  · Left ventricular systolic function is mildly decreased. Calculated EF = 47.0%. Estimated EF appears to be in the range of 41 - 45%.  · Left ventricular wall thickness is consistent  with borderline concentric hypertrophy.  · The following left ventricular wall segments are hypokinetic: basal inferolateral, mid inferolateral, mid inferior and mid inferoseptal.  · Left ventricular diastolic dysfunction (grade I a) consistent with impaired relaxation.  · Left ventricular systolic function is mildly decreased.      I have reviewed the medications:  Scheduled Meds:allopurinol, 100 mg, Oral, BID  amLODIPine, 5 mg, Oral, Q24H  arformoterol, 15 mcg, Nebulization, BID - RT   And  budesonide, 0.5 mg, Nebulization, BID - RT  aspirin, 81 mg, Oral, Daily  bumetanide, 2 mg, Oral, BID  carvedilol, 25 mg, Oral, BID With Meals  cholecalciferol, 2,000 Units, Oral, Daily  clopidogrel, 75 mg, Oral, Daily  docusate sodium, 100 mg, Oral, BID  guaiFENesin, 1,200 mg, Oral, Q12H  heparin (porcine), 5,000 Units, Subcutaneous, Q12H  ipratropium-albuterol, 3 mL, Nebulization, 4x Daily - RT  levothyroxine, 200 mcg, Oral, Q AM  methylPREDNISolone sodium succinate, 40 mg, Intravenous, Q8H  montelukast, 10 mg, Oral, Nightly  pantoprazole, 40 mg, Oral, Daily  rosuvastatin, 40 mg, Oral, Nightly  sacubitril-valsartan, 1 tablet, Oral, Q12H  sodium chloride, 10 mL, Intravenous, Q12H      Continuous Infusions:   PRN Meds:.•  acetaminophen **OR** acetaminophen **OR** acetaminophen  •  ipratropium-albuterol  •  sodium chloride  •  sodium chloride    Assessment/Plan   Assessment & Plan     Active Hospital Problems    Diagnosis  POA   • Abdominal distention [R14.0]  Unknown   • Shortness of breath [R06.02]  Yes   • Hypernatremia [E87.0]  Unknown   • SUNIL (acute kidney injury) (CMS/[facility]) [N17.9]  Unknown   • Hyperlipidemia LDL goal <70 [E78.5]  Yes   • COPD (chronic obstructive pulmonary disease) (CMS/[facility]) [J44.9]  Yes   • Essential hypertension [I10]  Yes   • Systolic CHF (CMS/[facility]) [I50.20]  Yes   • HTN (hypertension) [I10]  Yes      Resolved Hospital Problems   No resolved problems to display.        Brief Hospital Course to  date:  Antony Monahan is a 71 y.o. male with PMHx of Etoh abuse, CAD, HTN, HLD, ischemic cardiomyopathy/systolic CHF s/p ICD placement, COPD, PAD, hypothyroidism, depression, anxiety and OA who presented to [facility] with complaints of chest pain, SOA, and LE edema worsening over the last week prior to admission.    Plan:     Dyspnea    COPD exacerbation   Acute decompensated systolic heart failure/ICM s/p ICD  -- CXR unremarkable   -- continue duo-nebs scheduled and prn   --proBNP 1600  -- continuous pulse ox   -- keep o2 sat >90%  -- continue solumedrol  -- continue mucinex, trelegy    -- elevated d-dimer- checked CTA chest and it was unremarkable/no evidence of PE  -- s/p 2 mg of bumex in the ED  -- echo 7/2019 EF: 47.0%.  Grade I a diastolic dysfunction   -- follows with Dr. Gurrola, consulted him   -- continue home dose entresto, bumex  -- strict Is/Os, daily weights     SUNIL   -- baseline creatinine 1.3-1.4, currently 1.9  -- hold nephrotoxic agents   -- check urine studies   -- monitor      Hypernatremia  -- etiology unclear   -- check urine studies   -- awaiting repeat labs from this am     H/o CAD s/p stents (last in 2019)  Atypical Chest pain  --continue beta blocker, Entresto, ASA, statin, beta blocker, plavix (will need to clarify why he remains on Plavix as his last stent was >1 year ago)  --troponin negative x3  --Cards consulted    Adrenal lesion  --benign appearing on CT A/P/unchanged appearance from prior imaging  --will need outpatient follow up to ensure it remains stable    Essential hypertension   -- on norvasc, coreg, entresto and bumex     Hyperlipidemia  -continue statin              DVT Prophylaxis:  TIREA      Disposition: I expect the patient to be discharged TBD    CODE STATUS:   Code Status and Medical Interventions:   Ordered at: 06/01/21 0101     Level Of Support Discussed With:    Patient     Code Status:    CPR     Medical Interventions (Level of Support Prior to Arrest):    Full        Apoorva Maher MD  06/01/21

## 2021-06-01 NOTE — CONSULTS
[facility] Consult Note      Patient Care Team:  Franky Carrington MD as PCP - General (Family Medicine)  Drew Gurrola MD as Consulting Physician (Cardiology)  Franky Carrington MD  No ref. provider found    Subjective     History of Present Illness: Mr. Monahan is a pleasant 71-year-old male with a history of hypertension, hypercholesterolemia, former tobacco abuse, COPD, and known coronary artery disease.  He is post previous stents.  His last heart catheterization was 7/29/2019 at which time he received a drug-eluting stent to the circumflex coronary artery.  His previously placed LAD stent was patent.  He also is post [facility] dual-chamber ICD.  He is complaining of a 2 to 3-week history of increasing fatigue, shortness of breath, and dyspnea on exertion.  He presented to [facility] last Friday and they increased his Bumex for the next 5 days.  However he says that this did not help much and he presented to [facility].  Device interrogation reveals that he has been in atrial fibrillation since 4/30/2021.  He says that he has no previous history of A. fib.  He is on amiodarone due to history of ventricular tachycardia.  He is currently resting comfortably and is denying any chest pain or palpitations.  He states that he is breathing better after receiving his breathing treatments.      Current Facility-Administered Medications:   •  acetaminophen (TYLENOL) tablet 650 mg, 650 mg, Oral, Q4H PRN **OR** acetaminophen (TYLENOL) 160 MG/5ML solution 650 mg, 650 mg, Oral, Q4H PRN **OR** acetaminophen (TYLENOL) suppository 650 mg, 650 mg, Rectal, Q4H PRN, Harry, Susan, APRN  •  allopurinol (ZYLOPRIM) tablet 100 mg, 100 mg, Oral, BID, Harry, Susan, APRN  •  amLODIPine (NORVASC) tablet 5 mg, 5 mg, Oral, Q24H, Harry, Susan, APRN  •  arformoterol (BROVANA) nebulizer solution 15 mcg, 15 mcg,  Nebulization, BID - RT, 15 mcg at 06/01/21 0718 **AND** budesonide (PULMICORT) nebulizer solution 0.5 mg, 0.5 mg, Nebulization, BID - RT, Drew George IV, PharmD, 0.5 mg at 06/01/21 0718  •  aspirin EC tablet 81 mg, 81 mg, Oral, Daily, Harry, Susan, APRN  •  bumetanide (BUMEX) tablet 2 mg, 2 mg, Oral, BID, Drew George IV, PharmD  •  carvedilol (COREG) tablet 25 mg, 25 mg, Oral, BID With Meals, Harry, Susan, APRN  •  cholecalciferol (VITAMIN D3) tablet 2,000 Units, 2,000 Units, Oral, Daily, Harry, Susan, APRN  •  clopidogrel (PLAVIX) tablet 75 mg, 75 mg, Oral, Daily, Harry, Susan, APRN  •  docusate sodium (COLACE) capsule 100 mg, 100 mg, Oral, BID, Harry, Susan, APRN  •  guaiFENesin (MUCINEX) 12 hr tablet 1,200 mg, 1,200 mg, Oral, Q12H, Harry, Susan, APRN, 1,200 mg at 06/01/21 0148  •  heparin (porcine) 5000 UNIT/ML injection 5,000 Units, 5,000 Units, Subcutaneous, Q12H, Harry, Susan, APRN, 5,000 Units at 06/01/21 0148  •  ipratropium-albuterol (DUO-NEB) nebulizer solution 3 mL, 3 mL, Nebulization, 4x Daily - RT, Harry, Susan, APRN, 3 mL at 06/01/21 0717  •  ipratropium-albuterol (DUO-NEB) nebulizer solution 3 mL, 3 mL, Nebulization, Q4H PRN, Harry, Susan, APRN  •  levothyroxine (SYNTHROID, LEVOTHROID) tablet 200 mcg, 200 mcg, Oral, Q AM, Harry, Susan, APRN, 200 mcg at 06/01/21 0602  •  methylPREDNISolone sodium succinate (SOLU-Medrol) injection 40 mg, 40 mg, Intravenous, Q8H, Venessa Genao, DO  •  montelukast (SINGULAIR) tablet 10 mg, 10 mg, Oral, Nightly, Harry, Susan, APRN, 10 mg at 06/01/21 0148  •  pantoprazole (PROTONIX) EC tablet 40 mg, 40 mg, Oral, Daily, Harry, Susan, APRN  •  Pharmacy Consult - [facility], , Does not apply, Daily, Coreen Bailon, PharmD  •  rosuvastatin (CRESTOR) tablet 40 mg, 40 mg, Oral, Nightly, Harry, Susan, APRN, 40 mg at 06/01/21 0148  •  sacubitril-valsartan (ENTRESTO)  MG tablet 1 tablet, 1 tablet, Oral, Q12H, Harry,  Susan, APRN, 1 tablet at 21 0148  •  sodium chloride 0.9 % flush 10 mL, 10 mL, Intravenous, PRN, Ankur Burns, DO  •  sodium chloride 0.9 % flush 10 mL, 10 mL, Intravenous, Q12H, Harry, Susan, APRN, 10 mL at 21 0149  •  sodium chloride 0.9 % flush 10 mL, 10 mL, Intravenous, PRN, Harry, Susan, APRN    Social History     Socioeconomic History   • Marital status:      Spouse name: Not on file   • Number of children: Not on file   • Years of education: Not on file   • Highest education level: Not on file   Tobacco Use   • Smoking status: Former Smoker     Packs/day: 2.00     Years: 50.00     Pack years: 100.00     Types: Cigarettes     Quit date:      Years since quittin.4   • Smokeless tobacco: Never Used   Vaping Use   • Vaping Use: Never used   Substance and Sexual Activity   • Alcohol use: Yes     Comment: 2-3 Beers per Week   • Drug use: No   • Sexual activity: Defer       Family History   Problem Relation Age of Onset   • Hypertension Mother    • Hypertension Father    • Heart failure Father    • Heart attack Father    • Diabetes Sister    • Arrhythmia Sister        Review of Systems   Constitutional: Positive for activity change and fatigue.   HENT: Negative.    Eyes: Negative.    Respiratory: Positive for shortness of breath.    Cardiovascular: Positive for leg swelling.   Gastrointestinal: Negative.    Endocrine: Negative.    Genitourinary: Negative.    Musculoskeletal: Negative.    Skin: Negative.    Allergic/Immunologic: Negative.    Neurological: Negative.    Hematological: Negative.    Psychiatric/Behavioral: Negative.           Objective     Vital Signs  Temp:  [97.4 °F (36.3 °C)-97.8 °F (36.6 °C)] 97.8 °F (36.6 °C)  Heart Rate:  [64-90] 69  Resp:  [16-20] 20  BP: ()/(67-91) 106/73      Intake/Output Summary (Last 24 hours) at 2021 0811  Last data filed at 2021 0600  Gross per 24 hour   Intake 1700 ml   Output 450 ml   Net 1250 ml     No  intake/output data recorded.    Constitutional:       Appearance: Not in distress.   Eyes:      Conjunctiva/sclera: Conjunctivae normal.      Pupils: Pupils are equal, round, and reactive to light.   HENT:      Nose: Nose normal.    Mouth/Throat:      Pharynx: Oropharynx is clear.   Neck:      Thyroid: Thyroid normal. No thyromegaly.      Vascular: JVD normal.      Lymphadenopathy: No cervical adenopathy.   Pulmonary:      Effort: Increased respiratory effort.      Breath sounds: Normal breath sounds.   Chest:      Chest wall: Not tender to palpatation.   Cardiovascular:      Normal rate. Irregularly irregular rhythm.      Murmurs: There is no murmur.      No gallop. No click.   Edema:     Pretibial: bilateral 1+ edema of the pretibial area.  Abdominal:      General: Bowel sounds are normal. There is no distension.      Palpations: Abdomen is soft.      Tenderness: There is no abdominal tenderness.   Musculoskeletal: Normal range of motion.         General: No tenderness.      Cervical back: Normal range of motion. Skin:     General: Skin is warm and dry.   Neurological:      Mental Status: Alert and oriented to person, place and time.           Results Review:    I reviewed the patient's new clinical results.    WBC WBC   Date/Time Value Ref Range Status   06/01/2021 0218 5.99 3.40 - 10.80 10*3/mm3 Final   05/31/2021 2147 5.94 3.40 - 10.80 10*3/mm3 Final      HGB Hemoglobin   Date/Time Value Ref Range Status   06/01/2021 0218 12.5 (L) 13.0 - 17.7 g/dL Final   05/31/2021 2147 12.8 (L) 13.0 - 17.7 g/dL Final      HCT Hematocrit   Date/Time Value Ref Range Status   06/01/2021 0218 41.3 37.5 - 51.0 % Final   05/31/2021 2147 41.6 37.5 - 51.0 % Final      Platelets Platelets   Date/Time Value Ref Range Status   06/01/2021 0218 151 140 - 450 10*3/mm3 Final   05/31/2021 2147 150 140 - 450 10*3/mm3 Final        PT/INR:      Lab Results   Component Value Date    PROTIME 12.9 07/26/2019    INR 1.02 07/26/2019       Sodium  Sodium   Date/Time Value Ref Range Status   06/01/2021 0638 140 136 - 145 mmol/L Final   05/31/2021 2147 146 (H) 136 - 145 mmol/L Final      Potassium Potassium   Date/Time Value Ref Range Status   06/01/2021 0638 3.5 3.5 - 5.2 mmol/L Final     Comment:     Slight hemolysis detected by analyzer. Results may be affected.   05/31/2021 2147 3.8 3.5 - 5.2 mmol/L Final     Comment:     Slight hemolysis detected by analyzer. Results may be affected.      Chloride Chloride   Date/Time Value Ref Range Status   06/01/2021 0638 101 98 - 107 mmol/L Final   05/31/2021 2147 103 98 - 107 mmol/L Final      Bicarbonate No results found for: PLASMABICARB   BUN BUN   Date/Time Value Ref Range Status   06/01/2021 0638 46 (H) 8 - 23 mg/dL Final   05/31/2021 2147 49 (H) 8 - 23 mg/dL Final      Creatinine Creatinine   Date/Time Value Ref Range Status   06/01/2021 0638 1.82 (H) 0.76 - 1.27 mg/dL Final   05/31/2021 2147 1.90 (H) 0.76 - 1.27 mg/dL Final      Calcium Calcium   Date/Time Value Ref Range Status   06/01/2021 0638 8.7 8.6 - 10.5 mg/dL Final   05/31/2021 2147 9.7 8.6 - 10.5 mg/dL Final      Magnesium @RESULFAST(MG:3)@   Troponin       No results found for: TROPONINI                EKG: V paced.      Patient Active Problem List   Diagnosis   • ST elevation myocardial infarction involving left anterior descending (LAD) coronary artery (CMS/[facility])   • GERD (gastroesophageal reflux disease)   • Hyperlipidemia LDL goal <70   • PAD (peripheral artery disease) (CMS/[facility])   • COPD (chronic obstructive pulmonary disease) (CMS/[facility])   • Ischemic cardiomyopathy   • Hypothyroid   • Essential hypertension   • Coronary artery disease involving native coronary artery of native heart   • Claudication (CMS/[facility])   • Bradycardia   • Renal insufficiency   • Chronic fatigue   • Vitamin D deficiency   • Hypersomnolence   • Obesity (BMI 30.0-34.9)   • Intermittent claudication of both lower extremities due to atherosclerosis (CMS/[facility])   • Hypotension   •  Arrhythmia   • Cardiac related syncope   • VT (ventricular tachycardia) (CMS/[facility])   • Symptomatic stenosis of left carotid artery without infarction   • Carotid stenosis, asymptomatic, left, s/p carotid stent   • Atherosclerosis of native artery of both lower extremities with intermittent claudication (CMS/[facility])   • Morbidly obese (CMS/[facility])   • HTN (hypertension)   • Hypercholesterolemia   • Systolic CHF (CMS/[facility])   • Pacemaker   • CAD (coronary artery disease)   • Back pain   • Chronic narcotic use   • Borderline diabetes   • Shortness of breath   • Hypernatremia   • SUNIL (acute kidney injury) (CMS/[facility])   • Abdominal distention       Assessment/Plan   71-year-old male with known systolic CHF.  He is post [facility] dual-chamber ICD.  Admitted with a 2 to 3-week history of increasing shortness of breath and fatigue.    Echocardiogram to assess LV systolic function  Device interrogation reveals that he has been in A. fib since 4/30/2021  Anticoagulate  Continue diuresis  Monitor GFR closely      I discussed the patient's findings and my recommendations with patient       MD Tomym Marcos PA  06/01/21  08:11 EDT

## 2021-06-02 LAB
ANION GAP SERPL CALCULATED.3IONS-SCNC: 11 MMOL/L (ref 5–15)
BUN SERPL-MCNC: 41 MG/DL (ref 8–23)
BUN/CREAT SERPL: 27.2 (ref 7–25)
CALCIUM SPEC-SCNC: 9.1 MG/DL (ref 8.6–10.5)
CHLORIDE SERPL-SCNC: 104 MMOL/L (ref 98–107)
CO2 SERPL-SCNC: 29 MMOL/L (ref 22–29)
CREAT SERPL-MCNC: 1.51 MG/DL (ref 0.76–1.27)
DEPRECATED RDW RBC AUTO: 49.1 FL (ref 37–54)
ERYTHROCYTE [DISTWIDTH] IN BLOOD BY AUTOMATED COUNT: 15 % (ref 12.3–15.4)
GFR SERPL CREATININE-BSD FRML MDRD: 46 ML/MIN/1.73
GLUCOSE SERPL-MCNC: 183 MG/DL (ref 65–99)
HCT VFR BLD AUTO: 39.3 % (ref 37.5–51)
HGB BLD-MCNC: 12.3 G/DL (ref 13–17.7)
MCH RBC QN AUTO: 27.9 PG (ref 26.6–33)
MCHC RBC AUTO-ENTMCNC: 31.3 G/DL (ref 31.5–35.7)
MCV RBC AUTO: 89.1 FL (ref 79–97)
PLATELET # BLD AUTO: 140 10*3/MM3 (ref 140–450)
PMV BLD AUTO: 12.8 FL (ref 6–12)
POTASSIUM SERPL-SCNC: 3.5 MMOL/L (ref 3.5–5.2)
RBC # BLD AUTO: 4.41 10*6/MM3 (ref 4.14–5.8)
SODIUM SERPL-SCNC: 144 MMOL/L (ref 136–145)
WBC # BLD AUTO: 8.83 10*3/MM3 (ref 3.4–10.8)

## 2021-06-02 PROCEDURE — B2111ZZ FLUOROSCOPY OF MULTIPLE CORONARY ARTERIES USING LOW OSMOLAR CONTRAST: ICD-10-PCS | Performed by: INTERNAL MEDICINE

## 2021-06-02 PROCEDURE — 25010000002 METHYLPREDNISOLONE PER 40 MG: Performed by: FAMILY MEDICINE

## 2021-06-02 PROCEDURE — 02703ZZ DILATION OF CORONARY ARTERY, ONE ARTERY, PERCUTANEOUS APPROACH: ICD-10-PCS | Performed by: INTERNAL MEDICINE

## 2021-06-02 PROCEDURE — 80048 BASIC METABOLIC PNL TOTAL CA: CPT | Performed by: INTERNAL MEDICINE

## 2021-06-02 PROCEDURE — 94799 UNLISTED PULMONARY SVC/PX: CPT

## 2021-06-02 PROCEDURE — 63710000001 PREDNISONE PER 1 MG: Performed by: INTERNAL MEDICINE

## 2021-06-02 PROCEDURE — 85027 COMPLETE CBC AUTOMATED: CPT | Performed by: INTERNAL MEDICINE

## 2021-06-02 PROCEDURE — 99233 SBSQ HOSP IP/OBS HIGH 50: CPT | Performed by: INTERNAL MEDICINE

## 2021-06-02 PROCEDURE — B240ZZ3 ULTRASONOGRAPHY OF SINGLE CORONARY ARTERY, INTRAVASCULAR: ICD-10-PCS | Performed by: INTERNAL MEDICINE

## 2021-06-02 PROCEDURE — 4A023N7 MEASUREMENT OF CARDIAC SAMPLING AND PRESSURE, LEFT HEART, PERCUTANEOUS APPROACH: ICD-10-PCS | Performed by: INTERNAL MEDICINE

## 2021-06-02 RX ORDER — PREDNISONE 20 MG/1
40 TABLET ORAL
Status: DISCONTINUED | OUTPATIENT
Start: 2021-06-02 | End: 2021-06-05 | Stop reason: HOSPADM

## 2021-06-02 RX ORDER — BUMETANIDE 0.25 MG/ML
2 INJECTION INTRAMUSCULAR; INTRAVENOUS ONCE
Status: COMPLETED | OUTPATIENT
Start: 2021-06-02 | End: 2021-06-02

## 2021-06-02 RX ORDER — BUMETANIDE 1 MG/1
2 TABLET ORAL 2 TIMES DAILY
Status: DISCONTINUED | OUTPATIENT
Start: 2021-06-03 | End: 2021-06-05 | Stop reason: HOSPADM

## 2021-06-02 RX ADMIN — APIXABAN 5 MG: 5 TABLET, FILM COATED ORAL at 20:48

## 2021-06-02 RX ADMIN — SACUBITRIL AND VALSARTAN 1 TABLET: 97; 103 TABLET, FILM COATED ORAL at 09:08

## 2021-06-02 RX ADMIN — DOCUSATE SODIUM 100 MG: 100 CAPSULE ORAL at 20:48

## 2021-06-02 RX ADMIN — BUMETANIDE 2 MG: 0.25 INJECTION INTRAMUSCULAR; INTRAVENOUS at 09:02

## 2021-06-02 RX ADMIN — GUAIFENESIN 1200 MG: 600 TABLET, EXTENDED RELEASE ORAL at 20:48

## 2021-06-02 RX ADMIN — DOCUSATE SODIUM 100 MG: 100 CAPSULE ORAL at 09:02

## 2021-06-02 RX ADMIN — Medication 2000 UNITS: at 09:03

## 2021-06-02 RX ADMIN — ACETAMINOPHEN 650 MG: 325 TABLET ORAL at 04:15

## 2021-06-02 RX ADMIN — IPRATROPIUM BROMIDE AND ALBUTEROL SULFATE 3 ML: 2.5; .5 SOLUTION RESPIRATORY (INHALATION) at 19:32

## 2021-06-02 RX ADMIN — PANTOPRAZOLE SODIUM 40 MG: 40 TABLET, DELAYED RELEASE ORAL at 09:03

## 2021-06-02 RX ADMIN — SODIUM CHLORIDE, PRESERVATIVE FREE 10 ML: 5 INJECTION INTRAVENOUS at 20:47

## 2021-06-02 RX ADMIN — ROSUVASTATIN CALCIUM 40 MG: 20 TABLET, COATED ORAL at 20:48

## 2021-06-02 RX ADMIN — SODIUM CHLORIDE, PRESERVATIVE FREE 10 ML: 5 INJECTION INTRAVENOUS at 09:03

## 2021-06-02 RX ADMIN — SACUBITRIL AND VALSARTAN 1 TABLET: 97; 103 TABLET, FILM COATED ORAL at 20:47

## 2021-06-02 RX ADMIN — MONTELUKAST SODIUM 10 MG: 10 TABLET, COATED ORAL at 20:48

## 2021-06-02 RX ADMIN — IPRATROPIUM BROMIDE AND ALBUTEROL SULFATE 3 ML: 2.5; .5 SOLUTION RESPIRATORY (INHALATION) at 14:56

## 2021-06-02 RX ADMIN — ASPIRIN 81 MG: 81 TABLET, COATED ORAL at 09:03

## 2021-06-02 RX ADMIN — BUDESONIDE 0.5 MG: 0.5 INHALANT RESPIRATORY (INHALATION) at 19:32

## 2021-06-02 RX ADMIN — BUDESONIDE 0.5 MG: 0.5 INHALANT RESPIRATORY (INHALATION) at 06:54

## 2021-06-02 RX ADMIN — PREDNISONE 40 MG: 20 TABLET ORAL at 12:50

## 2021-06-02 RX ADMIN — IPRATROPIUM BROMIDE AND ALBUTEROL SULFATE 3 ML: 2.5; .5 SOLUTION RESPIRATORY (INHALATION) at 10:38

## 2021-06-02 RX ADMIN — APIXABAN 5 MG: 5 TABLET, FILM COATED ORAL at 09:03

## 2021-06-02 RX ADMIN — CARVEDILOL 25 MG: 12.5 TABLET, FILM COATED ORAL at 17:10

## 2021-06-02 RX ADMIN — GUAIFENESIN 1200 MG: 600 TABLET, EXTENDED RELEASE ORAL at 09:03

## 2021-06-02 RX ADMIN — METHYLPREDNISOLONE SODIUM SUCCINATE 40 MG: 40 INJECTION, POWDER, FOR SOLUTION INTRAMUSCULAR; INTRAVENOUS at 05:33

## 2021-06-02 RX ADMIN — ARFORMOTEROL TARTRATE 15 MCG: 15 SOLUTION RESPIRATORY (INHALATION) at 19:32

## 2021-06-02 RX ADMIN — ARFORMOTEROL TARTRATE 15 MCG: 15 SOLUTION RESPIRATORY (INHALATION) at 06:54

## 2021-06-02 RX ADMIN — LEVOTHYROXINE SODIUM 200 MCG: 100 TABLET ORAL at 05:33

## 2021-06-02 RX ADMIN — IPRATROPIUM BROMIDE AND ALBUTEROL SULFATE 3 ML: 2.5; .5 SOLUTION RESPIRATORY (INHALATION) at 06:54

## 2021-06-02 RX ADMIN — ALLOPURINOL 100 MG: 100 TABLET ORAL at 20:48

## 2021-06-02 RX ADMIN — ACETAMINOPHEN 650 MG: 325 TABLET ORAL at 20:53

## 2021-06-02 RX ADMIN — CARVEDILOL 25 MG: 12.5 TABLET, FILM COATED ORAL at 09:03

## 2021-06-02 RX ADMIN — ALLOPURINOL 100 MG: 100 TABLET ORAL at 09:03

## 2021-06-02 NOTE — PROGRESS NOTES
[facility]       LOS: 2 days   Patient Care Team:  Franky Carrington MD as PCP - General (Family Medicine)  Drew Gurrola MD as Consulting Physician (Cardiology)        Subjective       Patient Denies:  Palpitations.  Some dull chest pain and sob.      Vital Signs  Temp:  [96.3 °F (35.7 °C)-97.6 °F (36.4 °C)] 96.3 °F (35.7 °C)  Heart Rate:  [58-89] 70  Resp:  [18] 18  BP: (102-125)/(56-62) 102/56    Intake/Output Summary (Last 24 hours) at 6/2/2021 0802  Last data filed at 6/1/2021 1911  Gross per 24 hour   Intake 350 ml   Output 300 ml   Net 50 ml     No intake/output data recorded.    Physical Exam:     General Appearance:    Alert, cooperative, in no acute distress       Neck:   No adenopathy, supple, trachea midline, no thyromegaly, no JVD   Lungs:     Decreased to auscultation,respirations regular, even and                unlabored    Heart:    Regular rhythm and normal rate, normal S1 and S2, no         murmur, no gallop, no rub, no click   Chest Wall:    No abnormalities observed   Abdomen:     Normal bowel sounds, no masses, no organomegaly, soft     nontender, nondistended,   Extremities:   Moves all extremities well, no edema, no cyanosis, no           redness   Pulses:   Pulses palpable and equal bilaterally     Results Review:     I reviewed the patient's new clinical results.      WBC WBC   Date/Time Value Ref Range Status   06/02/2021 0500 8.83 3.40 - 10.80 10*3/mm3 Final   06/01/2021 0218 5.99 3.40 - 10.80 10*3/mm3 Final   05/31/2021 2147 5.94 3.40 - 10.80 10*3/mm3 Final            HGB Hemoglobin   Date/Time Value Ref Range Status   06/02/2021 0500 12.3 (L) 13.0 - 17.7 g/dL Final   06/01/2021 0218 12.5 (L) 13.0 - 17.7 g/dL Final   05/31/2021 2147 12.8 (L) 13.0 - 17.7 g/dL Final           HCT Hematocrit   Date/Time Value Ref Range Status   06/02/2021 0500 39.3 37.5 - 51.0 % Final   06/01/2021 0218 41.3 37.5 - 51.0 % Final    05/31/2021 2147 41.6 37.5 - 51.0 % Final            Platelets Platelets   Date/Time Value Ref Range Status   06/02/2021 0500 140 140 - 450 10*3/mm3 Final   06/01/2021 0218 151 140 - 450 10*3/mm3 Final   05/31/2021 2147 150 140 - 450 10*3/mm3 Final     Sodium  Sodium   Date/Time Value Ref Range Status   06/02/2021 0500 144 136 - 145 mmol/L Final   06/01/2021 0638 140 136 - 145 mmol/L Final   05/31/2021 2147 146 (H) 136 - 145 mmol/L Final     Potassium  Potassium   Date/Time Value Ref Range Status   06/02/2021 0500 3.5 3.5 - 5.2 mmol/L Final   06/01/2021 0638 3.5 3.5 - 5.2 mmol/L Final     Comment:     Slight hemolysis detected by analyzer. Results may be affected.   05/31/2021 2147 3.8 3.5 - 5.2 mmol/L Final     Comment:     Slight hemolysis detected by analyzer. Results may be affected.     Chloride  Chloride   Date/Time Value Ref Range Status   06/02/2021 0500 104 98 - 107 mmol/L Final   06/01/2021 0638 101 98 - 107 mmol/L Final   05/31/2021 2147 103 98 - 107 mmol/L Final     BicarbonateNo results found for: PLASMABICARB    BUN BUN   Date/Time Value Ref Range Status   06/02/2021 0500 41 (H) 8 - 23 mg/dL Final   06/01/2021 0638 46 (H) 8 - 23 mg/dL Final   05/31/2021 2147 49 (H) 8 - 23 mg/dL Final      Creatinine Creatinine   Date/Time Value Ref Range Status   06/02/2021 0500 1.51 (H) 0.76 - 1.27 mg/dL Final   06/01/2021 0638 1.82 (H) 0.76 - 1.27 mg/dL Final   05/31/2021 2147 1.90 (H) 0.76 - 1.27 mg/dL Final      Calcium Calcium   Date/Time Value Ref Range Status   06/02/2021 0500 9.1 8.6 - 10.5 mg/dL Final   06/01/2021 0638 8.7 8.6 - 10.5 mg/dL Final   05/31/2021 2147 9.7 8.6 - 10.5 mg/dL Final      Mag @RESULFAST(MG:3)@        PT/INR:       Lab Results   Component Value Date    PROTIME 12.9 07/26/2019    INR 1.02 07/26/2019      Troponin I:  No results found for: TROPONINI   Lab Results   Component Value Date    POCRTROPI 0.01 07/29/2016    TROPONINT <0.010 06/01/2021       allopurinol, 100 mg, Oral,  BID  apixaban, 5 mg, Oral, Q12H  arformoterol, 15 mcg, Nebulization, BID - RT   And  budesonide, 0.5 mg, Nebulization, BID - RT  aspirin, 81 mg, Oral, Daily  bumetanide, 2 mg, Oral, BID  carvedilol, 25 mg, Oral, BID With Meals  cholecalciferol, 2,000 Units, Oral, Daily  docusate sodium, 100 mg, Oral, BID  guaiFENesin, 1,200 mg, Oral, Q12H  ipratropium-albuterol, 3 mL, Nebulization, 4x Daily - RT  levothyroxine, 200 mcg, Oral, Q AM  methylPREDNISolone sodium succinate, 40 mg, Intravenous, Q8H  montelukast, 10 mg, Oral, Nightly  pantoprazole, 40 mg, Oral, Daily  pharmacy consult - MTM, , Does not apply, Daily  rosuvastatin, 40 mg, Oral, Nightly  sacubitril-valsartan, 1 tablet, Oral, Q12H  sodium chloride, 10 mL, Intravenous, Q12H           Assessment/Plan     Patient Active Problem List   Diagnosis Code   • ST elevation myocardial infarction involving left anterior descending (LAD) coronary artery (CMS/HCC) I21.02   • GERD (gastroesophageal reflux disease) K21.9   • Hyperlipidemia LDL goal <70 E78.5   • PAD (peripheral artery disease) (CMS/HCC) I73.9   • COPD (chronic obstructive pulmonary disease) (CMS/HCC) J44.9   • Ischemic cardiomyopathy I25.5   • Hypothyroid E03.9   • Essential hypertension I10   • Coronary artery disease involving native coronary artery of native heart I25.10   • Claudication (CMS/HCC) I73.9   • Bradycardia R00.1   • Renal insufficiency N28.9   • Chronic fatigue R53.82   • Vitamin D deficiency E55.9   • Hypersomnolence G47.10   • Obesity (BMI 30.0-34.9) E66.9   • Intermittent claudication of both lower extremities due to atherosclerosis (CMS/[facility]) I70.213   • Hypotension I95.9   • Arrhythmia I49.9   • Cardiac related syncope R55   • VT (ventricular tachycardia) (CMS/[facility]) I47.2   • Symptomatic stenosis of left carotid artery without infarction I65.22   • Carotid stenosis, asymptomatic, left, s/p carotid stent I65.22   • Atherosclerosis of native artery of both lower extremities with intermittent  claudication (CMS/[facility]) I70.213   • Morbidly obese (CMS/[facility]) E66.01   • HTN (hypertension) I10   • Hypercholesterolemia E78.00   • Systolic CHF (CMS/[facility]) I50.20   • Pacemaker Z95.0   • CAD (coronary artery disease) I25.10   • Back pain M54.9   • Chronic narcotic use F11.90   • Borderline diabetes R73.03   • Shortness of breath R06.02   • Hypernatremia E87.0   • SUNIL (acute kidney injury) (CMS/[facility]) N17.9   • Abdominal distention R14.0     EF~25-30%(40-45% July 2019)  Continue anticoagulaltion and diuresis  Renal function better  [facility] tomorrow afternoon      MD Tommy Marcos PA  06/02/21  08:02 EDT

## 2021-06-02 NOTE — CASE MANAGEMENT/SOCIAL WORK
Continued Stay Note  [facility]     Patient Name: Antony Monahan  MRN: [id number]  Today's Date: 6/2/2021    Admit Date: 5/31/2021    Discharge Plan     Row Name 06/02/21 1408       Plan    Plan Comments  Creatinine improved.  IV Bumex x 1 today.  Plan for [facility] tomorrow.  Cont nebs.  Transition to PO steroids.  CM will cont to follow for any home needs.    Final Discharge Disposition Code  01 - home or self-care        Discharge Codes    No documentation.       Expected Discharge Date and Time     Expected Discharge Date Expected Discharge Time    Jun 4, 2021             Carmen Trevino RN

## 2021-06-02 NOTE — PLAN OF CARE
Goal Outcome Evaluation:  Plan of Care Reviewed With: patient  Progress: improving  Outcome Summary: VSS on RA, slept at intervals without acute distress noted, will continue to monitor

## 2021-06-02 NOTE — PROGRESS NOTES
[facility] Medicine Services  PROGRESS NOTE    Patient Name: Antony Monahan  : 1950  MRN: [id number]    Date of Admission: 2021  Primary Care Physician: Franky Carrington MD    Subjective   Subjective     CC:  dyspnea    HPI:  Doing well this am, SOA has improved.     ROS:  Gen- No fevers, chills  CV- No chest pain, palpitations  Resp- No cough, dyspnea  GI- No N/V/D, abd pain        Objective   Objective     Vital Signs:   Temp:  [96.3 °F (35.7 °C)-98 °F (36.7 °C)] 98 °F (36.7 °C)  Heart Rate:  [58-89] 86  Resp:  [18] 18  BP: (102-125)/(56-79) 108/79        Physical Exam:  Constitutional: No acute distress, awake, alert  HENT: NCAT, mucous membranes moist  Respiratory: Clear to auscultation bilaterally, respiratory effort normal   Cardiovascular: RRR, no murmurs, rubs, or gallops  Gastrointestinal: Positive bowel sounds, soft, nontender, nondistended  Musculoskeletal: 1+ pitting edema BLEs at ankles  Psychiatric: Appropriate affect, cooperative  Neurologic: Oriented x 3, strength symmetric in all extremities, Cranial Nerves grossly intact to confrontation, speech clear  Skin: No rashes    Results Reviewed:  Results from last 7 days   Lab Units 21  0500 21  0218 21  2147   WBC 10*3/mm3 8.83 5.99 5.94   HEMOGLOBIN g/dL 12.3* 12.5* 12.8*   HEMATOCRIT % 39.3 41.3 41.6   PLATELETS 10*3/mm3 140 151 150     Results from last 7 days   Lab Units 21  0500 21  0638 21  0110 21  2147   SODIUM mmol/L 144 140  --  146*   POTASSIUM mmol/L 3.5 3.5  --  3.8   CHLORIDE mmol/L 104 101  --  103   CO2 mmol/L 29.0 28.0  --  30.0*   BUN mg/dL 41* 46*  --  49*   CREATININE mg/dL 1.51* 1.82*  --  1.90*   GLUCOSE mg/dL 183* 179*  --  125*   CALCIUM mg/dL 9.1 8.7  --  9.7   ALT (SGPT) U/L  --   --   --  13   AST (SGOT) U/L  --   --   --  17   TROPONIN T ng/mL  --  <0.010 <0.010 <0.010   PROBNP pg/mL  --   --   --  1,636.0*     Estimated Creatinine  Clearance: 51.4 mL/min (A) (by C-G formula based on SCr of 1.51 mg/dL (H)).    Microbiology Results Abnormal     Procedure Component Value - Date/Time    COVID PRE-OP / PRE-PROCEDURE SCREENING ORDER (NO ISOLATION) - Swab, Nasopharynx [[id number]]  (Normal) Collected: 06/01/21 0028    Lab Status: Final result Specimen: Swab from Nasopharynx Updated: 06/01/21 0121    Narrative:      The following orders were created for panel order COVID PRE-OP / PRE-PROCEDURE SCREENING ORDER (NO ISOLATION) - Swab, Nasopharynx.  Procedure                               Abnormality         Status                     ---------                               -----------         ------                     COVID-19 and FLU A/B PCR...[[id number]]  Normal              Final result                 Please view results for these tests on the individual orders.    COVID-19 and FLU A/B PCR - Swab, Nasopharynx [[id number]]  (Normal) Collected: 06/01/21 0028    Lab Status: Final result Specimen: Swab from Nasopharynx Updated: 06/01/21 0121     COVID19 Not Detected     Influenza A PCR Not Detected     Influenza B PCR Not Detected    Narrative:      Fact sheet for providers: https://www.fda.gov/media/108464/download    Fact sheet for patients: https://www.fda.gov/media/372658/download    Test performed by PCR.          Imaging Results (Last 24 Hours)     ** No results found for the last 24 hours. **          Results for orders placed during the hospital encounter of 05/31/21    Adult Transthoracic Echo Complete W/ Cont if Necessary Per Protocol    Interpretation Summary  · Left ventricular wall thickness is consistent with borderline concentric hypertrophy.  · Estimated right ventricular systolic pressure from tricuspid regurgitation is mildly elevated (35-45 mmHg). Calculated right ventricular systolic pressure from tricuspid regurgitation is 37 mmHg.  · Left ventricular ejection fraction appears to be 26 - 30%.  · The right atrial cavity is dilated.  ·  Aortic valve sclerosis  · Markedly hypokinetic inferolateral wall      I have reviewed the medications:  Scheduled Meds:allopurinol, 100 mg, Oral, BID  apixaban, 5 mg, Oral, Q12H  arformoterol, 15 mcg, Nebulization, BID - RT   And  budesonide, 0.5 mg, Nebulization, BID - RT  aspirin, 81 mg, Oral, Daily  bumetanide, 2 mg, Oral, BID  carvedilol, 25 mg, Oral, BID With Meals  cholecalciferol, 2,000 Units, Oral, Daily  docusate sodium, 100 mg, Oral, BID  guaiFENesin, 1,200 mg, Oral, Q12H  ipratropium-albuterol, 3 mL, Nebulization, 4x Daily - RT  levothyroxine, 200 mcg, Oral, Q AM  methylPREDNISolone sodium succinate, 40 mg, Intravenous, Q8H  montelukast, 10 mg, Oral, Nightly  pantoprazole, 40 mg, Oral, Daily  pharmacy consult - MTM, , Does not apply, Daily  rosuvastatin, 40 mg, Oral, Nightly  sacubitril-valsartan, 1 tablet, Oral, Q12H  sodium chloride, 10 mL, Intravenous, Q12H      Continuous Infusions:   PRN Meds:.•  acetaminophen **OR** acetaminophen **OR** acetaminophen  •  ipratropium-albuterol  •  sodium chloride  •  sodium chloride    Assessment/Plan   Assessment & Plan     Active Hospital Problems    Diagnosis  POA   • Abdominal distention [R14.0]  Unknown   • Shortness of breath [R06.02]  Yes   • Hypernatremia [E87.0]  Unknown   • SUNIL (acute kidney injury) (CMS/[facility]) [N17.9]  Unknown   • Hyperlipidemia LDL goal <70 [E78.5]  Yes   • COPD (chronic obstructive pulmonary disease) (CMS/[facility]) [J44.9]  Yes   • Essential hypertension [I10]  Yes   • Systolic CHF (CMS/[facility]) [I50.20]  Yes   • HTN (hypertension) [I10]  Yes      Resolved Hospital Problems   No resolved problems to display.        Brief Hospital Course to date:  Antony Monahan is a 71 y.o. male with PMHx of Etoh abuse, CAD, HTN, HLD, ischemic cardiomyopathy/systolic CHF s/p ICD placement, COPD, PAD, hypothyroidism, depression, anxiety and OA who presented to [facility] with complaints of chest pain, SOA, and LE edema worsening over the last week prior to admission.      Plan:     Acute decompensated systolic heart failure/ICM s/p ICD  -- CXR unremarkable   -- proBNP 1600  -- elevated d-dimer- checked CTA chest and it was unremarkable/no evidence of PE  -- s/p 2 mg of bumex in the ED  -- echo 7/2019 EF: 47.0%.  Grade I a diastolic dysfunction .   --Repeat TTE this admission shows EF 26-30%, C tomorrow.   -- follows with Dr. Gurrola, consulted him   -- continue home dose entresto  --hold PO Bumex (2mg BID at home), give IV Bumex 2mg once. Further diuresis as per Cards  -- strict Is/Os, daily weights, net + 50 for the last 24 hours     SUNIL   -- baseline creatinine 1.3-1.4, was 1.9 on admission, better today at 1.51. Monitor with diuresis  -- hold nephrotoxic agents   -- monitor     COPD with acute exacerbation  --continue duonebs, scheduled and PRN  -- continuous pulse ox   -- keep o2 sat >90%  -- transition to PO steroids  -- continue mucinex, trilogy       Hypernatremia  -- etiology unclear   -- resolved     H/o CAD s/p stents (last in 2019)  Atypical Chest pain  --continue beta blocker, Entresto, ASA, statin, beta blocker, plavix (will need to clarify why he remains on Plavix as his last stent was >1 year ago)  --troponin negative x3  --Cards consulted  --[facility] planned for tomorrow    Adrenal lesion  --benign appearing on CT A/P/unchanged appearance from prior imaging  --will need outpatient follow up to ensure it remains stable    Essential hypertension   -- on norvasc, coreg, entresto and bumex     Hyperlipidemia  -continue statin              DVT Prophylaxis:  TIERA      Disposition: I expect the patient to be discharged TBD    CODE STATUS:   Code Status and Medical Interventions:   Ordered at: 06/01/21 0101     Level Of Support Discussed With:    Patient     Code Status:    CPR     Medical Interventions (Level of Support Prior to Arrest):    Full       Apoorva Maher MD  06/02/21

## 2021-06-03 LAB
ANION GAP SERPL CALCULATED.3IONS-SCNC: 8 MMOL/L (ref 5–15)
BUN SERPL-MCNC: 42 MG/DL (ref 8–23)
BUN/CREAT SERPL: 29.4 (ref 7–25)
CALCIUM SPEC-SCNC: 9.1 MG/DL (ref 8.6–10.5)
CHLORIDE SERPL-SCNC: 104 MMOL/L (ref 98–107)
CO2 SERPL-SCNC: 30 MMOL/L (ref 22–29)
CREAT SERPL-MCNC: 1.43 MG/DL (ref 0.76–1.27)
DEPRECATED RDW RBC AUTO: 49.9 FL (ref 37–54)
ERYTHROCYTE [DISTWIDTH] IN BLOOD BY AUTOMATED COUNT: 15.2 % (ref 12.3–15.4)
GFR SERPL CREATININE-BSD FRML MDRD: 49 ML/MIN/1.73
GLUCOSE SERPL-MCNC: 219 MG/DL (ref 65–99)
HCT VFR BLD AUTO: 40 % (ref 37.5–51)
HGB BLD-MCNC: 12.4 G/DL (ref 13–17.7)
INTERPRETATION UR IFE-IMP: NORMAL
MCH RBC QN AUTO: 27.8 PG (ref 26.6–33)
MCHC RBC AUTO-ENTMCNC: 31 G/DL (ref 31.5–35.7)
MCV RBC AUTO: 89.7 FL (ref 79–97)
PLATELET # BLD AUTO: 143 10*3/MM3 (ref 140–450)
PMV BLD AUTO: 12.9 FL (ref 6–12)
POTASSIUM SERPL-SCNC: 3.7 MMOL/L (ref 3.5–5.2)
QT INTERVAL: 534 MS
QTC INTERVAL: 576 MS
RBC # BLD AUTO: 4.46 10*6/MM3 (ref 4.14–5.8)
SODIUM SERPL-SCNC: 142 MMOL/L (ref 136–145)
WBC # BLD AUTO: 8.42 10*3/MM3 (ref 3.4–10.8)

## 2021-06-03 PROCEDURE — 25010000002 FENTANYL CITRATE (PF) 50 MCG/ML SOLUTION: Performed by: INTERNAL MEDICINE

## 2021-06-03 PROCEDURE — 99233 SBSQ HOSP IP/OBS HIGH 50: CPT | Performed by: INTERNAL MEDICINE

## 2021-06-03 PROCEDURE — 80048 BASIC METABOLIC PNL TOTAL CA: CPT | Performed by: PHYSICIAN ASSISTANT

## 2021-06-03 PROCEDURE — C1894 INTRO/SHEATH, NON-LASER: HCPCS | Performed by: INTERNAL MEDICINE

## 2021-06-03 PROCEDURE — 25010000002 MIDAZOLAM PER 1 MG: Performed by: INTERNAL MEDICINE

## 2021-06-03 PROCEDURE — 25010000002 HEPARIN (PORCINE) PER 1000 UNITS: Performed by: INTERNAL MEDICINE

## 2021-06-03 PROCEDURE — 0 IOPAMIDOL PER 1 ML: Performed by: INTERNAL MEDICINE

## 2021-06-03 PROCEDURE — C1725 CATH, TRANSLUMIN NON-LASER: HCPCS | Performed by: INTERNAL MEDICINE

## 2021-06-03 PROCEDURE — 92920 PRQ TRLUML C ANGIOP 1ART&/BR: CPT | Performed by: INTERNAL MEDICINE

## 2021-06-03 PROCEDURE — 85027 COMPLETE CBC AUTOMATED: CPT | Performed by: INTERNAL MEDICINE

## 2021-06-03 PROCEDURE — 92978 ENDOLUMINL IVUS OCT C 1ST: CPT | Performed by: INTERNAL MEDICINE

## 2021-06-03 PROCEDURE — 93458 L HRT ARTERY/VENTRICLE ANGIO: CPT | Performed by: INTERNAL MEDICINE

## 2021-06-03 PROCEDURE — 25010000002 BIVALIRUDIN TRIFLUOROACETATE 250 MG RECONSTITUTED SOLUTION 1 EACH VIAL: Performed by: INTERNAL MEDICINE

## 2021-06-03 PROCEDURE — 63710000001 PREDNISONE PER 1 MG: Performed by: INTERNAL MEDICINE

## 2021-06-03 PROCEDURE — C1769 GUIDE WIRE: HCPCS | Performed by: INTERNAL MEDICINE

## 2021-06-03 PROCEDURE — C1753 CATH, INTRAVAS ULTRASOUND: HCPCS | Performed by: INTERNAL MEDICINE

## 2021-06-03 PROCEDURE — 94799 UNLISTED PULMONARY SVC/PX: CPT

## 2021-06-03 PROCEDURE — C1887 CATHETER, GUIDING: HCPCS | Performed by: INTERNAL MEDICINE

## 2021-06-03 PROCEDURE — 93005 ELECTROCARDIOGRAM TRACING: CPT | Performed by: INTERNAL MEDICINE

## 2021-06-03 PROCEDURE — 25010000002 PHENYLEPHRINE 10 MG/ML SOLUTION: Performed by: INTERNAL MEDICINE

## 2021-06-03 RX ORDER — MORPHINE SULFATE 2 MG/ML
1 INJECTION, SOLUTION INTRAMUSCULAR; INTRAVENOUS EVERY 4 HOURS PRN
Status: DISCONTINUED | OUTPATIENT
Start: 2021-06-03 | End: 2021-06-05 | Stop reason: HOSPADM

## 2021-06-03 RX ORDER — ALPRAZOLAM 0.25 MG/1
0.25 TABLET ORAL 3 TIMES DAILY PRN
Status: DISCONTINUED | OUTPATIENT
Start: 2021-06-03 | End: 2021-06-05 | Stop reason: HOSPADM

## 2021-06-03 RX ORDER — NALOXONE HCL 0.4 MG/ML
0.4 VIAL (ML) INJECTION
Status: DISCONTINUED | OUTPATIENT
Start: 2021-06-03 | End: 2021-06-05 | Stop reason: HOSPADM

## 2021-06-03 RX ORDER — HYDROCODONE BITARTRATE AND ACETAMINOPHEN 5; 325 MG/1; MG/1
1 TABLET ORAL EVERY 4 HOURS PRN
Status: DISCONTINUED | OUTPATIENT
Start: 2021-06-03 | End: 2021-06-05 | Stop reason: HOSPADM

## 2021-06-03 RX ORDER — ACETAMINOPHEN 325 MG/1
650 TABLET ORAL EVERY 4 HOURS PRN
Status: DISCONTINUED | OUTPATIENT
Start: 2021-06-03 | End: 2021-06-03 | Stop reason: SDUPTHER

## 2021-06-03 RX ORDER — FENTANYL CITRATE 50 UG/ML
INJECTION, SOLUTION INTRAMUSCULAR; INTRAVENOUS AS NEEDED
Status: DISCONTINUED | OUTPATIENT
Start: 2021-06-03 | End: 2021-06-03 | Stop reason: HOSPADM

## 2021-06-03 RX ORDER — LIDOCAINE HYDROCHLORIDE 10 MG/ML
INJECTION, SOLUTION EPIDURAL; INFILTRATION; INTRACAUDAL; PERINEURAL AS NEEDED
Status: DISCONTINUED | OUTPATIENT
Start: 2021-06-03 | End: 2021-06-03 | Stop reason: HOSPADM

## 2021-06-03 RX ORDER — MIDAZOLAM HYDROCHLORIDE 1 MG/ML
INJECTION INTRAMUSCULAR; INTRAVENOUS AS NEEDED
Status: DISCONTINUED | OUTPATIENT
Start: 2021-06-03 | End: 2021-06-03 | Stop reason: HOSPADM

## 2021-06-03 RX ORDER — PHENYLEPHRINE HYDROCHLORIDE 10 MG/ML
INJECTION INTRAVENOUS AS NEEDED
Status: DISCONTINUED | OUTPATIENT
Start: 2021-06-03 | End: 2021-06-03 | Stop reason: HOSPADM

## 2021-06-03 RX ORDER — TEMAZEPAM 7.5 MG/1
7.5 CAPSULE ORAL NIGHTLY PRN
Status: DISCONTINUED | OUTPATIENT
Start: 2021-06-03 | End: 2021-06-05 | Stop reason: HOSPADM

## 2021-06-03 RX ADMIN — APIXABAN 5 MG: 5 TABLET, FILM COATED ORAL at 20:00

## 2021-06-03 RX ADMIN — CARVEDILOL 25 MG: 12.5 TABLET, FILM COATED ORAL at 08:40

## 2021-06-03 RX ADMIN — ACETAMINOPHEN 650 MG: 325 TABLET ORAL at 04:42

## 2021-06-03 RX ADMIN — BUMETANIDE 2 MG: 1 TABLET ORAL at 20:01

## 2021-06-03 RX ADMIN — Medication 2000 UNITS: at 08:40

## 2021-06-03 RX ADMIN — BUDESONIDE 0.5 MG: 0.5 INHALANT RESPIRATORY (INHALATION) at 18:25

## 2021-06-03 RX ADMIN — IPRATROPIUM BROMIDE AND ALBUTEROL SULFATE 3 ML: 2.5; .5 SOLUTION RESPIRATORY (INHALATION) at 11:50

## 2021-06-03 RX ADMIN — SACUBITRIL AND VALSARTAN 1 TABLET: 97; 103 TABLET, FILM COATED ORAL at 20:00

## 2021-06-03 RX ADMIN — PREDNISONE 40 MG: 20 TABLET ORAL at 08:40

## 2021-06-03 RX ADMIN — MONTELUKAST SODIUM 10 MG: 10 TABLET, COATED ORAL at 20:00

## 2021-06-03 RX ADMIN — BUMETANIDE 2 MG: 1 TABLET ORAL at 08:40

## 2021-06-03 RX ADMIN — CARVEDILOL 25 MG: 12.5 TABLET, FILM COATED ORAL at 17:38

## 2021-06-03 RX ADMIN — PANTOPRAZOLE SODIUM 40 MG: 40 TABLET, DELAYED RELEASE ORAL at 08:41

## 2021-06-03 RX ADMIN — LEVOTHYROXINE SODIUM 200 MCG: 100 TABLET ORAL at 04:41

## 2021-06-03 RX ADMIN — ALLOPURINOL 100 MG: 100 TABLET ORAL at 20:00

## 2021-06-03 RX ADMIN — IPRATROPIUM BROMIDE AND ALBUTEROL SULFATE 3 ML: 2.5; .5 SOLUTION RESPIRATORY (INHALATION) at 07:37

## 2021-06-03 RX ADMIN — IPRATROPIUM BROMIDE AND ALBUTEROL SULFATE 3 ML: 2.5; .5 SOLUTION RESPIRATORY (INHALATION) at 18:25

## 2021-06-03 RX ADMIN — DOCUSATE SODIUM 100 MG: 100 CAPSULE ORAL at 08:40

## 2021-06-03 RX ADMIN — GUAIFENESIN 1200 MG: 600 TABLET, EXTENDED RELEASE ORAL at 08:41

## 2021-06-03 RX ADMIN — ARFORMOTEROL TARTRATE 15 MCG: 15 SOLUTION RESPIRATORY (INHALATION) at 07:37

## 2021-06-03 RX ADMIN — ACETAMINOPHEN 650 MG: 325 TABLET ORAL at 20:00

## 2021-06-03 RX ADMIN — APIXABAN 5 MG: 5 TABLET, FILM COATED ORAL at 08:40

## 2021-06-03 RX ADMIN — SACUBITRIL AND VALSARTAN 1 TABLET: 97; 103 TABLET, FILM COATED ORAL at 08:40

## 2021-06-03 RX ADMIN — GUAIFENESIN 1200 MG: 600 TABLET, EXTENDED RELEASE ORAL at 20:01

## 2021-06-03 RX ADMIN — ARFORMOTEROL TARTRATE 15 MCG: 15 SOLUTION RESPIRATORY (INHALATION) at 18:25

## 2021-06-03 RX ADMIN — ROSUVASTATIN CALCIUM 40 MG: 20 TABLET, COATED ORAL at 20:00

## 2021-06-03 RX ADMIN — SODIUM CHLORIDE, PRESERVATIVE FREE 10 ML: 5 INJECTION INTRAVENOUS at 08:41

## 2021-06-03 RX ADMIN — BUDESONIDE 0.5 MG: 0.5 INHALANT RESPIRATORY (INHALATION) at 07:37

## 2021-06-03 RX ADMIN — ALLOPURINOL 100 MG: 100 TABLET ORAL at 08:40

## 2021-06-03 RX ADMIN — ASPIRIN 81 MG: 81 TABLET, COATED ORAL at 08:40

## 2021-06-03 NOTE — PROGRESS NOTES
[facility] Medicine Services  PROGRESS NOTE    Patient Name: Antony Monahan  : 1950  MRN: [id number]    Date of Admission: 2021  Primary Care Physician: Franky Carrington MD    Subjective   Subjective     CC:  Follow-up shortness of breath    HPI:  Shortness of breath and wheezing improved from admission.  Still complaining of some swelling in lower extremities.  Urinating quite frequently, and has lost 4 pounds since being in the hospital.  Denies any chest pain or palpitations.    ROS:  Gen- No fevers, chills  GI- No N/V/D, abd pain     Objective   Objective     Vital Signs:   Temp:  [97 °F (36.1 °C)] 97 °F (36.1 °C)  Heart Rate:  [] 71  Resp:  [18] 18  BP: (117-129)/(60-74) 129/74        Physical Exam:  Constitutional: No acute distress, awake, alert  HENT: NCAT, mucous membranes moist  Respiratory: Crackles at bases bilaterally, respiratory effort normal on room air  Cardiovascular: RRR, no murmurs, no lower extremity edema  Gastrointestinal: Positive bowel sounds, soft, nontender, nondistended  Psychiatric: Appropriate affect, cooperative  Neurologic: Oriented x 3, in all extremities equally, cranial Nerves grossly intact to confrontation, speech clear  Skin: No rashes    Results Reviewed:  Results from last 7 days   Lab Units 21  0348 21  0500 21  0218   WBC 10*3/mm3 8.42 8.83 5.99   HEMOGLOBIN g/dL 12.4* 12.3* 12.5*   HEMATOCRIT % 40.0 39.3 41.3   PLATELETS 10*3/mm3 143 140 151     Results from last 7 days   Lab Units 21  0348 21  0500 21  0638 21  0110 21  2147   SODIUM mmol/L 142 144 140  --  146*   POTASSIUM mmol/L 3.7 3.5 3.5  --  3.8   CHLORIDE mmol/L 104 104 101  --  103   CO2 mmol/L 30.0* 29.0 28.0  --  30.0*   BUN mg/dL 42* 41* 46*  --  49*   CREATININE mg/dL 1.43* 1.51* 1.82*  --  1.90*   GLUCOSE mg/dL 219* 183* 179*  --  125*   CALCIUM mg/dL 9.1 9.1 8.7  --  9.7   ALT (SGPT) U/L  --   --   --   --   13   AST (SGOT) U/L  --   --   --   --  17   TROPONIN T ng/mL  --   --  <0.010 <0.010 <0.010   PROBNP pg/mL  --   --   --   --  1,636.0*     Estimated Creatinine Clearance: 53.7 mL/min (A) (by C-G formula based on SCr of 1.43 mg/dL (H)).    Microbiology Results Abnormal     Procedure Component Value - Date/Time    COVID PRE-OP / PRE-PROCEDURE SCREENING ORDER (NO ISOLATION) - Swab, Nasopharynx [[id number]]  (Normal) Collected: 06/01/21 0028    Lab Status: Final result Specimen: Swab from Nasopharynx Updated: 06/01/21 0121    Narrative:      The following orders were created for panel order COVID PRE-OP / PRE-PROCEDURE SCREENING ORDER (NO ISOLATION) - Swab, Nasopharynx.  Procedure                               Abnormality         Status                     ---------                               -----------         ------                     COVID-19 and FLU A/B PCR...[[id number]]  Normal              Final result                 Please view results for these tests on the individual orders.    COVID-19 and FLU A/B PCR - Swab, Nasopharynx [[id number]]  (Normal) Collected: 06/01/21 0028    Lab Status: Final result Specimen: Swab from Nasopharynx Updated: 06/01/21 0121     COVID19 Not Detected     Influenza A PCR Not Detected     Influenza B PCR Not Detected    Narrative:      Fact sheet for providers: https://www.fda.gov/media/200779/download    Fact sheet for patients: https://www.fda.gov/media/343260/download    Test performed by PCR.          Imaging Results (Last 24 Hours)     ** No results found for the last 24 hours. **          Results for orders placed during the hospital encounter of 05/31/21    Adult Transthoracic Echo Complete W/ Cont if Necessary Per Protocol    Interpretation Summary  · Left ventricular wall thickness is consistent with borderline concentric hypertrophy.  · Estimated right ventricular systolic pressure from tricuspid regurgitation is mildly elevated (35-45 mmHg). Calculated right ventricular  systolic pressure from tricuspid regurgitation is 37 mmHg.  · Left ventricular ejection fraction appears to be 26 - 30%.  · The right atrial cavity is dilated.  · Aortic valve sclerosis  · Markedly hypokinetic inferolateral wall      I have reviewed the medications:  Scheduled Meds:allopurinol, 100 mg, Oral, BID  apixaban, 5 mg, Oral, Q12H  arformoterol, 15 mcg, Nebulization, BID - RT   And  budesonide, 0.5 mg, Nebulization, BID - RT  aspirin, 81 mg, Oral, Daily  bumetanide, 2 mg, Oral, BID  carvedilol, 25 mg, Oral, BID With Meals  cholecalciferol, 2,000 Units, Oral, Daily  docusate sodium, 100 mg, Oral, BID  guaiFENesin, 1,200 mg, Oral, Q12H  ipratropium-albuterol, 3 mL, Nebulization, 4x Daily - RT  levothyroxine, 200 mcg, Oral, Q AM  montelukast, 10 mg, Oral, Nightly  pantoprazole, 40 mg, Oral, Daily  pharmacy consult - MTM, , Does not apply, Daily  predniSONE, 40 mg, Oral, Daily With Breakfast  rosuvastatin, 40 mg, Oral, Nightly  sacubitril-valsartan, 1 tablet, Oral, Q12H  sodium chloride, 10 mL, Intravenous, Q12H      Continuous Infusions:   PRN Meds:.•  acetaminophen **OR** acetaminophen **OR** acetaminophen  •  ipratropium-albuterol  •  sodium chloride  •  sodium chloride    Assessment/Plan   Assessment & Plan     Active Hospital Problems    Diagnosis  POA   • Abdominal distention [R14.0]  Unknown   • Shortness of breath [R06.02]  Yes   • Hypernatremia [E87.0]  Unknown   • SUNIL (acute kidney injury) (CMS/[facility]) [N17.9]  Unknown   • Hyperlipidemia LDL goal <70 [E78.5]  Yes   • COPD (chronic obstructive pulmonary disease) (CMS/[facility]) [J44.9]  Yes   • Essential hypertension [I10]  Yes   • Systolic CHF (CMS/[facility]) [I50.20]  Yes   • HTN (hypertension) [I10]  Yes      Resolved Hospital Problems   No resolved problems to display.        Brief Hospital Course to date:  Antony Monahan is a 71 y.o. male with a past medical history of CAD, hypertension, hyperlipidemia, ischemic cardiomyopathy, HFrEF, COPD, PAD,  hypothyroidism, anxiety/depression, arthritis who was admitted on May 31 for worsening shortness of breath associated with chest pain, and lower extremity edema for the week prior to admission, we have been treating for acute decompensated HFrEF.    Patient and problems new to me today    Acute decompensated HFrEF  Ischemic cardiomyopathy status post ICD  -Echocardiogram showed EF 26 to 30%, significantly decreased from prior echo in July 2019, at that time EF was 47% with grade 1 diastolic dysfunction  -proBNP 1600  -Chest x-ray without acute intrathoracic disease  -Continue Entresto, Bumex 2 mg p.o. twice daily, Coreg 25 mg twice daily, aspirin and Crestor  -Cardiology following, plan for left heart cath this afternoon    SUNIL on CKD 3a  -Resolved, creatinine at baseline  -Continue to monitor BMP every morning    COPD with acute exacerbation  -Significant wheezing on admission, started on prednisone 40 mg on June 2, continue for 5 days, wheezing improved  -Continue Brovana and Pulmicort, nebs scheduled, and Singulair, and Mucinex scheduled    Adrenal lesion  -As seen on CT abdomen pelvis  -Outpatient follow-up recommended    Hypernatremia-resolved      DVT Prophylaxis: SCD      Disposition: I expect the patient to be discharged pending left heart cath.    CODE STATUS:   Code Status and Medical Interventions:   Ordered at: 06/01/21 0101     Level Of Support Discussed With:    Patient     Code Status:    CPR     Medical Interventions (Level of Support Prior to Arrest):    Full       Airam Bryan MD  06/03/21

## 2021-06-03 NOTE — PLAN OF CARE
Goal Outcome Evaluation:  Plan of Care Reviewed With: patient, daughter  Progress: no change  Outcome Summary: VSS, Pt on RA. No c/o chest pain this shift. pt scheduled for heart cath this evening. Paced on the monitor. No other complains reported.    Status post heart cath, left radial band in place, no ss bleeding.

## 2021-06-03 NOTE — PLAN OF CARE
Goal Outcome Evaluation:  Plan of Care Reviewed With: patient  Progress: improving  Outcome Summary: VSS, on 2L, pt is having heart cath today, slept without complaints

## 2021-06-04 ENCOUNTER — DOCUMENTATION (OUTPATIENT)
Dept: CARDIAC REHAB | Facility: HOSPITAL | Age: 71
End: 2021-06-04

## 2021-06-04 LAB
ANION GAP SERPL CALCULATED.3IONS-SCNC: 8 MMOL/L (ref 5–15)
BASOPHILS # BLD AUTO: 0.01 10*3/MM3 (ref 0–0.2)
BASOPHILS NFR BLD AUTO: 0.1 % (ref 0–1.5)
BUN SERPL-MCNC: 43 MG/DL (ref 8–23)
BUN/CREAT SERPL: 27 (ref 7–25)
CALCIUM SPEC-SCNC: 8.8 MG/DL (ref 8.6–10.5)
CHLORIDE SERPL-SCNC: 102 MMOL/L (ref 98–107)
CO2 SERPL-SCNC: 32 MMOL/L (ref 22–29)
CREAT SERPL-MCNC: 1.59 MG/DL (ref 0.76–1.27)
DEPRECATED RDW RBC AUTO: 51.9 FL (ref 37–54)
EOSINOPHIL # BLD AUTO: 0 10*3/MM3 (ref 0–0.4)
EOSINOPHIL NFR BLD AUTO: 0 % (ref 0.3–6.2)
ERYTHROCYTE [DISTWIDTH] IN BLOOD BY AUTOMATED COUNT: 15.2 % (ref 12.3–15.4)
GFR SERPL CREATININE-BSD FRML MDRD: 43 ML/MIN/1.73
GLUCOSE SERPL-MCNC: 201 MG/DL (ref 65–99)
HCT VFR BLD AUTO: 40.9 % (ref 37.5–51)
HGB BLD-MCNC: 12.5 G/DL (ref 13–17.7)
IMM GRANULOCYTES # BLD AUTO: 0.08 10*3/MM3 (ref 0–0.05)
IMM GRANULOCYTES NFR BLD AUTO: 1.2 % (ref 0–0.5)
LYMPHOCYTES # BLD AUTO: 0.57 10*3/MM3 (ref 0.7–3.1)
LYMPHOCYTES NFR BLD AUTO: 8.5 % (ref 19.6–45.3)
MCH RBC QN AUTO: 28.3 PG (ref 26.6–33)
MCHC RBC AUTO-ENTMCNC: 30.6 G/DL (ref 31.5–35.7)
MCV RBC AUTO: 92.5 FL (ref 79–97)
MONOCYTES # BLD AUTO: 0.64 10*3/MM3 (ref 0.1–0.9)
MONOCYTES NFR BLD AUTO: 9.6 % (ref 5–12)
NEUTROPHILS NFR BLD AUTO: 5.39 10*3/MM3 (ref 1.7–7)
NEUTROPHILS NFR BLD AUTO: 80.6 % (ref 42.7–76)
NRBC BLD AUTO-RTO: 0 /100 WBC (ref 0–0.2)
PLAT MORPH BLD: NORMAL
PLATELET # BLD AUTO: 136 10*3/MM3 (ref 140–450)
PMV BLD AUTO: 12.6 FL (ref 6–12)
POTASSIUM SERPL-SCNC: 4 MMOL/L (ref 3.5–5.2)
RBC # BLD AUTO: 4.42 10*6/MM3 (ref 4.14–5.8)
RBC MORPH BLD: NORMAL
SODIUM SERPL-SCNC: 142 MMOL/L (ref 136–145)
WBC # BLD AUTO: 6.69 10*3/MM3 (ref 3.4–10.8)
WBC MORPH BLD: NORMAL

## 2021-06-04 PROCEDURE — 99233 SBSQ HOSP IP/OBS HIGH 50: CPT | Performed by: INTERNAL MEDICINE

## 2021-06-04 PROCEDURE — 85025 COMPLETE CBC W/AUTO DIFF WBC: CPT | Performed by: INTERNAL MEDICINE

## 2021-06-04 PROCEDURE — 63710000001 PREDNISONE PER 1 MG: Performed by: INTERNAL MEDICINE

## 2021-06-04 PROCEDURE — 94799 UNLISTED PULMONARY SVC/PX: CPT

## 2021-06-04 PROCEDURE — 80048 BASIC METABOLIC PNL TOTAL CA: CPT | Performed by: INTERNAL MEDICINE

## 2021-06-04 PROCEDURE — 85007 BL SMEAR W/DIFF WBC COUNT: CPT | Performed by: INTERNAL MEDICINE

## 2021-06-04 RX ADMIN — APIXABAN 5 MG: 5 TABLET, FILM COATED ORAL at 20:48

## 2021-06-04 RX ADMIN — SODIUM CHLORIDE, PRESERVATIVE FREE 10 ML: 5 INJECTION INTRAVENOUS at 20:48

## 2021-06-04 RX ADMIN — ALLOPURINOL 100 MG: 100 TABLET ORAL at 08:42

## 2021-06-04 RX ADMIN — BUDESONIDE 0.5 MG: 0.5 INHALANT RESPIRATORY (INHALATION) at 06:56

## 2021-06-04 RX ADMIN — PANTOPRAZOLE SODIUM 40 MG: 40 TABLET, DELAYED RELEASE ORAL at 08:42

## 2021-06-04 RX ADMIN — SACUBITRIL AND VALSARTAN 1 TABLET: 97; 103 TABLET, FILM COATED ORAL at 08:41

## 2021-06-04 RX ADMIN — MONTELUKAST SODIUM 10 MG: 10 TABLET, COATED ORAL at 20:48

## 2021-06-04 RX ADMIN — TEMAZEPAM 7.5 MG: 7.5 CAPSULE ORAL at 20:47

## 2021-06-04 RX ADMIN — HYDROCODONE BITARTRATE AND ACETAMINOPHEN 1 TABLET: 5; 325 TABLET ORAL at 23:20

## 2021-06-04 RX ADMIN — IPRATROPIUM BROMIDE AND ALBUTEROL SULFATE 3 ML: 2.5; .5 SOLUTION RESPIRATORY (INHALATION) at 12:35

## 2021-06-04 RX ADMIN — IPRATROPIUM BROMIDE AND ALBUTEROL SULFATE 3 ML: 2.5; .5 SOLUTION RESPIRATORY (INHALATION) at 06:56

## 2021-06-04 RX ADMIN — ARFORMOTEROL TARTRATE 15 MCG: 15 SOLUTION RESPIRATORY (INHALATION) at 06:56

## 2021-06-04 RX ADMIN — LEVOTHYROXINE SODIUM 200 MCG: 100 TABLET ORAL at 05:30

## 2021-06-04 RX ADMIN — ROSUVASTATIN CALCIUM 40 MG: 20 TABLET, COATED ORAL at 20:47

## 2021-06-04 RX ADMIN — SACUBITRIL AND VALSARTAN 1 TABLET: 97; 103 TABLET, FILM COATED ORAL at 22:09

## 2021-06-04 RX ADMIN — APIXABAN 5 MG: 5 TABLET, FILM COATED ORAL at 08:41

## 2021-06-04 RX ADMIN — CARVEDILOL 25 MG: 12.5 TABLET, FILM COATED ORAL at 17:08

## 2021-06-04 RX ADMIN — IPRATROPIUM BROMIDE AND ALBUTEROL SULFATE 3 ML: 2.5; .5 SOLUTION RESPIRATORY (INHALATION) at 19:25

## 2021-06-04 RX ADMIN — BUMETANIDE 2 MG: 1 TABLET ORAL at 20:47

## 2021-06-04 RX ADMIN — CARVEDILOL 25 MG: 12.5 TABLET, FILM COATED ORAL at 08:42

## 2021-06-04 RX ADMIN — ALLOPURINOL 100 MG: 100 TABLET ORAL at 20:48

## 2021-06-04 RX ADMIN — BUDESONIDE 0.5 MG: 0.5 INHALANT RESPIRATORY (INHALATION) at 19:25

## 2021-06-04 RX ADMIN — Medication 2000 UNITS: at 08:41

## 2021-06-04 RX ADMIN — PREDNISONE 40 MG: 20 TABLET ORAL at 08:41

## 2021-06-04 RX ADMIN — GUAIFENESIN 1200 MG: 600 TABLET, EXTENDED RELEASE ORAL at 08:42

## 2021-06-04 RX ADMIN — IPRATROPIUM BROMIDE AND ALBUTEROL SULFATE 3 ML: 2.5; .5 SOLUTION RESPIRATORY (INHALATION) at 15:43

## 2021-06-04 RX ADMIN — BUMETANIDE 2 MG: 1 TABLET ORAL at 08:42

## 2021-06-04 RX ADMIN — ARFORMOTEROL TARTRATE 15 MCG: 15 SOLUTION RESPIRATORY (INHALATION) at 19:25

## 2021-06-04 RX ADMIN — GUAIFENESIN 1200 MG: 600 TABLET, EXTENDED RELEASE ORAL at 20:48

## 2021-06-04 RX ADMIN — ASPIRIN 81 MG: 81 TABLET, COATED ORAL at 08:42

## 2021-06-04 NOTE — PLAN OF CARE
Goal Outcome Evaluation:  Plan of Care Reviewed With: patient     Outcome Summary: VSS< RA, V-Paced, no c/o chest pain this shift. Left heart cath 6-3, difficulty removing TR band. BP is stable. No other complaints.

## 2021-06-04 NOTE — PROGRESS NOTES
[facility] Medicine Services  PROGRESS NOTE    Patient Name: Antony Monahan  : 1950  MRN: [id number]    Date of Admission: 2021  Primary Care Physician: Franky Carrington MD    Subjective   Subjective     CC:  SOB    HPI:  Doing better, agreeable to stay one more day    ROS:  Gen- No fevers, chills  CV- No chest pain, palpitations  Resp- No cough, dyspnea  GI- No N/V/D, abd pain      Objective   Objective     Vital Signs:   Temp:  [97.3 °F (36.3 °C)-97.5 °F (36.4 °C)] 97.5 °F (36.4 °C)  Heart Rate:  [] 73  Resp:  [16-18] 18  BP: ()/(54-93) 111/78        Physical Exam:  Constitutional: No acute distress, awake, alert  HENT: NCAT, mucous membranes moist  Respiratory: Clear to auscultation bilaterally, respiratory effort normal   Cardiovascular: RRR, no murmurs, rubs, or gallops  Gastrointestinal: Positive bowel sounds, soft, nontender, distended  Musculoskeletal: trace LE edema  Psychiatric: Appropriate affect, cooperative  Neurologic: Oriented x 3,  speech clear  Skin: No rashes      Results Reviewed:  Results from last 7 days   Lab Units 21  0618 21  0348 21  0500   WBC 10*3/mm3 6.69 8.42 8.83   HEMOGLOBIN g/dL 12.5* 12.4* 12.3*   HEMATOCRIT % 40.9 40.0 39.3   PLATELETS 10*3/mm3 136* 143 140     Results from last 7 days   Lab Units 21  0348 21  0500 21  0638 21  0110 21  2147   SODIUM mmol/L 142 144 140  --  146*   POTASSIUM mmol/L 3.7 3.5 3.5  --  3.8   CHLORIDE mmol/L 104 104 101  --  103   CO2 mmol/L 30.0* 29.0 28.0  --  30.0*   BUN mg/dL 42* 41* 46*  --  49*   CREATININE mg/dL 1.43* 1.51* 1.82*  --  1.90*   GLUCOSE mg/dL 219* 183* 179*  --  125*   CALCIUM mg/dL 9.1 9.1 8.7  --  9.7   ALT (SGPT) U/L  --   --   --   --  13   AST (SGOT) U/L  --   --   --   --  17   TROPONIN T ng/mL  --   --  <0.010 <0.010 <0.010   PROBNP pg/mL  --   --   --   --  1,636.0*     Estimated Creatinine Clearance: 53.7 mL/min (A)  (by C-G formula based on SCr of 1.43 mg/dL (H)).    Microbiology Results Abnormal     Procedure Component Value - Date/Time    COVID PRE-OP / PRE-PROCEDURE SCREENING ORDER (NO ISOLATION) - Swab, Nasopharynx [[id number]]  (Normal) Collected: 06/01/21 0028    Lab Status: Final result Specimen: Swab from Nasopharynx Updated: 06/01/21 0121    Narrative:      The following orders were created for panel order COVID PRE-OP / PRE-PROCEDURE SCREENING ORDER (NO ISOLATION) - Swab, Nasopharynx.  Procedure                               Abnormality         Status                     ---------                               -----------         ------                     COVID-19 and FLU A/B PCR...[[id number]]  Normal              Final result                 Please view results for these tests on the individual orders.    COVID-19 and FLU A/B PCR - Swab, Nasopharynx [[id number]]  (Normal) Collected: 06/01/21 0028    Lab Status: Final result Specimen: Swab from Nasopharynx Updated: 06/01/21 0121     COVID19 Not Detected     Influenza A PCR Not Detected     Influenza B PCR Not Detected    Narrative:      Fact sheet for providers: https://www.fda.gov/media/062096/download    Fact sheet for patients: https://www.fda.gov/media/586276/download    Test performed by PCR.          Imaging Results (Last 24 Hours)     ** No results found for the last 24 hours. **          Results for orders placed during the hospital encounter of 05/31/21    Adult Transthoracic Echo Complete W/ Cont if Necessary Per Protocol    Interpretation Summary  · Left ventricular wall thickness is consistent with borderline concentric hypertrophy.  · Estimated right ventricular systolic pressure from tricuspid regurgitation is mildly elevated (35-45 mmHg). Calculated right ventricular systolic pressure from tricuspid regurgitation is 37 mmHg.  · Left ventricular ejection fraction appears to be 26 - 30%.  · The right atrial cavity is dilated.  · Aortic valve sclerosis  ·  Markedly hypokinetic inferolateral wall      I have reviewed the medications:  Scheduled Meds:allopurinol, 100 mg, Oral, BID  apixaban, 5 mg, Oral, Q12H  arformoterol, 15 mcg, Nebulization, BID - RT   And  budesonide, 0.5 mg, Nebulization, BID - RT  aspirin, 81 mg, Oral, Daily  bumetanide, 2 mg, Oral, BID  carvedilol, 25 mg, Oral, BID With Meals  cholecalciferol, 2,000 Units, Oral, Daily  docusate sodium, 100 mg, Oral, BID  guaiFENesin, 1,200 mg, Oral, Q12H  ipratropium-albuterol, 3 mL, Nebulization, 4x Daily - RT  levothyroxine, 200 mcg, Oral, Q AM  montelukast, 10 mg, Oral, Nightly  pantoprazole, 40 mg, Oral, Daily  pharmacy consult - MTM, , Does not apply, Daily  predniSONE, 40 mg, Oral, Daily With Breakfast  rosuvastatin, 40 mg, Oral, Nightly  sacubitril-valsartan, 1 tablet, Oral, Q12H  sodium chloride, 10 mL, Intravenous, Q12H      Continuous Infusions:   PRN Meds:.•  acetaminophen **OR** acetaminophen **OR** acetaminophen  •  ALPRAZolam  •  HYDROcodone-acetaminophen  •  ipratropium-albuterol  •  Morphine **AND** naloxone  •  sodium chloride  •  sodium chloride  •  temazepam    Assessment/Plan   Assessment & Plan     Active Hospital Problems    Diagnosis  POA   • Abdominal distention [R14.0]  Unknown   • Shortness of breath [R06.02]  Yes   • Hypernatremia [E87.0]  Unknown   • SUNIL (acute kidney injury) (CMS/[facility]) [N17.9]  Unknown   • Hyperlipidemia LDL goal <70 [E78.5]  Yes   • COPD (chronic obstructive pulmonary disease) (CMS/[facility]) [J44.9]  Yes   • Essential hypertension [I10]  Yes   • Systolic CHF (CMS/[facility]) [I50.20]  Yes   • HTN (hypertension) [I10]  Yes      Resolved Hospital Problems   No resolved problems to display.        Brief Hospital Course to date:  Antony Monahan is a 71 y.o. male with a past medical history of CAD, hypertension, hyperlipidemia, ischemic cardiomyopathy, HFrEF, COPD, PAD, hypothyroidism, anxiety/depression, arthritis who was admitted on May 31 for worsening shortness of breath  associated with chest pain, and lower extremity edema for the week prior to admission, we have been treating for acute decompensated HFrEF.        Acute decompensated HFrEF  Ischemic cardiomyopathy status post ICD  -Echocardiogram showed EF 26 to 30%, significantly decreased from prior echo in July 2019, at that time EF was 47% with grade 1 diastolic dysfunction  -proBNP 1600  -Chest x-ray without acute intrathoracic disease  -Continue Entresto, Bumex 2 mg p.o. twice daily, Coreg 25 mg twice daily, aspirin and Crestor  -Cardiology following, underwent C on 6/3 that showed significant in-stent restenosis of his circumflex treated with a cutting balloon guided by IVUS.      SUNIL on CKD 3a  -recheck renal function at [facility]  -overall stable, no labs today     COPD with acute exacerbation  -Significant wheezing on admission, started on prednisone 40 mg on June 2, continue for 5 days, wheezing improved  -Continue Brovana and Pulmicort, nebs scheduled, and Singulair, and Mucinex scheduled     Adrenal lesion  -As seen on CT abdomen pelvis  -Outpatient follow-up recommended     Hypernatremia-resolved     DVT Prophylaxis:  eliquis      Disposition: I expect the patient to be discharged tomorrow if weight and renal function stable    CODE STATUS:   Code Status and Medical Interventions:   Ordered at: 06/01/21 0101     Level Of Support Discussed With:    Patient     Code Status:    CPR     Medical Interventions (Level of Support Prior to Arrest):    Full       Kitty Gu,   06/04/21

## 2021-06-04 NOTE — PROGRESS NOTES
Pt. Referred for Phase II Cardiac Rehab. Staff discussed benefits of exercise, program protocol, and educational material provided. Teach back verified.  Permission granted from patient for staff to fax referral information to outlying program at this time.  Staff faxed referral info to [facility].

## 2021-06-04 NOTE — CASE MANAGEMENT/SOCIAL WORK
Continued Stay Note  [facility]     Patient Name: Antony Monahan  MRN: [id number]  Today's Date: 6/4/2021    Admit Date: 5/31/2021    Discharge Plan     Row Name 06/04/21 1123       Plan    Plan  HOME    Patient/Family in Agreement with Plan  yes    Plan Comments  C yesterday with cutting balloon to Cx.  Awaiting labs to f/u creatinine after receiving contrast yesterday.  Per bedside discussion with pt, plan remains to return home with wife when medically ready.  Pt advised that he has a portable O2 tank in the car for use upon DC.  CM will cont to follow.    Final Discharge Disposition Code  01 - home or self-care        Discharge Codes    No documentation.       Expected Discharge Date and Time     Expected Discharge Date Expected Discharge Time    Jun 5, 2021             Carmen Trevino RN

## 2021-06-04 NOTE — PROGRESS NOTES
[facility]       LOS: 4 days   Patient Care Team:  Franky Carrington MD as PCP - General (Family Medicine)  Drew Gurrola MD as Consulting Physician (Cardiology)        Subjective       Patient Denies:  Cp or palpitations.  Breathing better.      Vital Signs  Temp:  [97.3 °F (36.3 °C)-97.5 °F (36.4 °C)] 97.5 °F (36.4 °C)  Heart Rate:  [] 70  Resp:  [16-18] 16  BP: ()/(54-93) 111/78    Intake/Output Summary (Last 24 hours) at 6/4/2021 0923  Last data filed at 6/4/2021 0900  Gross per 24 hour   Intake 1200 ml   Output 1000 ml   Net 200 ml     I/O this shift:  In: 480 [P.O.:480]  Out: -     Physical Exam:     General Appearance:    Alert, cooperative, in no acute distress       Neck:   No adenopathy, supple, trachea midline, no thyromegaly, no JVD   Lungs:     Decreased to auscultation,respirations regular, even and                unlabored    Heart:    Regular rhythm and normal rate, normal S1 and S2, no         murmur, no gallop, no rub, no click   Chest Wall:    No abnormalities observed   Abdomen:     Normal bowel sounds, no masses, no organomegaly, soft     nontender, nondistended,   Extremities:   Moves all extremities well, no edema, no cyanosis, no           Redness.  Left wrist stable   Pulses:   Pulses palpable and equal bilaterally     Results Review:     I reviewed the patient's new clinical results.      WBC WBC   Date/Time Value Ref Range Status   06/04/2021 0618 6.69 3.40 - 10.80 10*3/mm3 Final   06/03/2021 0348 8.42 3.40 - 10.80 10*3/mm3 Final   06/02/2021 0500 8.83 3.40 - 10.80 10*3/mm3 Final            HGB Hemoglobin   Date/Time Value Ref Range Status   06/04/2021 0618 12.5 (L) 13.0 - 17.7 g/dL Final   06/03/2021 0348 12.4 (L) 13.0 - 17.7 g/dL Final   06/02/2021 0500 12.3 (L) 13.0 - 17.7 g/dL Final           HCT Hematocrit   Date/Time Value Ref Range Status   06/04/2021 0618 40.9 37.5 - 51.0 % Final   06/03/2021  0348 40.0 37.5 - 51.0 % Final   06/02/2021 0500 39.3 37.5 - 51.0 % Final            Platelets Platelets   Date/Time Value Ref Range Status   06/04/2021 0618 136 (L) 140 - 450 10*3/mm3 Final   06/03/2021 0348 143 140 - 450 10*3/mm3 Final   06/02/2021 0500 140 140 - 450 10*3/mm3 Final     Sodium  Sodium   Date/Time Value Ref Range Status   06/03/2021 0348 142 136 - 145 mmol/L Final   06/02/2021 0500 144 136 - 145 mmol/L Final     Potassium  Potassium   Date/Time Value Ref Range Status   06/03/2021 0348 3.7 3.5 - 5.2 mmol/L Final     Comment:     Slight hemolysis detected by analyzer. Results may be affected.   06/02/2021 0500 3.5 3.5 - 5.2 mmol/L Final     Chloride  Chloride   Date/Time Value Ref Range Status   06/03/2021 0348 104 98 - 107 mmol/L Final   06/02/2021 0500 104 98 - 107 mmol/L Final     BicarbonateNo results found for: PLASMABICARB    BUN BUN   Date/Time Value Ref Range Status   06/03/2021 0348 42 (H) 8 - 23 mg/dL Final   06/02/2021 0500 41 (H) 8 - 23 mg/dL Final      Creatinine Creatinine   Date/Time Value Ref Range Status   06/03/2021 0348 1.43 (H) 0.76 - 1.27 mg/dL Final   06/02/2021 0500 1.51 (H) 0.76 - 1.27 mg/dL Final      Calcium Calcium   Date/Time Value Ref Range Status   06/03/2021 0348 9.1 8.6 - 10.5 mg/dL Final   06/02/2021 0500 9.1 8.6 - 10.5 mg/dL Final      Mag @RESULFAST(MG:3)@        PT/INR:       Lab Results   Component Value Date    PROTIME 12.9 07/26/2019    INR 1.02 07/26/2019      Troponin I:  No results found for: TROPONINI   Lab Results   Component Value Date    POCRTROPI 0.01 07/29/2016    TROPONINT <0.010 06/01/2021       allopurinol, 100 mg, Oral, BID  apixaban, 5 mg, Oral, Q12H  arformoterol, 15 mcg, Nebulization, BID - RT   And  budesonide, 0.5 mg, Nebulization, BID - RT  aspirin, 81 mg, Oral, Daily  bumetanide, 2 mg, Oral, BID  carvedilol, 25 mg, Oral, BID With Meals  cholecalciferol, 2,000 Units, Oral, Daily  docusate sodium, 100 mg, Oral, BID  guaiFENesin, 1,200 mg, Oral,  Q12H  ipratropium-albuterol, 3 mL, Nebulization, 4x Daily - RT  levothyroxine, 200 mcg, Oral, Q AM  montelukast, 10 mg, Oral, Nightly  pantoprazole, 40 mg, Oral, Daily  pharmacy consult - MTM, , Does not apply, Daily  predniSONE, 40 mg, Oral, Daily With Breakfast  rosuvastatin, 40 mg, Oral, Nightly  sacubitril-valsartan, 1 tablet, Oral, Q12H  sodium chloride, 10 mL, Intravenous, Q12H           Assessment/Plan     Patient Active Problem List   Diagnosis Code   • ST elevation myocardial infarction involving left anterior descending (LAD) coronary artery (CMS/HCC) I21.02   • GERD (gastroesophageal reflux disease) K21.9   • Hyperlipidemia LDL goal <70 E78.5   • PAD (peripheral artery disease) (CMS/HCC) I73.9   • COPD (chronic obstructive pulmonary disease) (CMS/HCC) J44.9   • Ischemic cardiomyopathy I25.5   • Hypothyroid E03.9   • Essential hypertension I10   • Coronary artery disease involving native coronary artery of native heart I25.10   • Claudication (CMS/HCC) I73.9   • Bradycardia R00.1   • Renal insufficiency N28.9   • Chronic fatigue R53.82   • Vitamin D deficiency E55.9   • Hypersomnolence G47.10   • Obesity (BMI 30.0-34.9) E66.9   • Intermittent claudication of both lower extremities due to atherosclerosis (CMS/HCC) I70.213   • Hypotension I95.9   • Arrhythmia I49.9   • Cardiac related syncope R55   • VT (ventricular tachycardia) (CMS/HCC) I47.2   • Symptomatic stenosis of left carotid artery without infarction I65.22   • Carotid stenosis, asymptomatic, left, s/p carotid stent I65.22   • Atherosclerosis of native artery of both lower extremities with intermittent claudication (CMS/HCC) I70.213   • Morbidly obese (CMS/HCC) E66.01   • HTN (hypertension) I10   • Hypercholesterolemia E78.00   • Systolic CHF (CMS/HCC) I50.20   • Pacemaker Z95.0   • CAD (coronary artery disease) I25.10   • Back pain M54.9   • Chronic narcotic use F11.90   • Borderline diabetes R73.03   • Shortness of breath R06.02   • Hypernatremia  E87.0   • SUNIL (acute kidney injury) (CMS/HCC) N17.9   • Abdominal distention R14.0     EF~25-30%(40-45% July 2019)  Continue anticoagulaltion and diuresis  Renal function better  CV stable post CBA to Cx  F/u 4 weeks with device interrogation.  If still in afib will need ECV.  Ok home      MD Tommy Marcos PA  06/04/21  09:23 EDT

## 2021-06-05 VITALS
TEMPERATURE: 96.6 F | HEART RATE: 76 BPM | WEIGHT: 228 LBS | OXYGEN SATURATION: 95 % | BODY MASS INDEX: 35.79 KG/M2 | DIASTOLIC BLOOD PRESSURE: 71 MMHG | HEIGHT: 67 IN | RESPIRATION RATE: 18 BRPM | SYSTOLIC BLOOD PRESSURE: 100 MMHG

## 2021-06-05 LAB
ALBUMIN SERPL-MCNC: 3.9 G/DL (ref 3.5–5.2)
ANION GAP SERPL CALCULATED.3IONS-SCNC: 8 MMOL/L (ref 5–15)
BUN SERPL-MCNC: 37 MG/DL (ref 8–23)
BUN/CREAT SERPL: 27 (ref 7–25)
CALCIUM SPEC-SCNC: 8.6 MG/DL (ref 8.6–10.5)
CHLORIDE SERPL-SCNC: 103 MMOL/L (ref 98–107)
CO2 SERPL-SCNC: 31 MMOL/L (ref 22–29)
CREAT SERPL-MCNC: 1.37 MG/DL (ref 0.76–1.27)
DEPRECATED RDW RBC AUTO: 50.3 FL (ref 37–54)
ERYTHROCYTE [DISTWIDTH] IN BLOOD BY AUTOMATED COUNT: 15.2 % (ref 12.3–15.4)
GFR SERPL CREATININE-BSD FRML MDRD: 51 ML/MIN/1.73
GLUCOSE SERPL-MCNC: 142 MG/DL (ref 65–99)
HCT VFR BLD AUTO: 41.2 % (ref 37.5–51)
HGB BLD-MCNC: 12.8 G/DL (ref 13–17.7)
MCH RBC QN AUTO: 27.8 PG (ref 26.6–33)
MCHC RBC AUTO-ENTMCNC: 31.1 G/DL (ref 31.5–35.7)
MCV RBC AUTO: 89.6 FL (ref 79–97)
PHOSPHATE SERPL-MCNC: 3.8 MG/DL (ref 2.5–4.5)
PLATELET # BLD AUTO: 147 10*3/MM3 (ref 140–450)
PMV BLD AUTO: 12.9 FL (ref 6–12)
POTASSIUM SERPL-SCNC: 3.6 MMOL/L (ref 3.5–5.2)
RBC # BLD AUTO: 4.6 10*6/MM3 (ref 4.14–5.8)
SODIUM SERPL-SCNC: 142 MMOL/L (ref 136–145)
WBC # BLD AUTO: 6.72 10*3/MM3 (ref 3.4–10.8)

## 2021-06-05 PROCEDURE — 80069 RENAL FUNCTION PANEL: CPT | Performed by: INTERNAL MEDICINE

## 2021-06-05 PROCEDURE — 63710000001 PREDNISONE PER 1 MG: Performed by: INTERNAL MEDICINE

## 2021-06-05 PROCEDURE — 99239 HOSP IP/OBS DSCHRG MGMT >30: CPT | Performed by: INTERNAL MEDICINE

## 2021-06-05 PROCEDURE — 85027 COMPLETE CBC AUTOMATED: CPT | Performed by: INTERNAL MEDICINE

## 2021-06-05 PROCEDURE — 94799 UNLISTED PULMONARY SVC/PX: CPT

## 2021-06-05 RX ORDER — BUMETANIDE 2 MG/1
2 TABLET ORAL DAILY
Status: ON HOLD
Start: 2021-06-05 | End: 2021-07-12

## 2021-06-05 RX ORDER — ARFORMOTEROL TARTRATE 15 UG/2ML
15 SOLUTION RESPIRATORY (INHALATION) 2 TIMES DAILY
Qty: 120 ML | Refills: 0 | Status: SHIPPED | OUTPATIENT
Start: 2021-06-05 | End: 2021-07-05

## 2021-06-05 RX ORDER — BUMETANIDE 2 MG/1
2 TABLET ORAL 2 TIMES DAILY
Qty: 60 TABLET | Refills: 1 | Status: ON HOLD | OUTPATIENT
Start: 2021-06-05 | End: 2021-07-12

## 2021-06-05 RX ORDER — IPRATROPIUM BROMIDE AND ALBUTEROL SULFATE 2.5; .5 MG/3ML; MG/3ML
3 SOLUTION RESPIRATORY (INHALATION) EVERY 4 HOURS PRN
Status: DISCONTINUED | OUTPATIENT
Start: 2021-06-05 | End: 2021-06-05 | Stop reason: SDUPTHER

## 2021-06-05 RX ORDER — PREDNISONE 10 MG/1
TABLET ORAL
Qty: 18 TABLET | Refills: 0 | Status: SHIPPED | OUTPATIENT
Start: 2021-06-05 | End: 2021-06-14

## 2021-06-05 RX ADMIN — PANTOPRAZOLE SODIUM 40 MG: 40 TABLET, DELAYED RELEASE ORAL at 09:05

## 2021-06-05 RX ADMIN — BUDESONIDE 0.5 MG: 0.5 INHALANT RESPIRATORY (INHALATION) at 08:42

## 2021-06-05 RX ADMIN — ARFORMOTEROL TARTRATE 15 MCG: 15 SOLUTION RESPIRATORY (INHALATION) at 08:42

## 2021-06-05 RX ADMIN — Medication 2000 UNITS: at 09:07

## 2021-06-05 RX ADMIN — GUAIFENESIN 1200 MG: 600 TABLET, EXTENDED RELEASE ORAL at 09:06

## 2021-06-05 RX ADMIN — SACUBITRIL AND VALSARTAN 1 TABLET: 97; 103 TABLET, FILM COATED ORAL at 09:10

## 2021-06-05 RX ADMIN — ASPIRIN 81 MG: 81 TABLET, COATED ORAL at 09:07

## 2021-06-05 RX ADMIN — BUMETANIDE 2 MG: 1 TABLET ORAL at 09:07

## 2021-06-05 RX ADMIN — ACETAMINOPHEN 650 MG: 325 TABLET ORAL at 03:47

## 2021-06-05 RX ADMIN — SODIUM CHLORIDE, PRESERVATIVE FREE 10 ML: 5 INJECTION INTRAVENOUS at 09:08

## 2021-06-05 RX ADMIN — ALLOPURINOL 100 MG: 100 TABLET ORAL at 09:05

## 2021-06-05 RX ADMIN — PREDNISONE 40 MG: 20 TABLET ORAL at 09:05

## 2021-06-05 RX ADMIN — CARVEDILOL 25 MG: 12.5 TABLET, FILM COATED ORAL at 09:08

## 2021-06-05 RX ADMIN — APIXABAN 5 MG: 5 TABLET, FILM COATED ORAL at 09:06

## 2021-06-05 RX ADMIN — LEVOTHYROXINE SODIUM 200 MCG: 100 TABLET ORAL at 05:12

## 2021-06-05 NOTE — PROGRESS NOTES
[facility]       LOS: 5 days   Patient Care Team:  Franky Carrington MD as PCP - General (Family Medicine)  Drew Gurrola MD as Consulting Physician (Cardiology)        Subjective       Patient Denies:  Cp or palpitations.  Breathing better.      Vital Signs  Temp:  [96.6 °F (35.9 °C)-96.8 °F (36 °C)] 96.6 °F (35.9 °C)  Heart Rate:  [] 69  Resp:  [18] 18  BP: (119-126)/(67-85) 126/78    Intake/Output Summary (Last 24 hours) at 6/5/2021 0731  Last data filed at 6/5/2021 0343  Gross per 24 hour   Intake 1200 ml   Output 500 ml   Net 700 ml     No intake/output data recorded.    Physical Exam:     General Appearance:    Alert, cooperative, in no acute distress       Neck:   No adenopathy, supple, trachea midline, no thyromegaly, no JVD   Lungs:     Decreased to auscultation,respirations regular, even and                unlabored    Heart:    Regular rhythm and normal rate, normal S1 and S2, no         murmur, no gallop, no rub, no click   Chest Wall:    No abnormalities observed   Abdomen:     Normal bowel sounds, no masses, no organomegaly, soft     nontender, nondistended,   Extremities:   Moves all extremities well, no edema, no cyanosis, no           Redness.  Left wrist stable   Pulses:   Pulses palpable and equal bilaterally     Results Review:     I reviewed the patient's new clinical results.      WBC WBC   Date/Time Value Ref Range Status   06/05/2021 0452 6.72 3.40 - 10.80 10*3/mm3 Final   06/04/2021 0618 6.69 3.40 - 10.80 10*3/mm3 Final   06/03/2021 0348 8.42 3.40 - 10.80 10*3/mm3 Final            HGB Hemoglobin   Date/Time Value Ref Range Status   06/05/2021 0452 12.8 (L) 13.0 - 17.7 g/dL Final   06/04/2021 0618 12.5 (L) 13.0 - 17.7 g/dL Final   06/03/2021 0348 12.4 (L) 13.0 - 17.7 g/dL Final           HCT Hematocrit   Date/Time Value Ref Range Status   06/05/2021 0452 41.2 37.5 - 51.0 % Final   06/04/2021 0618 40.9 37.5 -  51.0 % Final   06/03/2021 0348 40.0 37.5 - 51.0 % Final            Platelets Platelets   Date/Time Value Ref Range Status   06/05/2021 0452 147 140 - 450 10*3/mm3 Final   06/04/2021 0618 136 (L) 140 - 450 10*3/mm3 Final   06/03/2021 0348 143 140 - 450 10*3/mm3 Final     Sodium  Sodium   Date/Time Value Ref Range Status   06/05/2021 0452 142 136 - 145 mmol/L Final   06/04/2021 1339 142 136 - 145 mmol/L Final   06/03/2021 0348 142 136 - 145 mmol/L Final     Potassium  Potassium   Date/Time Value Ref Range Status   06/05/2021 0452 3.6 3.5 - 5.2 mmol/L Final     Comment:     Slight hemolysis detected by analyzer. Results may be affected.   06/04/2021 1339 4.0 3.5 - 5.2 mmol/L Final     Comment:     Slight hemolysis detected by analyzer. Results may be affected.   06/03/2021 0348 3.7 3.5 - 5.2 mmol/L Final     Comment:     Slight hemolysis detected by analyzer. Results may be affected.     Chloride  Chloride   Date/Time Value Ref Range Status   06/05/2021 0452 103 98 - 107 mmol/L Final   06/04/2021 1339 102 98 - 107 mmol/L Final   06/03/2021 0348 104 98 - 107 mmol/L Final     BicarbonateNo results found for: PLASMABICARB    BUN BUN   Date/Time Value Ref Range Status   06/05/2021 0452 37 (H) 8 - 23 mg/dL Final   06/04/2021 1339 43 (H) 8 - 23 mg/dL Final   06/03/2021 0348 42 (H) 8 - 23 mg/dL Final      Creatinine Creatinine   Date/Time Value Ref Range Status   06/05/2021 0452 1.37 (H) 0.76 - 1.27 mg/dL Final   06/04/2021 1339 1.59 (H) 0.76 - 1.27 mg/dL Final   06/03/2021 0348 1.43 (H) 0.76 - 1.27 mg/dL Final      Calcium Calcium   Date/Time Value Ref Range Status   06/05/2021 0452 8.6 8.6 - 10.5 mg/dL Final   06/04/2021 1339 8.8 8.6 - 10.5 mg/dL Final   06/03/2021 0348 9.1 8.6 - 10.5 mg/dL Final      Mag @RESULFAST(MG:3)@        PT/INR:       Lab Results   Component Value Date    PROTIME 12.9 07/26/2019    INR 1.02 07/26/2019      Troponin I:  No results found for: TROPONINI   Lab Results   Component Value Date     POCRTROPI 0.01 07/29/2016    TROPONINT <0.010 06/01/2021       allopurinol, 100 mg, Oral, BID  apixaban, 5 mg, Oral, Q12H  arformoterol, 15 mcg, Nebulization, BID - RT   And  budesonide, 0.5 mg, Nebulization, BID - RT  aspirin, 81 mg, Oral, Daily  bumetanide, 2 mg, Oral, BID  carvedilol, 25 mg, Oral, BID With Meals  cholecalciferol, 2,000 Units, Oral, Daily  docusate sodium, 100 mg, Oral, BID  guaiFENesin, 1,200 mg, Oral, Q12H  levothyroxine, 200 mcg, Oral, Q AM  montelukast, 10 mg, Oral, Nightly  pantoprazole, 40 mg, Oral, Daily  pharmacy consult - MTM, , Does not apply, Daily  predniSONE, 40 mg, Oral, Daily With Breakfast  rosuvastatin, 40 mg, Oral, Nightly  sacubitril-valsartan, 1 tablet, Oral, Q12H  sodium chloride, 10 mL, Intravenous, Q12H           Assessment/Plan     Patient Active Problem List   Diagnosis Code   • ST elevation myocardial infarction involving left anterior descending (LAD) coronary artery (CMS/HCC) I21.02   • GERD (gastroesophageal reflux disease) K21.9   • Hyperlipidemia LDL goal <70 E78.5   • PAD (peripheral artery disease) (CMS/HCC) I73.9   • COPD (chronic obstructive pulmonary disease) (CMS/HCC) J44.9   • Ischemic cardiomyopathy I25.5   • Hypothyroid E03.9   • Essential hypertension I10   • Coronary artery disease involving native coronary artery of native heart I25.10   • Claudication (CMS/HCC) I73.9   • Bradycardia R00.1   • Renal insufficiency N28.9   • Chronic fatigue R53.82   • Vitamin D deficiency E55.9   • Hypersomnolence G47.10   • Obesity (BMI 30.0-34.9) E66.9   • Intermittent claudication of both lower extremities due to atherosclerosis (CMS/[facility]) I70.213   • Hypotension I95.9   • Arrhythmia I49.9   • Cardiac related syncope R55   • VT (ventricular tachycardia) (CMS/HCC) I47.2   • Symptomatic stenosis of left carotid artery without infarction I65.22   • Carotid stenosis, asymptomatic, left, s/p carotid stent I65.22   • Atherosclerosis of native artery of both lower extremities  with intermittent claudication (CMS/[facility]) I70.213   • Morbidly obese (CMS/[facility]) E66.01   • HTN (hypertension) I10   • Hypercholesterolemia E78.00   • Systolic CHF (CMS/[facility]) I50.20   • Pacemaker Z95.0   • CAD (coronary artery disease) I25.10   • Back pain M54.9   • Chronic narcotic use F11.90   • Borderline diabetes R73.03   • Shortness of breath R06.02   • Hypernatremia E87.0   • SUNIL (acute kidney injury) (CMS/[facility]) N17.9   • Abdominal distention R14.0     EF~25-30%(40-45% July 2019)  Continue anticoagulaltion and diuresis  Renal function better  CV stable post CBA to Cx  F/u 4 weeks with device interrogation.  If still in afib will need ECV.  Home anytime.  Call if needed.      MD Tommy Marcos PA  06/05/21  07:31 EDT

## 2021-06-05 NOTE — DISCHARGE SUMMARY
[facility] Medicine Services  DISCHARGE SUMMARY    Patient Name: Antony Monahan  : 1950  MRN: [id number]    Date of Admission: 2021 10:01 PM  Date of Discharge:  2021  Primary Care Physician: Franky Carrington MD    Consults     Date and Time Order Name Status Description    2021  1:26 AM Inpatient Cardiology Consult Completed     2021 11:51 PM Inpatient Cardiology Consult Completed           Hospital Course     Presenting Problem:   Shortness of breath [R06.02]    Active Hospital Problems    Diagnosis  POA   • Abdominal distention [R14.0]  Unknown   • Shortness of breath [R06.02]  Yes   • Hypernatremia [E87.0]  Unknown   • SUNIL (acute kidney injury) (CMS/[facility]) [N17.9]  Unknown   • Hyperlipidemia LDL goal <70 [E78.5]  Yes   • COPD (chronic obstructive pulmonary disease) (CMS/[facility]) [J44.9]  Yes   • Essential hypertension [I10]  Yes   • Systolic CHF (CMS/[facility]) [I50.20]  Yes   • HTN (hypertension) [I10]  Yes      Resolved Hospital Problems   No resolved problems to display.          Hospital Course:  Antony Monahan is a 71 y.o. male  with a past medical history of CAD, hypertension, hyperlipidemia, ischemic cardiomyopathy, HFrEF, COPD, PAD, hypothyroidism, anxiety/depression, arthritis who was admitted on May 31 for worsening shortness of breath associated with chest pain, and lower extremity edema for the week prior to admission, which improved with IV diuretics, currently transition to p.o. Bumex    Acute decompensated HFrEF  Ischemic cardiomyopathy status post ICD  -Echocardiogram showed EF 26 to 30%, significantly decreased from prior echo in 2019, at that time EF was 47% with grade 1 diastolic dysfunction  -DC home on Entresto, Bumex 2 mg p.o. twice daily, Coreg 25 mg twice daily, aspirin and Crestor  -He underwent LHC on 6/3 that showed significant in-stent restenosis of his circumflex treated with a cutting balloon guided by IVUS  -Instructed  patient to buy a scale and his weigh himself daily every morning prior to eating or drinking.  He was given instructions to double his diuretic for 1 to 2 days until his weight improved back to baseline.     SUNIL on CKD 3a  -Resolved, renal function stable  -Suspect initially related to volume overload, improved with diuresis     COPD with acute exacerbation  -Significant wheezing on admission, which is resolved  -Received prednisone 40 mg for 4 days     Adrenal lesion  -As seen on CT abdomen pelvis  -Outpatient follow-up recommended, discussed with him to make appointment with PCP     Hypernatremia-resolved         Discharge Follow Up Recommendations for outpatient labs/diagnostics:  Cardiology as instructed  PCP in 1 week  Instructed to follow-up with [facility] heart and valve [place] after discharge    COVID Symptom Date of Onset:  ______  Date of first positive COVID test: ____  Quarantine Complete 20 days after date of onset:_______      Day of Discharge     HPI:   Doing well breathing improved and weight is stable    Review of Systems  Gen- No fevers, chills  CV- No chest pain, palpitations  Resp- No cough, dyspnea  GI- No N/V/D, abd pain        Vital Signs:   Temp:  [96.6 °F (35.9 °C)-96.8 °F (36 °C)] 96.6 °F (35.9 °C)  Heart Rate:  [] 69  Resp:  [18] 18  BP: (119-126)/(67-85) 126/78     Physical Exam:  Constitutional: No acute distress, awake, alert  HENT: NCAT, mucous membranes moist  Respiratory: Clear to auscultation bilaterally, respiratory effort normal   Cardiovascular: RRR, no murmurs, rubs, or gallops  Gastrointestinal: Positive bowel sounds, soft, nontender, obese  Musculoskeletal: No bilateral ankle edema  Psychiatric: Appropriate affect, cooperative  Neurologic: Oriented x 3, speech clear  Skin: No rashes      Pertinent  and/or Most Recent Results     LAB RESULTS:      Lab 06/05/21  0452 06/04/21  0618 06/03/21  0348 06/02/21  0500 06/01/21  0218 05/31/21  2147   WBC 6.72 6.69 8.42 8.83 5.99  5.94   HEMOGLOBIN 12.8* 12.5* 12.4* 12.3* 12.5* 12.8*   HEMATOCRIT 41.2 40.9 40.0 39.3 41.3 41.6   PLATELETS 147 136* 143 140 151 150   NEUTROS ABS  --  5.39  --   --  4.04 4.30   IMMATURE GRANS (ABS)  --  0.08*  --   --  0.03 0.02   LYMPHS ABS  --  0.57*  --   --  1.13 0.91   MONOS ABS  --  0.64  --   --  0.52 0.46   EOS ABS  --  0.00  --   --  0.22 0.20   MCV 89.6 92.5 89.7 89.1 92.8 92.0   D DIMER QUANT  --   --   --   --   --  0.91*         Lab 06/05/21  0452 06/04/21  1339 06/03/21  0348 06/02/21  0500 06/01/21  0638 06/01/21  0218   SODIUM 142 142 142 144 140  --    POTASSIUM 3.6 4.0 3.7 3.5 3.5  --    CHLORIDE 103 102 104 104 101  --    CO2 31.0* 32.0* 30.0* 29.0 28.0  --    ANION GAP 8.0 8.0 8.0 11.0 11.0  --    BUN 37* 43* 42* 41* 46*  --    CREATININE 1.37* 1.59* 1.43* 1.51* 1.82*  --    GLUCOSE 142* 201* 219* 183* 179*  --    CALCIUM 8.6 8.8 9.1 9.1 8.7  --    PHOSPHORUS 3.8  --   --   --   --   --    HEMOGLOBIN A1C  --   --   --   --   --  6.60*         Lab 06/05/21  0452 05/31/21 2147   TOTAL PROTEIN  --  7.1   ALBUMIN 3.90 4.30   GLOBULIN  --  2.8   ALT (SGPT)  --  13   AST (SGOT)  --  17   BILIRUBIN  --  0.3   ALK PHOS  --  74   LIPASE  --  18         Lab 06/01/21  0638 06/01/21  0110 05/31/21 2147   PROBNP  --   --  1,636.0*   TROPONIN T <0.010 <0.010 <0.010         Lab 06/01/21  0638   CHOLESTEROL 104   LDL CHOL 42   HDL CHOL 39*   TRIGLYCERIDES 127             Brief Urine Lab Results  (Last result in the past 365 days)      Color   Clarity   Blood   Leuk Est   Nitrite   Protein   CREAT   Urine HCG        06/01/21 0140             50.3           Microbiology Results (last 10 days)     Procedure Component Value - Date/Time    COVID PRE-OP / PRE-PROCEDURE SCREENING ORDER (NO ISOLATION) - Swab, Nasopharynx [[id number]]  (Normal) Collected: 06/01/21 0028    Lab Status: Final result Specimen: Swab from Nasopharynx Updated: 06/01/21 0121    Narrative:      The following orders were created for panel  order COVID PRE-OP / PRE-PROCEDURE SCREENING ORDER (NO ISOLATION) - Swab, Nasopharynx.  Procedure                               Abnormality         Status                     ---------                               -----------         ------                     COVID-19 and FLU A/B PCR...[[id number]]  Normal              Final result                 Please view results for these tests on the individual orders.    COVID-19 and FLU A/B PCR - Swab, Nasopharynx [[id number]]  (Normal) Collected: 06/01/21 0028    Lab Status: Final result Specimen: Swab from Nasopharynx Updated: 06/01/21 0121     COVID19 Not Detected     Influenza A PCR Not Detected     Influenza B PCR Not Detected    Narrative:      Fact sheet for providers: https://www.fda.gov/media/208848/download    Fact sheet for patients: https://www.fda.gov/media/541183/download    Test performed by PCR.          Adult Transthoracic Echo Complete W/ Cont if Necessary Per Protocol    Result Date: 6/1/2021  · Left ventricular wall thickness is consistent with borderline concentric hypertrophy. · Estimated right ventricular systolic pressure from tricuspid regurgitation is mildly elevated (35-45 mmHg). Calculated right ventricular systolic pressure from tricuspid regurgitation is 37 mmHg. · Left ventricular ejection fraction appears to be 26 - 30%. · The right atrial cavity is dilated. · Aortic valve sclerosis · Markedly hypokinetic inferolateral wall      CT Abdomen Pelvis Without Contrast    Result Date: 6/1/2021  CT Abdomen Pelvis WO INDICATION: Abdominal bloating and distention. TECHNIQUE: CT of the abdomen and pelvis without IV contrast. Coronal and sagittal reconstructions were obtained.  Radiation dose reduction techniques included automated exposure control or exposure modulation based on body size. Count of known CT and cardiac nuc med studies performed in previous 12 months: 1. COMPARISON: 11/22/2020 FINDINGS: Please see chest CTA report dictated separately.  Gallbladder is within normal limits. No biliary obstruction is seen. There is diffuse atherosclerotic disease, but there is no aortic aneurysm. There is bilateral renal cortical thinning, and there are bilateral renal cysts. No renal or ureteral stones are identified, and there is no hydronephrosis. There is fatty atrophy of the pancreas. Left adrenal mass is unchanged measuring 4.0 x 2.7 cm. This remains indeterminate, but stability favors a benign adenoma. This can be better assessed with adrenal protocol CT on a nonemergent basis if indicated. Solid organs are otherwise normal. Urinary bladder is normal. Mild prostate enlargement is again seen. There is diffuse colonic diverticulosis without acute diverticulitis. The appendix is normal. No small bowel obstruction is seen. There is no ascites or adenopathy. Skin thickening and subcutaneous fat stranding in the left lower quadrant abdominal wall may reflect contusion or possibly a site of medication injection. There is lumbar degenerative disease. There are small bilateral fat-containing inguinal hernias.     1. No evidence of ascites. 2. Colonic diverticulosis without diverticulitis. No small bowel obstruction. Normal appendix. 3. No renal or ureteral stones. No hydronephrosis. 4. Soft tissue induration in the left lower quadrant ventral wall, possibly contusion or medication injection site. 5. Stable appearance of a nonspecific left adrenal mass. Stability favors that it is a benign adenoma. This can be further assessed with nonemergent adrenal protocol CT if indicated. 6. Additional findings as above. Signer Name: Antony Martell MD  Signed: 6/1/2021 4:06 AM  Workstation Name: [facility]  [facility]    CT Angiogram Chest With & Without Contrast    Result Date: 6/1/2021  CT ANGIOGRAM CHEST W WO CONTRAST INDICATION: Shortness of air with elevated d-dimer level. TECHNIQUE: CT angiogram of the chest with IV contrast. 3-D reconstructions were  obtained and reviewed.   Radiation dose reduction techniques included automated exposure control or exposure modulation based on body size. Count of known CT and cardiac nuc med studies performed in previous 12 months: 1. COMPARISON: None available. FINDINGS: Please see abdomen and pelvis CT report dictated separately. No pulmonary embolism or aortic dissection is seen. There is atherosclerotic disease and coronary artery disease. The heart is enlarged. There is a left-sided pacer. No pleural or pericardial effusion is seen. There is marked bilateral gynecomastia. There is a small lymph node adjacent to the lower descending thoracic aorta measuring about 1.3 cm. This is stable from the abdominal CTA of 11/22/2020 suggesting that it is benign. No other adenopathy is seen. Lung windows demonstrate mild COPD. There is dependent atelectasis in both lungs. The lungs are otherwise clear. No CT findings present to indicate pneumonia. Note: CT may be negative in the earliest stages of COVID-19.     1. No PE or aortic dissection. 2. Negative for pneumonia. 3. Atherosclerotic disease and coronary artery disease. 4. COPD. Signer Name: Antony Martell MD  Signed: 6/1/2021 4:12 AM  Workstation Name: [facility]  [facility]    Cardiac Catheterization/Vascular Study    Result Date: 6/4/2021  Cardiac Catheterization Referring Provider: Franky Carrington MD Indication(s) for this Procedure: Unstable angina pectoris and acute on chronic systolic congestive heart failure Procedure(s) Performed:  Left heart catheterization Coronary angiography Cutting Balloon angioplasty to the circumflex IVUS of the circumflex Description of the Procedure:  Informed consent was obtained.  A sheath was placed a left radial artery and selective angiography of the right left coronary arteries as well as left heart catheterization  was performed.  Because of the severe stenosis we proceeded with intervention, I gave Angiomax  used 6 [place] guider advanced a wire down the circumflex and IVUS was performed.  There was significant in-stent restenosis and because of the IVUS findings with a 3 mm diameter angioscope cutting balloon and performed multiple inflations up to 20 stacey.  Repeat angiography revealed excellent result.  Procedure was then terminated and the sheath was removed with a TR band and there were no early complications. Angiographic Findings: Coronary dominance, right · LM:   10% plaque · LAD: Patent stent with scattered plaque less than 20% · LCX: Mid vessel in-stent restenosis of 80%, post PTCA 10% residual.  The type a lesion with WILLIS-3 flow before and after · RCA: Luminal irregularities LV: LVEF not done to minimize contrast dye exposure Intravascular Ultrasound:  IVUS of circumflex was a 3 mm reference vessel with at least 2 layers of stent with 90% in-stent restenosis. Hemodynamic Findings: Ao pressure:  116/72 mmHg LVEDP:  16 mmHg EBL: None Specimen(s): None obtained Complications: There were no early complications. Final Impression(s):  Significant in-stent restenosis circumflex treated with a cutting balloon guided by IVUS Recommendations:  Continue anticoagulation with Eliquis and aspirin and risk factor modification for atherosclerotic disease Possibly home tomorrow with continued therapy for heart failure reduced ejection fraction     XR Chest 1 View    Result Date: 5/31/2021  CR Chest 1 Vw INDICATION: Chest pain and shortness of air. COMPARISON:  7/31/2019 FINDINGS: Single portable AP view(s) of the chest.  The heart is enlarged. Left-sided pacer is unchanged. The lungs are clear. Pulmonary vascularity is normal. There is no pneumothorax.     No acute cardiopulmonary findings. Signer Name: Antony Martell MD  Signed: 5/31/2021 11:38 PM  Workstation Name: [email or web address]  [facility]      Results for orders placed during the hospital encounter of 11/20/20    Bilateral Carotid  Duplex    Interpretation Summary  · Left carotid stent imaging indicates patent flow.  · Right internal carotid artery stenosis of 50-69%.      Results for orders placed during the hospital encounter of 11/20/20    Bilateral Carotid Duplex    Interpretation Summary  · Left carotid stent imaging indicates patent flow.  · Right internal carotid artery stenosis of 50-69%.      Results for orders placed during the hospital encounter of 05/31/21    Adult Transthoracic Echo Complete W/ Cont if Necessary Per Protocol    Interpretation Summary  · Left ventricular wall thickness is consistent with borderline concentric hypertrophy.  · Estimated right ventricular systolic pressure from tricuspid regurgitation is mildly elevated (35-45 mmHg). Calculated right ventricular systolic pressure from tricuspid regurgitation is 37 mmHg.  · Left ventricular ejection fraction appears to be 26 - 30%.  · The right atrial cavity is dilated.  · Aortic valve sclerosis  · Markedly hypokinetic inferolateral wall      Plan for Follow-up of Pending Labs/Results:    Discharge Details        Discharge Medications      New Medications      Instructions Start Date   apixaban 5 MG tablet tablet  Commonly known as: ELIQUIS   5 mg, Oral, Every 12 Hours Scheduled         Continue These Medications      Instructions Start Date   aspirin 81 MG EC tablet   81 mg, Oral, Daily      carvedilol 25 MG tablet  Commonly known as: COREG   37.5 mg, Oral, 2 Times Daily With Meals, Take 1 and 1/2 tables by mouth twice a day      clopidogrel 75 MG tablet  Commonly known as: PLAVIX   75 mg, Oral, Daily      Crestor 40 MG tablet  Generic drug: rosuvastatin   40 mg, Oral, Nightly      Entresto  MG tablet  Generic drug: sacubitril-valsartan   1 tablet, Oral, 2 Times Daily      furosemide 40 MG tablet  Commonly known as: LASIX   40 mg, Oral, 2 Times Daily      spironolactone 25 MG tablet  Commonly known as: ALDACTONE   25 mg, Oral, Daily         Stop These  Medications    amLODIPine 5 MG tablet  Commonly known as: NORVASC     bumetanide 1 MG tablet  Commonly known as: BUMEX        ASK your doctor about these medications      Instructions Start Date   allopurinol 100 MG tablet  Commonly known as: ZYLOPRIM   100 mg, Oral, 2 Times Daily      docusate sodium 100 MG capsule  Commonly known as: COLACE   100 mg, Oral, 2 Times Daily      levothyroxine 200 MCG tablet  Commonly known as: SYNTHROID, LEVOTHROID   200 mcg, Oral, Daily      montelukast 10 MG tablet  Commonly known as: SINGULAIR   10 mg, Oral, Nightly      pantoprazole 40 MG EC tablet  Commonly known as: PROTONIX   40 mg, Oral, Every Morning      Trelegy Ellipta 100-62.5-25 MCG/INH inhaler  Generic drug: Fluticasone-Umeclidin-Vilant   25 mcg, Inhalation, Daily      vitamin D3 125 MCG (5000 UT) capsule capsule   2,000 Units, Oral, Daily             No Known Allergies      Discharge Disposition:      Diet:  Hospital:  Diet Order   Procedures   • Diet Regular; Cardiac       Activity:      Restrictions or Other Recommendations:  Check weight daily, call PCP if you gain 2-3 pounds in 24 hour period         CODE STATUS:    Code Status and Medical Interventions:   Ordered at: 06/01/21 0101     Level Of Support Discussed With:    Patient     Code Status:    CPR     Medical Interventions (Level of Support Prior to Arrest):    Full       Future Appointments   Date Time Provider Department Center   6/10/2021  3:15 PM Po Lezama APRN [facility]   6/14/2021  1:30 PM Claude Atkins MD [facility]       Additional Instructions for the Follow-ups that You Need to Schedule     Discharge Follow-up with Specified Provider: Dr. Gurrola; 1 Month   As directed      To: Dr. Gurrola    Follow Up: 1 Month                     Kitty Gu DO  06/05/21      Time Spent on Discharge:  I spent  41  minutes on this discharge activity which included: face-to-face encounter with the patient, reviewing the data in the system,  coordination of the care with the nursing staff as well as consultants, documentation, and entering orders.

## 2021-06-06 ENCOUNTER — READMISSION MANAGEMENT (OUTPATIENT)
Dept: CALL CENTER | Facility: HOSPITAL | Age: 71
End: 2021-06-06

## 2021-06-06 NOTE — OUTREACH NOTE
Prep Survey      Responses   [facility] facility patient discharged from?  [place]   Is LACE score < 7 ?  No   Emergency Room discharge w/ pulse ox?  No   Eligibility  Readm Mgmt   Discharge diagnosis  Acute decompensated HFrEF   Does the patient have one of the following disease processes/diagnoses(primary or secondary)?  CHF   Does the patient have Home health ordered?  No   Is there a DME ordered?  No   Prep survey completed?  Yes          Edwina Bloom RN

## 2021-06-09 ENCOUNTER — DOCUMENTATION (OUTPATIENT)
Dept: CARDIOLOGY | Facility: HOSPITAL | Age: 71
End: 2021-06-09

## 2021-06-09 ENCOUNTER — READMISSION MANAGEMENT (OUTPATIENT)
Dept: CALL CENTER | Facility: HOSPITAL | Age: 71
End: 2021-06-09

## 2021-06-09 NOTE — OUTREACH NOTE
CHF Week 1 Survey      Responses   [facility] patient discharged from?  [place]   Does the patient have one of the following disease processes/diagnoses(primary or secondary)?  CHF   CHF Week 1 attempt successful?  Yes   Call start time  1428   Rescheduled  Rescheduled-pt requested   Call end time  1428   Discharge diagnosis  Acute decompensated HFrEF   Is patient permission given to speak with other caregiver?  Yes   List who call center can speak with  wife          Alisha Jeronimo RN

## 2021-06-09 NOTE — PROGRESS NOTES
Pt called inquiry on a phone call that was made to him. Told pt it was an appointment reminder and he stated that he may not be able to make it and he said he would call in the morning if he needed to cancel.

## 2021-06-14 ENCOUNTER — READMISSION MANAGEMENT (OUTPATIENT)
Dept: CALL CENTER | Facility: HOSPITAL | Age: 71
End: 2021-06-14

## 2021-06-14 NOTE — OUTREACH NOTE
CHF Week 1 Survey      Responses   [facility] [place] patient discharged from?  [place]   Does the patient have one of the following disease processes/diagnoses(primary or secondary)?  CHF   CHF Week 1 attempt successful?  No   Rescheduled  Revoked          Irina Walden RN

## 2021-07-05 NOTE — ED PROVIDER NOTES
EMERGENCY DEPARTMENT ENCOUNTER      Pt Name: Antony Monahan  MRN: [id number]  YOB: 1950  Date of evaluation: 11/22/2020  Provider: Ankur Burns DO    CHIEF COMPLAINT       Chief Complaint   Patient presents with   • Pain         HISTORY OF PRESENT ILLNESS  (Location/Symptom, Timing/Onset, Context/Setting, Quality, Duration, Modifying Factors, Severity.)   Antony Monahan is a 70 y.o. male who presents to the emergency department for evaluation of right inguinal/groin pain status post a recent catheterization and intervention for carotid stenosis with a stent placement with Dr. Villegas this past Friday.  Since the procedure the patient notes a sharp and burning pain in his groin region which has been pretty consistent and he was concerned secondary to the symptoms on improving and I was told to come the hospital for further evaluation.  He denies any numbness or tingling distally.  He has been compliant with his medications including his aspirin/Plavix.  He denies any neck pain, no fever chills, no headache or vision changes.  Denies any chest pain difficulty breathing.  States he has had multiple catheterizations in the past without any residual side effects or pain.  He denies any other acute stomach complaints at this time.      Nursing notes were reviewed.    REVIEW OF SYSTEMS    (2-9 systems for level 4, 10 or more for level 5)   ROS:  General:  No fevers, no chills, no weakness  Cardiovascular:  No chest pain, no palpitations  Respiratory:  No shortness of breath, no cough, no wheezing  Gastrointestinal:  No pain, no nausea, no vomiting, no diarrhea  Musculoskeletal: Positive right inguinal/groin pain, no muscle pain, no joint pain  Skin:  No rash, no easy bruising  Neurologic:  No speech problems, no headache, no extremity numbness  Psychiatric:  No anxiety  Genitourinary:  No dysuria, no hematuria    Except as noted above the remainder of the review of systems was reviewed and  negative.       PAST MEDICAL HISTORY     Past Medical History:   Diagnosis Date   • Anxiety    • Arthritis    • CAD (coronary artery disease)    • COPD (chronic obstructive pulmonary disease) (CMS/[facility]) 7/29/2016   • HTN (hypertension) 7/29/2016   • Hypercholesterolemia 7/29/2016   • Hypothyroid 7/29/2016   • Obesity    • PAD (peripheral artery disease) (CMS/[facility]) 7/29/2016   • Systolic CHF (CMS/[facility]) 7/29/2016   • Tobacco abuse 7/29/2016         SURGICAL HISTORY       Past Surgical History:   Procedure Laterality Date   • CARDIAC CATHETERIZATION N/A 7/29/2016    Procedure: Left Heart Cath;  Surgeon: Drew Gurrola MD;  Location: [facility] CATH INVASIVE LOCATION;  Service:    • CARDIAC CATHETERIZATION N/A 7/29/2019    Procedure: LEFT HEART CATH;  Surgeon: Drew Gurrola MD;  Location: [facility] CATH INVASIVE LOCATION;  Service: Cardiovascular   • CARDIAC ELECTROPHYSIOLOGY PROCEDURE N/A 7/30/2019    Procedure: ICD SC new;  Surgeon: Musa Harden MD;  Location: [facility] EP INVASIVE LOCATION;  Service: Cardiology   • INTERVENTIONAL RADIOLOGY PROCEDURE N/A 9/14/2016    Procedure: AORTIC Aortagram with Runoff;  Surgeon: Drew Gurrola MD;  Location: [facility] CATH INVASIVE LOCATION;  Service:    • INTERVENTIONAL RADIOLOGY PROCEDURE N/A 5/9/2017    Procedure: Abdominal Aortagram with Runoff;  Surgeon: Drew Gurrola MD;  Location: [facility] CATH INVASIVE LOCATION;  Service:    • INTERVENTIONAL RADIOLOGY PROCEDURE N/A 9/5/2018    Procedure: Abdominal Aortagram with Runoff;  Surgeon: Drew Gurrola MD;  Location: [facility] CATH INVASIVE LOCATION;  Service: Cardiovascular   • INTERVENTIONAL RADIOLOGY PROCEDURE Bilateral 7/29/2019    Procedure: Carotid Cerebral Angiogram;  Surgeon: Claude Atkins MD;  Location: [facility] CATH INVASIVE LOCATION;  Service: Interventional Radiology   • INTERVENTIONAL RADIOLOGY PROCEDURE N/A 8/18/2020    Procedure: AARO+/-;  Surgeon: Drew Gurrola MD;  Location: [facility]  CATH INVASIVE LOCATION;  Service: Cardiovascular;  Laterality: N/A;   • KNEE SURGERY Left 08/17/2015   • OTHER SURGICAL HISTORY      L LEG STENT   • NV TCAT IV STENT CRV CRTD ART EMBOLIC PROTECJ N/A 9/25/2019    Left Carotid Stent; Dr. Arce Given   • WRIST SURGERY           CURRENT MEDICATIONS       Current Facility-Administered Medications:   •  morphine injection 2 mg, 2 mg, Intravenous, Q30 Min PRN, Ankur Burns DO, 2 mg at 11/22/20 2225  •  ondansetron (ZOFRAN) injection 4 mg, 4 mg, Intravenous, Q30 Min PRN, Ankur Burns, , 4 mg at 11/22/20 2222  •  [COMPLETED] Insert peripheral IV, , , Once **AND** sodium chloride 0.9 % flush 10 mL, 10 mL, Intravenous, PRN, Ankur Burns DO    Current Outpatient Medications:   •  aspirin 81 MG EC tablet, Take 162 mg by mouth Daily., Disp: , Rfl:   •  carvedilol (COREG) 12.5 MG tablet, Take 25 mg by mouth 2 (Two) Times a Day With Meals., Disp: , Rfl:   •  clopidogrel (PLAVIX) 75 MG tablet, Take 1 tablet by mouth Daily., Disp: 30 tablet, Rfl: 11  •  levothyroxine (SYNTHROID, LEVOTHROID) 100 MCG tablet, Take 100 mcg by mouth Daily., Disp: , Rfl:   •  nitroglycerin (NITROSTAT) 0.4 MG SL tablet, Place 1 tablet under the tongue every 5 (five) minutes as needed for chest pain. Take no more than 3 doses in 15 minutes., Disp: 25 tablet, Rfl: 12  •  pantoprazole (PROTONIX) 40 MG EC tablet, Take 40 mg by mouth Every Morning., Disp: , Rfl:   •  rosuvastatin (CRESTOR) 40 MG tablet, Take 40 mg by mouth every night., Disp: , Rfl:   •  sacubitril-valsartan (ENTRESTO) 49-51 MG tablet, Take 2 tablets by mouth 2 (Two) Times a Day., Disp: , Rfl:   •  docusate sodium (COLACE) 100 MG capsule, Take 1 capsule by mouth 2 (Two) Times a Day., Disp: 20 capsule, Rfl: 0  •  polyethylene glycol (MIRALAX) 17 GM/SCOOP powder, Take 17 g by mouth Daily., Disp: 578 g, Rfl: 0    ALLERGIES     Patient has no known allergies.    FAMILY HISTORY       Family History   Problem Relation  Age of Onset   • Hypertension Mother    • Hypertension Father    • Heart failure Father    • Heart attack Father    • Diabetes Sister    • Arrhythmia Sister           SOCIAL HISTORY       Social History     Socioeconomic History   • Marital status:      Spouse name: Not on file   • Number of children: Not on file   • Years of education: Not on file   • Highest education level: Not on file   Occupational History   • Occupation: disabled   Tobacco Use   • Smoking status: Former Smoker     Packs/day: 2.00     Years: 50.00     Pack years: 100.00     Types: Cigarettes     Quit date: 2016     Years since quittin.4   • Smokeless tobacco: Never Used   Substance and Sexual Activity   • Alcohol use: Yes     Frequency: Never     Comment: 2-3 Beers per Week   • Drug use: No   • Sexual activity: Defer   Social History Narrative    Pt consumes caffeine: 2-3 drinks per day         PHYSICAL EXAM    (up to 7 for level 4, 8 or more for level 5)     Vitals:    20 2340 20 0000 20 0001 20 0020   BP: 152/81 160/88  158/81   BP Location:       Patient Position:       Pulse:   60 60   Resp:       Temp:       TempSrc:       SpO2:   93% 95%   Weight:       Height:           Physical Exam  General : Patient is awake, alert, oriented, in no acute distress, nontoxic appearing  HEENT: Pupils are equally round and reactive to light, EOMI, conjunctivae clear, sclerae white, there is no injection no icterus.  Oral mucosa is moist, no exudate. Uvula is midline  Neck: Neck is supple, full range of motion, trachea midline  Cardiac: Heart regular rate, rhythm, no murmurs, rubs, or gallops  Lungs: Lungs are clear to auscultation, there is no wheezing, rhonchi, or rales. There is no use of accessory muscles  Abdomen: Abdomen is soft, nontender, mildly distended, no peritoneal signs. There are no firm or pulsatile masses, no rebound rigidity or guarding.  Right inguinal/groin does have an area of recent cath  intervention site which is small area of ecchymosis.  There is no palpable hematoma or bruit, skin is soft and nontender.  No active bleeding or drainage from the site.  Musculoskeletal: 5 out of 5 strength in all 4 extremities.  No focal muscle deficits are appreciated  Neuro: Motor intact, sensory intact, level of consciousness is normal  Dermatology: Skin is warm and dry  Psych: Mentation is grossly normal, cognition is grossly normal. Affect is appropriate.      DIAGNOSTIC RESULTS     EKG: All EKG's are interpreted by the Emergency Department Physician who either signs or Co-signs this chart in the absence of a cardiologist.    No orders to display       RADIOLOGY:   Non-plain film images such as CT, Ultrasound and MRI are read by the radiologist. Plain radiographic images are visualized and preliminarily interpreted by the emergency physician with the below findings:      [] Radiologist's Report Reviewed:  CT Angio Abdominal Aorta Bilateral Iliofem Runoff   Final Result      1. Extensive atheromatous disease throughout the abdominal aorta and bilateral lower extremities. Multifocal areas of low-grade stenosis throughout both lower extremities with a patent left external iliac and femoral artery stent. No evidence of   high-grade stenosis or occlusion.   2. Mild soft tissue stranding in the right inguinal region, likely secondary to recent interventional procedure. No drainable soft tissue fluid collection or evidence of pseudoaneurysm.   3. Indeterminate 4 cm left adrenal mass. Recommend further characterization with nonemergent contrast-enhanced adrenal mass protocol MRI of the abdomen.   4. Uncomplicated colonic diverticulosis.   5. Mild prostate gland enlargement.      Signer Name: James Rain MD    Signed: 11/22/2020 11:59 PM    Workstation Name: [id number]     [facility]            ED BEDSIDE ULTRASOUND:   Performed by ED Physician - none    LABS:    I have reviewed and  interpreted all of the currently available lab results from this visit (if applicable):  Results for orders placed or performed during the hospital encounter of 11/22/20   Comprehensive Metabolic Panel    Specimen: Blood   Result Value Ref Range    Glucose 138 (H) 65 - 99 mg/dL    BUN 24 (H) 8 - 23 mg/dL    Creatinine 1.38 (H) 0.76 - 1.27 mg/dL    Sodium 144 136 - 145 mmol/L    Potassium 4.0 3.5 - 5.2 mmol/L    Chloride 105 98 - 107 mmol/L    CO2 30.0 (H) 22.0 - 29.0 mmol/L    Calcium 9.5 8.6 - 10.5 mg/dL    Total Protein 6.5 6.0 - 8.5 g/dL    Albumin 4.20 3.50 - 5.20 g/dL    ALT (SGPT) 14 1 - 41 U/L    AST (SGOT) 17 1 - 40 U/L    Alkaline Phosphatase 77 39 - 117 U/L    Total Bilirubin 0.2 0.0 - 1.2 mg/dL    eGFR Non African Amer 51 (L) >60 mL/min/1.73    Globulin 2.3 gm/dL    A/G Ratio 1.8 g/dL    BUN/Creatinine Ratio 17.4 7.0 - 25.0    Anion Gap 9.0 5.0 - 15.0 mmol/L   CBC Auto Differential    Specimen: Blood   Result Value Ref Range    WBC 5.22 3.40 - 10.80 10*3/mm3    RBC 4.34 4.14 - 5.80 10*6/mm3    Hemoglobin 12.7 (L) 13.0 - 17.7 g/dL    Hematocrit 40.5 37.5 - 51.0 %    MCV 93.3 79.0 - 97.0 fL    MCH 29.3 26.6 - 33.0 pg    MCHC 31.4 (L) 31.5 - 35.7 g/dL    RDW 15.1 12.3 - 15.4 %    RDW-SD 52.1 37.0 - 54.0 fl    MPV 12.8 (H) 6.0 - 12.0 fL    Platelets 132 (L) 140 - 450 10*3/mm3    Neutrophil % 66.0 42.7 - 76.0 %    Lymphocyte % 18.8 (L) 19.6 - 45.3 %    Monocyte % 9.6 5.0 - 12.0 %    Eosinophil % 4.6 0.3 - 6.2 %    Basophil % 0.6 0.0 - 1.5 %    Immature Grans % 0.4 0.0 - 0.5 %    Neutrophils, Absolute 3.45 1.70 - 7.00 10*3/mm3    Lymphocytes, Absolute 0.98 0.70 - 3.10 10*3/mm3    Monocytes, Absolute 0.50 0.10 - 0.90 10*3/mm3    Eosinophils, Absolute 0.24 0.00 - 0.40 10*3/mm3    Basophils, Absolute 0.03 0.00 - 0.20 10*3/mm3    Immature Grans, Absolute 0.02 0.00 - 0.05 10*3/mm3    nRBC 0.0 0.0 - 0.2 /100 WBC        All other labs were within normal range or not returned as of this dictation.      EMERGENCY  DEPARTMENT COURSE and DIFFERENTIAL DIAGNOSIS/MDM:   Vitals:    Vitals:    11/22/20 2340 11/23/20 0000 11/23/20 0001 11/23/20 0020   BP: 152/81 160/88  158/81   BP Location:       Patient Position:       Pulse:   60 60   Resp:       Temp:       TempSrc:       SpO2:   93% 95%   Weight:       Height:                Patient status post a recent's right inguinal catheterization for carotid stent placement.  Having a discomfort pain at the groin insertion site.  There is no obvious hematoma, no palpable bruit, no active bleeding or drainage from the site.  With his continued pain which is pretty significant in nature we discussed obtaining advanced imaging for further evaluation.  Results reviewed as above.  Kidney function creatinine 1.38, baseline about 1.30.  CT angiography with runoff reveals underlying atherosclerotic disease.  No signs of aneurysm or pseudoaneurysm or active bleeding from the site.  There is some mild inflammatory changes at the puncture site.  No other acute or significant abnormalities are appreciated.  I opted the patient on these findings.  We discussed continuing with a symptomatic control taking his pain medication at home, bowel regimen, calling Dr. Villegas to establish a follow-up appointment which the family will call in the morning.  Patient will continue to monitor symptoms in the meantime.  If he has any worsening discomfort or any further concerns the meantime he return back to the ED. I had a discussion with the patient/family regarding diagnosis, diagnostic results, treatment plan, and medications.  The patient/family indicated understanding of these instructions.  I spent adequate time at the bedside proceeding discharge necessary to personally discuss the aftercare instructions, giving patient education, providing explanations of the results of our evaluations/findings, and my decision making to assure that the patient/family understand the plan of care.  Time was allotted to answer  questions at that time and throughout the ED course.  Emphasis was placed on timely follow-up after discharge.  I also discussed the potential for the development of an acute emergent condition requiring further evaluation, admission, or even surgical intervention. I discussed that we found nothing during the visit today indicating the need for further workup, admission, or the presence of an unstable medical condition.  I encouraged the patient to return to the emergency department immediately for ANY concerns, worsening, new complaints, or if symptoms persist and unable to seek follow-up in a timely fashion.  The patient/family expressed understanding and agreement with this plan.  The patient will follow-up with their PCP in 1-2 days for reevaluation.       MEDICATIONS ADMINISTERED IN ED:  Medications   morphine injection 2 mg (2 mg Intravenous Given 11/22/20 2225)   ondansetron (ZOFRAN) injection 4 mg (4 mg Intravenous Given 11/22/20 2222)   sodium chloride 0.9 % flush 10 mL (has no administration in time range)   iopamidol (ISOVUE-370) 76 % injection 150 mL (125 mL Intravenous Given 11/22/20 2259)   HYDROcodone-acetaminophen (NORCO) 5-325 MG per tablet 1 tablet (1 tablet Oral Given 11/23/20 0034)       PROCEDURES:  Procedures    CRITICAL CARE TIME    Total Critical Care time was 0 minutes, excluding separately reportable procedures.   There was a high probability of clinically significant/life threatening deterioration in the patient's condition which required my urgent intervention.      FINAL IMPRESSION      1. Post-op pain    2. Groin pain, right          DISPOSITION/PLAN     ED Disposition     ED Disposition Condition Comment    Discharge Stable           PATIENT REFERRED TO:  Franky Carrington MD  [address] [facility] [address] [phone]    In 2 days      Given, Claude RAMSEY MD  [address] [facility] [address]  [facility] [address] [phone]    Schedule an appointment as soon as possible for a  visit         DISCHARGE MEDICATIONS:     Medication List      START taking these medications    docusate sodium 100 MG capsule  Commonly known as: COLACE  Take 1 capsule by mouth 2 (Two) Times a Day.     polyethylene glycol 17 GM/SCOOP powder  Commonly known as: MIRALAX  Take 17 g by mouth Daily.        CONTINUE taking these medications    aspirin 81 MG EC tablet     carvedilol 12.5 MG tablet  Commonly known as: COREG     clopidogrel 75 MG tablet  Commonly known as: PLAVIX  Take 1 tablet by mouth Daily.     Crestor 40 MG tablet  Generic drug: rosuvastatin     Entresto 49-51 MG tablet  Generic drug: sacubitril-valsartan     levothyroxine 100 MCG tablet  Commonly known as: SYNTHROID, LEVOTHROID     nitroglycerin 0.4 MG SL tablet  Commonly known as: NITROSTAT  Place 1 tablet under the tongue every 5 (five) minutes as needed for chest pain. Take no more than 3 doses in 15 minutes.     pantoprazole 40 MG EC tablet  Commonly known as: PROTONIX        STOP taking these medications    amiodarone 200 MG tablet  Commonly known as: PACERONE     fish oil 1000 MG capsule capsule     furosemide 40 MG tablet  Commonly known as: LASIX           Where to Get Your Medications      You can get these medications from any pharmacy    Bring a paper prescription for each of these medications  · docusate sodium 100 MG capsule  · polyethylene glycol 17 GM/SCOOP powder             Comment: Please note this report has been produced using speech recognition software.      Ankur Burns DO  Attending Emergency Physician               Ankur Burns DO  11/23/20 0038     no

## 2021-07-12 ENCOUNTER — HOSPITAL ENCOUNTER (OUTPATIENT)
Dept: CARDIOLOGY | Facility: HOSPITAL | Age: 71
Discharge: HOME | End: 2021-07-12
Attending: INTERNAL MEDICINE | Admitting: INTERNAL MEDICINE

## 2021-07-12 VITALS
TEMPERATURE: 97.5 F | HEART RATE: 76 BPM | OXYGEN SATURATION: 94 % | WEIGHT: 222.66 LBS | RESPIRATION RATE: 16 BRPM | HEIGHT: 67 IN | SYSTOLIC BLOOD PRESSURE: 120 MMHG | BODY MASS INDEX: 34.95 KG/M2 | DIASTOLIC BLOOD PRESSURE: 94 MMHG

## 2021-07-12 DIAGNOSIS — I48.91 ATRIAL FIBRILLATION, UNSPECIFIED TYPE (HCC): ICD-10-CM

## 2021-07-12 LAB
ANION GAP SERPL CALCULATED.3IONS-SCNC: 16 MMOL/L (ref 5–15)
BUN SERPL-MCNC: 30 MG/DL (ref 8–23)
BUN/CREAT SERPL: 16 (ref 7–25)
CALCIUM SPEC-SCNC: 9.5 MG/DL (ref 8.6–10.5)
CHLORIDE SERPL-SCNC: 106 MMOL/L (ref 98–107)
CO2 SERPL-SCNC: 25 MMOL/L (ref 22–29)
CREAT SERPL-MCNC: 1.87 MG/DL (ref 0.76–1.27)
DEPRECATED RDW RBC AUTO: 51.8 FL (ref 37–54)
ERYTHROCYTE [DISTWIDTH] IN BLOOD BY AUTOMATED COUNT: 15.8 % (ref 12.3–15.4)
GFR SERPL CREATININE-BSD FRML MDRD: 36 ML/MIN/1.73
GLUCOSE SERPL-MCNC: 123 MG/DL (ref 65–99)
HCT VFR BLD AUTO: 36.5 % (ref 37.5–51)
HGB BLD-MCNC: 11.4 G/DL (ref 13–17.7)
MAXIMAL PREDICTED HEART RATE: 148 BPM
MCH RBC QN AUTO: 28.1 PG (ref 26.6–33)
MCHC RBC AUTO-ENTMCNC: 31.2 G/DL (ref 31.5–35.7)
MCV RBC AUTO: 90.1 FL (ref 79–97)
PLATELET # BLD AUTO: 131 10*3/MM3 (ref 140–450)
PMV BLD AUTO: 12.6 FL (ref 6–12)
POTASSIUM SERPL-SCNC: 3.5 MMOL/L (ref 3.5–5.2)
RBC # BLD AUTO: 4.05 10*6/MM3 (ref 4.14–5.8)
SODIUM SERPL-SCNC: 147 MMOL/L (ref 136–145)
STRESS TARGET HR: 126 BPM
WBC # BLD AUTO: 4.43 10*3/MM3 (ref 3.4–10.8)

## 2021-07-12 PROCEDURE — 25010000002 MIDAZOLAM PER 1 MG: Performed by: INTERNAL MEDICINE

## 2021-07-12 PROCEDURE — 80048 BASIC METABOLIC PNL TOTAL CA: CPT

## 2021-07-12 PROCEDURE — 93005 ELECTROCARDIOGRAM TRACING: CPT | Performed by: INTERNAL MEDICINE

## 2021-07-12 PROCEDURE — 92960 CARDIOVERSION ELECTRIC EXT: CPT

## 2021-07-12 PROCEDURE — 36415 COLL VENOUS BLD VENIPUNCTURE: CPT

## 2021-07-12 PROCEDURE — 85027 COMPLETE CBC AUTOMATED: CPT

## 2021-07-12 PROCEDURE — 25010000002 FENTANYL CITRATE (PF) 50 MCG/ML SOLUTION: Performed by: INTERNAL MEDICINE

## 2021-07-12 RX ORDER — NITROGLYCERIN 0.4 MG/1
0.4 TABLET SUBLINGUAL
COMMUNITY

## 2021-07-12 RX ORDER — BUMETANIDE 2 MG/1
3 TABLET ORAL 2 TIMES DAILY
COMMUNITY
End: 2022-05-10 | Stop reason: HOSPADM

## 2021-07-12 RX ORDER — CHLORAL HYDRATE 500 MG
CAPSULE ORAL
Status: ON HOLD | COMMUNITY
End: 2022-03-30

## 2021-07-12 RX ORDER — FENTANYL CITRATE 50 UG/ML
INJECTION, SOLUTION INTRAMUSCULAR; INTRAVENOUS
Status: COMPLETED | OUTPATIENT
Start: 2021-07-12 | End: 2021-07-12

## 2021-07-12 RX ORDER — MIDAZOLAM HYDROCHLORIDE 1 MG/ML
INJECTION INTRAMUSCULAR; INTRAVENOUS
Status: DISCONTINUED
Start: 2021-07-12 | End: 2021-07-12 | Stop reason: HOSPADM

## 2021-07-12 RX ORDER — LEVOTHYROXINE SODIUM 0.1 MG/1
100 TABLET ORAL EVERY MORNING
COMMUNITY

## 2021-07-12 RX ORDER — MIDAZOLAM HYDROCHLORIDE 1 MG/ML
INJECTION INTRAMUSCULAR; INTRAVENOUS
Status: COMPLETED | OUTPATIENT
Start: 2021-07-12 | End: 2021-07-12

## 2021-07-12 RX ORDER — MIDAZOLAM HYDROCHLORIDE 1 MG/ML
INJECTION INTRAMUSCULAR; INTRAVENOUS
Status: DISCONTINUED
Start: 2021-07-12 | End: 2021-07-12 | Stop reason: WASHOUT

## 2021-07-12 RX ORDER — POTASSIUM CHLORIDE 1500 MG/1
20 TABLET, FILM COATED, EXTENDED RELEASE ORAL EVERY MORNING
Status: ON HOLD | COMMUNITY
End: 2022-03-30

## 2021-07-12 RX ORDER — AMLODIPINE BESYLATE 5 MG/1
5 TABLET ORAL EVERY MORNING
COMMUNITY
End: 2022-05-10 | Stop reason: HOSPADM

## 2021-07-12 RX ORDER — FENTANYL CITRATE 50 UG/ML
INJECTION, SOLUTION INTRAMUSCULAR; INTRAVENOUS
Status: DISCONTINUED
Start: 2021-07-12 | End: 2021-07-12 | Stop reason: HOSPADM

## 2021-07-12 RX ADMIN — FENTANYL CITRATE 50 MCG: 50 INJECTION, SOLUTION INTRAMUSCULAR; INTRAVENOUS at 15:30

## 2021-07-12 RX ADMIN — FENTANYL CITRATE 50 MCG: 50 INJECTION, SOLUTION INTRAMUSCULAR; INTRAVENOUS at 15:35

## 2021-07-12 RX ADMIN — MIDAZOLAM 2 MG: 1 INJECTION INTRAMUSCULAR; INTRAVENOUS at 15:35

## 2021-07-12 NOTE — H&P
Pre-ECV Report  Cardiovascular Laboratory  [facility]      Patient:  Antony Monahan  :  1950  PCP:  Franky Carrington MD  PHONE:  [phone]    DATE: 2021    BRIEF HPI:  Antony Monahan is a 71 y.o. male with hypertension, hypercholesterolemia, diabetes mellitus, systolic CHF-post previous dual-chamber ICD, and known coronary artery disease.  He also has persistent atrial fibrillation and is chronically anticoagulated.  He has been complaining of shortness of breath which he states is moderate in severity with associated dyspnea on exertion, fatigue, and occasional chest pain.  His symptoms increased with stress and are decreased with rest.  He now presents for external cardioversion.    Cardiac Risk Factors:  advanced age (older than 55 for men, 65 for women), diabetes mellitus, dyslipidemia, family history of premature cardiovascular disease, hypertension, male gender, obesity (BMI >= 30 kg/m2)    Anginal class in last 2 weeks:  CCS class I    CHF Class in last 2 weeks:  NYHA Class II    Cardiogenic shock:  no    Cardiac arrest <24 hours:  no    Stress test within last 6 months:   no   Details:    Previous cardiac catheterization:  yes  Details:     Previous CABG:  no  Details:      Allergies:     IV contrast allergy:  no  No Known Allergies    MEDICATIONS:  Prior to Admission medications    Medication Sig Start Date End Date Taking? Authorizing Provider   allopurinol (ZYLOPRIM) 100 MG tablet Take 100 mg by mouth 2 (Two) Times a Day.    Provider, MD Wilbur   apixaban (ELIQUIS) 5 MG tablet tablet Take 1 tablet by mouth Every 12 (Twelve) Hours. Indications: Atrial Fibrillation 21   Tommy Lee PA   aspirin 81 MG EC tablet Take 81 mg by mouth Daily.    Provider, MD Wilbur   bumetanide (BUMEX) 2 MG tablet Take 1 tablet by mouth Daily. 21   Kitty Gu DO   bumetanide (BUMEX) 2 MG tablet Take 1 tablet by mouth 2 (Two) Times a Day. 21    Kitty Gu,    carvedilol (COREG) 25 MG tablet Take 37.5 mg by mouth 2 (Two) Times a Day With Meals. Take 1 and 1/2 tables by mouth twice a day    Wilbur Fine MD   clopidogrel (PLAVIX) 75 MG tablet Take 1 tablet by mouth Daily. 9/5/18   Drew Gurrola MD   docusate sodium (COLACE) 100 MG capsule Take 1 capsule by mouth 2 (Two) Times a Day. 11/23/20   Ankur Burns DO   levothyroxine (SYNTHROID, LEVOTHROID) 200 MCG tablet Take 200 mcg by mouth Daily.    Wilbur Fine MD   montelukast (SINGULAIR) 10 MG tablet Take 10 mg by mouth Every Night.    Wilbur Fine MD   pantoprazole (PROTONIX) 40 MG EC tablet Take 40 mg by mouth Every Morning.    Wilbur Fine MD   rosuvastatin (CRESTOR) 40 MG tablet Take 40 mg by mouth every night.    Wilbur Fine MD   sacubitril-valsartan (Entresto)  MG tablet Take 1 tablet by mouth 2 (Two) Times a Day.    Wilbur Fine MD   spironolactone (ALDACTONE) 25 MG tablet Take 25 mg by mouth Daily.    Wilbur Fine MD   Trelegy Ellipta 100-62.5-25 MCG/INH aerosol powder  Inhale 25 mcg Daily. 12/11/20   Wilbur Fine MD   vitamin D3 125 MCG (5000 UT) capsule capsule Take 2,000 Units by mouth Daily.    Wilbur Fine MD       Past medical & surgical history, social and family history reviewed in the electronic medical record.    ROS:  Cardiovascular ROS: positive for - chest pain, dyspnea on exertion, irregular heartbeat and shortness of breath    Physical Exam:    Vitals: There were no vitals filed for this visit. There were no vitals filed for this visit.    General Appearance:    Alert, cooperative, in no acute distress   Head:    Normocephalic, without obvious abnormality, atraumatic   Eyes:            Lids and lashes normal, conjunctivae and sclerae normal, no   icterus, no pallor, corneas clear, PERRLA   Ears:    Ears appear intact with no abnormalities noted   Neck:   No adenopathy,  supple, trachea midline, no thyromegaly, no   carotid bruit, no JVD   Back:     No kyphosis present, no scoliosis present, range of motion normal   Lungs:     Clear to auscultation,respirations regular, even and                unlabored    Heart:    Regular rhythm and normal rate, normal S1 and S2, no         murmur, no gallop, no rub, no click.  Lateral PMI   Chest Wall:    No abnormalities observed   Abdomen:     Normal bowel sounds, no masses, no organomegaly, soft     nontender, nondistended, no guarding, no rebound                tenderness   Rectal:     Deferred   Extremities:   Moves all extremities well, 1+ edema, no cyanosis, no           redness   Pulses:   Pulses palpable and equal bilaterally   Skin:   No bleeding, bruising or rash   Neurologic:   Cranial nerves 2 - 12 grossly intact, sensation intact     Barbaeu Test:  Left: Not Assessed  (oxymetric Allens) Right: Not Assessed             Lab Results   Component Value Date    CHLPL 113 01/27/2021    TRIG 127 06/01/2021    HDL 39 (L) 06/01/2021    AST 17 05/31/2021    ALT 13 05/31/2021           Impression      · Persistent atrial fibrillation    Plan     · Procedure to perform: External cardioversion                RUDY Esposito   07/12/21  12:56 EDT

## 2021-07-14 LAB
QT INTERVAL: 242 MS
QTC INTERVAL: 308 MS

## 2021-08-04 ENCOUNTER — TELEPHONE (OUTPATIENT)
Dept: BARIATRICS/WEIGHT MGMT | Facility: CLINIC | Age: 71
End: 2021-08-04

## 2021-08-04 DIAGNOSIS — R10.13 DYSPEPSIA: Primary | ICD-10-CM

## 2021-08-04 NOTE — TELEPHONE ENCOUNTER
Franky Mondragon MD Hitt, Edwina SANDERS PA-C  Unfortuneately he is not a candidate for LSG with us, welcome to seek 2nd opinion(s).  Thanks,

## 2021-08-05 ENCOUNTER — TELEPHONE (OUTPATIENT)
Dept: NEUROSURGERY | Facility: CLINIC | Age: 71
End: 2021-08-05

## 2021-08-05 NOTE — TELEPHONE ENCOUNTER
Can you please make sure the most recent letter has been sent to: Natalya Hameed  Bariatric Surgery , Referral Coordinator

## 2022-03-28 ENCOUNTER — PRE-ADMISSION TESTING (OUTPATIENT)
Dept: PREADMISSION TESTING | Facility: HOSPITAL | Age: 72
End: 2022-03-28

## 2022-03-28 VITALS — BODY MASS INDEX: 36.51 KG/M2 | HEIGHT: 67 IN | WEIGHT: 232.59 LBS

## 2022-03-28 LAB
ANION GAP SERPL CALCULATED.3IONS-SCNC: 10 MMOL/L (ref 5–15)
BUN SERPL-MCNC: 32 MG/DL (ref 8–23)
BUN/CREAT SERPL: 27.1 (ref 7–25)
CALCIUM SPEC-SCNC: 8.7 MG/DL (ref 8.6–10.5)
CHLORIDE SERPL-SCNC: 102 MMOL/L (ref 98–107)
CO2 SERPL-SCNC: 30 MMOL/L (ref 22–29)
CREAT SERPL-MCNC: 1.18 MG/DL (ref 0.76–1.27)
DEPRECATED RDW RBC AUTO: 48.5 FL (ref 37–54)
EGFRCR SERPLBLD CKD-EPI 2021: 65.2 ML/MIN/1.73
ERYTHROCYTE [DISTWIDTH] IN BLOOD BY AUTOMATED COUNT: 14.5 % (ref 12.3–15.4)
GLUCOSE SERPL-MCNC: 200 MG/DL (ref 65–99)
HCT VFR BLD AUTO: 39.2 % (ref 37.5–51)
HGB BLD-MCNC: 12.7 G/DL (ref 13–17.7)
INR PPP: 1.05 (ref 0.85–1.16)
MCH RBC QN AUTO: 29.6 PG (ref 26.6–33)
MCHC RBC AUTO-ENTMCNC: 32.4 G/DL (ref 31.5–35.7)
MCV RBC AUTO: 91.4 FL (ref 79–97)
PLATELET # BLD AUTO: 141 10*3/MM3 (ref 140–450)
PMV BLD AUTO: 11.9 FL (ref 6–12)
POTASSIUM SERPL-SCNC: 3.7 MMOL/L (ref 3.5–5.2)
PROTHROMBIN TIME: 13.4 SECONDS (ref 11.4–14.4)
QT INTERVAL: 454 MS
QTC INTERVAL: 454 MS
RBC # BLD AUTO: 4.29 10*6/MM3 (ref 4.14–5.8)
SARS-COV-2 RNA PNL SPEC NAA+PROBE: NOT DETECTED
SODIUM SERPL-SCNC: 142 MMOL/L (ref 136–145)
WBC NRBC COR # BLD: 4.24 10*3/MM3 (ref 3.4–10.8)

## 2022-03-28 PROCEDURE — C9803 HOPD COVID-19 SPEC COLLECT: HCPCS

## 2022-03-28 PROCEDURE — 93005 ELECTROCARDIOGRAM TRACING: CPT

## 2022-03-28 PROCEDURE — 93010 ELECTROCARDIOGRAM REPORT: CPT | Performed by: INTERNAL MEDICINE

## 2022-03-28 PROCEDURE — U0004 COV-19 TEST NON-CDC HGH THRU: HCPCS

## 2022-03-28 PROCEDURE — 80048 BASIC METABOLIC PNL TOTAL CA: CPT

## 2022-03-28 PROCEDURE — 85610 PROTHROMBIN TIME: CPT

## 2022-03-28 PROCEDURE — 85027 COMPLETE CBC AUTOMATED: CPT

## 2022-03-28 PROCEDURE — 36415 COLL VENOUS BLD VENIPUNCTURE: CPT

## 2022-03-28 NOTE — PAT
covid in pat.       Patient not good historian so instructed to have daughter take photos of medications and bring photos on phone dos.     Recent office note from [facility] and old clearance letter from previous surgery added to chart-  Informed office of new surgery and state will work on new clearance letter. Will fax to [facility] when get it.     Patient was informed that dr manuel was doing both legs not just left- message left for dr manuel to address and patient to sign consent dos.     Patient to apply Chlorhexadine wipes  to surgical area (as instructed) the night before procedure and the AM of procedure. Wipes provided.    Patient viewed general PAT education video as instructed in their preoperative information received from their surgeon.  Patient stated the general PAT education video was viewed in its entirety and survey completed.  Copies of [facility] general education handouts (Incentive Spirometry, Meds to Beds Program, Patient Belongings, Pre-op skin preparation instructions, Blood Glucose testing, Visitor policy, Surgery FAQ, Code H) distributed to patient if not printed. Education related to the PAT pass and skin preparation for surgery (if applicable) completed in PAT as a reinforcement to PAT education video. Patient instructed to return PAT pass provided today as well as completed skin preparation sheet (if applicable) on the day of procedure.     Additionally if patient had not viewed video yet but intended to view it at home or in our waiting area, then referred them to the handout with QR code/link provided during PAT visit.  Instructed patient to complete survey after viewing the video in its entirety.  Encouraged patient/family to read PAT general education handouts thoroughly and notify PAT staff with any questions or concerns. Patient verbalized understanding of all information and priority content.

## 2022-03-29 ENCOUNTER — ANESTHESIA EVENT (OUTPATIENT)
Dept: PERIOP | Facility: HOSPITAL | Age: 72
End: 2022-03-29

## 2022-03-29 RX ORDER — FAMOTIDINE 10 MG/ML
20 INJECTION, SOLUTION INTRAVENOUS ONCE
Status: CANCELLED | OUTPATIENT
Start: 2022-03-29 | End: 2022-03-29

## 2022-03-30 ENCOUNTER — HOSPITAL ENCOUNTER (OUTPATIENT)
Facility: HOSPITAL | Age: 72
Setting detail: HOSPITAL OUTPATIENT SURGERY
Discharge: HOME | End: 2022-03-30
Attending: SURGERY | Admitting: SURGERY

## 2022-03-30 ENCOUNTER — ANESTHESIA (OUTPATIENT)
Dept: PERIOP | Facility: HOSPITAL | Age: 72
End: 2022-03-30

## 2022-03-30 ENCOUNTER — APPOINTMENT (OUTPATIENT)
Dept: GENERAL RADIOLOGY | Facility: HOSPITAL | Age: 72
End: 2022-03-30

## 2022-03-30 VITALS
OXYGEN SATURATION: 96 % | TEMPERATURE: 98.3 F | RESPIRATION RATE: 16 BRPM | HEART RATE: 66 BPM | DIASTOLIC BLOOD PRESSURE: 65 MMHG | HEIGHT: 67 IN | WEIGHT: 232 LBS | BODY MASS INDEX: 36.41 KG/M2 | SYSTOLIC BLOOD PRESSURE: 144 MMHG

## 2022-03-30 LAB — GLUCOSE BLDC GLUCOMTR-MCNC: 123 MG/DL (ref 70–130)

## 2022-03-30 PROCEDURE — C1769 GUIDE WIRE: HCPCS | Performed by: SURGERY

## 2022-03-30 PROCEDURE — C1894 INTRO/SHEATH, NON-LASER: HCPCS | Performed by: SURGERY

## 2022-03-30 PROCEDURE — C1887 CATHETER, GUIDING: HCPCS | Performed by: SURGERY

## 2022-03-30 PROCEDURE — 25010000002 HEPARIN (PORCINE) PER 1000 UNITS: Performed by: NURSE ANESTHETIST, CERTIFIED REGISTERED

## 2022-03-30 PROCEDURE — C2623 CATH, TRANSLUMIN, DRUG-COAT: HCPCS | Performed by: SURGERY

## 2022-03-30 PROCEDURE — 25010000002 PROPOFOL 10 MG/ML EMULSION: Performed by: NURSE ANESTHETIST, CERTIFIED REGISTERED

## 2022-03-30 PROCEDURE — 25010000002 PHENYLEPHRINE 10 MG/ML SOLUTION 1 ML VIAL: Performed by: NURSE ANESTHETIST, CERTIFIED REGISTERED

## 2022-03-30 PROCEDURE — C1876 STENT, NON-COA/NON-COV W/DEL: HCPCS | Performed by: SURGERY

## 2022-03-30 PROCEDURE — 0 IODIXANOL PER 1 ML: Performed by: SURGERY

## 2022-03-30 PROCEDURE — 25010000002 CEFAZOLIN IN DEXTROSE 2-4 GM/100ML-% SOLUTION: Performed by: SURGERY

## 2022-03-30 PROCEDURE — 25010000002 ONDANSETRON PER 1 MG: Performed by: NURSE ANESTHETIST, CERTIFIED REGISTERED

## 2022-03-30 PROCEDURE — 75710 ARTERY X-RAYS ARM/LEG: CPT

## 2022-03-30 PROCEDURE — 0 LIDOCAINE 1 % SOLUTION: Performed by: SURGERY

## 2022-03-30 PROCEDURE — 82962 GLUCOSE BLOOD TEST: CPT

## 2022-03-30 PROCEDURE — 25010000002 DEXAMETHASONE PER 1 MG: Performed by: NURSE ANESTHETIST, CERTIFIED REGISTERED

## 2022-03-30 PROCEDURE — C1760 CLOSURE DEV, VASC: HCPCS

## 2022-03-30 PROCEDURE — 25010000002 HEPARIN (PORCINE) PER 1000 UNITS: Performed by: SURGERY

## 2022-03-30 DEVICE — PREMOUNTED STENT SYSTEM
Type: IMPLANTABLE DEVICE | Site: ARTERY ILIAC | Status: FUNCTIONAL
Brand: EXPRESS® LD ILIAC / BILIARY

## 2022-03-30 RX ORDER — FAMOTIDINE 20 MG/1
20 TABLET, FILM COATED ORAL ONCE
Status: DISCONTINUED | OUTPATIENT
Start: 2022-03-30 | End: 2022-03-30 | Stop reason: HOSPADM

## 2022-03-30 RX ORDER — ONDANSETRON 2 MG/ML
4 INJECTION INTRAMUSCULAR; INTRAVENOUS ONCE AS NEEDED
Status: DISCONTINUED | OUTPATIENT
Start: 2022-03-30 | End: 2022-03-30 | Stop reason: HOSPADM

## 2022-03-30 RX ORDER — BUPIVACAINE HCL/0.9 % NACL/PF 0.125 %
PLASTIC BAG, INJECTION (ML) EPIDURAL AS NEEDED
Status: DISCONTINUED | OUTPATIENT
Start: 2022-03-30 | End: 2022-03-30 | Stop reason: SURG

## 2022-03-30 RX ORDER — SODIUM CHLORIDE 0.9 % (FLUSH) 0.9 %
10 SYRINGE (ML) INJECTION AS NEEDED
Status: DISCONTINUED | OUTPATIENT
Start: 2022-03-30 | End: 2022-03-30 | Stop reason: HOSPADM

## 2022-03-30 RX ORDER — LABETALOL HYDROCHLORIDE 5 MG/ML
5 INJECTION, SOLUTION INTRAVENOUS
Status: DISCONTINUED | OUTPATIENT
Start: 2022-03-30 | End: 2022-03-30 | Stop reason: HOSPADM

## 2022-03-30 RX ORDER — SODIUM CHLORIDE 0.9 % (FLUSH) 0.9 %
3 SYRINGE (ML) INJECTION EVERY 12 HOURS SCHEDULED
Status: DISCONTINUED | OUTPATIENT
Start: 2022-03-30 | End: 2022-03-30 | Stop reason: HOSPADM

## 2022-03-30 RX ORDER — NALOXONE HCL 0.4 MG/ML
0.4 VIAL (ML) INJECTION AS NEEDED
Status: DISCONTINUED | OUTPATIENT
Start: 2022-03-30 | End: 2022-03-30 | Stop reason: HOSPADM

## 2022-03-30 RX ORDER — FENTANYL CITRATE 50 UG/ML
50 INJECTION, SOLUTION INTRAMUSCULAR; INTRAVENOUS
Status: DISCONTINUED | OUTPATIENT
Start: 2022-03-30 | End: 2022-03-30 | Stop reason: HOSPADM

## 2022-03-30 RX ORDER — PROPOFOL 10 MG/ML
VIAL (ML) INTRAVENOUS AS NEEDED
Status: DISCONTINUED | OUTPATIENT
Start: 2022-03-30 | End: 2022-03-30 | Stop reason: SURG

## 2022-03-30 RX ORDER — SODIUM CHLORIDE 0.9 % (FLUSH) 0.9 %
3-10 SYRINGE (ML) INJECTION AS NEEDED
Status: DISCONTINUED | OUTPATIENT
Start: 2022-03-30 | End: 2022-03-30 | Stop reason: HOSPADM

## 2022-03-30 RX ORDER — DROPERIDOL 2.5 MG/ML
0.62 INJECTION, SOLUTION INTRAMUSCULAR; INTRAVENOUS ONCE AS NEEDED
Status: DISCONTINUED | OUTPATIENT
Start: 2022-03-30 | End: 2022-03-30 | Stop reason: HOSPADM

## 2022-03-30 RX ORDER — ONDANSETRON 2 MG/ML
INJECTION INTRAMUSCULAR; INTRAVENOUS AS NEEDED
Status: DISCONTINUED | OUTPATIENT
Start: 2022-03-30 | End: 2022-03-30 | Stop reason: SURG

## 2022-03-30 RX ORDER — DEXAMETHASONE SODIUM PHOSPHATE 10 MG/ML
INJECTION INTRAMUSCULAR; INTRAVENOUS AS NEEDED
Status: DISCONTINUED | OUTPATIENT
Start: 2022-03-30 | End: 2022-03-30 | Stop reason: SURG

## 2022-03-30 RX ORDER — ACETAMINOPHEN 325 MG/1
650 TABLET ORAL EVERY 4 HOURS PRN
Status: DISCONTINUED | OUTPATIENT
Start: 2022-03-30 | End: 2022-03-30 | Stop reason: HOSPADM

## 2022-03-30 RX ORDER — MIDAZOLAM HYDROCHLORIDE 1 MG/ML
0.5 INJECTION INTRAMUSCULAR; INTRAVENOUS
Status: DISCONTINUED | OUTPATIENT
Start: 2022-03-30 | End: 2022-03-30 | Stop reason: HOSPADM

## 2022-03-30 RX ORDER — LIDOCAINE HYDROCHLORIDE 10 MG/ML
0.5 INJECTION, SOLUTION EPIDURAL; INFILTRATION; INTRACAUDAL; PERINEURAL ONCE AS NEEDED
Status: COMPLETED | OUTPATIENT
Start: 2022-03-30 | End: 2022-03-30

## 2022-03-30 RX ORDER — SODIUM CHLORIDE 0.9 % (FLUSH) 0.9 %
10 SYRINGE (ML) INJECTION EVERY 12 HOURS SCHEDULED
Status: DISCONTINUED | OUTPATIENT
Start: 2022-03-30 | End: 2022-03-30 | Stop reason: HOSPADM

## 2022-03-30 RX ORDER — EPHEDRINE SULFATE 50 MG/ML
INJECTION, SOLUTION INTRAVENOUS AS NEEDED
Status: DISCONTINUED | OUTPATIENT
Start: 2022-03-30 | End: 2022-03-30 | Stop reason: SURG

## 2022-03-30 RX ORDER — SPIRONOLACTONE 25 MG/1
25 TABLET ORAL DAILY
COMMUNITY
End: 2022-05-10 | Stop reason: HOSPADM

## 2022-03-30 RX ORDER — DROPERIDOL 2.5 MG/ML
0.62 INJECTION, SOLUTION INTRAMUSCULAR; INTRAVENOUS AS NEEDED
Status: DISCONTINUED | OUTPATIENT
Start: 2022-03-30 | End: 2022-03-30 | Stop reason: HOSPADM

## 2022-03-30 RX ORDER — LIDOCAINE HYDROCHLORIDE 10 MG/ML
INJECTION, SOLUTION EPIDURAL; INFILTRATION; INTRACAUDAL; PERINEURAL AS NEEDED
Status: DISCONTINUED | OUTPATIENT
Start: 2022-03-30 | End: 2022-03-30 | Stop reason: SURG

## 2022-03-30 RX ORDER — HYDROMORPHONE HYDROCHLORIDE 1 MG/ML
0.5 INJECTION, SOLUTION INTRAMUSCULAR; INTRAVENOUS; SUBCUTANEOUS
Status: DISCONTINUED | OUTPATIENT
Start: 2022-03-30 | End: 2022-03-30 | Stop reason: HOSPADM

## 2022-03-30 RX ORDER — HEPARIN SODIUM 1000 [USP'U]/ML
INJECTION, SOLUTION INTRAVENOUS; SUBCUTANEOUS AS NEEDED
Status: DISCONTINUED | OUTPATIENT
Start: 2022-03-30 | End: 2022-03-30 | Stop reason: SURG

## 2022-03-30 RX ORDER — HYDROCODONE BITARTRATE AND ACETAMINOPHEN 5; 325 MG/1; MG/1
1 TABLET ORAL ONCE AS NEEDED
Status: DISCONTINUED | OUTPATIENT
Start: 2022-03-30 | End: 2022-03-30 | Stop reason: HOSPADM

## 2022-03-30 RX ORDER — LIDOCAINE HYDROCHLORIDE 10 MG/ML
INJECTION, SOLUTION INFILTRATION; PERINEURAL AS NEEDED
Status: DISCONTINUED | OUTPATIENT
Start: 2022-03-30 | End: 2022-03-30 | Stop reason: HOSPADM

## 2022-03-30 RX ORDER — PROMETHAZINE HYDROCHLORIDE 25 MG/1
25 TABLET ORAL ONCE AS NEEDED
Status: DISCONTINUED | OUTPATIENT
Start: 2022-03-30 | End: 2022-03-30 | Stop reason: HOSPADM

## 2022-03-30 RX ORDER — SODIUM CHLORIDE, SODIUM LACTATE, POTASSIUM CHLORIDE, CALCIUM CHLORIDE 600; 310; 30; 20 MG/100ML; MG/100ML; MG/100ML; MG/100ML
9 INJECTION, SOLUTION INTRAVENOUS CONTINUOUS
Status: DISCONTINUED | OUTPATIENT
Start: 2022-03-30 | End: 2022-03-30 | Stop reason: HOSPADM

## 2022-03-30 RX ORDER — PROMETHAZINE HYDROCHLORIDE 25 MG/1
25 SUPPOSITORY RECTAL ONCE AS NEEDED
Status: DISCONTINUED | OUTPATIENT
Start: 2022-03-30 | End: 2022-03-30 | Stop reason: HOSPADM

## 2022-03-30 RX ORDER — HYDRALAZINE HYDROCHLORIDE 20 MG/ML
5 INJECTION INTRAMUSCULAR; INTRAVENOUS
Status: DISCONTINUED | OUTPATIENT
Start: 2022-03-30 | End: 2022-03-30 | Stop reason: HOSPADM

## 2022-03-30 RX ORDER — IPRATROPIUM BROMIDE AND ALBUTEROL SULFATE 2.5; .5 MG/3ML; MG/3ML
3 SOLUTION RESPIRATORY (INHALATION) ONCE AS NEEDED
Status: DISCONTINUED | OUTPATIENT
Start: 2022-03-30 | End: 2022-03-30 | Stop reason: HOSPADM

## 2022-03-30 RX ORDER — CEFAZOLIN SODIUM 2 G/100ML
2 INJECTION, SOLUTION INTRAVENOUS ONCE
Status: COMPLETED | OUTPATIENT
Start: 2022-03-30 | End: 2022-03-30

## 2022-03-30 RX ORDER — MEPERIDINE HYDROCHLORIDE 25 MG/ML
12.5 INJECTION INTRAMUSCULAR; INTRAVENOUS; SUBCUTANEOUS
Status: DISCONTINUED | OUTPATIENT
Start: 2022-03-30 | End: 2022-03-30 | Stop reason: HOSPADM

## 2022-03-30 RX ADMIN — Medication 100 MCG: at 11:05

## 2022-03-30 RX ADMIN — PROPOFOL 150 MG: 10 INJECTION, EMULSION INTRAVENOUS at 11:02

## 2022-03-30 RX ADMIN — SODIUM CHLORIDE, POTASSIUM CHLORIDE, SODIUM LACTATE AND CALCIUM CHLORIDE 9 ML/HR: 600; 310; 30; 20 INJECTION, SOLUTION INTRAVENOUS at 10:13

## 2022-03-30 RX ADMIN — HEPARIN SODIUM 9000 UNITS: 1000 INJECTION, SOLUTION INTRAVENOUS; SUBCUTANEOUS at 11:17

## 2022-03-30 RX ADMIN — PHENYLEPHRINE HYDROCHLORIDE 1 MCG/KG/MIN: 10 INJECTION INTRAVENOUS at 11:08

## 2022-03-30 RX ADMIN — LIDOCAINE HYDROCHLORIDE 50 MG: 10 INJECTION, SOLUTION EPIDURAL; INFILTRATION; INTRACAUDAL; PERINEURAL at 11:02

## 2022-03-30 RX ADMIN — EPHEDRINE SULFATE 5 MG: 50 INJECTION INTRAVENOUS at 11:05

## 2022-03-30 RX ADMIN — ONDANSETRON 4 MG: 2 INJECTION INTRAMUSCULAR; INTRAVENOUS at 11:15

## 2022-03-30 RX ADMIN — DEXAMETHASONE SODIUM PHOSPHATE 4 MG: 10 INJECTION INTRAMUSCULAR; INTRAVENOUS at 11:15

## 2022-03-30 RX ADMIN — CEFAZOLIN SODIUM 2 G: 2 INJECTION, SOLUTION INTRAVENOUS at 11:06

## 2022-03-30 RX ADMIN — LIDOCAINE HYDROCHLORIDE 0.5 ML: 10 INJECTION, SOLUTION EPIDURAL; INFILTRATION; INTRACAUDAL; PERINEURAL at 10:13

## 2022-03-30 NOTE — OP NOTE
AORTAGRAM WITH OR WITHOUT RUNOFFS POSSIBLE STENT  Procedure Report    Patient Name:  Antony Monahan  YOB: 1950    Date of Surgery:  3/30/2022     Indications: This is a 72-year-old gentleman with history of left iliac artery stenting by Dr. Gurrola in the past.  He presents with bilateral lower extremity claudication.  Left leg is worse than right.  His arterial duplex is concerning for inflow stenosis.  He has been offered catheter-based angiography and peripheral intervention including the iliac vessel.    Pre-op Diagnosis:   Iliac stenosis and bilateral lower extremity claudication       Post-Op Diagnosis Codes:  Iliac stenosis and bilateral lower extremity claudication    Procedure/CPT® Codes:      Procedure(s):  ABDOMINAL AORTAGRAM WITH RUNOFF,    LEFT EXTERNAL ILIAC DRUG ELUTING BALLOON ANGIOPLASTY,   LEFT INTERNAL ILIAC STENT,   LEFT LEG ANGIOGRAM    Staff:  Surgeon(s):  Mono Marley MD    Circulator: Luz Yu RN; Esmer Ware RN  Radiology Technologist: Jhony Jacobson  Scrub Person: Johan Yarbrough             Anesthesia: Monitored Anesthesia Care    Estimated Blood Loss: minimal    Implants:    Implant Name Type Inv. Item Serial No.  Lot No. LRB No. Used Action   STNT BILI EXPRSS LD [id number] 75CM - [id number] Stent STNT BILI EXPRSS LD [id number] 75CM  [employer] OMI [id number] Left 1 Implanted       Specimen:          None        Findings:    1.  Abdominal aortography demonstrating ectatic infrarenal aorta.  Bilateral common iliac arteries were patent without significant stenosis.    2.  Left external iliac and common femoral artery had pre-existing stents.  There is severe in-stent stenosis present within the external iliac artery.  This was treated with 7 x 80 drug-eluting balloon angioplasty with no residual stenosis    3.  Left internal iliac artery ostial stenosis over 80%.  This was treated with a 7 x 27 express balloon mounted stent    4.  Left  superficial femoral artery, popliteal is patent throughout.  Two vessel runoff is present within the left lower extremity    Complications: None    Description of Procedure: This patient was taken back to the operating room placed in the supine position on operating table.  Following LMA intubation his abdomen and groins were widely prepped and draped in standard sterile fashion.  A timeout was taken with identification of the patient and the procedure.  Under ultrasound guidance the right common femoral artery was accessed successfully.  A 6 Danish sheath was placed.  Flush catheter was placed within the abdominal aorta.  Heparin was also administered.  Aortography demonstrated no significant aorta or common iliac artery stenosis.  The left  external iliac was stented throughout with evidence of severe in-stent stenosis within 2 segments.  The left internal iliac artery also had severe ostial stenosis.  A 7-45 cm sheath was advanced over the aortic bifurcation.  The left external iliac artery severe in-stent stenosis was treated with a 7 x 80 drug-eluting balloon angioplasty.  3 minutes of inflation time was utilized.  The left lower extremity runoff was next evaluated.  The superficial femoral, popliteal and proximal tibials were all patent.  Patient had single-vessel runoff below the ankle with peroneal open above the ankle.    To treat the severe left internal iliac artery stenosis due to debilitating left leg claudication the internal iliac artery was selected.  This was challenging as the pre-existing external iliac stent was partially caging off the ostium.  A 7 x 27 express balloon mounted stent was deployed.  This was successful in reopening the internal iliac artery.  The external axetil remained patent as well.  The right femoral sheath was next removed and Angio-Seal closure device was used successfully.      Mono Marley MD     Date: 3/30/2022  Time: 11:50 EDT

## 2022-03-30 NOTE — INTERVAL H&P NOTE
[facility] Pre-op    Full history and physical note from office is up to date.       LAB Results:  Lab Results   Component Value Date    WBC 4.24 03/28/2022    HGB 12.7 (L) 03/28/2022    HCT 39.2 03/28/2022    MCV 91.4 03/28/2022     03/28/2022    NEUTROABS 5.39 06/04/2021    GLUCOSE 200 (H) 03/28/2022    BUN 32 (H) 03/28/2022    CREATININE 1.18 03/28/2022    EGFRIFNONA 36 (L) 07/12/2021    EGFRIFAFRI 56 (L) 01/27/2021     03/28/2022    K 3.7 03/28/2022     03/28/2022    CO2 30.0 (H) 03/28/2022    MG 2.1 07/29/2019    PHOS 3.8 06/05/2021    CALCIUM 8.7 03/28/2022    ALBUMIN 3.90 06/05/2021    AST 17 05/31/2021    ALT 13 05/31/2021    BILITOT 0.3 05/31/2021    INR 1.05 03/28/2022     *I have reviewed the patient's recent clinical results.    6/1/21 Echo  Interpretation Summary  · Left ventricular wall thickness is consistent with borderline concentric hypertrophy.  · Estimated right ventricular systolic pressure from tricuspid regurgitation is mildly elevated (35-45 mmHg). Calculated right ventricular systolic pressure from tricuspid regurgitation is 37 mmHg.  · Left ventricular ejection fraction appears to be 26 - 30%.  · The right atrial cavity is dilated.  · Aortic valve sclerosis  · Markedly hypokinetic inferolateral wall  ·     Cancer Staging (if applicable)  Cancer Patient: __ yes _X_no __unknown__N/A; If yes, clinical stage T:__ N:__M:__, stage group or __N/A        Mar Dugan, APRN   03/30/22   9:45 AM EDT

## 2022-04-28 ENCOUNTER — HOSPITAL ENCOUNTER (EMERGENCY)
Facility: HOSPITAL | Age: 72
Discharge: HOME | End: 2022-04-28
Attending: EMERGENCY MEDICINE | Admitting: EMERGENCY MEDICINE

## 2022-04-28 ENCOUNTER — APPOINTMENT (OUTPATIENT)
Dept: GENERAL RADIOLOGY | Facility: HOSPITAL | Age: 72
End: 2022-04-28

## 2022-04-28 VITALS
BODY MASS INDEX: 36.1 KG/M2 | OXYGEN SATURATION: 93 % | TEMPERATURE: 97.9 F | HEIGHT: 67 IN | SYSTOLIC BLOOD PRESSURE: 143 MMHG | DIASTOLIC BLOOD PRESSURE: 71 MMHG | RESPIRATION RATE: 20 BRPM | HEART RATE: 61 BPM | WEIGHT: 230 LBS

## 2022-04-28 DIAGNOSIS — I50.23 ACUTE ON CHRONIC SYSTOLIC CHF (CONGESTIVE HEART FAILURE): Primary | ICD-10-CM

## 2022-04-28 LAB
ALBUMIN SERPL-MCNC: 4 G/DL (ref 3.5–5.2)
ALBUMIN/GLOB SERPL: 1.3 G/DL
ALP SERPL-CCNC: 91 U/L (ref 39–117)
ALT SERPL W P-5'-P-CCNC: 16 U/L (ref 1–41)
ANION GAP SERPL CALCULATED.3IONS-SCNC: 10 MMOL/L (ref 5–15)
AST SERPL-CCNC: 21 U/L (ref 1–40)
BASOPHILS # BLD AUTO: 0.03 10*3/MM3 (ref 0–0.2)
BASOPHILS NFR BLD AUTO: 0.4 % (ref 0–1.5)
BILIRUB SERPL-MCNC: 0.8 MG/DL (ref 0–1.2)
BUN SERPL-MCNC: 22 MG/DL (ref 8–23)
BUN/CREAT SERPL: 15.6 (ref 7–25)
CALCIUM SPEC-SCNC: 8.6 MG/DL (ref 8.6–10.5)
CHLORIDE SERPL-SCNC: 103 MMOL/L (ref 98–107)
CO2 SERPL-SCNC: 27 MMOL/L (ref 22–29)
CREAT SERPL-MCNC: 1.41 MG/DL (ref 0.76–1.27)
DEPRECATED RDW RBC AUTO: 45.6 FL (ref 37–54)
EGFRCR SERPLBLD CKD-EPI 2021: 52.6 ML/MIN/1.73
EOSINOPHIL # BLD AUTO: 0.2 10*3/MM3 (ref 0–0.4)
EOSINOPHIL NFR BLD AUTO: 2.4 % (ref 0.3–6.2)
ERYTHROCYTE [DISTWIDTH] IN BLOOD BY AUTOMATED COUNT: 14.1 % (ref 12.3–15.4)
FLUAV SUBTYP SPEC NAA+PROBE: NOT DETECTED
FLUBV RNA ISLT QL NAA+PROBE: NOT DETECTED
GLOBULIN UR ELPH-MCNC: 3.1 GM/DL
GLUCOSE SERPL-MCNC: 122 MG/DL (ref 65–99)
HCT VFR BLD AUTO: 38.5 % (ref 37.5–51)
HGB BLD-MCNC: 12.6 G/DL (ref 13–17.7)
HOLD SPECIMEN: NORMAL
IMM GRANULOCYTES # BLD AUTO: 0.02 10*3/MM3 (ref 0–0.05)
IMM GRANULOCYTES NFR BLD AUTO: 0.2 % (ref 0–0.5)
LYMPHOCYTES # BLD AUTO: 0.64 10*3/MM3 (ref 0.7–3.1)
LYMPHOCYTES NFR BLD AUTO: 7.8 % (ref 19.6–45.3)
MCH RBC QN AUTO: 29 PG (ref 26.6–33)
MCHC RBC AUTO-ENTMCNC: 32.7 G/DL (ref 31.5–35.7)
MCV RBC AUTO: 88.5 FL (ref 79–97)
MONOCYTES # BLD AUTO: 0.7 10*3/MM3 (ref 0.1–0.9)
MONOCYTES NFR BLD AUTO: 8.5 % (ref 5–12)
NEUTROPHILS NFR BLD AUTO: 6.63 10*3/MM3 (ref 1.7–7)
NEUTROPHILS NFR BLD AUTO: 80.7 % (ref 42.7–76)
NRBC BLD AUTO-RTO: 0 /100 WBC (ref 0–0.2)
NT-PROBNP SERPL-MCNC: 1373 PG/ML (ref 0–900)
PLATELET # BLD AUTO: 137 10*3/MM3 (ref 140–450)
PMV BLD AUTO: 12.1 FL (ref 6–12)
POTASSIUM SERPL-SCNC: 3.5 MMOL/L (ref 3.5–5.2)
PROT SERPL-MCNC: 7.1 G/DL (ref 6–8.5)
RBC # BLD AUTO: 4.35 10*6/MM3 (ref 4.14–5.8)
SARS-COV-2 RNA PNL SPEC NAA+PROBE: NOT DETECTED
SODIUM SERPL-SCNC: 140 MMOL/L (ref 136–145)
TROPONIN T SERPL-MCNC: <0.01 NG/ML (ref 0–0.03)
WBC NRBC COR # BLD: 8.22 10*3/MM3 (ref 3.4–10.8)
WHOLE BLOOD HOLD SPECIMEN: NORMAL
WHOLE BLOOD HOLD SPECIMEN: NORMAL

## 2022-04-28 PROCEDURE — 80053 COMPREHEN METABOLIC PANEL: CPT | Performed by: EMERGENCY MEDICINE

## 2022-04-28 PROCEDURE — 84484 ASSAY OF TROPONIN QUANT: CPT | Performed by: EMERGENCY MEDICINE

## 2022-04-28 PROCEDURE — 85025 COMPLETE CBC W/AUTO DIFF WBC: CPT | Performed by: EMERGENCY MEDICINE

## 2022-04-28 PROCEDURE — 83880 ASSAY OF NATRIURETIC PEPTIDE: CPT | Performed by: EMERGENCY MEDICINE

## 2022-04-28 PROCEDURE — 99283 EMERGENCY DEPT VISIT LOW MDM: CPT

## 2022-04-28 PROCEDURE — 25010000002 FUROSEMIDE PER 20 MG: Performed by: EMERGENCY MEDICINE

## 2022-04-28 PROCEDURE — 71045 X-RAY EXAM CHEST 1 VIEW: CPT

## 2022-04-28 PROCEDURE — 93005 ELECTROCARDIOGRAM TRACING: CPT | Performed by: EMERGENCY MEDICINE

## 2022-04-28 PROCEDURE — 96374 THER/PROPH/DIAG INJ IV PUSH: CPT

## 2022-04-28 PROCEDURE — 87636 SARSCOV2 & INF A&B AMP PRB: CPT | Performed by: EMERGENCY MEDICINE

## 2022-04-28 RX ORDER — METOLAZONE 5 MG/1
5 TABLET ORAL DAILY
Qty: 5 TABLET | Refills: 0 | Status: SHIPPED | OUTPATIENT
Start: 2022-04-28 | End: 2022-04-29 | Stop reason: SDUPTHER

## 2022-04-28 RX ORDER — SODIUM CHLORIDE 0.9 % (FLUSH) 0.9 %
10 SYRINGE (ML) INJECTION AS NEEDED
Status: DISCONTINUED | OUTPATIENT
Start: 2022-04-28 | End: 2022-04-28 | Stop reason: HOSPADM

## 2022-04-28 RX ORDER — IODIXANOL 320 MG/ML
INJECTION, SOLUTION INTRAVASCULAR AS NEEDED
Status: DISCONTINUED | OUTPATIENT
Start: 2022-03-30 | End: 2022-04-28 | Stop reason: HOSPADM

## 2022-04-28 RX ORDER — FUROSEMIDE 10 MG/ML
40 INJECTION INTRAMUSCULAR; INTRAVENOUS ONCE
Status: COMPLETED | OUTPATIENT
Start: 2022-04-28 | End: 2022-04-28

## 2022-04-28 RX ADMIN — FUROSEMIDE 40 MG: 10 INJECTION, SOLUTION INTRAMUSCULAR; INTRAVENOUS at 19:25

## 2022-04-28 RX ADMIN — NITROGLYCERIN 1 INCH: 20 OINTMENT TOPICAL at 19:25

## 2022-04-29 LAB
QT INTERVAL: 438 MS
QTC INTERVAL: 451 MS

## 2022-04-29 RX ORDER — METOLAZONE 5 MG/1
5 TABLET ORAL DAILY
Qty: 5 TABLET | Refills: 0 | Status: SHIPPED | OUTPATIENT
Start: 2022-04-29 | End: 2022-05-10 | Stop reason: HOSPADM

## 2022-05-01 ENCOUNTER — HOSPITAL ENCOUNTER (INPATIENT)
Facility: HOSPITAL | Age: 72
LOS: 7 days | Discharge: HOME | End: 2022-05-10
Attending: EMERGENCY MEDICINE | Admitting: INTERNAL MEDICINE

## 2022-05-01 ENCOUNTER — APPOINTMENT (OUTPATIENT)
Dept: CARDIOLOGY | Facility: HOSPITAL | Age: 72
End: 2022-05-01

## 2022-05-01 ENCOUNTER — APPOINTMENT (OUTPATIENT)
Dept: GENERAL RADIOLOGY | Facility: HOSPITAL | Age: 72
End: 2022-05-01

## 2022-05-01 DIAGNOSIS — E86.9 VOLUME DEPLETION: ICD-10-CM

## 2022-05-01 DIAGNOSIS — N17.9 AKI (ACUTE KIDNEY INJURY): ICD-10-CM

## 2022-05-01 DIAGNOSIS — J44.1 COPD EXACERBATION: Primary | ICD-10-CM

## 2022-05-01 DIAGNOSIS — J18.9 PNEUMONIA DUE TO INFECTIOUS ORGANISM, UNSPECIFIED LATERALITY, UNSPECIFIED PART OF LUNG: ICD-10-CM

## 2022-05-01 LAB
ALBUMIN SERPL-MCNC: 3.7 G/DL (ref 3.5–5.2)
ALBUMIN/GLOB SERPL: 1 G/DL
ALP SERPL-CCNC: 136 U/L (ref 39–117)
ALT SERPL W P-5'-P-CCNC: 33 U/L (ref 1–41)
ANION GAP SERPL CALCULATED.3IONS-SCNC: 14 MMOL/L (ref 5–15)
AST SERPL-CCNC: 39 U/L (ref 1–40)
B PARAPERT DNA SPEC QL NAA+PROBE: NOT DETECTED
B PERT DNA SPEC QL NAA+PROBE: NOT DETECTED
BASOPHILS # BLD AUTO: 0.03 10*3/MM3 (ref 0–0.2)
BASOPHILS NFR BLD AUTO: 0.5 % (ref 0–1.5)
BILIRUB SERPL-MCNC: 0.9 MG/DL (ref 0–1.2)
BUN SERPL-MCNC: 35 MG/DL (ref 8–23)
BUN/CREAT SERPL: 18.5 (ref 7–25)
C PNEUM DNA NPH QL NAA+NON-PROBE: NOT DETECTED
CALCIUM SPEC-SCNC: 9.2 MG/DL (ref 8.6–10.5)
CHLORIDE SERPL-SCNC: 97 MMOL/L (ref 98–107)
CO2 SERPL-SCNC: 28 MMOL/L (ref 22–29)
CREAT SERPL-MCNC: 1.89 MG/DL (ref 0.76–1.27)
D-LACTATE SERPL-SCNC: 1.5 MMOL/L (ref 0.5–2)
DEPRECATED RDW RBC AUTO: 45 FL (ref 37–54)
EGFRCR SERPLBLD CKD-EPI 2021: 37 ML/MIN/1.73
EOSINOPHIL # BLD AUTO: 0.06 10*3/MM3 (ref 0–0.4)
EOSINOPHIL NFR BLD AUTO: 0.9 % (ref 0.3–6.2)
ERYTHROCYTE [DISTWIDTH] IN BLOOD BY AUTOMATED COUNT: 14.1 % (ref 12.3–15.4)
FLUAV SUBTYP SPEC NAA+PROBE: NOT DETECTED
FLUBV RNA ISLT QL NAA+PROBE: NOT DETECTED
GLOBULIN UR ELPH-MCNC: 3.6 GM/DL
GLUCOSE SERPL-MCNC: 142 MG/DL (ref 65–99)
HADV DNA SPEC NAA+PROBE: NOT DETECTED
HCOV 229E RNA SPEC QL NAA+PROBE: NOT DETECTED
HCOV HKU1 RNA SPEC QL NAA+PROBE: NOT DETECTED
HCOV NL63 RNA SPEC QL NAA+PROBE: NOT DETECTED
HCOV OC43 RNA SPEC QL NAA+PROBE: NOT DETECTED
HCT VFR BLD AUTO: 36.7 % (ref 37.5–51)
HGB BLD-MCNC: 12.1 G/DL (ref 13–17.7)
HMPV RNA NPH QL NAA+NON-PROBE: NOT DETECTED
HOLD SPECIMEN: NORMAL
HPIV1 RNA ISLT QL NAA+PROBE: NOT DETECTED
HPIV2 RNA SPEC QL NAA+PROBE: NOT DETECTED
HPIV3 RNA NPH QL NAA+PROBE: NOT DETECTED
HPIV4 P GENE NPH QL NAA+PROBE: NOT DETECTED
IMM GRANULOCYTES # BLD AUTO: 0.02 10*3/MM3 (ref 0–0.05)
IMM GRANULOCYTES NFR BLD AUTO: 0.3 % (ref 0–0.5)
LYMPHOCYTES # BLD AUTO: 0.74 10*3/MM3 (ref 0.7–3.1)
LYMPHOCYTES NFR BLD AUTO: 11.7 % (ref 19.6–45.3)
M PNEUMO IGG SER IA-ACNC: NOT DETECTED
MCH RBC QN AUTO: 28.9 PG (ref 26.6–33)
MCHC RBC AUTO-ENTMCNC: 33 G/DL (ref 31.5–35.7)
MCV RBC AUTO: 87.6 FL (ref 79–97)
MONOCYTES # BLD AUTO: 0.82 10*3/MM3 (ref 0.1–0.9)
MONOCYTES NFR BLD AUTO: 12.9 % (ref 5–12)
NEUTROPHILS NFR BLD AUTO: 4.67 10*3/MM3 (ref 1.7–7)
NEUTROPHILS NFR BLD AUTO: 73.7 % (ref 42.7–76)
NRBC BLD AUTO-RTO: 0 /100 WBC (ref 0–0.2)
NT-PROBNP SERPL-MCNC: 5480 PG/ML (ref 0–900)
PLATELET # BLD AUTO: 178 10*3/MM3 (ref 140–450)
PMV BLD AUTO: 12.6 FL (ref 6–12)
POTASSIUM SERPL-SCNC: 3.3 MMOL/L (ref 3.5–5.2)
PROT SERPL-MCNC: 7.3 G/DL (ref 6–8.5)
RBC # BLD AUTO: 4.19 10*6/MM3 (ref 4.14–5.8)
RHINOVIRUS RNA SPEC NAA+PROBE: DETECTED
RSV RNA NPH QL NAA+NON-PROBE: NOT DETECTED
SARS-COV-2 RNA NPH QL NAA+NON-PROBE: NOT DETECTED
SODIUM SERPL-SCNC: 139 MMOL/L (ref 136–145)
TROPONIN T SERPL-MCNC: 0.02 NG/ML (ref 0–0.03)
WBC NRBC COR # BLD: 6.34 10*3/MM3 (ref 3.4–10.8)
WHOLE BLOOD HOLD SPECIMEN: NORMAL
WHOLE BLOOD HOLD SPECIMEN: NORMAL

## 2022-05-01 PROCEDURE — 36415 COLL VENOUS BLD VENIPUNCTURE: CPT

## 2022-05-01 PROCEDURE — 94799 UNLISTED PULMONARY SVC/PX: CPT

## 2022-05-01 PROCEDURE — 93306 TTE W/DOPPLER COMPLETE: CPT

## 2022-05-01 PROCEDURE — 87040 BLOOD CULTURE FOR BACTERIA: CPT | Performed by: EMERGENCY MEDICINE

## 2022-05-01 PROCEDURE — 25010000002 AZITHROMYCIN PER 500 MG: Performed by: EMERGENCY MEDICINE

## 2022-05-01 PROCEDURE — 85025 COMPLETE CBC W/AUTO DIFF WBC: CPT | Performed by: EMERGENCY MEDICINE

## 2022-05-01 PROCEDURE — 25010000002 CEFTRIAXONE PER 250 MG: Performed by: EMERGENCY MEDICINE

## 2022-05-01 PROCEDURE — 25010000002 METHYLPREDNISOLONE PER 125 MG: Performed by: INTERNAL MEDICINE

## 2022-05-01 PROCEDURE — 93005 ELECTROCARDIOGRAM TRACING: CPT

## 2022-05-01 PROCEDURE — 94761 N-INVAS EAR/PLS OXIMETRY MLT: CPT

## 2022-05-01 PROCEDURE — 83880 ASSAY OF NATRIURETIC PEPTIDE: CPT | Performed by: EMERGENCY MEDICINE

## 2022-05-01 PROCEDURE — 25010000002 METHYLPREDNISOLONE PER 125 MG: Performed by: EMERGENCY MEDICINE

## 2022-05-01 PROCEDURE — 99285 EMERGENCY DEPT VISIT HI MDM: CPT

## 2022-05-01 PROCEDURE — 0202U NFCT DS 22 TRGT SARS-COV-2: CPT | Performed by: EMERGENCY MEDICINE

## 2022-05-01 PROCEDURE — 71045 X-RAY EXAM CHEST 1 VIEW: CPT

## 2022-05-01 PROCEDURE — 94640 AIRWAY INHALATION TREATMENT: CPT

## 2022-05-01 PROCEDURE — 99223 1ST HOSP IP/OBS HIGH 75: CPT | Performed by: INTERNAL MEDICINE

## 2022-05-01 PROCEDURE — 94664 DEMO&/EVAL PT USE INHALER: CPT

## 2022-05-01 PROCEDURE — 93005 ELECTROCARDIOGRAM TRACING: CPT | Performed by: EMERGENCY MEDICINE

## 2022-05-01 PROCEDURE — 84484 ASSAY OF TROPONIN QUANT: CPT | Performed by: EMERGENCY MEDICINE

## 2022-05-01 PROCEDURE — 80053 COMPREHEN METABOLIC PANEL: CPT | Performed by: EMERGENCY MEDICINE

## 2022-05-01 PROCEDURE — 83605 ASSAY OF LACTIC ACID: CPT | Performed by: EMERGENCY MEDICINE

## 2022-05-01 RX ORDER — SPIRONOLACTONE 25 MG/1
25 TABLET ORAL DAILY
Status: DISCONTINUED | OUTPATIENT
Start: 2022-05-02 | End: 2022-05-06

## 2022-05-01 RX ORDER — ACETAMINOPHEN 325 MG/1
650 TABLET ORAL EVERY 4 HOURS PRN
Status: DISCONTINUED | OUTPATIENT
Start: 2022-05-01 | End: 2022-05-10 | Stop reason: HOSPADM

## 2022-05-01 RX ORDER — ROSUVASTATIN CALCIUM 20 MG/1
40 TABLET, COATED ORAL NIGHTLY
Status: DISCONTINUED | OUTPATIENT
Start: 2022-05-01 | End: 2022-05-06

## 2022-05-01 RX ORDER — ONDANSETRON 2 MG/ML
4 INJECTION INTRAMUSCULAR; INTRAVENOUS EVERY 6 HOURS PRN
Status: DISCONTINUED | OUTPATIENT
Start: 2022-05-01 | End: 2022-05-10 | Stop reason: HOSPADM

## 2022-05-01 RX ORDER — IPRATROPIUM BROMIDE AND ALBUTEROL SULFATE 2.5; .5 MG/3ML; MG/3ML
3 SOLUTION RESPIRATORY (INHALATION)
Status: DISCONTINUED | OUTPATIENT
Start: 2022-05-01 | End: 2022-05-10 | Stop reason: HOSPADM

## 2022-05-01 RX ORDER — LEVOTHYROXINE SODIUM 0.1 MG/1
100 TABLET ORAL
Status: DISCONTINUED | OUTPATIENT
Start: 2022-05-01 | End: 2022-05-10 | Stop reason: HOSPADM

## 2022-05-01 RX ORDER — METHYLPREDNISOLONE SODIUM SUCCINATE 125 MG/2ML
60 INJECTION, POWDER, LYOPHILIZED, FOR SOLUTION INTRAMUSCULAR; INTRAVENOUS EVERY 12 HOURS
Status: DISCONTINUED | OUTPATIENT
Start: 2022-05-01 | End: 2022-05-04

## 2022-05-01 RX ORDER — CLOPIDOGREL BISULFATE 75 MG/1
75 TABLET ORAL DAILY
Status: DISCONTINUED | OUTPATIENT
Start: 2022-05-01 | End: 2022-05-10 | Stop reason: HOSPADM

## 2022-05-01 RX ORDER — DOXYCYCLINE 100 MG/1
100 CAPSULE ORAL EVERY 12 HOURS SCHEDULED
Status: COMPLETED | OUTPATIENT
Start: 2022-05-01 | End: 2022-05-07

## 2022-05-01 RX ORDER — BUMETANIDE 1 MG/1
2 TABLET ORAL
Status: DISCONTINUED | OUTPATIENT
Start: 2022-05-02 | End: 2022-05-04

## 2022-05-01 RX ORDER — AMLODIPINE BESYLATE 5 MG/1
5 TABLET ORAL EVERY EVENING
Status: DISCONTINUED | OUTPATIENT
Start: 2022-05-01 | End: 2022-05-05

## 2022-05-01 RX ORDER — SODIUM CHLORIDE 0.9 % (FLUSH) 0.9 %
10 SYRINGE (ML) INJECTION AS NEEDED
Status: DISCONTINUED | OUTPATIENT
Start: 2022-05-01 | End: 2022-05-10 | Stop reason: HOSPADM

## 2022-05-01 RX ORDER — BUDESONIDE 0.5 MG/2ML
0.5 INHALANT ORAL
Status: DISCONTINUED | OUTPATIENT
Start: 2022-05-01 | End: 2022-05-10 | Stop reason: HOSPADM

## 2022-05-01 RX ORDER — ALBUTEROL SULFATE 2.5 MG/3ML
2.5 SOLUTION RESPIRATORY (INHALATION) ONCE
Status: COMPLETED | OUTPATIENT
Start: 2022-05-01 | End: 2022-05-01

## 2022-05-01 RX ORDER — ACETAMINOPHEN 160 MG/5ML
650 SOLUTION ORAL EVERY 4 HOURS PRN
Status: DISCONTINUED | OUTPATIENT
Start: 2022-05-01 | End: 2022-05-10 | Stop reason: HOSPADM

## 2022-05-01 RX ORDER — ACETAMINOPHEN 650 MG/1
650 SUPPOSITORY RECTAL EVERY 4 HOURS PRN
Status: DISCONTINUED | OUTPATIENT
Start: 2022-05-01 | End: 2022-05-10 | Stop reason: HOSPADM

## 2022-05-01 RX ORDER — METHYLPREDNISOLONE SODIUM SUCCINATE 125 MG/2ML
125 INJECTION, POWDER, LYOPHILIZED, FOR SOLUTION INTRAMUSCULAR; INTRAVENOUS ONCE
Status: COMPLETED | OUTPATIENT
Start: 2022-05-01 | End: 2022-05-01

## 2022-05-01 RX ORDER — ONDANSETRON 4 MG/1
4 TABLET, FILM COATED ORAL EVERY 6 HOURS PRN
Status: DISCONTINUED | OUTPATIENT
Start: 2022-05-01 | End: 2022-05-10 | Stop reason: HOSPADM

## 2022-05-01 RX ORDER — CARVEDILOL 12.5 MG/1
25 TABLET ORAL 2 TIMES DAILY
Status: DISCONTINUED | OUTPATIENT
Start: 2022-05-01 | End: 2022-05-10 | Stop reason: HOSPADM

## 2022-05-01 RX ORDER — ALLOPURINOL 100 MG/1
100 TABLET ORAL 2 TIMES DAILY
Status: DISCONTINUED | OUTPATIENT
Start: 2022-05-01 | End: 2022-05-10 | Stop reason: HOSPADM

## 2022-05-01 RX ORDER — SODIUM CHLORIDE 0.9 % (FLUSH) 0.9 %
10 SYRINGE (ML) INJECTION EVERY 12 HOURS SCHEDULED
Status: DISCONTINUED | OUTPATIENT
Start: 2022-05-01 | End: 2022-05-10 | Stop reason: HOSPADM

## 2022-05-01 RX ORDER — MULTIPLE VITAMINS W/ MINERALS TAB 9MG-400MCG
1 TAB ORAL DAILY
Status: DISCONTINUED | OUTPATIENT
Start: 2022-05-02 | End: 2022-05-01

## 2022-05-01 RX ORDER — PANTOPRAZOLE SODIUM 40 MG/1
40 TABLET, DELAYED RELEASE ORAL DAILY
Status: DISCONTINUED | OUTPATIENT
Start: 2022-05-01 | End: 2022-05-10 | Stop reason: HOSPADM

## 2022-05-01 RX ADMIN — IPRATROPIUM BROMIDE AND ALBUTEROL SULFATE 3 ML: .5; 3 SOLUTION RESPIRATORY (INHALATION) at 19:39

## 2022-05-01 RX ADMIN — ALLOPURINOL 100 MG: 100 TABLET ORAL at 21:15

## 2022-05-01 RX ADMIN — METHYLPREDNISOLONE SODIUM SUCCINATE 125 MG: 125 INJECTION, POWDER, FOR SOLUTION INTRAMUSCULAR; INTRAVENOUS at 11:31

## 2022-05-01 RX ADMIN — ROSUVASTATIN 40 MG: 20 TABLET, FILM COATED ORAL at 21:15

## 2022-05-01 RX ADMIN — ALBUTEROL SULFATE 2.5 MG: 2.5 SOLUTION RESPIRATORY (INHALATION) at 10:22

## 2022-05-01 RX ADMIN — AMLODIPINE BESYLATE 5 MG: 5 TABLET ORAL at 16:37

## 2022-05-01 RX ADMIN — SACUBITRIL AND VALSARTAN 1 TABLET: 97; 103 TABLET, FILM COATED ORAL at 21:21

## 2022-05-01 RX ADMIN — APIXABAN 5 MG: 5 TABLET, FILM COATED ORAL at 21:15

## 2022-05-01 RX ADMIN — BUDESONIDE 0.5 MG: 0.5 SUSPENSION RESPIRATORY (INHALATION) at 19:39

## 2022-05-01 RX ADMIN — Medication 10 ML: at 12:51

## 2022-05-01 RX ADMIN — METHYLPREDNISOLONE SODIUM SUCCINATE 60 MG: 125 INJECTION, POWDER, FOR SOLUTION INTRAMUSCULAR; INTRAVENOUS at 21:15

## 2022-05-01 RX ADMIN — IPRATROPIUM BROMIDE AND ALBUTEROL SULFATE 3 ML: .5; 3 SOLUTION RESPIRATORY (INHALATION) at 14:22

## 2022-05-01 RX ADMIN — DOXYCYCLINE 100 MG: 100 CAPSULE ORAL at 21:15

## 2022-05-01 RX ADMIN — PANTOPRAZOLE SODIUM 40 MG: 40 TABLET, DELAYED RELEASE ORAL at 16:37

## 2022-05-01 RX ADMIN — CLOPIDOGREL BISULFATE 75 MG: 75 TABLET ORAL at 16:37

## 2022-05-01 RX ADMIN — SODIUM CHLORIDE 1 G: 900 INJECTION INTRAVENOUS at 11:35

## 2022-05-01 RX ADMIN — CARVEDILOL 25 MG: 12.5 TABLET, FILM COATED ORAL at 21:15

## 2022-05-01 RX ADMIN — AZITHROMYCIN 500 MG: 500 INJECTION, POWDER, LYOPHILIZED, FOR SOLUTION INTRAVENOUS at 12:46

## 2022-05-01 RX ADMIN — SODIUM CHLORIDE 1000 ML: 9 INJECTION, SOLUTION INTRAVENOUS at 11:34

## 2022-05-01 NOTE — H&P
[facility] Medicine Services  HISTORY AND PHYSICAL    Patient Name: Antony Monahan  : 1950  MRN: [id number]  Primary Care Physician: Franky Carrington MD  Date of admission: 2022      Subjective   Subjective     Chief Complaint:  Shortness of breath    HPI:  Antony Monahan is a 72 y.o. male with history of CAD, ischemic cardiomyopathy, PAF, COPD, PAD s/p left iliac stent 3/30/22, HTN, dyslipidemia, hypothyroid, depression and anxiety who presents with shortness of breath, cough, fatigue and leg edema.  Mr Monahan was seen several days ago in the ED for shortness of breath and edema and was prescribed metolazone to take along with his usual diuretic.  Leg edema has improved, but dyspnea continues with a worsening productive cough, green sputum. Headache for the past several days.      Review of Systems   Constitutional: Positive for fatigue. Negative for chills and fever.   Eyes: Negative.    Respiratory: Positive for cough and shortness of breath.    Cardiovascular: Positive for leg swelling.   Gastrointestinal: Negative.    Endocrine: Negative.    Genitourinary: Negative.    Musculoskeletal: Negative.    Allergic/Immunologic: Negative.    Neurological: Positive for headaches.   Hematological: Negative.    Psychiatric/Behavioral: Negative.         All other systems reviewed and are negative.     Personal History     Past Medical History:   Diagnosis Date   • Anxiety    • Arthritis    • Back pain     past injections, years ago   • Borderline diabetes    • CAD (coronary artery disease)    • Chronic narcotic use     prn   • COPD (chronic obstructive pulmonary disease) (HCC) 2016   • Depression     intermittent   • Diabetes mellitus (HCC)     checsk sugar once per week   • Full dentures    • Headache    • HTN (hypertension) 2016   • Hypercholesterolemia 2016   • Hypothyroid 2016   • Joint pain     hydrocodone prn   • Obesity    • Pacemaker    •  PAD (peripheral artery disease) ([facility]) 07/29/2016   • SOBOE (shortness of breath on exertion)    • Systolic CHF ([facility]) 07/29/2016   • Tinnitus    • Tobacco abuse 07/29/2016   • Wears glasses     readers             Past Surgical History:   Procedure Laterality Date   • AORTAGRAM Left 3/30/2022    Procedure: ABDOMINAL AORTAGRAM WITH RUNOFF,  LEFT EXTERNAL ILIAC DRUG ELUTING BALLOON ANGIOPLASTY, LEFT INTERNAL ILIAC STENT, LEFT LEG ANGIOGRAM;  Surgeon: Mono Marley MD;  Location: [facility] HYBRID AMBROSE;  Service: Vascular;  Laterality: Left;   • CARDIAC CATHETERIZATION N/A 07/29/2016    Procedure: Left Heart Cath;  Surgeon: Drew Gurrola MD;  Location:  [facility];  Service:    • CARDIAC CATHETERIZATION N/A 07/29/2019    Procedure: LEFT HEART CATH;  Surgeon: Drew Gurrola MD;  Location:  [facility];  Service: Cardiovascular   • CARDIAC CATHETERIZATION N/A 06/03/2021    Procedure: LEFT HEART CATH;  Surgeon: Drew Gurrola MD;  Location:  [facility];  Service: Cardiovascular;  Laterality: N/A;   • CARDIAC ELECTROPHYSIOLOGY PROCEDURE N/A 07/30/2019    Procedure: ICD SC new;  Surgeon: Musa Harden MD;  Location:  [facility] EP INVASIVE LOCATION;  Service: Cardiology   • COLONOSCOPY     • INTERVENTIONAL RADIOLOGY PROCEDURE N/A 09/14/2016    Procedure: AORTIC Aortagram with Runoff;  Surgeon: Drew Gurrola MD;  Location:  [facility];  Service:    • INTERVENTIONAL RADIOLOGY PROCEDURE N/A 05/09/2017    Procedure: Abdominal Aortagram with Runoff;  Surgeon: Drew Gurrola MD;  Location:  [facility];  Service:    • INTERVENTIONAL RADIOLOGY PROCEDURE N/A 09/05/2018    Procedure: Abdominal Aortagram with Runoff;  Surgeon: Drew Gurrola MD;  Location:  [facility];  Service: Cardiovascular   • INTERVENTIONAL RADIOLOGY PROCEDURE Bilateral 07/29/2019    Procedure: Carotid Cerebral Angiogram;   Surgeon: Cladue Atkins MD;  Location:  [facility];  Service: Interventional Radiology   • INTERVENTIONAL RADIOLOGY PROCEDURE N/A 08/18/2020    Procedure: AARO+/-;  Surgeon: Drew Gurrola MD;  Location:  [facility];  Service: Cardiovascular;  Laterality: N/A;   • INTERVENTIONAL RADIOLOGY PROCEDURE Bilateral 11/20/2020    Procedure: Carotid Cerebral Angiogram with stent;  Surgeon: Claude Atkins MD;  Location:  [facility];  Service: Interventional Radiology;  Laterality: Bilateral;   • KNEE SURGERY Left 08/17/2015   • OTHER SURGICAL HISTORY      L LEG STENT   • VT TCAT IV STENT CRV CRTD ART EMBOLIC PROTECJ N/A 09/25/2019    Left Carotid Stent; Dr. Claude Atkins   • UMBILICAL HERNIA REPAIR  2006    no mesh, done in [facility]   • WRIST SURGERY Right        Family History:  family history includes Arrhythmia in his sister; Diabetes in his sister; Heart attack in his father; Heart failure in his father; Hypertension in his father and mother. Otherwise pertinent FHx was reviewed and unremarkable.     Social History:  reports that he quit smoking about 3 years ago. His smoking use included cigarettes. He has a 100.00 pack-year smoking history. He has never used smokeless tobacco. He reports current alcohol use of about 2.0 standard drinks of alcohol per week. He reports that he does not use drugs.  Social History     Social History Narrative    Pt consumes caffeine: 2-3 drinks per day    Lives in [facility] with wife. prior .        Medications:  Available home medication information reviewed.  (Not in a hospital admission)      No Known Allergies    Objective   Objective     Vital Signs:   Temp:  [98.9 °F (37.2 °C)] 98.9 °F (37.2 °C)  Heart Rate:  [82-87] 84  Resp:  [16-20] 18  BP: (102-104)/(57-68) 102/57       Physical Exam   Constitutional - no acute distress, nontoxic, in bed  HEENT-NCAT, mucous membranes moist  CV-irregularly  irregular  Resp-diminished bilaterally with expiratory wheezes  Abd-soft, nontender, nondistended, normoactive bowel sounds  Ext-trace edema LE bilaterally  Neuro-alert and oriented, speech clear, moves all extremities   Psych-normal affect   Skin- No rash on exposed UE or LE bilaterally      Result Review:  I have personally reviewed the results from the time of this admission to 5/1/2022 12:35 EDT and agree with these findings:  []  Laboratory  []  Microbiology  []  Radiology  []  EKG/Telemetry   []  Cardiology/Vascular   []  Pathology  []  Old records  []  Other:  Most notable findings include: PCR panel positive for rhinovirus/enterovirus      LAB RESULTS:      Lab 05/01/22  1017 04/28/22  1802   WBC 6.34 8.22   HEMOGLOBIN 12.1* 12.6*   HEMATOCRIT 36.7* 38.5   PLATELETS 178 137*   NEUTROS ABS 4.67 6.63   IMMATURE GRANS (ABS) 0.02 0.02   LYMPHS ABS 0.74 0.64*   MONOS ABS 0.82 0.70   EOS ABS 0.06 0.20   MCV 87.6 88.5   LACTATE 1.5  --          Lab 05/01/22  1017 04/28/22  1802   SODIUM 139 140   POTASSIUM 3.3* 3.5   CHLORIDE 97* 103   CO2 28.0 27.0   ANION GAP 14.0 10.0   BUN 35* 22   CREATININE 1.89* 1.41*   EGFR 37.0* 52.6*   GLUCOSE 142* 122*   CALCIUM 9.2 8.6         Lab 05/01/22  1017 04/28/22  1802   TOTAL PROTEIN 7.3 7.1   ALBUMIN 3.70 4.00   GLOBULIN 3.6 3.1   ALT (SGPT) 33 16   AST (SGOT) 39 21   BILIRUBIN 0.9 0.8   ALK PHOS 136* 91         Lab 05/01/22  1017 04/28/22  1802   PROBNP 5,480.0* 1,373.0*   TROPONIN T 0.017 <0.010                     Microbiology Results (last 10 days)     Procedure Component Value - Date/Time    Respiratory Panel PCR w/COVID-19(SARS-CoV-2) [facility] In-House, NP Swab in UTM/VTM, 3-4 HR TAT - Swab, Nasopharynx [[id number]]  (Abnormal) Collected: 05/01/22 1101    Lab Status: Final result Specimen: Swab from Nasopharynx Updated: 05/01/22 1204     ADENOVIRUS, PCR Not Detected     Coronavirus 229E Not Detected     Coronavirus HKU1 Not Detected     Coronavirus NL63  Not Detected     Coronavirus OC43 Not Detected     COVID19 Not Detected     Human Metapneumovirus Not Detected     Human Rhinovirus/Enterovirus Detected     Influenza A PCR Not Detected     Influenza B PCR Not Detected     Parainfluenza Virus 1 Not Detected     Parainfluenza Virus 2 Not Detected     Parainfluenza Virus 3 Not Detected     Parainfluenza Virus 4 Not Detected     RSV, PCR Not Detected     Bordetella pertussis pcr Not Detected     Bordetella parapertussis PCR Not Detected     Chlamydophila pneumoniae PCR Not Detected     Mycoplasma pneumo by PCR Not Detected    Narrative:      In the setting of a positive respiratory panel with a viral infection PLUS a negative procalcitonin without other underlying concern for bacterial infection, consider observing off antibiotics or discontinuation of antibiotics and continue supportive care. If the respiratory panel is positive for atypical bacterial infection (Bordetella pertussis, Chlamydophila pneumoniae, or Mycoplasma pneumoniae), consider antibiotic de-escalation to target atypical bacterial infection.    COVID-19 and FLU A/B PCR - Swab, Nasopharynx [[id number]]  (Normal) Collected: 04/28/22 1840    Lab Status: Final result Specimen: Swab from Nasopharynx Updated: 04/28/22 1906     COVID19 Not Detected     Influenza A PCR Not Detected     Influenza B PCR Not Detected    Narrative:      Fact sheet for providers: https://www.fda.gov/media/420546/download    Fact sheet for patients: https://www.fda.gov/media/095109/download    Test performed by PCR.          XR Chest 1 View    Result Date: 5/1/2022  EXAMINATION: XR CHEST 1 VW-  DATE OF EXAM: 5/1/2022 10:28 AM  INDICATION: Shortness of air  COMPARISON: Chest radiograph dated 04/28/2022  TECHNIQUE: Portable AP view of the chest was obtained.  FINDINGS: There is a left chest wall pacemaker with leads terminating at the right atrium and right ventricle, stable from prior. There is stable cardiomegaly. Pulmonary  vascularity appears normal. There is no acute airspace consolidation, pleural effusion, or pneumothorax. There are degenerative changes of the thoracic spine.      Impression: 1. Stable cardiomegaly without acute pulmonary process.  This report was finalized on 5/1/2022 10:37 AM by Phil Davies MD.        Results for orders placed during the hospital encounter of 05/31/21    Adult Transthoracic Echo Complete W/ Cont if Necessary Per Protocol    Interpretation Summary  · Left ventricular wall thickness is consistent with borderline concentric hypertrophy.  · Estimated right ventricular systolic pressure from tricuspid regurgitation is mildly elevated (35-45 mmHg). Calculated right ventricular systolic pressure from tricuspid regurgitation is 37 mmHg.  · Left ventricular ejection fraction appears to be 26 - 30%.  · The right atrial cavity is dilated.  · Aortic valve sclerosis  · Markedly hypokinetic inferolateral wall      Assessment/Plan   Assessment & Plan     Active Hospital Problems    Diagnosis  POA   • COPD exacerbation (HCC) [J44.1]  Yes       COPD with acute exacerbation  Viral URI (rhinovirus/enterovirus)  - continue solumedrol IV 60 mg q12h  - duonebs and budesonide  - doxycycline    Acute on Chronic Systolic Heart Failure  - proBNP 5480  - recent leg edema, improved with metolazone  - check echo, Dr Gurrola/Bruna to read  - resume home bumex/entresto.    Atrial fibrillation  - anticoagulated    SUNIL on CKD IIIa  - baseline creat 1.3-1.4  - given gentle fluids in ED, will hold off further fluids for now    PAD  - recent left iliac stent    HTN    Dyslipidemia    Hypothyroid  - check TSH        DVT prophylaxis:  NOAC      CODE STATUS:  Full per patient.  Code Status and Medical Interventions:   Ordered at: 05/01/22 1233     Level Of Support Discussed With:    Patient     Code Status (Patient has no pulse and is not breathing):    CPR (Attempt to Resuscitate)     Medical Interventions (Patient has pulse  or is breathing):    Full Support         Keith Akbar MD  05/01/22

## 2022-05-01 NOTE — PLAN OF CARE
Problem: Adult Inpatient Plan of Care  Goal: Plan of Care Review  Outcome: Ongoing, Progressing  Flowsheets (Taken 5/1/2022 1735)  Progress: no change  Plan of Care Reviewed With:   patient   son  Goal: Absence of Hospital-Acquired Illness or Injury  Outcome: Ongoing, Progressing  Intervention: Identify and Manage Fall Risk  Recent Flowsheet Documentation  Taken 5/1/2022 1600 by Nhung Zavala RN  Safety Promotion/Fall Prevention:   assistive device/personal items within reach   clutter free environment maintained   fall prevention program maintained   nonskid shoes/slippers when out of bed   safety round/check completed  Taken 5/1/2022 1405 by Nhung Zavala RN  Safety Promotion/Fall Prevention:   activity supervised   assistive device/personal items within reach   clutter free environment maintained   fall prevention program maintained   nonskid shoes/slippers when out of bed   safety round/check completed  Intervention: Prevent Skin Injury  Flowsheets  Taken 5/1/2022 1600  Body Position:   position changed independently   neutral head position   neutral body alignment  Skin Protection:   adhesive use limited   transparent dressing maintained   tubing/devices free from skin contact  Taken 5/1/2022 1405  Body Position:   position changed independently   neutral head position   neutral body alignment  Skin Protection:   tubing/devices free from skin contact   transparent dressing maintained   adhesive use limited  Intervention: Prevent and Manage VTE (Venous Thromboembolism) Risk  Recent Flowsheet Documentation  Taken 5/1/2022 1600 by Nhung Zavala RN  Activity Management:   activity adjusted per tolerance   up ad abeba  Taken 5/1/2022 1405 by Nhung Zavala RN  Activity Management:   activity adjusted per tolerance   up ad abeba  VTE Prevention/Management:   dorsiflexion/plantar flexion performed   bilateral  Range of Motion: active ROM (range of motion) encouraged  Intervention: Prevent  Infection  Flowsheets  Taken 5/1/2022 1600  Infection Prevention:   hand hygiene promoted   rest/sleep promoted   single patient room provided  Taken 5/1/2022 1405  Infection Prevention:   hand hygiene promoted   rest/sleep promoted   single patient room provided   personal protective equipment utilized  Goal: Optimal Comfort and Wellbeing  Outcome: Ongoing, Progressing  Intervention: Monitor Pain and Promote Comfort  Flowsheets (Taken 5/1/2022 1405)  Pain Management Interventions:   pain management plan reviewed with patient/caregiver   quiet environment facilitated   relaxation techniques promoted  Intervention: Provide Person-Centered Care  Flowsheets (Taken 5/1/2022 1405)  Trust Relationship/Rapport:   care explained   choices provided   emotional support provided   empathic listening provided   questions answered   questions encouraged   reassurance provided   thoughts/feelings acknowledged     Problem: COPD (Chronic Obstructive Pulmonary Disease) Comorbidity  Goal: Maintenance of COPD Symptom Control  Outcome: Ongoing, Progressing  Intervention: Maintain COPD-Symptom Control  Flowsheets  Taken 5/1/2022 1735  Medication Review/Management:   medications reviewed   high-risk medications identified  Taken 5/1/2022 1405  Supportive Measures:   active listening utilized   verbalization of feelings encouraged  Medication Review/Management:   medications reviewed   high-risk medications identified     Problem: Heart Failure Comorbidity  Goal: Maintenance of Heart Failure Symptom Control  Outcome: Ongoing, Progressing  Intervention: Maintain Heart Failure-Management  Flowsheets  Taken 5/1/2022 1735  Medication Review/Management:   medications reviewed   high-risk medications identified  Taken 5/1/2022 1405  Medication Review/Management:   medications reviewed   high-risk medications identified   Goal Outcome Evaluation:  Plan of Care Reviewed With: patient, son        Progress: no change

## 2022-05-01 NOTE — ED PROVIDER NOTES
Subjective   Mr. Monahan presents with shortness of breath and cough.  He was seen here for similar symptoms on 28 April.  At that point he had high blood pressure and pedal edema.  Metolazone was added to his diuretic regimen.  He reports that his edema is now gone.  He reports feeling weak and dizzy and lightheaded particularly with ambulation.  He has cough productive of green sputum.  He tells me he has not been able to sleep due to coughing whenever he tries to lay down.  He denies fevers or chills.  He does note left-sided pain in his chest.  Its dull its been present off and on for about a week.  Its not exertional.      History provided by:  Patient and medical records  Shortness of Breath  Severity:  Moderate  Onset quality:  Gradual  Timing:  Constant  Progression:  Worsening  Chronicity:  New  Context: activity and URI    Relieved by:  Rest  Worsened by:  Activity  Ineffective treatments:  Diuretics  Associated symptoms: chest pain, cough, headaches, sputum production and wheezing    Associated symptoms: no abdominal pain, no fever, no neck pain and no vomiting    Cough  Associated symptoms: chest pain, headaches, shortness of breath and wheezing    Associated symptoms: no chills, no fever and no rhinorrhea        Review of Systems   Constitutional: Negative for chills and fever.   HENT: Negative for congestion and rhinorrhea.    Respiratory: Positive for cough, sputum production, shortness of breath and wheezing.    Cardiovascular: Positive for chest pain and leg swelling.   Gastrointestinal: Negative for abdominal pain and vomiting.   Genitourinary: Negative for difficulty urinating.   Musculoskeletal: Negative for neck pain.   Neurological: Positive for weakness and headaches. Negative for dizziness.   Psychiatric/Behavioral: Negative for confusion.   All other systems reviewed and are negative.      Past Medical History:   Diagnosis Date   • Anxiety    • Arthritis    • Back pain     past injections,  years ago   • Borderline diabetes    • CAD (coronary artery disease)    • Chronic narcotic use     prn   • COPD (chronic obstructive pulmonary disease) ([facility]) 07/29/2016   • Depression     intermittent   • Diabetes mellitus ([facility])     checsk sugar once per week   • Full dentures    • Headache    • HTN (hypertension) 07/29/2016   • Hypercholesterolemia 07/29/2016   • Hypothyroid 07/29/2016   • Joint pain     hydrocodone prn   • Obesity    • Pacemaker    • PAD (peripheral artery disease) ([facility]) 07/29/2016   • SOBOE (shortness of breath on exertion)    • Systolic CHF ([facility]) 07/29/2016   • Tinnitus    • Tobacco abuse 07/29/2016   • Wears glasses     readers       No Known Allergies    Past Surgical History:   Procedure Laterality Date   • AORTAGRAM Left 3/30/2022    Procedure: ABDOMINAL AORTAGRAM WITH RUNOFF,  LEFT EXTERNAL ILIAC DRUG ELUTING BALLOON ANGIOPLASTY, LEFT INTERNAL ILIAC STENT, LEFT LEG ANGIOGRAM;  Surgeon: Mono Marley MD;  Location: [facility];  Service: Vascular;  Laterality: Left;   • CARDIAC CATHETERIZATION N/A 07/29/2016    Procedure: Left Heart Cath;  Surgeon: Drew Gurrola MD;  Location:  [facility];  Service:    • CARDIAC CATHETERIZATION N/A 07/29/2019    Procedure: LEFT HEART CATH;  Surgeon: Drew Gurrola MD;  Location:  [facility];  Service: Cardiovascular   • CARDIAC CATHETERIZATION N/A 06/03/2021    Procedure: LEFT HEART CATH;  Surgeon: Drew Gurrola MD;  Location:  [facility];  Service: Cardiovascular;  Laterality: N/A;   • CARDIAC ELECTROPHYSIOLOGY PROCEDURE N/A 07/30/2019    Procedure: ICD SC new;  Surgeon: Musa Harden MD;  Location:  [facility]VASIVE [facility];  Service: Cardiology   • COLONOSCOPY     • INTERVENTIONAL RADIOLOGY PROCEDURE N/A 09/14/2016    Procedure: AORTIC Aortagram with Runoff;  Surgeon: Drew Gurrola MD;  Location:  [facility];  Service:    • INTERVENTIONAL  RADIOLOGY PROCEDURE N/A 05/09/2017    Procedure: Abdominal Aortagram with Runoff;  Surgeon: Drew Gurrola MD;  Location:  [facility];  Service:    • INTERVENTIONAL RADIOLOGY PROCEDURE N/A 09/05/2018    Procedure: Abdominal Aortagram with Runoff;  Surgeon: Drew Gurrola MD;  Location:  [facility];  Service: Cardiovascular   • INTERVENTIONAL RADIOLOGY PROCEDURE Bilateral 07/29/2019    Procedure: Carotid Cerebral Angiogram;  Surgeon: Claude Atkins MD;  Location:  [facility];  Service: Interventional Radiology   • INTERVENTIONAL RADIOLOGY PROCEDURE N/A 08/18/2020    Procedure: AARO+/-;  Surgeon: Drew Gurrola MD;  Location:  [facility];  Service: Cardiovascular;  Laterality: N/A;   • INTERVENTIONAL RADIOLOGY PROCEDURE Bilateral 11/20/2020    Procedure: Carotid Cerebral Angiogram with stent;  Surgeon: Claude Atkins MD;  Location:  [facility];  Service: Interventional Radiology;  Laterality: Bilateral;   • KNEE SURGERY Left 08/17/2015   • OTHER SURGICAL HISTORY      L LEG STENT   • DE TCAT IV STENT CRV CRTD ART EMBOLIC PROTECJ N/A 09/25/2019    Left Carotid Stent; Dr. Claude Atkins   • UMBILICAL HERNIA REPAIR  2006    no mesh, done in [place]   • WRIST SURGERY Right        Family History   Problem Relation Age of Onset   • Hypertension Mother    • Hypertension Father    • Heart failure Father    • Heart attack Father    • Diabetes Sister    • Arrhythmia Sister        Social History     Socioeconomic History   • Marital status:    Tobacco Use   • Smoking status: Former Smoker     Packs/day: 2.00     Years: 50.00     Pack years: 100.00     Types: Cigarettes     Quit date: 2019     Years since quitting: 3.3   • Smokeless tobacco: Never Used   Vaping Use   • Vaping Use: Never used   Substance and Sexual Activity   • Alcohol use: Yes     Alcohol/week: 2.0 standard drinks     Types: 2 Cans of beer  per week     Comment: 2-3 Beers per Week   • Drug use: No   • Sexual activity: Defer           Objective   Physical Exam  Vitals and nursing note reviewed.   Constitutional:       General: He is not in acute distress.     Appearance: Normal appearance.   HENT:      Head: Normocephalic and atraumatic.      Nose: Nose normal. No congestion or rhinorrhea.      Mouth/Throat:      Mouth: Mucous membranes are moist.      Pharynx: No posterior oropharyngeal erythema.   Eyes:      General: No scleral icterus.     Conjunctiva/sclera: Conjunctivae normal.   Neck:      Comments: No JVD  Cardiovascular:      Rate and Rhythm: Normal rate and regular rhythm.      Heart sounds: No murmur heard.    No friction rub.   Pulmonary:      Effort: Pulmonary effort is normal. No respiratory distress.      Breath sounds: Wheezing present. No decreased breath sounds or rales.      Comments: He has a frequent cough and is bringing up copious green sputum  Abdominal:      General: Bowel sounds are normal.      Palpations: Abdomen is soft.      Tenderness: There is no abdominal tenderness. There is no guarding or rebound.   Musculoskeletal:         General: No tenderness.      Cervical back: Normal range of motion and neck supple.      Right lower leg: Edema present.      Left lower leg: Edema present.      Comments: He has only very mild edema of both lower legs   Skin:     General: Skin is warm and dry.      Capillary Refill: Capillary refill takes less than 2 seconds.      Coloration: Skin is not pale.   Neurological:      General: No focal deficit present.      Mental Status: He is alert and oriented to person, place, and time.      Coordination: Coordination normal.   Psychiatric:         Mood and Affect: Mood normal.         Behavior: Behavior normal.         Thought Content: Thought content normal.         Procedures           ED Course  ED Course as of 05/01/22 1544   Sun May 01, 2022   1012 Reviewed records.  Last echo was Sharlene of last  year at which point his ejection fraction was noted to be 26 to 30%.  EKG today shows atrial fibrillation with a rate of 86.  Its not significantly changed from prior EKGs.  He is maintained on Eliquis.  He does have history of atrial fibrillation.  Last heart cath was June of last year.  At that point he had 80% in-stent restenosis of the circumflex artery.  This was treated and he had no other areas of blockages.  He had 1 other stent that was patent. [DT]   1113 Systolic blood pressure 98.  Creatinine has risen over the last several days since starting metolazone.  Chest x-ray is negative for pneumonia although I suspect he is volume depleted.  We will give fluids cautiously.  I suspect he does have pneumonia.  Awaiting respiratory panel.  We will give a round of antibiotics as well. [DT]      ED Course User Index  [DT] Steve Leon MD                                                 MDM  Number of Diagnoses or Management Options  SUNIL (acute kidney injury) (HCC): new and requires workup  COPD exacerbation (HCC): new and requires workup  Pneumonia due to infectious organism, unspecified laterality, unspecified part of lung: new and requires workup  Volume depletion: new and requires workup     Amount and/or Complexity of Data Reviewed  Clinical lab tests: reviewed and ordered  Tests in the radiology section of CPT®: ordered and reviewed  Decide to obtain previous medical records or to obtain history from someone other than the patient: yes  Review and summarize past medical records: yes  Discuss the patient with other providers: yes  Independent visualization of images, tracings, or specimens: yes    Patient Progress  Patient progress: improved      Final diagnoses:   COPD exacerbation (HCC)   Volume depletion   Pneumonia due to infectious organism, unspecified laterality, unspecified part of lung   SUNIL (acute kidney injury) (HCC)       ED Disposition  ED Disposition     ED Disposition   Decision to Admit     Condition   --    Comment   Level of Care: Telemetry [5]   Diagnosis: COPD exacerbation (HCC) [853408]   Admitting Physician: BARRY CHESTER [[id number]]   Attending Physician: BARRY CHESTER [[id number]]   Certification: I Certify That Inpatient Hospital Services Are Medically Necessary For Greater Than 2 Midnights               No follow-up provider specified.       Medication List      No changes were made to your prescriptions during this visit.          Steve Leon MD  05/01/22 1549

## 2022-05-02 PROBLEM — E66.9 OBESITY (BMI 30-39.9): Status: ACTIVE | Noted: 2022-05-02

## 2022-05-02 LAB
ANION GAP SERPL CALCULATED.3IONS-SCNC: 14 MMOL/L (ref 5–15)
BASOPHILS # BLD AUTO: 0.01 10*3/MM3 (ref 0–0.2)
BASOPHILS NFR BLD AUTO: 0.2 % (ref 0–1.5)
BH CV ECHO MEAS - AO MAX PG: 12.8 MMHG
BH CV ECHO MEAS - AO MEAN PG: 6.2 MMHG
BH CV ECHO MEAS - AO ROOT DIAM: 3.1 CM
BH CV ECHO MEAS - AO V2 MAX: 179.2 CM/SEC
BH CV ECHO MEAS - AO V2 VTI: 30.6 CM
BH CV ECHO MEAS - AVA(I,D): 1.42 CM2
BH CV ECHO MEAS - EDV(CUBED): 238 ML
BH CV ECHO MEAS - EDV(MOD-SP2): 118 ML
BH CV ECHO MEAS - EDV(MOD-SP4): 107 ML
BH CV ECHO MEAS - EF(MOD-BP): 49.5 %
BH CV ECHO MEAS - EF(MOD-SP2): 50.5 %
BH CV ECHO MEAS - EF(MOD-SP4): 48.7 %
BH CV ECHO MEAS - ESV(CUBED): 157.6 ML
BH CV ECHO MEAS - ESV(MOD-SP2): 58.4 ML
BH CV ECHO MEAS - ESV(MOD-SP4): 54.9 ML
BH CV ECHO MEAS - FS: 12.8 %
BH CV ECHO MEAS - IVS/LVPW: 0.99 CM
BH CV ECHO MEAS - IVSD: 1.32 CM
BH CV ECHO MEAS - LA DIMENSION: 5.6 CM
BH CV ECHO MEAS - LAT PEAK E' VEL: 8.3 CM/SEC
BH CV ECHO MEAS - LV MASS(C)D: 378.4 GRAMS
BH CV ECHO MEAS - LV MAX PG: 2.2 MMHG
BH CV ECHO MEAS - LV MEAN PG: 1.18 MMHG
BH CV ECHO MEAS - LV V1 MAX: 74.2 CM/SEC
BH CV ECHO MEAS - LV V1 VTI: 13.7 CM
BH CV ECHO MEAS - LVIDD: 6.2 CM
BH CV ECHO MEAS - LVIDS: 5.4 CM
BH CV ECHO MEAS - LVOT AREA: 3.2 CM2
BH CV ECHO MEAS - LVOT DIAM: 2.01 CM
BH CV ECHO MEAS - LVPWD: 1.33 CM
BH CV ECHO MEAS - MED PEAK E' VEL: 5 CM/SEC
BH CV ECHO MEAS - MV DEC SLOPE: 736.7 CM/SEC2
BH CV ECHO MEAS - MV DEC TIME: 0.19 MSEC
BH CV ECHO MEAS - MV E MAX VEL: 111 CM/SEC
BH CV ECHO MEAS - MV MAX PG: 6 MMHG
BH CV ECHO MEAS - MV MEAN PG: 1.97 MMHG
BH CV ECHO MEAS - MV P1/2T: 48 MSEC
BH CV ECHO MEAS - MV V2 VTI: 30.3 CM
BH CV ECHO MEAS - MVA(P1/2T): 4.6 CM2
BH CV ECHO MEAS - MVA(VTI): 1.44 CM2
BH CV ECHO MEAS - PA ACC TIME: 0.08 SEC
BH CV ECHO MEAS - PA PR(ACCEL): 41 MMHG
BH CV ECHO MEAS - SV(LVOT): 43.5 ML
BH CV ECHO MEAS - SV(MOD-SP2): 59.6 ML
BH CV ECHO MEAS - SV(MOD-SP4): 52.1 ML
BH CV ECHO MEAS - TAPSE (>1.6): 2.14 CM
BH CV ECHO MEASUREMENTS AVERAGE E/E' RATIO: 16.69
BH CV VAS BP RIGHT ARM: NORMAL MMHG
BH CV XLRA - RV BASE: 3.5 CM
BH CV XLRA - RV LENGTH: 7.1 CM
BH CV XLRA - RV MID: 2.5 CM
BH CV XLRA - TDI S': 12.5 CM/SEC
BUN SERPL-MCNC: 48 MG/DL (ref 8–23)
BUN/CREAT SERPL: 27.3 (ref 7–25)
CALCIUM SPEC-SCNC: 9.2 MG/DL (ref 8.6–10.5)
CHLORIDE SERPL-SCNC: 96 MMOL/L (ref 98–107)
CO2 SERPL-SCNC: 24 MMOL/L (ref 22–29)
CREAT SERPL-MCNC: 1.76 MG/DL (ref 0.76–1.27)
DEPRECATED RDW RBC AUTO: 44.4 FL (ref 37–54)
EGFRCR SERPLBLD CKD-EPI 2021: 40.3 ML/MIN/1.73
EOSINOPHIL # BLD AUTO: 0 10*3/MM3 (ref 0–0.4)
EOSINOPHIL NFR BLD AUTO: 0 % (ref 0.3–6.2)
ERYTHROCYTE [DISTWIDTH] IN BLOOD BY AUTOMATED COUNT: 14 % (ref 12.3–15.4)
GLUCOSE SERPL-MCNC: 241 MG/DL (ref 65–99)
HCT VFR BLD AUTO: 36.9 % (ref 37.5–51)
HGB BLD-MCNC: 12.1 G/DL (ref 13–17.7)
IMM GRANULOCYTES # BLD AUTO: 0.03 10*3/MM3 (ref 0–0.05)
IMM GRANULOCYTES NFR BLD AUTO: 0.5 % (ref 0–0.5)
LEFT ATRIUM VOLUME INDEX: 46.5 ML/M2
LYMPHOCYTES # BLD AUTO: 0.57 10*3/MM3 (ref 0.7–3.1)
LYMPHOCYTES NFR BLD AUTO: 10.2 % (ref 19.6–45.3)
MAGNESIUM SERPL-MCNC: 3 MG/DL (ref 1.6–2.4)
MAXIMAL PREDICTED HEART RATE: 147 BPM
MCH RBC QN AUTO: 28.5 PG (ref 26.6–33)
MCHC RBC AUTO-ENTMCNC: 32.8 G/DL (ref 31.5–35.7)
MCV RBC AUTO: 87 FL (ref 79–97)
MONOCYTES # BLD AUTO: 0.21 10*3/MM3 (ref 0.1–0.9)
MONOCYTES NFR BLD AUTO: 3.8 % (ref 5–12)
NEUTROPHILS NFR BLD AUTO: 4.75 10*3/MM3 (ref 1.7–7)
NEUTROPHILS NFR BLD AUTO: 85.3 % (ref 42.7–76)
NRBC BLD AUTO-RTO: 0 /100 WBC (ref 0–0.2)
PLATELET # BLD AUTO: 188 10*3/MM3 (ref 140–450)
PMV BLD AUTO: 12.9 FL (ref 6–12)
POTASSIUM SERPL-SCNC: 3.3 MMOL/L (ref 3.5–5.2)
RBC # BLD AUTO: 4.24 10*6/MM3 (ref 4.14–5.8)
SODIUM SERPL-SCNC: 134 MMOL/L (ref 136–145)
STRESS TARGET HR: 125 BPM
TSH SERPL DL<=0.05 MIU/L-ACNC: 0.68 UIU/ML (ref 0.27–4.2)
WBC NRBC COR # BLD: 5.57 10*3/MM3 (ref 3.4–10.8)

## 2022-05-02 PROCEDURE — 94761 N-INVAS EAR/PLS OXIMETRY MLT: CPT

## 2022-05-02 PROCEDURE — 84443 ASSAY THYROID STIM HORMONE: CPT | Performed by: INTERNAL MEDICINE

## 2022-05-02 PROCEDURE — 83735 ASSAY OF MAGNESIUM: CPT | Performed by: INTERNAL MEDICINE

## 2022-05-02 PROCEDURE — 97116 GAIT TRAINING THERAPY: CPT

## 2022-05-02 PROCEDURE — 94799 UNLISTED PULMONARY SVC/PX: CPT

## 2022-05-02 PROCEDURE — 25010000002 METHYLPREDNISOLONE PER 125 MG: Performed by: INTERNAL MEDICINE

## 2022-05-02 PROCEDURE — 97162 PT EVAL MOD COMPLEX 30 MIN: CPT

## 2022-05-02 PROCEDURE — 99233 SBSQ HOSP IP/OBS HIGH 50: CPT | Performed by: INTERNAL MEDICINE

## 2022-05-02 PROCEDURE — G0378 HOSPITAL OBSERVATION PER HR: HCPCS

## 2022-05-02 PROCEDURE — 97530 THERAPEUTIC ACTIVITIES: CPT

## 2022-05-02 PROCEDURE — 97110 THERAPEUTIC EXERCISES: CPT

## 2022-05-02 PROCEDURE — 85025 COMPLETE CBC W/AUTO DIFF WBC: CPT | Performed by: INTERNAL MEDICINE

## 2022-05-02 PROCEDURE — 80048 BASIC METABOLIC PNL TOTAL CA: CPT | Performed by: INTERNAL MEDICINE

## 2022-05-02 PROCEDURE — 94664 DEMO&/EVAL PT USE INHALER: CPT

## 2022-05-02 RX ORDER — HYDROCODONE BITARTRATE AND ACETAMINOPHEN 10; 325 MG/1; MG/1
1 TABLET ORAL EVERY 6 HOURS PRN
COMMUNITY

## 2022-05-02 RX ORDER — POTASSIUM CHLORIDE 750 MG/1
20 CAPSULE, EXTENDED RELEASE ORAL ONCE
Status: COMPLETED | OUTPATIENT
Start: 2022-05-02 | End: 2022-05-02

## 2022-05-02 RX ORDER — AMIODARONE HYDROCHLORIDE 200 MG/1
400 TABLET ORAL EVERY 12 HOURS SCHEDULED
Status: DISCONTINUED | OUTPATIENT
Start: 2022-05-02 | End: 2022-05-06

## 2022-05-02 RX ORDER — NIACIN 500 MG
500 TABLET ORAL NIGHTLY
COMMUNITY

## 2022-05-02 RX ADMIN — CARVEDILOL 25 MG: 12.5 TABLET, FILM COATED ORAL at 08:29

## 2022-05-02 RX ADMIN — ACETAMINOPHEN 650 MG: 325 TABLET ORAL at 21:08

## 2022-05-02 RX ADMIN — CLOPIDOGREL BISULFATE 75 MG: 75 TABLET ORAL at 08:29

## 2022-05-02 RX ADMIN — IPRATROPIUM BROMIDE AND ALBUTEROL SULFATE 3 ML: .5; 3 SOLUTION RESPIRATORY (INHALATION) at 11:12

## 2022-05-02 RX ADMIN — IPRATROPIUM BROMIDE AND ALBUTEROL SULFATE 3 ML: .5; 3 SOLUTION RESPIRATORY (INHALATION) at 20:01

## 2022-05-02 RX ADMIN — ROSUVASTATIN 40 MG: 20 TABLET, FILM COATED ORAL at 21:09

## 2022-05-02 RX ADMIN — DOXYCYCLINE 100 MG: 100 CAPSULE ORAL at 21:08

## 2022-05-02 RX ADMIN — AMLODIPINE BESYLATE 5 MG: 5 TABLET ORAL at 17:37

## 2022-05-02 RX ADMIN — SACUBITRIL AND VALSARTAN 1 TABLET: 97; 103 TABLET, FILM COATED ORAL at 21:10

## 2022-05-02 RX ADMIN — BUMETANIDE 2 MG: 1 TABLET ORAL at 08:29

## 2022-05-02 RX ADMIN — PANTOPRAZOLE SODIUM 40 MG: 40 TABLET, DELAYED RELEASE ORAL at 08:29

## 2022-05-02 RX ADMIN — BUMETANIDE 2 MG: 1 TABLET ORAL at 17:37

## 2022-05-02 RX ADMIN — METHYLPREDNISOLONE SODIUM SUCCINATE 60 MG: 125 INJECTION, POWDER, FOR SOLUTION INTRAMUSCULAR; INTRAVENOUS at 21:10

## 2022-05-02 RX ADMIN — APIXABAN 5 MG: 5 TABLET, FILM COATED ORAL at 21:10

## 2022-05-02 RX ADMIN — SACUBITRIL AND VALSARTAN 1 TABLET: 97; 103 TABLET, FILM COATED ORAL at 08:29

## 2022-05-02 RX ADMIN — IPRATROPIUM BROMIDE AND ALBUTEROL SULFATE 3 ML: .5; 3 SOLUTION RESPIRATORY (INHALATION) at 15:32

## 2022-05-02 RX ADMIN — LEVOTHYROXINE SODIUM 100 MCG: 0.17 TABLET ORAL at 05:27

## 2022-05-02 RX ADMIN — DOXYCYCLINE 100 MG: 100 CAPSULE ORAL at 08:29

## 2022-05-02 RX ADMIN — ALLOPURINOL 100 MG: 100 TABLET ORAL at 21:10

## 2022-05-02 RX ADMIN — Medication 10 ML: at 08:36

## 2022-05-02 RX ADMIN — ALLOPURINOL 100 MG: 100 TABLET ORAL at 08:29

## 2022-05-02 RX ADMIN — BUDESONIDE 0.5 MG: 0.5 SUSPENSION RESPIRATORY (INHALATION) at 20:01

## 2022-05-02 RX ADMIN — IPRATROPIUM BROMIDE AND ALBUTEROL SULFATE 3 ML: .5; 3 SOLUTION RESPIRATORY (INHALATION) at 06:53

## 2022-05-02 RX ADMIN — AMIODARONE HYDROCHLORIDE 400 MG: 200 TABLET ORAL at 13:13

## 2022-05-02 RX ADMIN — Medication 10 ML: at 21:08

## 2022-05-02 RX ADMIN — ACETAMINOPHEN 650 MG: 325 TABLET ORAL at 00:46

## 2022-05-02 RX ADMIN — CARVEDILOL 25 MG: 12.5 TABLET, FILM COATED ORAL at 21:10

## 2022-05-02 RX ADMIN — METHYLPREDNISOLONE SODIUM SUCCINATE 60 MG: 125 INJECTION, POWDER, FOR SOLUTION INTRAMUSCULAR; INTRAVENOUS at 08:31

## 2022-05-02 RX ADMIN — POTASSIUM CHLORIDE 20 MEQ: 10 CAPSULE, COATED, EXTENDED RELEASE ORAL at 08:29

## 2022-05-02 RX ADMIN — APIXABAN 5 MG: 5 TABLET, FILM COATED ORAL at 08:29

## 2022-05-02 RX ADMIN — BUDESONIDE 0.5 MG: 0.5 SUSPENSION RESPIRATORY (INHALATION) at 06:53

## 2022-05-02 RX ADMIN — SPIRONOLACTONE 25 MG: 25 TABLET ORAL at 08:29

## 2022-05-02 RX ADMIN — AMIODARONE HYDROCHLORIDE 400 MG: 200 TABLET ORAL at 21:08

## 2022-05-02 NOTE — PROGRESS NOTES
Clinical Nutrition       Patient Name: Antony Monahan  YOB: 1950  MRN: [id number]  Date of Encounter: 05/02/22 11:37 EDT  Admission date: 5/1/2022      Reason for Visit     Education r/t obesity with BMI>30    EMR  Reviewed   Yes  Ht=67in, ti=548dd; BMI=36.3 (obese)         Reported/Observed/Food/Nutrition Related - Comments     RD spoke with pt in room. Pt receiving Respiratory treatment and respiratory therapist in room with pt and allowed RD to speak with pt during treatment.       Current Nutrition Prescription     Diet Regular    Average po Intake: 88% at 2 meals.      Actions     Follow treatment progress  RD provided survival skill level edu wt management edu and gave pt written materials from [facility] [employer]. RD encouraged pt toward healthy food types/prep and food portion control, and rec physical activity daily as allowed by MD. Pt states he has an airfryer at home he has been using for food prep recently. Pt receptive to teaching and motivated to work towards BMI wnl.    Monitor Per Protocol      Rasheeda Steel, MS,RD,LD,   Time Spent: 35 mins

## 2022-05-02 NOTE — CASE MANAGEMENT/SOCIAL WORK
Discharge Planning Assessment  [facility]     Patient Name: Antony Monahan  MRN: [id number]  Today's Date: 5/2/2022    Admit Date: 5/1/2022     Discharge Needs Assessment     Row Name 05/02/22 1306       Living Environment    People in Home spouse    Name(s) of People in Home Jessica- spouse    Current Living Arrangements home    Primary Care Provided by self    Provides Primary Care For no one    Family Caregiver if Needed spouse    Family Caregiver Names Jessica- spouse    Quality of Family Relationships supportive    Able to Return to Prior Arrangements yes       Resource/Environmental Concerns    Resource/Environmental Concerns none    Transportation Concerns none       Transition Planning    Patient/Family Anticipates Transition to home with family    Patient/Family Anticipated Services at Transition none    Transportation Anticipated family or friend will provide       Discharge Needs Assessment    Equipment Currently Used at Home oxygen    Concerns to be Addressed denies needs/concerns at this time;no discharge needs identified    Anticipated Changes Related to Illness none    Equipment Needed After Discharge none    Provided Post Acute Provider List? N/A               Discharge Plan     Row Name 05/02/22 1308       Plan    Plan Home    Patient/Family in Agreement with Plan yes    Plan Comments Spoke with pt. at bedside. Lives with spouse in [place] Independent with ADL's at baseline. Uses home O2 at [employer] as needed. Supplied by [employer]. His plan is to dc to home. Family will provide transport.    Final Discharge Disposition Code 01 - home or self-care              Continued Care and Services - Admitted Since 5/1/2022    Coordination has not been started for this encounter.          Demographic Summary    No documentation.                Functional Status     Row Name 05/02/22 1306       Functional Status    Usual Activity Tolerance good       Functional Status, IADL    Medications independent    Meal  Preparation independent    Housekeeping independent    Laundry independent    Shopping independent       Mental Status Summary    Recent Changes in Mental Status/Cognitive Functioning no changes               Psychosocial    No documentation.                Abuse/Neglect    No documentation.                Legal    No documentation.                Substance Abuse    No documentation.                Patient Forms    No documentation.                   Cherise Stephen RN

## 2022-05-02 NOTE — PLAN OF CARE
Goal Outcome Evaluation:  Plan of Care Reviewed With: patient        Progress: improving  Outcome Evaluation: PT eval completed. Presents w/ rhinovirus(droplet prec.),COPD exac, claudic. LE's (recent L ext iliac PTCA & L int.iliac stent 3-30), decr LE strength/endurance & impaired funct mobil. Amb 80 ft w/ CGA (decl. AD) w/ 2 stand.rests on 2 L, + ther exer per HEP, but noted onset limping LLE (agreed to SPC for next trial), incr SOB (desat to 93%), & HR to 96. Pt. decl. HHPT & states he is prim. CG for spouse d/t CVA (dtr/family will assist pt. at d/c as needed).

## 2022-05-02 NOTE — CONSULTS
Cardiology Consult - [facility]    Antony Monahan     [id number]  1950  [address] [place] [address]         Admission Date:  5/1/2022    Consultation Date:  05/02/22        PCP:  Franky Carrington MD  Referring MD:  Dr. Keith Akbar - Hospitalist  Consulting MD:  Dr. Dandre Mcfarland          CC:  COPD exacerbation with SOA    Reason for Consult: A/C Systolic CHF, PAF        Allergies:  has No Known Allergies.    Medications Prior to Admission   Medication Sig Dispense Refill Last Dose   • allopurinol (ZYLOPRIM) 100 MG tablet Take 100 mg by mouth 2 (Two) Times a Day.   4/30/2022 at breakfast   • amLODIPine (NORVASC) 5 MG tablet Take 5 mg by mouth Every Morning.   4/30/2022 at dinner   • apixaban (ELIQUIS) 5 MG tablet tablet Take 5 mg by mouth Daily.   4/30/2022 at breakfast   • bumetanide (BUMEX) 2 MG tablet Take 3 mg by mouth 2 (Two) Times a Day.   4/30/2022 at dinner   • carvedilol (COREG) 25 MG tablet Take 37.5 mg by mouth 2 (Two) Times a Day With Meals. Take 1 and 1/2 tables by mouth twice a day   4/30/2022 at breakfast   • clopidogrel (PLAVIX) 75 MG tablet Take 1 tablet by mouth Daily. 30 tablet 11 4/30/2022 at breakfast   • levothyroxine (SYNTHROID, LEVOTHROID) 100 MCG tablet Take 100 mcg by mouth Every Morning.   4/30/2022 at breakfast   • metOLazone (ZAROXOLYN) 5 MG tablet Take 1 tablet by mouth Daily. 5 tablet 0 4/30/2022 at breakfast   • Multiple Vitamins-Minerals (IMMUNE SYSTEM BOOSTER PO) Take  by mouth.   4/30/2022 at dinner   • pantoprazole (PROTONIX) 40 MG EC tablet Take 40 mg by mouth Every Morning.   4/30/2022 at breakfast   • rosuvastatin (CRESTOR) 40 MG tablet Take 40 mg by mouth every night.   4/30/2022 at bedtime   • sacubitril-valsartan (ENTRESTO)  MG tablet Take 1 tablet by mouth 2 (Two) Times a Day.   4/30/2022 at bedtime   • spironolactone (ALDACTONE) 25 MG tablet Take 25 mg by mouth Daily.   4/30/2022 at breakfast   • Trelegy Ellipta  100-62.5-25 MCG/INH aerosol powder  Inhale 1 puff Daily.   4/30/2022 at breakfast   • vitamin D3 125 MCG (5000 UT) capsule capsule Take 2,000 Units by mouth Daily.   4/30/2022 at bedtime   • nitroglycerin (NITROSTAT) 0.4 MG SL tablet Place 0.4 mg under the tongue Every 5 (Five) Minutes As Needed for Chest Pain. Take no more than 3 doses in 15 minutes.   More than a month at Unknown time              HPI:  Antony Monahan is a 73 yo CM with PMHx of COPD, Chronic systolic CHF (SJM DDD ICD), paroxysmal AFib on NOAC, HTN, HLP, CKDz, PAD and hypothyroidism.  He presents to [facility] ED yesterday with 3-4 days of progressive SOA and pedal edema.  Despite additional diuretics, his dyspnea progressed now with productive cough.  Upon  Arrival to ED vitals stable, afebrile without leukocytosis.  Started on IV steroids and antibiotics for URI (rhinovirus).  Cardiology has been asked to see him in consultation for proBNP 5480 with recent exacerbation.  He is resting in bedside chair - breathing is somewhat better.  Not aware that heart is beating irregularly but still short winded with minimal activity.           Social History     Socioeconomic History   • Marital status:    Tobacco Use   • Smoking status: Former Smoker     Packs/day: 2.00     Years: 50.00     Pack years: 100.00     Types: Cigarettes     Quit date: 2019     Years since quitting: 3.3   • Smokeless tobacco: Never Used   Vaping Use   • Vaping Use: Never used   Substance and Sexual Activity   • Alcohol use: Yes     Alcohol/week: 2.0 standard drinks     Types: 2 Cans of beer per week     Comment: 2-3 Beers per Week   • Drug use: No   • Sexual activity: Defer     Family History   Problem Relation Age of Onset   • Hypertension Mother    • Hypertension Father    • Heart failure Father    • Heart attack Father    • Diabetes Sister    • Arrhythmia Sister      Past Surgical History:   Procedure Laterality Date   • AORTAGRAM Left 3/30/2022    Procedure: ABDOMINAL  AORTAGRAM WITH RUNOFF,  LEFT EXTERNAL ILIAC DRUG ELUTING BALLOON ANGIOPLASTY, LEFT INTERNAL ILIAC STENT, LEFT LEG ANGIOGRAM;  Surgeon: Mono Marley MD;  Location:  [facility];  Service: Vascular;  Laterality: Left;   • CARDIAC CATHETERIZATION N/A 07/29/2016    Procedure: Left Heart Cath;  Surgeon: Drew Gurrola MD;  Location:  [facility];  Service:    • CARDIAC CATHETERIZATION N/A 07/29/2019    Procedure: LEFT HEART CATH;  Surgeon: Drew Gurrola MD;  Location:  [facility];  Service: Cardiovascular   • CARDIAC CATHETERIZATION N/A 06/03/2021    Procedure: LEFT HEART CATH;  Surgeon: Drew Gurrola MD;  Location:  [facility];  Service: Cardiovascular;  Laterality: N/A;   • CARDIAC ELECTROPHYSIOLOGY PROCEDURE N/A 07/30/2019    Procedure: ICD SC new;  Surgeon: Musa Harden MD;  Location:  [facility] EP INVASIVE [facility];  Service: Cardiology   • COLONOSCOPY     • INTERVENTIONAL RADIOLOGY PROCEDURE N/A 09/14/2016    Procedure: AORTIC Aortagram with Runoff;  Surgeon: Drew Gurrola MD;  Location:  [facility];  Service:    • INTERVENTIONAL RADIOLOGY PROCEDURE N/A 05/09/2017    Procedure: Abdominal Aortagram with Runoff;  Surgeon: Drew Gurrola MD;  Location:  [facility];  Service:    • INTERVENTIONAL RADIOLOGY PROCEDURE N/A 09/05/2018    Procedure: Abdominal Aortagram with Runoff;  Surgeon: Drew Gurrola MD;  Location:  [facility];  Service: Cardiovascular   • INTERVENTIONAL RADIOLOGY PROCEDURE Bilateral 07/29/2019    Procedure: Carotid Cerebral Angiogram;  Surgeon: Claude Atkins MD;  Location:  [facility]VAS[facility];  Service: Interventional Radiology   • INTERVENTIONAL RADIOLOGY PROCEDURE N/A 08/18/2020    Procedure: AARO+/-;  Surgeon: Drew Gurrola MD;  Location:  [facility];  Service: Cardiovascular;  Laterality: N/A;   •  "INTERVENTIONAL RADIOLOGY PROCEDURE Bilateral 11/20/2020    Procedure: Carotid Cerebral Angiogram with stent;  Surgeon: Claude Atkins MD;  Location: [facility] CATH INVASIVE [facility];  Service: Interventional Radiology;  Laterality: Bilateral;   • KNEE SURGERY Left 08/17/2015   • OTHER SURGICAL HISTORY      L LEG STENT   • [place] TCAT IV STENT CRV CRTD ART EMBOLIC PROTECJ N/A 09/25/2019    Left Carotid Stent; Dr. Claude Atkins   • UMBILICAL HERNIA REPAIR  2006    no mesh, done in [facility]   • WRIST SURGERY Right      ROS: Pertinent items are noted in HPI, all other systems reviewed and negative     Objective     height is 170.2 cm (67.01\") and weight is 105 kg (231 lb 7.7 oz). His oral temperature is 97.6 °F (36.4 °C). His blood pressure is 124/73 and his pulse is 75. His respiration is 18 and oxygen saturation is 96%.      Intake/Output Summary (Last 24 hours) at 5/2/2022 0904  Last data filed at 5/2/2022 0528  Gross per 24 hour   Intake 572 ml   Output 1200 ml   Net -628 ml     Intake/Output                 05/01/22 0701 - 05/02/22 0700     0442-4921 6830-5013 Total              Intake    P.O.  472  -- 472    IV Piggyback  100  -- 100    Total Intake 572 -- 572       Output    Urine  425  775 1200    Total Output              05/01/22  0942 05/01/22  1300 05/01/22  1627   Weight: 107 kg (235 lb) 105 kg (231 lb 3.2 oz) 105 kg (231 lb 7.7 oz)          Physical Exam:  General Appearance:    Alert, cooperative, in no acute distress   Head:    Normocephalic, without obvious abnormality, atraumatic   Eyes:            Lids and lashes normal, conjunctivae and sclerae normal, no   icterus, no pallor, corneas clear, PERRLA   Ears:    Ears appear intact with no abnormalities noted   Throat:   No oral lesions, no thrush, oral mucosa moist   Neck:   No adenopathy, supple, trachea midline, no thyromegaly, no carotid bruit, no JVD   Back:     No kyphosis present, no scoliosis present, no skin lesions,    erythema or " scars, no tenderness to percussion or                   palpation, range of motion normal   Lungs:     Fine wheezes with auscultation, respirations regular, even and unlabored    Heart:    Irregular rhythm and normal rate, normal S1 and S2, no         murmur, no gallop, no rub, no click   Abdomen:     Normal bowel sounds, no masses, no organomegaly, soft     nontender, nondistended, no guarding, no rebound   tenderness   Extremities:   Moves all extremities well,  no cyanosis, no redness, no-trace edema   Pulses:   Pulses palpable and equal bilaterally   Skin:   No bleeding, bruising or rash   Lymph nodes:   No palpable adenopathy   Neurologic:   Cranial nerves 2 - 12 grossly intact, sensation intact, DTR     present and equal bilaterally          Results Review:  I personally reviewed the patient's clinical results.  Results from last 7 days   Lab Units 05/02/22  0349   WBC 10*3/mm3 5.57   HEMOGLOBIN g/dL 12.1*   HEMATOCRIT % 36.9*   PLATELETS 10*3/mm3 188     Results from last 7 days   Lab Units 05/02/22  0349 05/01/22  1017   SODIUM mmol/L 134* 139   POTASSIUM mmol/L 3.3* 3.3*   CHLORIDE mmol/L 96* 97*   CO2 mmol/L 24.0 28.0   BUN mg/dL 48* 35*   CREATININE mg/dL 1.76* 1.89*   CALCIUM mg/dL 9.2 9.2   BILIRUBIN mg/dL  --  0.9   ALK PHOS U/L  --  136*   ALT (SGPT) U/L  --  33   AST (SGOT) U/L  --  39   GLUCOSE mg/dL 241* 142*         Results from last 7 days   Lab Units 05/02/22  0349   TSH uIU/mL 0.682         Results from last 7 days   Lab Units 05/01/22  1017   PROBNP pg/mL 5,480.0*             Radiology:  Imaging Results (Last 72 Hours)     Procedure Component Value Units Date/Time    XR Chest 1 View [[id number]] Collected: 05/01/22 1034     Updated: 05/01/22 1040    Narrative:      EXAMINATION: XR CHEST 1 VW-     DATE OF EXAM: 5/1/2022 10:28 AM     INDICATION: Shortness of air     COMPARISON: Chest radiograph dated 04/28/2022     TECHNIQUE: Portable AP view of the chest was obtained.     FINDINGS:  There is a  left chest wall pacemaker with leads terminating at the right  atrium and right ventricle, stable from prior. There is stable  cardiomegaly. Pulmonary vascularity appears normal. There is no acute  airspace consolidation, pleural effusion, or pneumothorax. There are  degenerative changes of the thoracic spine.       Impression:      1. Stable cardiomegaly without acute pulmonary process.     This report was finalized on 5/1/2022 10:37 AM by Phil Davies MD.               Tele:  Paced, underlying AFib      Assessment/Plan     1.  Acute on Chronic Systolic CHF   -  73 yo CM with known CHF/EF 25-30% (echo 6/2021) with increased pedal edema that responded to change in diuretics and progressive dyspnea - in setting of URI(rhinovirus)   -  Repeat echo this admission shows EF 46-50%, mild global HK   -  ProBNP 5480 (up from 1373 a few days ago).   -  [employer] DDD ICD   -  73 yo CM with acute URI/Rhinovirus presents in AFib which has exacerbated his CHF.  Currently on no AA.  Will add Amiodarone 400mg twice daily, continue Entresto, diuretics, beta blocker.        2.  COPD    -  Exacerbation with URI/Rhinovirus but historically well managed.   -  IV steroids/abx per primary service    3.  Paroxysmal AFib   -  Interrogate device, SJM.  Was in NSR on 4/28/22.     -  Continue Coreg, Eliquis.  Add amiodarone with close monitoring of lung disease, thyroid function.  400mg twice daily x 7 days then taper down afterwards.    4.  HTN   -  Controlled.   -  Continue to monitor.     5.  HLP    6.  CKDz   -  Labs reviewed.        73 yo CM with COPD, PAF, Sys CHF who presents with exacerbation from URI/Rhinovirus which has caused occurrence of AFib which in turn has exacerbated CHF.  Will continue diuresis with monitoring of CKDz and initiate Amiodarone with for rhythm control while monitoring thyroid, liver and COPD.    I discussed the patient's findings and my recommendations with the patient, any present family members, and the  nursing staff.  Dandre Mcfarland MD saw and examined patient, verified hx and PE, read all radiographic studies, reviewed labs and micro data, and formulated dx, plan for treatment and all medical decision making.      Na Elkins PA-C, working with Dandre Mcfarland MD  05/02/22 09:04 EDT

## 2022-05-02 NOTE — PROGRESS NOTES
[facility] Medicine Services  PROGRESS NOTE    Patient Name: Antony Monahan  : 1950  MRN: [id number]    Date of Admission: 2022  Primary Care Physician: Franky Carrington MD    Subjective   Subjective     CC:  SOA    HPI:  Feels better but not back to normal breathing.  States that he gets for SOA with any exertion.  Normally only uses O2 PRN/occasionally at home.     ROS:  Gen- No fevers, chills  CV- No chest pain, palpitations  Resp- +cough, +dyspnea  GI- No N/V/D, abd pain        Objective   Objective     Vital Signs:   Temp:  [97.6 °F (36.4 °C)-98.9 °F (37.2 °C)] 97.6 °F (36.4 °C)  Heart Rate:  [75-92] 75  Resp:  [16-20] 18  BP: (102-137)/(57-76) 124/73  Flow (L/min):  [2] 2     Physical Exam:  Constitutional: No acute distress, awake, alert, sitting up in chair  HENT: NCAT, mucous membranes moist  Respiratory: scattered wheezes, decreased BS in the bases, respiratory effort normal   Cardiovascular: RRR, no murmurs, rubs, or gallops  Gastrointestinal: Positive bowel sounds, soft, nontender, nondistended  Musculoskeletal: No bilateral ankle edema  Psychiatric: Appropriate affect, cooperative  Neurologic: Oriented x 3, strength symmetric in all extremities, Cranial Nerves grossly intact to confrontation, speech clear  Skin: No rashes      Results Reviewed:  LAB RESULTS:      Lab 22  1017 22  1802   WBC 5.57 6.34 8.22   HEMOGLOBIN 12.1* 12.1* 12.6*   HEMATOCRIT 36.9* 36.7* 38.5   PLATELETS 188 178 137*   NEUTROS ABS 4.75 4.67 6.63   IMMATURE GRANS (ABS) 0.03 0.02 0.02   LYMPHS ABS 0.57* 0.74 0.64*   MONOS ABS 0.21 0.82 0.70   EOS ABS 0.00 0.06 0.20   MCV 87.0 87.6 88.5   LACTATE  --  1.5  --          Lab 22  03422  1017 22  1802   SODIUM 134* 139 140   POTASSIUM 3.3* 3.3* 3.5   CHLORIDE 96* 97* 103   CO2 24.0 28.0 27.0   ANION GAP 14.0 14.0 10.0   BUN 48* 35* 22   CREATININE 1.76* 1.89* 1.41*   EGFR 40.3* 37.0* 52.6*    GLUCOSE 241* 142* 122*   CALCIUM 9.2 9.2 8.6   TSH 0.682  --   --          Lab 05/01/22  1017 04/28/22  1802   TOTAL PROTEIN 7.3 7.1   ALBUMIN 3.70 4.00   GLOBULIN 3.6 3.1   ALT (SGPT) 33 16   AST (SGOT) 39 21   BILIRUBIN 0.9 0.8   ALK PHOS 136* 91         Lab 05/01/22  1017 04/28/22  1802   PROBNP 5,480.0* 1,373.0*   TROPONIN T 0.017 <0.010                 Brief Urine Lab Results  (Last result in the past 365 days)      Color   Clarity   Blood   Leuk Est   Nitrite   Protein   CREAT   Urine HCG        06/01/21 0140             50.3               Microbiology Results Abnormal     None          Adult Transthoracic Echo Complete W/ Cont if Necessary Per Protocol    Result Date: 5/2/2022  · The left ventricular cavity is mildly dilated. · Left ventricular wall thickness is consistent with mild concentric hypertrophy. · Left ventricular ejection fraction appears to be 46 - 50%. · Left atrial volume is moderately increased. · Mild global LV hypokinesis      XR Chest 1 View    Result Date: 5/1/2022  EXAMINATION: XR CHEST 1 VW-  DATE OF EXAM: 5/1/2022 10:28 AM  INDICATION: Shortness of air  COMPARISON: Chest radiograph dated 04/28/2022  TECHNIQUE: Portable AP view of the chest was obtained.  FINDINGS: There is a left chest wall pacemaker with leads terminating at the right atrium and right ventricle, stable from prior. There is stable cardiomegaly. Pulmonary vascularity appears normal. There is no acute airspace consolidation, pleural effusion, or pneumothorax. There are degenerative changes of the thoracic spine.      Impression: 1. Stable cardiomegaly without acute pulmonary process.  This report was finalized on 5/1/2022 10:37 AM by Phli Davies MD.        Results for orders placed during the hospital encounter of 05/01/22    Adult Transthoracic Echo Complete W/ Cont if Necessary Per Protocol    Interpretation Summary  · The left ventricular cavity is mildly dilated.  · Left ventricular wall thickness is consistent  with mild concentric hypertrophy.  · Left ventricular ejection fraction appears to be 46 - 50%.  · Left atrial volume is moderately increased.  · Mild global LV hypokinesis      I have reviewed the medications:  Scheduled Meds:allopurinol, 100 mg, Oral, BID  amLODIPine, 5 mg, Oral, Q PM  apixaban, 5 mg, Oral, Q12H  budesonide, 0.5 mg, Nebulization, BID - RT  bumetanide, 2 mg, Oral, BID  carvedilol, 25 mg, Oral, BID  clopidogrel, 75 mg, Oral, Daily  doxycycline, 100 mg, Oral, Q12H  ipratropium-albuterol, 3 mL, Nebulization, 4x Daily - RT  levothyroxine, 100 mcg, Oral, Q AM  methylPREDNISolone sodium succinate, 60 mg, Intravenous, Q12H  pantoprazole, 40 mg, Oral, Daily  pharmacy consult - MTM, , Does not apply, Daily  rosuvastatin, 40 mg, Oral, Nightly  sacubitril-valsartan, 1 tablet, Oral, Q12H  sodium chloride, 10 mL, Intravenous, Q12H  spironolactone, 25 mg, Oral, Daily      Continuous Infusions:   PRN Meds:.•  acetaminophen **OR** acetaminophen **OR** acetaminophen  •  ondansetron **OR** ondansetron  •  sodium chloride  •  sodium chloride    Assessment/Plan   Assessment & Plan     Active Hospital Problems    Diagnosis  POA   • COPD exacerbation (HCC) [J44.1]  Yes      Resolved Hospital Problems   No resolved problems to display.        Brief Hospital Course to date:  Antony Monahan is a 72 y.o. male     COPD with acute exacerbation  Viral URI (rhinovirus/enterovirus)  - continue solumedrol IV 60 mg q12h day 2  - duonebs and budesonide  - doxycycline day 2  - CXR shows no acute     Acute on Chronic Systolic Heart Failure  S/p [employer] DDD ICD  - proBNP 5480  - recent leg edema, improved with metolazone  - echo showed EF 46-50%, mild global HK  - diuresis per cardiology/Dr. Mcfarland  - cont home entresto, beta blocker     Atrial fibrillation  - anticoagulated  - cardiology following, recs amiodarone 400mg BID for rhythm control    Hypokalemia  --KCl 20mEQ x1     SUNIL on CKD IIIa  - baseline creat 1.3-1.4  -  diuresis per cards.  Monitor Creatinine     PAD  - recent left iliac stent     HTN     Dyslipidemia     Hypothyroid  - TSH wnl    DVT prophylaxis:  Medical DVT prophylaxis orders are present.       AM-PAC 6 Clicks Score (PT): 23 (05/01/22 1405)    Disposition: I expect the patient to be discharged TBD    CODE STATUS:   Code Status and Medical Interventions:   Ordered at: 05/01/22 1233     Level Of Support Discussed With:    Patient     Code Status (Patient has no pulse and is not breathing):    CPR (Attempt to Resuscitate)     Medical Interventions (Patient has pulse or is breathing):    Full Support       Marbin Marie MD  05/02/22

## 2022-05-02 NOTE — PLAN OF CARE
Goal Outcome Evaluation:  Plan of Care Reviewed With: patient        Progress: improving  Outcome Evaluation: VSS, Paced rhythm on the monitor, 2LNC. No complaints this shift. Will continue to monitor.

## 2022-05-02 NOTE — DISCHARGE INSTRUCTIONS
Please call the [place] Prescription Assistance Program at [phone] for help with medication costs.

## 2022-05-02 NOTE — THERAPY EVALUATION
Patient Name: Antony Monahan  : 1950    MRN: [id number]                              Today's Date: 2022       Admit Date: 2022    Visit Dx:     ICD-10-CM ICD-9-CM   1. COPD exacerbation ([facility])  J44.1 491.21   2. Volume depletion  E86.9 276.50   3. Pneumonia due to infectious organism, unspecified laterality, unspecified part of lung  J18.9 486   4. SUNIL (acute kidney injury) ([facility])  N17.9 584.9     Patient Active Problem List   Diagnosis   • ST elevation myocardial infarction involving left anterior descending (LAD) coronary artery ([facility])   • GERD (gastroesophageal reflux disease)   • Hyperlipidemia LDL goal <70   • PAD (peripheral artery disease) ([facility])   • COPD (chronic obstructive pulmonary disease) ([facility])   • Ischemic cardiomyopathy   • Hypothyroid   • Essential hypertension   • Coronary artery disease involving native coronary artery of native heart   • Claudication ([facility])   • Bradycardia   • Renal insufficiency   • Chronic fatigue   • Vitamin D deficiency   • Hypersomnolence   • Obesity (BMI 30.0-34.9)   • Intermittent claudication of both lower extremities due to atherosclerosis ([facility])   • Hypotension   • Arrhythmia   • Cardiac related syncope   • VT (ventricular tachycardia) ([facility])   • Symptomatic stenosis of left carotid artery without infarction   • Carotid stenosis, asymptomatic, left, s/p carotid stent   • Atherosclerosis of native artery of both lower extremities with intermittent claudication ([facility])   • Morbidly obese ([facility])   • HTN (hypertension)   • Hypercholesterolemia   • Systolic CHF ([facility])   • Pacemaker   • CAD (coronary artery disease)   • Back pain   • Chronic narcotic use   • Borderline diabetes   • Shortness of breath   • Hypernatremia   • SUNIL (acute kidney injury) ([facility])   • Abdominal distention   • COPD exacerbation ([facility])   • Obesity (BMI 30-39.9)     Past Medical History:   Diagnosis Date   • Anxiety    • Arthritis    • Back pain     past injections, years ago   • Borderline diabetes     • CAD (coronary artery disease)    • Chronic narcotic use     prn   • COPD (chronic obstructive pulmonary disease) ([facility]) 07/29/2016   • Depression     intermittent   • Diabetes mellitus ([facility])     checsk sugar once per week   • Full dentures    • Headache    • HTN (hypertension) 07/29/2016   • Hypercholesterolemia 07/29/2016   • Hypothyroid 07/29/2016   • Joint pain     hydrocodone prn   • Obesity    • Pacemaker    • PAD (peripheral artery disease) ([facility]) 07/29/2016   • SOBOE (shortness of breath on exertion)    • Systolic CHF ([facility]) 07/29/2016   • Tinnitus    • Tobacco abuse 07/29/2016   • Wears glasses     readers     Past Surgical History:   Procedure Laterality Date   • AORTAGRAM Left 3/30/2022    Procedure: ABDOMINAL AORTAGRAM WITH RUNOFF,  LEFT EXTERNAL ILIAC DRUG ELUTING BALLOON ANGIOPLASTY, LEFT INTERNAL ILIAC STENT, LEFT LEG ANGIOGRAM;  Surgeon: Mono Marley MD;  Location:  [facility];  Service: Vascular;  Laterality: Left;   • CARDIAC CATHETERIZATION N/A 07/29/2016    Procedure: Left Heart Cath;  Surgeon: Drew Gurrola MD;  Location:  [facility];  Service:    • CARDIAC CATHETERIZATION N/A 07/29/2019    Procedure: LEFT HEART CATH;  Surgeon: Drew Gurrola MD;  Location:  [facility];  Service: Cardiovascular   • CARDIAC CATHETERIZATION N/A 06/03/2021    Procedure: LEFT HEART CATH;  Surgeon: Drew Gurrola MD;  Location:  [facility];  Service: Cardiovascular;  Laterality: N/A;   • CARDIAC ELECTROPHYSIOLOGY PROCEDURE N/A 07/30/2019    Procedure: ICD SC new;  Surgeon: Musa Harden MD;  Location:  [facility];  Service: Cardiology   • COLONOSCOPY     • INTERVENTIONAL RADIOLOGY PROCEDURE N/A 09/14/2016    Procedure: AORTIC Aortagram with Runoff;  Surgeon: Drew Gurrola MD;  Location:  [facility];  Service:    • INTERVENTIONAL RADIOLOGY PROCEDURE N/A 05/09/2017    Procedure: Abdominal  Aortagram with Runoff;  Surgeon: Drew Gurrola MD;  Location:  [facility];  Service:    • INTERVENTIONAL RADIOLOGY PROCEDURE N/A 09/05/2018    Procedure: Abdominal Aortagram with Runoff;  Surgeon: Drew Gurrola MD;  Location:  [facility];  Service: Cardiovascular   • INTERVENTIONAL RADIOLOGY PROCEDURE Bilateral 07/29/2019    Procedure: Carotid Cerebral Angiogram;  Surgeon: Claude Atkins MD;  Location:  [facility];  Service: Interventional Radiology   • INTERVENTIONAL RADIOLOGY PROCEDURE N/A 08/18/2020    Procedure: AARO+/-;  Surgeon: Drew Gurrola MD;  Location:  [facility];  Service: Cardiovascular;  Laterality: N/A;   • INTERVENTIONAL RADIOLOGY PROCEDURE Bilateral 11/20/2020    Procedure: Carotid Cerebral Angiogram with stent;  Surgeon: Claude Atkins MD;  Location:  [facility];  Service: Interventional Radiology;  Laterality: Bilateral;   • KNEE SURGERY Left 08/17/2015   • OTHER SURGICAL HISTORY      L LEG STENT   • [place] TCAT IV STENT CRV CRTD ART EMBOLIC PROTECJ N/A 09/25/2019    Left Carotid Stent; Dr. Claude Atkins   • UMBILICAL HERNIA REPAIR  2006    no mesh, done in [facility]   • WRIST SURGERY Right       General Information     Row Name 05/02/22 0849          Physical Therapy Time and Intention    Document Type evaluation  -DM     Mode of Treatment physical therapy  -DM     Row Name 05/02/22 0849          General Information    Patient Profile Reviewed yes  -DM     Prior Level of Function independent:;all household mobility;community mobility;gait;transfer;bed mobility;ADL's;home management;cooking;cleaning;driving;shopping;min assist:;yard work  indep gt w/o AD(occ limps LLE d/t L iliac stent);is prim.CG for wife (CVA 1 1/2 Yr.ago & is dep.in HM but can get around in home), & does HM,etc.(hired help q2 wks to launder wife's sheets & do pt.care for her);grkids help w/ ydwk(rid.mower);dtr PRN   -DM     Existing Precautions/Restrictions oxygen therapy device and L/min;other (see comments)  Rhinovirus (droplet prec.);chr.bronch,claudic LE's(3-30-22: abdom aogram w/ L ext iliac PTCA & L int.iliac stent;arthr.(h/o L knee sx '15, & back inject.);Intermitt.2L O2@ home  -DM     Barriers to Rehab ineffective coping  intermitt Dep.  -DM     Row Name 05/02/22 0849          Living Environment    People in Home spouse  dtr runs a fitness gym in [place] & avail PRN  -DM     Row Name 05/02/22 0849          Home Main Entrance    Number of Stairs, Main Entrance one  -DM     Stair Railings, Main Entrance none  -DM     Row Name 05/02/22 0849          Stairs Within Home, Primary    Stairs, Within Home, Primary 1-st. w/ W.I. show.& sh.seat  -DM     Number of Stairs, Within Home, Primary none  -DM     Row Name 05/02/22 0849          Cognition    Orientation Status (Cognition) oriented x 3  -DM     Row Name 05/02/22 0849          Safety Issues, Functional Mobility    Safety Issues Affecting Function (Mobility) safety precaution awareness;safety precautions follow-through/compliance  -DM     Impairments Affecting Function (Mobility) endurance/activity tolerance;pain;shortness of breath;strength  -DM           User Key  (r) = Recorded By, (t) = Taken By, (c) = Cosigned By    Initials Name Provider Type    DM Nubia Barrow, PT Physical Therapist               Mobility     Row Name 05/02/22 0849          Bed Mobility    Comment, (Bed Mobility) UIC w/ o2; issued tdsocks, o2 ext. tubing; phone call from bus.office re:billing; in to perform cardiol.consult;pt.c/o thirst(req. discard all old drinks from yest & new H20/ice);states he needs to get home to care for spouse w/ h/o CVA(is prim.CG)  -DM     Row Name 05/02/22 0849          Transfers    Comment, (Transfers) cues for HP  -DM     Row Name 05/02/22 0849          Sit-Stand Transfer    Sit-Stand [place] (Transfers) standby assist  -DM     Comment, (Sit-Stand  Transfer) Once standing, performed PLB exer & WS L/R  -DM     Row Name 05/02/22 0849          Gait/Stairs (Locomotion)    [place] Level (Gait) verbal cues;contact guard  pt reached for FOB for support on last lap d/t onset antalgic gt LLE (had L iliac PTCA & stent 3-30, & prev L Knee sx); 2 stand.rests; desat 93%;  o2 ext.tubing removed once pt ret. to chair(PCT will use if pt amb to [facility])  -DM     Assistive Device (Gait) other (see comments)  decl. AD, but after gt agreed he would benefit from SPC, [facility]  -DM     Distance in Feet (Gait) 80  -DM     Deviations/Abnormal Patterns (Gait) bilateral deviations;base of support, narrow;sherri decreased;stride length decreased;weight shifting decreased;antalgic;left sided deviations;other (see comments)  Decr step length;onset antal.LLE  -DM     Bilateral Gait Deviations forward flexed posture;heel strike decreased  -DM     Comment, (Gait/Stairs) cues for incr step length, stabiliz. L knee, trunk ext/focusing ahead w/ PLB; [facility]  -DM           User Key  (r) = Recorded By, (t) = Taken By, (c) = Cosigned By    Initials Name Provider Type    DM Nubia Barrow, PT Physical Therapist               Obj/Interventions     Row Name 05/02/22 0849          Range of Motion Comprehensive    General Range of Motion lower extremity range of motion deficits identified  -DM     Comment, General Range of Motion L hip/ knee [place]. 10-15% d/t recent iliac PTCA/stent  -DM     Row Name 05/02/22 0849          Strength Comprehensive (MMT)    General Manual Muscle Testing (MMT) Assessment lower extremity strength deficits identified  -DM     Comment, General Manual Muscle Testing (MMT) Assessment L hip/knee 3- to 4-/5; R hip/knee 3+ to 4/5  -DM     Row Name 05/02/22 0849          Motor Skills    Therapeutic Exercise shoulder;hip;knee;ankle  issued HEP & instructed  -DM     Row Name 05/02/22 0849          Shoulder (Therapeutic Exercise)    Shoulder (Therapeutic  Exercise) AROM (active range of motion)  -DM     Shoulder AROM (Therapeutic Exercise) bilateral;flexion;extension;aBduction;aDduction;external rotation;sitting;10 repetitions;other (see comments)  biceps curls  -DM     Row Name 05/02/22 0849          Hip (Therapeutic Exercise)    Hip (Therapeutic Exercise) AROM (active range of motion);isometric exercises  -DM     Hip AROM (Therapeutic Exercise) bilateral;flexion;extension;aBduction;aDduction;external rotation;internal rotation;sitting;10 repetitions  sitting marches  -DM     Hip Isometrics (Therapeutic Exercise) bilateral;gluteal sets;sitting;10 repetitions;other (see comments)  abdom sets  -DM     Row Name 05/02/22 0849          Knee (Therapeutic Exercise)    Knee (Therapeutic Exercise) AROM (active range of motion);isometric exercises  -DM     Knee AROM (Therapeutic Exercise) bilateral;flexion;extension;SAQ (short arc quad);LAQ (long arc quad);heel slides;sitting;10 repetitions  -DM     Knee Isometrics (Therapeutic Exercise) bilateral;quad sets;hamstring sets;sitting;10 repetitions  -DM     Row Name 05/02/22 0849          Ankle (Therapeutic Exercise)    Ankle (Therapeutic Exercise) AROM (active range of motion)  -DM     Ankle AROM (Therapeutic Exercise) bilateral;dorsiflexion;plantarflexion;sitting;10 repetitions;other (see comments)  AC  -DM     Row Name 05/02/22 0849          Balance    Balance Assessment sitting static balance;sitting dynamic balance;standing static balance;standing dynamic balance  -DM     Static Sitting Balance independent  -DM     Dynamic Sitting Balance independent  -DM     Position, Sitting Balance unsupported;sitting in chair  -DM     Static Standing Balance independent  -DM     Dynamic Standing Balance verbal cues;contact guard  -DM     Position/Device Used, Standing Balance unsupported  init decl. AD; agreed to SPC after gt trial  -DM     Balance Interventions sitting;standing;static;dynamic;weight shifting activity  -DM           User  Key  (r) = Recorded By, (t) = Taken By, (c) = Cosigned By    Initials Name Provider Type    DM Nubia Barrow, PT Physical Therapist               Goals/Plan     Row Name 05/02/22 0849          Bed Mobility Goal 1 (PT)    Activity/Assistive Device (Bed Mobility Goal 1, PT) bed mobility activities, all  -DM     [place] Level/Cues Needed (Bed Mobility Goal 1, PT) independent  -DM     Time Frame (Bed Mobility Goal 1, PT) long term goal (LTG);1 week  -DM     Row Name 05/02/22 0849          Transfer Goal 1 (PT)    Activity/Assistive Device (Transfer Goal 1, PT) sit-to-stand/stand-to-sit;bed-to-chair/chair-to-bed  -DM     [place] Level/Cues Needed (Transfer Goal 1, PT) independent  -DM     Time Frame (Transfer Goal 1, PT) long term goal (LTG);1 week  -DM     Row Name 05/02/22 0849          Gait Training Goal 1 (PT)    Activity/Assistive Device (Gait Training Goal 1, PT) gait (walking locomotion);cane, straight  Stable VS; O2 sat > 92%  -DM     [place] Level (Gait Training Goal 1, PT) supervision required  -DM     Distance (Gait Training Goal 1, PT) 150  -DM     Time Frame (Gait Training Goal 1, PT) long term goal (LTG);1 week  -DM     Row Name 05/02/22 0849          Stairs Goal 1 (PT)    Activity/Assistive Device (Stairs Goal 1, PT) ascending stairs;descending stairs;cane, straight  -DM     [place] Level/Cues Needed (Stairs Goal 1, PT) supervision required  -DM     Number of Stairs (Stairs Goal 1, PT) 1  -DM     Time Frame (Stairs Goal 1, PT) long term goal (LTG);1 week  -DM     Row Name 05/02/22 0849          Patient Education Goal (PT)    Activity (Patient Education Goal, PT) HEP exer  -DM     [place]/Cues/Accuracy (Memory Goal 2, PT) demonstrates adequately;verbalizes understanding  -DM     Time Frame (Patient Education Goal, PT) long term goal (LTG);1 week  -DM     Row Name 05/02/22 0849          Therapy Assessment/Plan (PT)    Planned Therapy Interventions (PT) balance training;bed mobility  training;gait training;home exercise program;patient/family education;stair training;strengthening;transfer training  -DM           User Key  (r) = Recorded By, (t) = Taken By, (c) = Cosigned By    Initials Name Provider Type    DM Nubia Barrow, PT Physical Therapist               Clinical Impression     Row Name 05/02/22 0849          Pain    Pain Intervention(s) Repositioned;Elevated;Rest  -DM     Additional Documentation Pain Scale: FACES Pre/Post-Treatment (Group)  -DM     Row Name 05/02/22 0849          Pain Scale: FACES Pre/Post-Treatment    Pain: FACES Scale, Pretreatment 0-->no hurt  -DM     Posttreatment Pain Rating 2-->hurts little bit  -DM     Pain Location - Side/Orientation Left  -DM     Pain Location lower  -DM     Pain Location - extremity  -DM     Row Name 05/02/22 0849          Plan of Care Review    Plan of Care Reviewed With patient  -DM     Progress improving  -DM     Outcome Evaluation PT eval completed. Presents w/ rhinovirus(droplet prec.),COPD exac, claudic. LE's (recent L ext iliac PTCA & L int.iliac stent 3-30), decr LE strength/endurance & impaired funct mobil. Amb 80 ft w/ CGA (decl. AD) w/ 2 stand.rests on 2 L, + ther exer per HEP, but noted onset limping LLE (agreed to SPC for next trial), incr SOB (desat to 93%), & HR to 96. Pt. decl. HHPT & states he is prim. CG for spouse d/t CVA (dtr/family will assist pt. at d/c as needed).  -DM     Row Name 05/02/22 0849          Therapy Assessment/Plan (PT)    Patient/Family Therapy Goals Statement (PT) improved funct mobil  -DM     Rehab Potential (PT) good, to achieve stated therapy goals  -DM     Criteria for Skilled Interventions Met (PT) yes;meets criteria;skilled treatment is necessary  -DM     Therapy Frequency (PT) daily  -DM     Row Name 05/02/22 0849          Vital Signs    Pre Systolic BP Rehab 124  -DM     Pre Treatment Diastolic BP 73  -DM     Post Systolic BP Rehab 128  -DM     Post Treatment Diastolic BP 74  -DM      Pretreatment Heart Rate (beats/min) 75  -DM     Intratreatment Heart Rate (beats/min) 96  -DM     Posttreatment Heart Rate (beats/min) 78  -DM     Pre SpO2 (%) 96  -DM     O2 Delivery Pre Treatment supplemental O2  -DM     Intra SpO2 (%) 93  -DM     O2 Delivery Intra Treatment supplemental O2  -DM     Post SpO2 (%) 94  -DM     O2 Delivery Post Treatment supplemental O2  -DM     Pre Patient Position Sitting  -DM     Intra Patient Position Standing  -DM     Post Patient Position Sitting  -DM     Rest Breaks  2  -DM     Row Name 05/02/22 0849          Positioning and Restraints    Pre-Treatment Position sitting in chair/recliner  -DM     Post Treatment Position chair  -DM     In Chair notified nsg;reclined;call light within reach;encouraged to call for assist;waffle cushion;legs elevated  -DM           User Key  (r) = Recorded By, (t) = Taken By, (c) = Cosigned By    Initials Name Provider Type    Nubia Jones, PT Physical Therapist               Outcome Measures     Row Name 05/02/22 0849          How much help from another person do you currently need...    Turning from your back to your side while in flat bed without using bedrails? 4  -DM     Moving from lying on back to sitting on the side of a flat bed without bedrails? 4  -DM     Moving to and from a bed to a chair (including a wheelchair)? 4  -DM     Standing up from a chair using your arms (e.g., wheelchair, bedside chair)? 4  -DM     Climbing 3-5 steps with a railing? 3  -DM     To walk in hospital room? 3  -DM     AM-PAC 6 Clicks Score (PT) 22  -DM     Highest level of mobility 7 --> Walked 25 feet or more  -DM     Row Name 05/02/22 0849          Functional Assessment    Outcome Measure Options AM-PAC 6 Clicks Basic Mobility (PT)  -DM           User Key  (r) = Recorded By, (t) = Taken By, (c) = Cosigned By    Initials Name Provider Type    Nubia Jones, PT Physical Therapist                             Physical Therapy Education                  Title: PT OT SLP Therapies (Done)     Topic: Physical Therapy (Done)     Point: Mobility training (Done)     Learning Progress Summary           Patient Chau, E,D,H, VU,DU by DM at 5/2/2022 1107                   Point: Home exercise program (Done)     Learning Progress Summary           Patient Chau, E,D,H, VU,DU by DM at 5/2/2022 1107                   Point: Body mechanics (Done)     Learning Progress Summary           Patient Eduardoer, E,D,H, VU,DU by DM at 5/2/2022 1107                   Point: Precautions (Done)     Learning Progress Summary           Patient Eduardoer, E,D,H, VU,DU by DM at 5/2/2022 1107                               User Key     Initials Effective Dates Name Provider Type Discipline    DM 06/16/21 -  Nubia Barrow, PT Physical Therapist PT              PT Recommendation and Plan  Planned Therapy Interventions (PT): balance training, bed mobility training, gait training, home exercise program, patient/family education, stair training, strengthening, transfer training  Plan of Care Reviewed With: patient  Progress: improving  Outcome Evaluation: PT eval completed. Presents w/ rhinovirus(droplet prec.),COPD exac, claudic. LE's (recent L ext iliac PTCA & L int.iliac stent 3-30), decr LE strength/endurance & impaired funct mobil. Amb 80 ft w/ CGA (decl. AD) w/ 2 stand.rests on 2 L, + ther exer per HEP, but noted onset limping LLE (agreed to SPC for next trial), incr SOB (desat to 93%), & HR to 96. Pt. decl. HHPT & states he is prim. CG for spouse d/t CVA (dtr/family will assist pt. at d/c as needed).     Time Calculation:    PT Charges     Row Name 05/02/22 1107             Time Calculation    Start Time 0849  -DM      PT Received On 05/02/22  -DM      PT Goal Re-Cert Due Date 05/12/22  -DM              Time Calculation- PT    Total Timed Code Minutes-  minute(s)  -DM              Timed Charges    34553 - PT Therapeutic Exercise Minutes 18  -DM      20427 - Gait Training Minutes  14  -DM       81720 - PT Therapeutic Activity Minutes 15  -DM              Untimed Charges    PT Eval/Re-eval Minutes 60  -DM              Total Minutes    Timed Charges Total Minutes 47  -DM      Untimed Charges Total Minutes 60  -DM       Total Minutes 107  -DM            User Key  (r) = Recorded By, (t) = Taken By, (c) = Cosigned By    Initials Name Provider Type    Nubia Jones, PT Physical Therapist              Therapy Charges for Today     Code Description Service Date Service Provider Modifiers Qty    51475092629 HC PT THER PROC EA 15 MIN 5/2/2022 Nubia Barrow, PT GP 1    52431082068 HC GAIT TRAINING EA 15 MIN 5/2/2022 Nubia Barrow, PT GP 1    75306443152 HC PT THERAPEUTIC ACT EA 15 MIN 5/2/2022 Nubia Barrow, PT GP 1    84560083537 HC PT EVAL MOD COMPLEXITY 4 5/2/2022 Nubia Barrow, PT GP 1          PT G-Codes  Outcome Measure Options: AM-PAC 6 Clicks Basic Mobility (PT)  AM-PAC 6 Clicks Score (PT): 22    Nubia Barrow, PT  5/2/2022

## 2022-05-02 NOTE — CONSULTS
"  Referring Provider: MD Frank  Reason for Consultation: AECOPD    Subjective .   Education:  NN spoke with pt at BS.  Pt alert and able to answer questions appropriately.  Pt O2 sat  92% on  2L currently, home O2 use  2L PRN.  Pt reports the ability to ambulate~30 feet at baseline before experiencing SOB.  Pt states no use of rescue inhaler. Patient is up to date on COVID and flu but not PNA vaccines.  He is a former smoker, quite date 2019.   Pt reports some issues at this time with medications but not transportation for appointments.  Pt reports no previous hx of formal COPD teaching, no understanding of action plan, or MI.  Stop light report, NN contact information, instructions for accessing [facility] and list of educational videos given to pt.  \"A Patient's Guide to COPD\" booklet left at BS. 1800QUITNOW reference sheet discussed and given to patient at BS.  Benefits and various levels of pulmonary rehab explained.   COPD education completed in the form of explanation, handouts, and videos. No new concerns or questions voiced at this time.  NN will continue to follow as needed.     Age: 72 y.o.  Sex: male  Smoker Status: former, ~100 pack years  Pulmonologist: DENISE  FEV1 (PFT): NA  Home O2: 2L PRN    Objective     SpO2 SpO2: 93 % (05/02/22 1146)  Device Device (Oxygen Therapy): nasal cannula (05/02/22 1200)  Flow Flow (L/min): 2 (05/02/22 1200)  Incentive Spirometer    IS Predicted Level (mL)     Number of Repetitions     Level Incentive Spirometer (mL)    Patient Tolerance     Inhaler Treatment Status    Treatment Route        Home Medications:  Medications Prior to Admission   Medication Sig Dispense Refill Last Dose   • allopurinol (ZYLOPRIM) 100 MG tablet Take 100 mg by mouth 2 (Two) Times a Day.   4/30/2022 at breakfast   • amLODIPine (NORVASC) 5 MG tablet Take 5 mg by mouth Every Morning.   4/30/2022 at dinner   • apixaban (ELIQUIS) 5 MG tablet tablet Take 5 mg by mouth Daily.   4/30/2022 at breakfast   • " bumetanide (BUMEX) 2 MG tablet Take 3 mg by mouth 2 (Two) Times a Day.   4/30/2022 at dinner   • carvedilol (COREG) 25 MG tablet Take 37.5 mg by mouth 2 (Two) Times a Day With Meals. Take 1 and 1/2 tables by mouth twice a day   4/30/2022 at breakfast   • clopidogrel (PLAVIX) 75 MG tablet Take 1 tablet by mouth Daily. 30 tablet 11 4/30/2022 at breakfast   • levothyroxine (SYNTHROID, LEVOTHROID) 100 MCG tablet Take 100 mcg by mouth Every Morning.   4/30/2022 at breakfast   • metOLazone (ZAROXOLYN) 5 MG tablet Take 1 tablet by mouth Daily. (Patient taking differently: Take 5 mg by mouth Daily. X5 days, starting 4/29) 5 tablet 0 4/30/2022 at breakfast   • pantoprazole (PROTONIX) 40 MG EC tablet Take 40 mg by mouth Every Morning.   4/30/2022 at breakfast   • rosuvastatin (CRESTOR) 40 MG tablet Take 40 mg by mouth every night.   4/30/2022 at bedtime   • sacubitril-valsartan (ENTRESTO)  MG tablet Take 1 tablet by mouth 2 (Two) Times a Day.   4/30/2022 at bedtime   • spironolactone (ALDACTONE) 25 MG tablet Take 25 mg by mouth Daily.   4/30/2022 at breakfast   • Trelegy Ellipta 100-62.5-25 MCG/INH aerosol powder  Inhale 1 puff Daily.   4/30/2022 at breakfast   • vitamin D3 125 MCG (5000 UT) capsule capsule Take 5,000 Units by mouth Daily.   4/30/2022 at bedtime   • HYDROcodone-acetaminophen (NORCO)  MG per tablet Take 1 tablet by mouth Every 6 (Six) Hours As Needed for Moderate Pain .      • niacin 500 MG tablet Take 500 mg by mouth Every Night.      • nitroglycerin (NITROSTAT) 0.4 MG SL tablet Place 0.4 mg under the tongue Every 5 (Five) Minutes As Needed for Chest Pain. Take no more than 3 doses in 15 minutes.   More than a month at Unknown time       Discussion: Per current GOLD Standards, please consider:  LAMA/LABA/ICS in place (Trelegy), Outpatient PFT, Rehab as appropriate, Palliative Care consult,  Annual LDCT per current screening guidelines (age 50-80 years old, smoking history of 20 pack years or more  or has quit within past 15 years)     Patient is agreeable to ambulatory pulmonary referral with [facility].          Olga Lidia Sanchez RN

## 2022-05-03 LAB
ANION GAP SERPL CALCULATED.3IONS-SCNC: 14 MMOL/L (ref 5–15)
BUN SERPL-MCNC: 58 MG/DL (ref 8–23)
BUN/CREAT SERPL: 29.6 (ref 7–25)
CALCIUM SPEC-SCNC: 9.7 MG/DL (ref 8.6–10.5)
CHLORIDE SERPL-SCNC: 97 MMOL/L (ref 98–107)
CO2 SERPL-SCNC: 28 MMOL/L (ref 22–29)
CREAT SERPL-MCNC: 1.96 MG/DL (ref 0.76–1.27)
EGFRCR SERPLBLD CKD-EPI 2021: 35.4 ML/MIN/1.73
GLUCOSE BLDC GLUCOMTR-MCNC: 231 MG/DL (ref 70–130)
GLUCOSE BLDC GLUCOMTR-MCNC: 292 MG/DL (ref 70–130)
GLUCOSE SERPL-MCNC: 294 MG/DL (ref 65–99)
MAGNESIUM SERPL-MCNC: 2.3 MG/DL (ref 1.6–2.4)
POTASSIUM SERPL-SCNC: 3.4 MMOL/L (ref 3.5–5.2)
SODIUM SERPL-SCNC: 139 MMOL/L (ref 136–145)

## 2022-05-03 PROCEDURE — 63710000001 INSULIN LISPRO (HUMAN) PER 5 UNITS: Performed by: INTERNAL MEDICINE

## 2022-05-03 PROCEDURE — 82962 GLUCOSE BLOOD TEST: CPT

## 2022-05-03 PROCEDURE — 80048 BASIC METABOLIC PNL TOTAL CA: CPT | Performed by: INTERNAL MEDICINE

## 2022-05-03 PROCEDURE — 93005 ELECTROCARDIOGRAM TRACING: CPT | Performed by: PHYSICIAN ASSISTANT

## 2022-05-03 PROCEDURE — 99232 SBSQ HOSP IP/OBS MODERATE 35: CPT | Performed by: INTERNAL MEDICINE

## 2022-05-03 PROCEDURE — 83735 ASSAY OF MAGNESIUM: CPT | Performed by: INTERNAL MEDICINE

## 2022-05-03 PROCEDURE — 94799 UNLISTED PULMONARY SVC/PX: CPT

## 2022-05-03 PROCEDURE — G0378 HOSPITAL OBSERVATION PER HR: HCPCS

## 2022-05-03 PROCEDURE — 25010000002 METHYLPREDNISOLONE PER 125 MG: Performed by: INTERNAL MEDICINE

## 2022-05-03 RX ORDER — NICOTINE POLACRILEX 4 MG
15 LOZENGE BUCCAL
Status: DISCONTINUED | OUTPATIENT
Start: 2022-05-03 | End: 2022-05-10 | Stop reason: HOSPADM

## 2022-05-03 RX ORDER — BENZONATATE 100 MG/1
100 CAPSULE ORAL 3 TIMES DAILY PRN
Status: DISCONTINUED | OUTPATIENT
Start: 2022-05-03 | End: 2022-05-10 | Stop reason: HOSPADM

## 2022-05-03 RX ORDER — INSULIN LISPRO 100 [IU]/ML
0-7 INJECTION, SOLUTION INTRAVENOUS; SUBCUTANEOUS
Status: DISCONTINUED | OUTPATIENT
Start: 2022-05-03 | End: 2022-05-04

## 2022-05-03 RX ORDER — DEXTROSE MONOHYDRATE 25 G/50ML
25 INJECTION, SOLUTION INTRAVENOUS
Status: DISCONTINUED | OUTPATIENT
Start: 2022-05-03 | End: 2022-05-10 | Stop reason: HOSPADM

## 2022-05-03 RX ORDER — POTASSIUM CHLORIDE 750 MG/1
40 CAPSULE, EXTENDED RELEASE ORAL ONCE
Status: COMPLETED | OUTPATIENT
Start: 2022-05-03 | End: 2022-05-03

## 2022-05-03 RX ADMIN — BENZONATATE 100 MG: 100 CAPSULE ORAL at 19:59

## 2022-05-03 RX ADMIN — INSULIN LISPRO 3 UNITS: 100 INJECTION, SOLUTION INTRAVENOUS; SUBCUTANEOUS at 12:15

## 2022-05-03 RX ADMIN — APIXABAN 5 MG: 5 TABLET, FILM COATED ORAL at 19:57

## 2022-05-03 RX ADMIN — IPRATROPIUM BROMIDE AND ALBUTEROL SULFATE 3 ML: .5; 3 SOLUTION RESPIRATORY (INHALATION) at 16:24

## 2022-05-03 RX ADMIN — AMLODIPINE BESYLATE 5 MG: 5 TABLET ORAL at 18:01

## 2022-05-03 RX ADMIN — IPRATROPIUM BROMIDE AND ALBUTEROL SULFATE 3 ML: .5; 3 SOLUTION RESPIRATORY (INHALATION) at 19:25

## 2022-05-03 RX ADMIN — CLOPIDOGREL BISULFATE 75 MG: 75 TABLET ORAL at 08:40

## 2022-05-03 RX ADMIN — ACETAMINOPHEN 650 MG: 325 TABLET ORAL at 22:43

## 2022-05-03 RX ADMIN — DOXYCYCLINE 100 MG: 100 CAPSULE ORAL at 08:39

## 2022-05-03 RX ADMIN — IPRATROPIUM BROMIDE AND ALBUTEROL SULFATE 3 ML: .5; 3 SOLUTION RESPIRATORY (INHALATION) at 11:35

## 2022-05-03 RX ADMIN — LEVOTHYROXINE SODIUM 100 MCG: 0.17 TABLET ORAL at 05:40

## 2022-05-03 RX ADMIN — PANTOPRAZOLE SODIUM 40 MG: 40 TABLET, DELAYED RELEASE ORAL at 08:40

## 2022-05-03 RX ADMIN — AMIODARONE HYDROCHLORIDE 400 MG: 200 TABLET ORAL at 19:56

## 2022-05-03 RX ADMIN — INSULIN LISPRO 4 UNITS: 100 INJECTION, SOLUTION INTRAVENOUS; SUBCUTANEOUS at 18:01

## 2022-05-03 RX ADMIN — SACUBITRIL AND VALSARTAN 1 TABLET: 97; 103 TABLET, FILM COATED ORAL at 20:08

## 2022-05-03 RX ADMIN — BUMETANIDE 2 MG: 1 TABLET ORAL at 08:40

## 2022-05-03 RX ADMIN — APIXABAN 5 MG: 5 TABLET, FILM COATED ORAL at 08:40

## 2022-05-03 RX ADMIN — ALLOPURINOL 100 MG: 100 TABLET ORAL at 19:56

## 2022-05-03 RX ADMIN — DOXYCYCLINE 100 MG: 100 CAPSULE ORAL at 19:57

## 2022-05-03 RX ADMIN — SPIRONOLACTONE 25 MG: 25 TABLET ORAL at 08:40

## 2022-05-03 RX ADMIN — ROSUVASTATIN 40 MG: 20 TABLET, FILM COATED ORAL at 19:57

## 2022-05-03 RX ADMIN — Medication 10 ML: at 08:41

## 2022-05-03 RX ADMIN — BUDESONIDE 0.5 MG: 0.5 SUSPENSION RESPIRATORY (INHALATION) at 07:36

## 2022-05-03 RX ADMIN — METHYLPREDNISOLONE SODIUM SUCCINATE 60 MG: 125 INJECTION, POWDER, FOR SOLUTION INTRAMUSCULAR; INTRAVENOUS at 08:41

## 2022-05-03 RX ADMIN — BUMETANIDE 2 MG: 1 TABLET ORAL at 18:00

## 2022-05-03 RX ADMIN — BENZONATATE 100 MG: 100 CAPSULE ORAL at 12:22

## 2022-05-03 RX ADMIN — METHYLPREDNISOLONE SODIUM SUCCINATE 60 MG: 125 INJECTION, POWDER, FOR SOLUTION INTRAMUSCULAR; INTRAVENOUS at 19:57

## 2022-05-03 RX ADMIN — IPRATROPIUM BROMIDE AND ALBUTEROL SULFATE 3 ML: .5; 3 SOLUTION RESPIRATORY (INHALATION) at 07:37

## 2022-05-03 RX ADMIN — Medication 10 ML: at 19:58

## 2022-05-03 RX ADMIN — BUDESONIDE 0.5 MG: 0.5 SUSPENSION RESPIRATORY (INHALATION) at 19:25

## 2022-05-03 RX ADMIN — POTASSIUM CHLORIDE 40 MEQ: 10 CAPSULE, COATED, EXTENDED RELEASE ORAL at 12:15

## 2022-05-03 RX ADMIN — CARVEDILOL 25 MG: 12.5 TABLET, FILM COATED ORAL at 19:56

## 2022-05-03 RX ADMIN — AMIODARONE HYDROCHLORIDE 400 MG: 200 TABLET ORAL at 08:39

## 2022-05-03 RX ADMIN — SACUBITRIL AND VALSARTAN 1 TABLET: 97; 103 TABLET, FILM COATED ORAL at 08:39

## 2022-05-03 RX ADMIN — ALLOPURINOL 100 MG: 100 TABLET ORAL at 08:40

## 2022-05-03 RX ADMIN — CARVEDILOL 25 MG: 12.5 TABLET, FILM COATED ORAL at 08:39

## 2022-05-03 RX ADMIN — ACETAMINOPHEN 650 MG: 325 TABLET ORAL at 04:15

## 2022-05-03 NOTE — PLAN OF CARE
Goal Outcome Evaluation:  Plan of Care Reviewed With: patient        Progress: improving  Outcome Evaluation: VSS, Paced/Afib rhythm on the monitor, 2LNC. PRN Tylenol given twice for headache. Will continue to monitor.

## 2022-05-03 NOTE — PROGRESS NOTES
" [facility] - Progress Note    Antony Monahan  1950  [id number]    05/03/22, 10:20 EDT      Chief Complaint: Following for CHF and AFib    Subjective:   Did well overnight - breathing is better.  Still requiring 2L O2    Review of Systems:  Pertinent positives are listed above and in physical exam.  All others have been reviewed and are negative.    allopurinol, 100 mg, Oral, BID  amiodarone, 400 mg, Oral, Q12H  amLODIPine, 5 mg, Oral, Q PM  apixaban, 5 mg, Oral, Q12H  budesonide, 0.5 mg, Nebulization, BID - RT  bumetanide, 2 mg, Oral, BID  carvedilol, 25 mg, Oral, BID  clopidogrel, 75 mg, Oral, Daily  doxycycline, 100 mg, Oral, Q12H  insulin lispro, 0-7 Units, Subcutaneous, TID AC  ipratropium-albuterol, 3 mL, Nebulization, 4x Daily - RT  levothyroxine, 100 mcg, Oral, Q AM  methylPREDNISolone sodium succinate, 60 mg, Intravenous, Q12H  pantoprazole, 40 mg, Oral, Daily  pharmacy consult - MTM, , Does not apply, Daily  potassium chloride, 40 mEq, Oral, Once  rosuvastatin, 40 mg, Oral, Nightly  sacubitril-valsartan, 1 tablet, Oral, Q12H  sodium chloride, 10 mL, Intravenous, Q12H  spironolactone, 25 mg, Oral, Daily        Objective:  Vitals:   height is 170.2 cm (67.01\") and weight is 102 kg (224 lb 9.6 oz). His oral temperature is 98.6 °F (37 °C). His blood pressure is 114/68 and his pulse is 72. His respiration is 22 and oxygen saturation is 90%.       Intake/Output Summary (Last 24 hours) at 5/3/2022 1020  Last data filed at 5/3/2022 0500  Gross per 24 hour   Intake 800 ml   Output --   Net 800 ml       Physical Exam:  General:  WN, NAD, A and O x3.  CV:  Irregular. No murmur, rub, or gallop.  Resp:  CTA [place] with few wheezes, equal, nonlabored.  Abd:  Soft, + BS, no organomegaly. Nontender to palpation.  Extrem:  No edema BLE, 2+ pedal/PT pulses.            Results from last 7 days   Lab Units 05/02/22  0349   WBC 10*3/mm3 5.57   HEMOGLOBIN g/dL 12.1*   HEMATOCRIT % 36.9*   PLATELETS 10*3/mm3 " 188     Results from last 7 days   Lab Units 05/03/22  0451 05/02/22  0349 05/01/22  1017   SODIUM mmol/L 139   < > 139   POTASSIUM mmol/L 3.4*   < > 3.3*   CHLORIDE mmol/L 97*   < > 97*   CO2 mmol/L 28.0   < > 28.0   BUN mg/dL 58*   < > 35*   CREATININE mg/dL 1.96*   < > 1.89*   CALCIUM mg/dL 9.7   < > 9.2   BILIRUBIN mg/dL  --   --  0.9   ALK PHOS U/L  --   --  136*   ALT (SGPT) U/L  --   --  33   AST (SGOT) U/L  --   --  39   GLUCOSE mg/dL 294*   < > 142*    < > = values in this interval not displayed.         Results from last 7 days   Lab Units 05/02/22  0349   TSH uIU/mL 0.682         Results from last 7 days   Lab Units 05/01/22  1017   PROBNP pg/mL 5,480.0*       Tele:  AFib    Assessment and Plan:  1.  Acute on Chronic Systolic CHF              -  73 yo CM with known CHF/EF 25-30% (echo 6/2021) with increased pedal edema that responded to change in diuretics and progressive dyspnea - in setting of URI(rhinovirus)              -  Repeat echo this admission shows EF 46-50%, mild global HK              -  ProBNP 5480 (up from 1373 a few days ago).              -  [facility] [facility] DDD ICD              -  73 yo CM with acute URI/Rhinovirus presents in AFib which has exacerbated his CHF.  Admitted on no AA -   started Amiodarone 400mg twice daily, continue Entresto, diuretics, beta blocker.          2.  COPD               -  Exacerbation with URI/Rhinovirus but historically well managed.              -  IV steroids/abx per primary service   -  Showing some improvement.     3.  Paroxysmal AFib              -  Interrogate device, SJM.  Was in NSR on 4/28/22.                -  Continue Coreg, Eliquis.  Added amiodarone with close monitoring of lung disease, thyroid function.  400mg twice daily x 7 days then taper down afterwards.     4.  HTN              -  Controlled.              -  Continue to monitor.      5.  HLP     6.  CKDz              -  Labs reviewed.   -  Cr 1.96           73 yo CM with COPD, PAF, Sys CHF who  presents with exacerbation from URI/Rhinovirus which has caused occurrence of AFib which in turn has exacerbated CHF.  Will continue diuresis with monitoring of CKDz and initiate Amiodarone with for rhythm control while monitoring thyroid, liver and COPD.        I discussed the patient's findings and my recommendations with the patient, any present family members, and the nursing staff.  Dandre Mcfarland MD saw and examined patient, verified hx and PE, read all radiographic studies, reviewed labs and micro data, and formulated dx, plan for treatment and all medical decision making.      Na Elkins PA-C  05/03/22, 10:20 EDT

## 2022-05-03 NOTE — PLAN OF CARE
Goal Outcome Evaluation:  Plan of Care Reviewed With: patient        Progress: improving VSS, v-paced/ a-fib, 3L NC, no complaints at this time will continue to monitor.

## 2022-05-03 NOTE — PROGRESS NOTES
NN spoke with pt at BS.  Pt alert and able to answer questions appropriately. Pt O2 sat  92% on  2 L currently, home O2 use  2L PRN. COPD action plan reviewed. Deep breathing exercises encouraged. No new concerns or questions voiced at this time.  NN will continue to follow as needed.       Per current GOLD Standards, please consider:  LAMA/LABA/ICS in place (Trelegy), Outpatient PFT, Rehab as appropriate, Palliative Care consult,  Annual LDCT per current screening guidelines (age 50-80 years old, smoking history of 20 pack years or more or has quit within past 15 years)     Patient has been scheduled to establish care with [facility] for 5/27/2022 @ 9:45 am with Dr. GABBY Constantino MD.  Pre testing COVID swab scheduled for 5/25/2022 at 11 am.

## 2022-05-03 NOTE — PROGRESS NOTES
[facility] Medicine Services  PROGRESS NOTE    Patient Name: Antony Monahan  : 1950  MRN: [id number]    Date of Admission: 2022  Primary Care Physician: Franky Carrington MD    Subjective   Subjective     CC:  SOA    HPI:  Feels better but has coughing fits where starts coughing and can't stop and can't breath     ROS:  Gen- No fevers, chills  CV- No chest pain, palpitations  Resp- +cough, +dyspnea  GI- No N/V/D, abd pain        Objective   Objective     Vital Signs:   Temp:  [97.6 °F (36.4 °C)-98.6 °F (37 °C)] 98.6 °F (37 °C)  Heart Rate:  [72-94] 72  Resp:  [18-22] 22  BP: (107-119)/(62-85) 114/68  Flow (L/min):  [2] 2     Physical Exam:  Constitutional: No acute distress, awake, alert, sitting up in chair  HENT: NCAT, mucous membranes moist  Respiratory: moving better air today, respiratory effort normal   Cardiovascular: RRR, no murmurs, rubs, or gallops  Gastrointestinal: Positive bowel sounds, soft, nontender, nondistended  Musculoskeletal: No bilateral ankle edema  Psychiatric: Appropriate affect, cooperative  Neurologic: Oriented x 3, strength symmetric in all extremities, Cranial Nerves grossly intact to confrontation, speech clear  Skin: No rashes      Results Reviewed:  LAB RESULTS:      Lab 22  03422  1017 22  1802   WBC 5.57 6.34 8.22   HEMOGLOBIN 12.1* 12.1* 12.6*   HEMATOCRIT 36.9* 36.7* 38.5   PLATELETS 188 178 137*   NEUTROS ABS 4.75 4.67 6.63   IMMATURE GRANS (ABS) 0.03 0.02 0.02   LYMPHS ABS 0.57* 0.74 0.64*   MONOS ABS 0.21 0.82 0.70   EOS ABS 0.00 0.06 0.20   MCV 87.0 87.6 88.5   LACTATE  --  1.5  --          Lab 22  0451 22  0349 22  1017 22  1802   SODIUM 139 134* 139 140   POTASSIUM 3.4* 3.3* 3.3* 3.5   CHLORIDE 97* 96* 97* 103   CO2 28.0 24.0 28.0 27.0   ANION GAP 14.0 14.0 14.0 10.0   BUN 58* 48* 35* 22   CREATININE 1.96* 1.76* 1.89* 1.41*   EGFR 35.4* 40.3* 37.0* 52.6*   GLUCOSE 294* 241* 142* 122*    CALCIUM 9.7 9.2 9.2 8.6   MAGNESIUM 2.3 3.0*  --   --    TSH  --  0.682  --   --          Lab 05/01/22  1017 04/28/22  1802   TOTAL PROTEIN 7.3 7.1   ALBUMIN 3.70 4.00   GLOBULIN 3.6 3.1   ALT (SGPT) 33 16   AST (SGOT) 39 21   BILIRUBIN 0.9 0.8   ALK PHOS 136* 91         Lab 05/01/22  1017 04/28/22  1802   PROBNP 5,480.0* 1,373.0*   TROPONIN T 0.017 <0.010                 Brief Urine Lab Results  (Last result in the past 365 days)      Color   Clarity   Blood   Leuk Est   Nitrite   Protein   CREAT   Urine HCG        06/01/21 0140             50.3               Microbiology Results Abnormal     Procedure Component Value - Date/Time    Blood Culture - Blood, Hand, Left [[id number]]  (Normal) Collected: 05/01/22 1104    Lab Status: Preliminary result Specimen: Blood from Hand, Left Updated: 05/02/22 1133     Blood Culture No growth at 24 hours    Blood Culture - Blood, Arm, Left [[id number]]  (Normal) Collected: 05/01/22 1104    Lab Status: Preliminary result Specimen: Blood from Arm, Left Updated: 05/02/22 1133     Blood Culture No growth at 24 hours          Adult Transthoracic Echo Complete W/ Cont if Necessary Per Protocol    Result Date: 5/2/2022  · The left ventricular cavity is mildly dilated. · Left ventricular wall thickness is consistent with mild concentric hypertrophy. · Left ventricular ejection fraction appears to be 46 - 50%. · Left atrial volume is moderately increased. · Mild global LV hypokinesis      XR Chest 1 View    Result Date: 5/1/2022  EXAMINATION: XR CHEST 1 VW-  DATE OF EXAM: 5/1/2022 10:28 AM  INDICATION: Shortness of air  COMPARISON: Chest radiograph dated 04/28/2022  TECHNIQUE: Portable AP view of the chest was obtained.  FINDINGS: There is a left chest wall pacemaker with leads terminating at the right atrium and right ventricle, stable from prior. There is stable cardiomegaly. Pulmonary vascularity appears normal. There is no acute airspace consolidation, pleural effusion, or  pneumothorax. There are degenerative changes of the thoracic spine.      Impression: 1. Stable cardiomegaly without acute pulmonary process.  This report was finalized on 5/1/2022 10:37 AM by Phil Davies MD.        Results for orders placed during the hospital encounter of 05/01/22    Adult Transthoracic Echo Complete W/ Cont if Necessary Per Protocol    Interpretation Summary  · The left ventricular cavity is mildly dilated.  · Left ventricular wall thickness is consistent with mild concentric hypertrophy.  · Left ventricular ejection fraction appears to be 46 - 50%.  · Left atrial volume is moderately increased.  · Mild global LV hypokinesis      I have reviewed the medications:  Scheduled Meds:allopurinol, 100 mg, Oral, BID  amiodarone, 400 mg, Oral, Q12H  amLODIPine, 5 mg, Oral, Q PM  apixaban, 5 mg, Oral, Q12H  budesonide, 0.5 mg, Nebulization, BID - RT  bumetanide, 2 mg, Oral, BID  carvedilol, 25 mg, Oral, BID  clopidogrel, 75 mg, Oral, Daily  doxycycline, 100 mg, Oral, Q12H  ipratropium-albuterol, 3 mL, Nebulization, 4x Daily - RT  levothyroxine, 100 mcg, Oral, Q AM  methylPREDNISolone sodium succinate, 60 mg, Intravenous, Q12H  pantoprazole, 40 mg, Oral, Daily  pharmacy consult - MTM, , Does not apply, Daily  rosuvastatin, 40 mg, Oral, Nightly  sacubitril-valsartan, 1 tablet, Oral, Q12H  sodium chloride, 10 mL, Intravenous, Q12H  spironolactone, 25 mg, Oral, Daily      Continuous Infusions:   PRN Meds:.•  acetaminophen **OR** acetaminophen **OR** acetaminophen  •  ondansetron **OR** ondansetron  •  sodium chloride  •  sodium chloride    Assessment/Plan   Assessment & Plan     Active Hospital Problems    Diagnosis  POA   • Obesity (BMI 30-39.9) [E66.9]  Yes   • COPD exacerbation (HCC) [J44.1]  Yes      Resolved Hospital Problems   No resolved problems to display.        Brief Hospital Course to date:  Antony Monahan is a 72 y.o. male     COPD with acute exacerbation  Viral URI  (rhinovirus/enterovirus)  - continue solumedrol IV 60 mg q12h day 3  - duonebs and budesonide  - doxycycline day 3  - CXR shows no acute     Acute on Chronic Systolic Heart Failure  S/p [facility] DDD ICD  - proBNP 5480  - recent leg edema, improved with metolazone  - echo showed EF 46-50%, mild global HK  - diuresis per cardiology/Dr. Mcfarland  - cont home entresto, beta blocker     Atrial fibrillation  - anticoagulated  - cardiology following, recs amiodarone 400mg BID for rhythm control    Hypokalemia  --KCl 40mEQ x1    T2DM  --exacerbated by steroids  --check HbA1c  --low dose SSI     SUNIL on CKD IIIa  - baseline creat 1.3-1.4  - diuresis per cards.  Creatinine increased slightly today, monitor close     PAD  - recent left iliac stent     HTN     Dyslipidemia     Hypothyroid  - TSH wnl    DVT prophylaxis:  Medical DVT prophylaxis orders are present.       AM-PAC 6 Clicks Score (PT): 22 (05/02/22 0800)    Disposition: I expect the patient to be discharged TBD    CODE STATUS:   Code Status and Medical Interventions:   Ordered at: 05/01/22 1233     Level Of Support Discussed With:    Patient     Code Status (Patient has no pulse and is not breathing):    CPR (Attempt to Resuscitate)     Medical Interventions (Patient has pulse or is breathing):    Full Support       Marbin Marie MD  05/03/22

## 2022-05-04 LAB
ANION GAP SERPL CALCULATED.3IONS-SCNC: 14 MMOL/L (ref 5–15)
BUN SERPL-MCNC: 80 MG/DL (ref 8–23)
BUN/CREAT SERPL: 32.5 (ref 7–25)
CALCIUM SPEC-SCNC: 9.2 MG/DL (ref 8.6–10.5)
CHLORIDE SERPL-SCNC: 95 MMOL/L (ref 98–107)
CO2 SERPL-SCNC: 28 MMOL/L (ref 22–29)
CREAT SERPL-MCNC: 2.46 MG/DL (ref 0.76–1.27)
DEPRECATED RDW RBC AUTO: 45.3 FL (ref 37–54)
EGFRCR SERPLBLD CKD-EPI 2021: 27 ML/MIN/1.73
ERYTHROCYTE [DISTWIDTH] IN BLOOD BY AUTOMATED COUNT: 14.2 % (ref 12.3–15.4)
GLUCOSE BLDC GLUCOMTR-MCNC: 347 MG/DL (ref 70–130)
GLUCOSE BLDC GLUCOMTR-MCNC: 350 MG/DL (ref 70–130)
GLUCOSE BLDC GLUCOMTR-MCNC: 435 MG/DL (ref 70–130)
GLUCOSE SERPL-MCNC: 383 MG/DL (ref 65–99)
HCT VFR BLD AUTO: 38.7 % (ref 37.5–51)
HGB BLD-MCNC: 12.6 G/DL (ref 13–17.7)
MCH RBC QN AUTO: 28.2 PG (ref 26.6–33)
MCHC RBC AUTO-ENTMCNC: 32.6 G/DL (ref 31.5–35.7)
MCV RBC AUTO: 86.6 FL (ref 79–97)
PLATELET # BLD AUTO: 256 10*3/MM3 (ref 140–450)
PMV BLD AUTO: 12.6 FL (ref 6–12)
POTASSIUM SERPL-SCNC: 3.8 MMOL/L (ref 3.5–5.2)
RBC # BLD AUTO: 4.47 10*6/MM3 (ref 4.14–5.8)
SODIUM SERPL-SCNC: 137 MMOL/L (ref 136–145)
WBC NRBC COR # BLD: 10.71 10*3/MM3 (ref 3.4–10.8)

## 2022-05-04 PROCEDURE — 63710000001 INSULIN LISPRO (HUMAN) PER 5 UNITS: Performed by: INTERNAL MEDICINE

## 2022-05-04 PROCEDURE — 85027 COMPLETE CBC AUTOMATED: CPT | Performed by: INTERNAL MEDICINE

## 2022-05-04 PROCEDURE — 94664 DEMO&/EVAL PT USE INHALER: CPT

## 2022-05-04 PROCEDURE — 25010000002 METHYLPREDNISOLONE PER 125 MG: Performed by: INTERNAL MEDICINE

## 2022-05-04 PROCEDURE — 63710000001 INSULIN DETEMIR PER 5 UNITS: Performed by: INTERNAL MEDICINE

## 2022-05-04 PROCEDURE — 94799 UNLISTED PULMONARY SVC/PX: CPT

## 2022-05-04 PROCEDURE — 97116 GAIT TRAINING THERAPY: CPT

## 2022-05-04 PROCEDURE — 93005 ELECTROCARDIOGRAM TRACING: CPT | Performed by: PHYSICIAN ASSISTANT

## 2022-05-04 PROCEDURE — 99232 SBSQ HOSP IP/OBS MODERATE 35: CPT | Performed by: INTERNAL MEDICINE

## 2022-05-04 PROCEDURE — 80048 BASIC METABOLIC PNL TOTAL CA: CPT | Performed by: INTERNAL MEDICINE

## 2022-05-04 PROCEDURE — 97110 THERAPEUTIC EXERCISES: CPT

## 2022-05-04 PROCEDURE — 82962 GLUCOSE BLOOD TEST: CPT

## 2022-05-04 RX ORDER — METHYLPREDNISOLONE SODIUM SUCCINATE 125 MG/2ML
60 INJECTION, POWDER, LYOPHILIZED, FOR SOLUTION INTRAMUSCULAR; INTRAVENOUS EVERY 8 HOURS
Status: DISCONTINUED | OUTPATIENT
Start: 2022-05-04 | End: 2022-05-07

## 2022-05-04 RX ORDER — GUAIFENESIN AND CODEINE PHOSPHATE 100; 10 MG/5ML; MG/5ML
5 SOLUTION ORAL EVERY 4 HOURS PRN
Status: DISCONTINUED | OUTPATIENT
Start: 2022-05-04 | End: 2022-05-10 | Stop reason: HOSPADM

## 2022-05-04 RX ORDER — INSULIN LISPRO 100 [IU]/ML
0-14 INJECTION, SOLUTION INTRAVENOUS; SUBCUTANEOUS
Status: DISCONTINUED | OUTPATIENT
Start: 2022-05-04 | End: 2022-05-09

## 2022-05-04 RX ADMIN — METHYLPREDNISOLONE SODIUM SUCCINATE 60 MG: 125 INJECTION, POWDER, FOR SOLUTION INTRAMUSCULAR; INTRAVENOUS at 16:21

## 2022-05-04 RX ADMIN — IPRATROPIUM BROMIDE AND ALBUTEROL SULFATE 3 ML: .5; 3 SOLUTION RESPIRATORY (INHALATION) at 12:02

## 2022-05-04 RX ADMIN — METHYLPREDNISOLONE SODIUM SUCCINATE 60 MG: 125 INJECTION, POWDER, FOR SOLUTION INTRAMUSCULAR; INTRAVENOUS at 22:19

## 2022-05-04 RX ADMIN — ACETAMINOPHEN 650 MG: 325 TABLET ORAL at 02:59

## 2022-05-04 RX ADMIN — GUAIFENESIN AND CODEINE PHOSPHATE 5 ML: 100; 10 SOLUTION ORAL at 20:48

## 2022-05-04 RX ADMIN — BUDESONIDE 0.5 MG: 0.5 SUSPENSION RESPIRATORY (INHALATION) at 08:08

## 2022-05-04 RX ADMIN — BUMETANIDE 2 MG: 1 TABLET ORAL at 08:45

## 2022-05-04 RX ADMIN — Medication 10 ML: at 20:40

## 2022-05-04 RX ADMIN — CLOPIDOGREL BISULFATE 75 MG: 75 TABLET ORAL at 08:47

## 2022-05-04 RX ADMIN — BENZONATATE 100 MG: 100 CAPSULE ORAL at 20:40

## 2022-05-04 RX ADMIN — IPRATROPIUM BROMIDE AND ALBUTEROL SULFATE 3 ML: .5; 3 SOLUTION RESPIRATORY (INHALATION) at 16:30

## 2022-05-04 RX ADMIN — LEVOTHYROXINE SODIUM 100 MCG: 0.17 TABLET ORAL at 08:46

## 2022-05-04 RX ADMIN — BENZONATATE 100 MG: 100 CAPSULE ORAL at 08:51

## 2022-05-04 RX ADMIN — IPRATROPIUM BROMIDE AND ALBUTEROL SULFATE 3 ML: .5; 3 SOLUTION RESPIRATORY (INHALATION) at 19:29

## 2022-05-04 RX ADMIN — ROSUVASTATIN 40 MG: 20 TABLET, FILM COATED ORAL at 20:40

## 2022-05-04 RX ADMIN — SACUBITRIL AND VALSARTAN 1 TABLET: 97; 103 TABLET, FILM COATED ORAL at 08:47

## 2022-05-04 RX ADMIN — Medication 10 ML: at 08:48

## 2022-05-04 RX ADMIN — AMIODARONE HYDROCHLORIDE 400 MG: 200 TABLET ORAL at 08:45

## 2022-05-04 RX ADMIN — ALLOPURINOL 100 MG: 100 TABLET ORAL at 20:40

## 2022-05-04 RX ADMIN — DOXYCYCLINE 100 MG: 100 CAPSULE ORAL at 08:45

## 2022-05-04 RX ADMIN — IPRATROPIUM BROMIDE AND ALBUTEROL SULFATE 3 ML: .5; 3 SOLUTION RESPIRATORY (INHALATION) at 08:08

## 2022-05-04 RX ADMIN — AMIODARONE HYDROCHLORIDE 400 MG: 200 TABLET ORAL at 20:40

## 2022-05-04 RX ADMIN — INSULIN LISPRO 5 UNITS: 100 INJECTION, SOLUTION INTRAVENOUS; SUBCUTANEOUS at 12:38

## 2022-05-04 RX ADMIN — INSULIN LISPRO 14 UNITS: 100 INJECTION, SOLUTION INTRAVENOUS; SUBCUTANEOUS at 17:31

## 2022-05-04 RX ADMIN — APIXABAN 5 MG: 5 TABLET, FILM COATED ORAL at 20:40

## 2022-05-04 RX ADMIN — BUDESONIDE 0.5 MG: 0.5 SUSPENSION RESPIRATORY (INHALATION) at 19:29

## 2022-05-04 RX ADMIN — GUAIFENESIN AND CODEINE PHOSPHATE 5 ML: 100; 10 SOLUTION ORAL at 13:37

## 2022-05-04 RX ADMIN — SACUBITRIL AND VALSARTAN 1 TABLET: 97; 103 TABLET, FILM COATED ORAL at 20:40

## 2022-05-04 RX ADMIN — DOXYCYCLINE 100 MG: 100 CAPSULE ORAL at 20:40

## 2022-05-04 RX ADMIN — METHYLPREDNISOLONE SODIUM SUCCINATE 60 MG: 125 INJECTION, POWDER, FOR SOLUTION INTRAMUSCULAR; INTRAVENOUS at 08:45

## 2022-05-04 RX ADMIN — ALLOPURINOL 100 MG: 100 TABLET ORAL at 08:46

## 2022-05-04 RX ADMIN — SPIRONOLACTONE 25 MG: 25 TABLET ORAL at 08:47

## 2022-05-04 RX ADMIN — INSULIN DETEMIR 10 UNITS: 100 INJECTION, SOLUTION SUBCUTANEOUS at 16:25

## 2022-05-04 RX ADMIN — PANTOPRAZOLE SODIUM 40 MG: 40 TABLET, DELAYED RELEASE ORAL at 08:46

## 2022-05-04 RX ADMIN — AMLODIPINE BESYLATE 5 MG: 5 TABLET ORAL at 16:23

## 2022-05-04 RX ADMIN — ACETAMINOPHEN 650 MG: 325 TABLET ORAL at 20:48

## 2022-05-04 RX ADMIN — CARVEDILOL 25 MG: 12.5 TABLET, FILM COATED ORAL at 08:45

## 2022-05-04 RX ADMIN — APIXABAN 5 MG: 5 TABLET, FILM COATED ORAL at 08:46

## 2022-05-04 RX ADMIN — INSULIN LISPRO 6 UNITS: 100 INJECTION, SOLUTION INTRAVENOUS; SUBCUTANEOUS at 08:47

## 2022-05-04 RX ADMIN — CARVEDILOL 25 MG: 12.5 TABLET, FILM COATED ORAL at 20:40

## 2022-05-04 NOTE — PLAN OF CARE
Pt a/o, stable and cooperative. Vss on 2-3L/NC. Needs with ambulation as well. Very short of air when ambulating without 02. C/o headache tonight and apap given x2. Sleeps in chair. Poor sleep habits.     Problem: Adult Inpatient Plan of Care  Goal: Plan of Care Review  Outcome: Ongoing, Progressing  Goal: Absence of Hospital-Acquired Illness or Injury  Outcome: Ongoing, Progressing  Intervention: Identify and Manage Fall Risk  Recent Flowsheet Documentation  Taken 5/4/2022 0400 by Yulia Ingram RN  Safety Promotion/Fall Prevention:   safety round/check completed   assistive device/personal items within reach   clutter free environment maintained  Taken 5/4/2022 0200 by Yulia Ingram RN  Safety Promotion/Fall Prevention:   safety round/check completed   assistive device/personal items within reach   clutter free environment maintained   nonskid shoes/slippers when out of bed  Taken 5/3/2022 2000 by Yulia Ingram RN  Safety Promotion/Fall Prevention:   lighting adjusted   nonskid shoes/slippers when out of bed   safety round/check completed  Intervention: Prevent Skin Injury  Recent Flowsheet Documentation  Taken 5/3/2022 2000 by Yulia Ingram RN  Body Position: position changed independently  Intervention: Prevent and Manage VTE (Venous Thromboembolism) Risk  Recent Flowsheet Documentation  Taken 5/4/2022 0400 by Yulia Ingram RN  Activity Management:   ambulated in room   ambulated to bathroom  Taken 5/4/2022 0200 by Yulai Ingram RN  Activity Management: (sleeps in chair) up in chair  Taken 5/3/2022 2000 by Yulia Ingram RN  Activity Management: up in chair  Intervention: Prevent Infection  Recent Flowsheet Documentation  Taken 5/4/2022 0400 by Yulia Ingram RN  Infection Prevention: environmental surveillance performed  Taken 5/3/2022 2000 by Yulia Ingram RN  Infection Prevention: environmental surveillance performed  Goal: Optimal Comfort and Wellbeing  Outcome: Ongoing,  Progressing  Intervention: Monitor Pain and Promote Comfort  Recent Flowsheet Documentation  Taken 5/4/2022 0259 by Yulia Ingram RN  Pain Management Interventions: (cool cloth and coffee) simple massage provided  Goal: Readiness for Transition of Care  Outcome: Ongoing, Progressing     Problem: COPD (Chronic Obstructive Pulmonary Disease) Comorbidity  Goal: Maintenance of COPD Symptom Control  Outcome: Ongoing, Progressing     Problem: Heart Failure Comorbidity  Goal: Maintenance of Heart Failure Symptom Control  Outcome: Ongoing, Progressing     Problem: Adjustment to Illness COPD (Chronic Obstructive Pulmonary Disease)  Goal: Optimal Chronic Illness Coping  Outcome: Ongoing, Progressing     Problem: Functional Ability Impaired COPD (Chronic Obstructive Pulmonary Disease)  Goal: Optimal Level of Functional Prosperity  Outcome: Ongoing, Progressing  Intervention: Optimize Functional Ability  Recent Flowsheet Documentation  Taken 5/4/2022 0400 by Yulia Ingram RN  Activity Management:   ambulated in room   ambulated to bathroom  Taken 5/4/2022 0200 by Yulia Ingram RN  Activity Management: (sleeps in chair) up in chair  Taken 5/3/2022 2000 by Yulia Ingram RN  Activity Management: up in chair     Problem: Infection COPD (Chronic Obstructive Pulmonary Disease)  Goal: Absence of Infection Signs and Symptoms  Outcome: Ongoing, Progressing  Intervention: Prevent or Manage Infection  Recent Flowsheet Documentation  Taken 5/4/2022 0400 by Yulia Ingram RN  Isolation Precautions: precautions maintained  Taken 5/4/2022 0200 by Yulia Ingram RN  Isolation Precautions: precautions maintained  Taken 5/3/2022 2000 by Yulia Ingram RN  Isolation Precautions:   precautions maintained   droplet     Problem: Oral Intake Inadequate COPD (Chronic Obstructive Pulmonary Disease)  Goal: Improved Nutrition Intake  Outcome: Ongoing, Progressing     Problem: Respiratory Compromise COPD (Chronic Obstructive Pulmonary  Disease)  Goal: Effective Oxygenation and Ventilation  Outcome: Ongoing, Progressing  Intervention: Promote Airway Secretion Clearance  Recent Flowsheet Documentation  Taken 5/4/2022 0400 by Yulia Ingram RN  Activity Management:   ambulated in room   ambulated to bathroom  Taken 5/4/2022 0200 by Yulia Ingram RN  Activity Management: (sleeps in chair) up in chair  Taken 5/3/2022 2000 by Yulia Ingram RN  Activity Management: up in chair  Cough And Deep Breathing: done independently per patient     Problem: Adjustment to Illness (Heart Failure)  Goal: Optimal Coping  Outcome: Ongoing, Progressing     Problem: Cardiac Output Decreased (Heart Failure)  Goal: Optimal Cardiac Output  Outcome: Ongoing, Progressing     Problem: Dysrhythmia (Heart Failure)  Goal: Stable Heart Rate and Rhythm  Outcome: Ongoing, Progressing     Problem: Fluid Imbalance (Heart Failure)  Goal: Fluid Balance  Outcome: Ongoing, Progressing     Problem: Functional Ability Impaired (Heart Failure)  Goal: Optimal Functional Ability  Outcome: Ongoing, Progressing  Intervention: Optimize Functional Ability  Recent Flowsheet Documentation  Taken 5/4/2022 0400 by Yulia Ingram RN  Activity Management:   ambulated in room   ambulated to bathroom  Taken 5/4/2022 0200 by Yulia Ingram RN  Activity Management: (sleeps in chair) up in chair  Taken 5/3/2022 2000 by Yulia Ingram RN  Activity Management: up in chair     Problem: Oral Intake Inadequate (Heart Failure)  Goal: Optimal Nutrition Intake  Outcome: Ongoing, Progressing     Problem: Respiratory Compromise (Heart Failure)  Goal: Effective Oxygenation and Ventilation  Outcome: Ongoing, Progressing  Intervention: Promote Airway Secretion Clearance  Recent Flowsheet Documentation  Taken 5/3/2022 2000 by Yulia Ingram RN  Cough And Deep Breathing: done independently per patient     Problem: Sleep Disordered Breathing (Heart Failure)  Goal: Effective Breathing Pattern During  Sleep  Outcome: Ongoing, Progressing     Problem: Arrhythmia/Dysrhythmia (Cardiac Catheterization)  Goal: Stable Heart Rate and Rhythm  Outcome: Ongoing, Progressing     Problem: Bleeding (Cardiac Catheterization)  Goal: Absence of Bleeding  Outcome: Ongoing, Progressing     Problem: Contrast-Induced Injury Risk (Cardiac Catheterization)  Goal: Absence of Contrast-Induced Injury  Outcome: Ongoing, Progressing     Problem: Embolism (Cardiac Catheterization)  Goal: Absence of Embolism Signs and Symptoms  Outcome: Ongoing, Progressing     Problem: Ongoing Anesthesia/Sedation Effects (Cardiac Catheterization)  Goal: Anesthesia/Sedation Recovery  Outcome: Ongoing, Progressing  Intervention: Optimize Anesthesia Recovery  Recent Flowsheet Documentation  Taken 5/4/2022 0400 by Yulia Ingram RN  Safety Promotion/Fall Prevention:   safety round/check completed   assistive device/personal items within reach   clutter free environment maintained  Taken 5/4/2022 0200 by Yulia Ingram RN  Safety Promotion/Fall Prevention:   safety round/check completed   assistive device/personal items within reach   clutter free environment maintained   nonskid shoes/slippers when out of bed  Taken 5/3/2022 2000 by Yulia Ingram RN  Safety Promotion/Fall Prevention:   lighting adjusted   nonskid shoes/slippers when out of bed   safety round/check completed     Problem: Pain (Cardiac Catheterization)  Goal: Acceptable Pain Control  Outcome: Ongoing, Progressing  Intervention: Prevent or Manage Pain  Recent Flowsheet Documentation  Taken 5/4/2022 0259 by Yulia Ingram RN  Pain Management Interventions: (cool cloth and coffee) simple massage provided     Problem: Vascular Access Protection (Cardiac Catheterization)  Goal: Absence of Vascular Access Complication  Outcome: Ongoing, Progressing  Intervention: Prevent Access Site Complications  Recent Flowsheet Documentation  Taken 5/4/2022 0400 by Yulia Ingram RN  Activity  Management:   ambulated in room   ambulated to bathroom  Taken 5/4/2022 0200 by Yulia Ingram, RN  Activity Management: (sleeps in chair) up in chair  Taken 5/3/2022 2000 by Yulia Ingram, RN  Activity Management: up in chair   Goal Outcome Evaluation:

## 2022-05-04 NOTE — PROGRESS NOTES
NN spoke with pt at BS.  Pt alert and able to answer questions appropriately. Pt O2 sat 93 % on   4L currently, home O2 use  2L PRN. COPD action plan reviewed. Deep breathing exercises encouraged.  Patient informed of upcoming pulmonary clinic appointment date and time.   No new concerns or questions voiced at this time.  NN will continue to follow as needed.       Per current GOLD Standards, please consider:  LAMA/LABA/ICS in place ([place]), Outpatient PFT, Rehab as appropriate, Palliative Care consult,  Annual LDCT per current screening guidelines (age 50-80 years old, smoking history of 20 pack years or more or has quit within past 15 years)      Patient has been scheduled to establish care with [facility] for 5/27/2022 @ 9:45 am with Dr. GABBY Constantino MD.  Pre testing COVID swab scheduled for 5/25/2022 at 11 am.

## 2022-05-04 NOTE — THERAPY TREATMENT NOTE
Patient Name: Antony Monahan  : 1950    MRN: [id number]                              Today's Date: 2022       Admit Date: 2022    Visit Dx:     ICD-10-CM ICD-9-CM   1. COPD exacerbation ([facility])  J44.1 491.21   2. Volume depletion  E86.9 276.50   3. Pneumonia due to infectious organism, unspecified laterality, unspecified part of lung  J18.9 486   4. SUNIL (acute kidney injury) ([facility])  N17.9 584.9     Patient Active Problem List   Diagnosis   • ST elevation myocardial infarction involving left anterior descending (LAD) coronary artery ([facility])   • GERD (gastroesophageal reflux disease)   • Hyperlipidemia LDL goal <70   • PAD (peripheral artery disease) ([facility])   • COPD (chronic obstructive pulmonary disease) ([facility])   • Ischemic cardiomyopathy   • Hypothyroid   • Essential hypertension   • Coronary artery disease involving native coronary artery of native heart   • Claudication ([facility])   • Bradycardia   • Renal insufficiency   • Chronic fatigue   • Vitamin D deficiency   • Hypersomnolence   • Obesity (BMI 30.0-34.9)   • Intermittent claudication of both lower extremities due to atherosclerosis ([facility])   • Hypotension   • Arrhythmia   • Cardiac related syncope   • VT (ventricular tachycardia) ([facility])   • Symptomatic stenosis of left carotid artery without infarction   • Carotid stenosis, asymptomatic, left, s/p carotid stent   • Atherosclerosis of native artery of both lower extremities with intermittent claudication ([facility])   • Morbidly obese ([facility])   • HTN (hypertension)   • Hypercholesterolemia   • Systolic CHF ([facility])   • Pacemaker   • CAD (coronary artery disease)   • Back pain   • Chronic narcotic use   • Borderline diabetes   • Shortness of breath   • Hypernatremia   • SUNIL (acute kidney injury) ([facility])   • Abdominal distention   • COPD exacerbation ([facility])   • Obesity (BMI 30-39.9)     Past Medical History:   Diagnosis Date   • Anxiety    • Arthritis    • Back pain     past injections, years ago   • Borderline diabetes     • CAD (coronary artery disease)    • Chronic narcotic use     prn   • COPD (chronic obstructive pulmonary disease) ([facility]) 07/29/2016   • Depression     intermittent   • Diabetes mellitus ([facility])     checsk sugar once per week   • Full dentures    • Headache    • HTN (hypertension) 07/29/2016   • Hypercholesterolemia 07/29/2016   • Hypothyroid 07/29/2016   • Joint pain     hydrocodone prn   • Obesity    • Pacemaker    • PAD (peripheral artery disease) ([facility]) 07/29/2016   • SOBOE (shortness of breath on exertion)    • Systolic CHF ([facility]) 07/29/2016   • Tinnitus    • Tobacco abuse 07/29/2016   • Wears glasses     readers     Past Surgical History:   Procedure Laterality Date   • AORTAGRAM Left 3/30/2022    Procedure: ABDOMINAL AORTAGRAM WITH RUNOFF,  LEFT EXTERNAL ILIAC DRUG ELUTING BALLOON ANGIOPLASTY, LEFT INTERNAL ILIAC STENT, LEFT LEG ANGIOGRAM;  Surgeon: Mono Marley MD;  Location:  [facility];  Service: Vascular;  Laterality: Left;   • CARDIAC CATHETERIZATION N/A 07/29/2016    Procedure: Left Heart Cath;  Surgeon: Drew Gurrola MD;  Location:  [facility];  Service:    • CARDIAC CATHETERIZATION N/A 07/29/2019    Procedure: LEFT HEART CATH;  Surgeon: Drew Gurrola MD;  Location:  [facility];  Service: Cardiovascular   • CARDIAC CATHETERIZATION N/A 06/03/2021    Procedure: LEFT HEART CATH;  Surgeon: Drew Gurrola MD;  Location:  [facility];  Service: Cardiovascular;  Laterality: N/A;   • CARDIAC ELECTROPHYSIOLOGY PROCEDURE N/A 07/30/2019    Procedure: ICD SC new;  Surgeon: Musa Harden MD;  Location:  [facility];  Service: Cardiology   • COLONOSCOPY     • INTERVENTIONAL RADIOLOGY PROCEDURE N/A 09/14/2016    Procedure: AORTIC Aortagram with Runoff;  Surgeon: Drew Gurrola MD;  Location:  [facility];  Service:    • INTERVENTIONAL RADIOLOGY PROCEDURE N/A 05/09/2017    Procedure: Abdominal  Aortagram with Runoff;  Surgeon: Drew Gurrola MD;  Location:  [facility];  Service:    • INTERVENTIONAL RADIOLOGY PROCEDURE N/A 09/05/2018    Procedure: Abdominal Aortagram with Runoff;  Surgeon: Drew Gurrola MD;  Location:  [facility];  Service: Cardiovascular   • INTERVENTIONAL RADIOLOGY PROCEDURE Bilateral 07/29/2019    Procedure: Carotid Cerebral Angiogram;  Surgeon: Claude Atkins MD;  Location:  [facility];  Service: Interventional Radiology   • INTERVENTIONAL RADIOLOGY PROCEDURE N/A 08/18/2020    Procedure: AARO+/-;  Surgeon: Drew Gurrola MD;  Location:  [facility];  Service: Cardiovascular;  Laterality: N/A;   • INTERVENTIONAL RADIOLOGY PROCEDURE Bilateral 11/20/2020    Procedure: Carotid Cerebral Angiogram with stent;  Surgeon: Claude Atkins MD;  Location:  [facility];  Service: Interventional Radiology;  Laterality: Bilateral;   • KNEE SURGERY Left 08/17/2015   • OTHER SURGICAL HISTORY      L LEG STENT   • VA TCAT IV STENT CRV CRTD ART EMBOLIC PROTECJ N/A 09/25/2019    Left Carotid Stent; Dr. Claude Atkins   • UMBILICAL HERNIA REPAIR  2006    no mesh, done in [place]   • WRIST SURGERY Right       General Information     Row Name 05/04/22 1532          Physical Therapy Time and Intention    Document Type therapy note (daily note)  -MB     Mode of Treatment physical therapy  -MB     Row Name 05/04/22 1534          General Information    Patient Profile Reviewed yes  -MB     Existing Precautions/Restrictions oxygen therapy device and L/min  -MB     Barriers to Rehab previous functional deficit  -MB     Row Name 05/04/22 1538          Cognition    Orientation Status (Cognition) oriented x 4  -MB     Row Name 05/04/22 1538          Safety Issues, Functional Mobility    Safety Issues Affecting Function (Mobility) safety precaution awareness  -MB     Impairments Affecting Function  (Mobility) endurance/activity tolerance;pain;shortness of breath;strength  -MB           User Key  (r) = Recorded By, (t) = Taken By, (c) = Cosigned By    Initials Name Provider Type    Sejal Flores, PT Physical Therapist               Mobility     Row Name 05/04/22 1610          Bed Mobility    Bed Mobility supine-sit  -MB     Supine-Sit [place] (Bed Mobility) modified independence  -MB     Row Name 05/04/22 1610          Transfers    Comment, (Transfers) Pt. demo safe and appropriate transfer technique; no LOB.  -MB     Row Name 05/04/22 1610          Sit-Stand Transfer    Sit-Stand [place] (Transfers) supervision  -MB     Row Name 05/04/22 1610          Gait/Stairs (Locomotion)    [place] Level (Gait) standby assist  -MB     Distance in Feet (Gait) 35  -MB     Deviations/Abnormal Patterns (Gait) sherri decreased;gait speed decreased;stride length decreased  -MB     Bilateral Gait Deviations forward flexed posture;heel strike decreased  -MB     Comment, (Gait/Stairs) Pt. amb. w/ step through gait pattern. VCs for forward gaze and adaptive breathing. Gait distance limited by fatigue.  -MB           User Key  (r) = Recorded By, (t) = Taken By, (c) = Cosigned By    Initials Name Provider Type    Sejal Flores, PT Physical Therapist               Obj/Interventions     Row Name 05/04/22 1611          Motor Skills    Therapeutic Exercise shoulder;hip;knee;ankle  -MB     Row Name 05/04/22 1611          Shoulder (Therapeutic Exercise)    Shoulder (Therapeutic Exercise) AROM (active range of motion)  -MB     Shoulder AROM (Therapeutic Exercise) bilateral;flexion;scapular retraction;10 repetitions  -MB     Row Name 05/04/22 1611          Hip (Therapeutic Exercise)    Hip (Therapeutic Exercise) strengthening exercise  -MB     Hip Strengthening (Therapeutic Exercise) bilateral;marching while seated;15 repititions  -MB     Row Name 05/04/22 1611          Knee (Therapeutic Exercise)    Knee  (Therapeutic Exercise) strengthening exercise  -MB     Knee Strengthening (Therapeutic Exercise) bilateral;LAQ (long arc quad);15 repititions  -MB     Row Name 05/04/22 1611          Ankle (Therapeutic Exercise)    Ankle AROM (Therapeutic Exercise) bilateral;dorsiflexion;plantarflexion;15 repititions  -MB     Row Name 05/04/22 1611          Balance    Dynamic Standing Balance supervision  -MB     Position/Device Used, Standing Balance unsupported  -MB           User Key  (r) = Recorded By, (t) = Taken By, (c) = Cosigned By    Initials Name Provider Type    Sejal Flores, PT Physical Therapist               Goals/Plan    No documentation.                Clinical Impression     Row Name 05/04/22 1612          Pain    Pretreatment Pain Rating 0/10 - no pain  -MB     Posttreatment Pain Rating 0/10 - no pain  -MB     Row Name 05/04/22 1612          Plan of Care Review    Plan of Care Reviewed With patient  -MB     Progress improving  -MB     Outcome Evaluation Patient pleasant w/ good effort. He completed transfers w/ supervision and ambulated in room w/ SPC and SBA; gait distance limited by fatigue. PT continues to recommend home w/ assist at D/C.  -MB     Row Name 05/04/22 1612          Vital Signs    Pre SpO2 (%) 95  -MB     O2 Delivery Pre Treatment nasal cannula  -MB     Intra SpO2 (%) 90  -MB     O2 Delivery Intra Treatment nasal cannula  -MB     Post SpO2 (%) 94  -MB     O2 Delivery Post Treatment nasal cannula  -MB     Pre Patient Position Supine  -MB     Intra Patient Position Standing  -MB     Post Patient Position Sitting  -MB     Row Name 05/04/22 1612          Positioning and Restraints    Pre-Treatment Position in bed  -MB     Post Treatment Position chair  -MB     In Chair notified nsg;reclined;call light within reach;encouraged to call for assist;legs elevated  -MB           User Key  (r) = Recorded By, (t) = Taken By, (c) = Cosigned By    Initials Name Provider Type    Sejal Flores, PT  Physical Therapist               Outcome Measures     Row Name 05/04/22 1614 05/04/22 0800       How much help from another person do you currently need...    Turning from your back to your side while in flat bed without using bedrails? 4  -MB 4  -LT    Moving from lying on back to sitting on the side of a flat bed without bedrails? 4  -MB 4  -LT    Moving to and from a bed to a chair (including a wheelchair)? 4  -MB 4  -LT    Standing up from a chair using your arms (e.g., wheelchair, bedside chair)? 4  -MB 4  -LT    Climbing 3-5 steps with a railing? 3  -MB 3  -LT    To walk in hospital room? 3  -MB 3  -LT    AM-PAC 6 Clicks Score (PT) 22  -MB 22  -LT    Highest level of mobility 7 --> Walked 25 feet or more  -MB 7 --> Walked 25 feet or more  -LT          User Key  (r) = Recorded By, (t) = Taken By, (c) = Cosigned By    Initials Name Provider Type    MB Sejal De Luna, PT Physical Therapist    Arielle Marx, RN Registered Nurse                             Physical Therapy Education                 Title: PT OT SLP Therapies (Done)     Topic: Physical Therapy (Done)     Point: Mobility training (Done)     Learning Progress Summary           Patient Eager, E,D,H, VU,DU by DM at 5/2/2022 1107                   Point: Home exercise program (Done)     Learning Progress Summary           Patient Eager, E,D,H, VU,DU by DM at 5/2/2022 1107                   Point: Body mechanics (Done)     Learning Progress Summary           Patient Eager, E,D,H, VU,DU by DM at 5/2/2022 1107                   Point: Precautions (Done)     Learning Progress Summary           Patient Eager, E,D,H, VU,DU by DM at 5/2/2022 1107                               User Key     Initials Effective Dates Name Provider Type Discipline    DM 06/16/21 -  Nubia Barrow, PT Physical Therapist PT              PT Recommendation and Plan     Plan of Care Reviewed With: patient  Progress: improving  Outcome Evaluation: Patient pleasant w/ good  effort. He completed transfers w/ supervision and ambulated in room w/ SPC and SBA; gait distance limited by fatigue. PT continues to recommend home w/ assist at D/C.     Time Calculation:    PT Charges     Row Name 05/04/22 1614             Time Calculation    Start Time 1535  -MB      PT Received On 05/04/22  -MB      PT Goal Re-Cert Due Date 05/12/22  -MB              Time Calculation- PT    Total Timed Code Minutes- PT 30 minute(s)  -MB              Timed Charges    16702 - PT Therapeutic Exercise Minutes 10  -MB      72121 - Gait Training Minutes  20  -MB              Total Minutes    Timed Charges Total Minutes 30  -MB       Total Minutes 30  -MB            User Key  (r) = Recorded By, (t) = Taken By, (c) = Cosigned By    Initials Name Provider Type    Sejal Flores, PT Physical Therapist              Therapy Charges for Today     Code Description Service Date Service Provider Modifiers Qty    74770240674 HC PT THER PROC EA 15 MIN 5/4/2022 Sejal De Luna, PT GP 1    87595891696 HC GAIT TRAINING EA 15 MIN 5/4/2022 Sejal De Luna, PT GP 1          PT G-Codes  Outcome Measure Options: AM-PAC 6 Clicks Basic Mobility (PT)  AM-PAC 6 Clicks Score (PT): 22    Sejal De Luna PT  5/4/2022

## 2022-05-04 NOTE — CASE MANAGEMENT/SOCIAL WORK
Continued Stay Note  [facility]     Patient Name: Antony Monahan  MRN: [id number]  Today's Date: 5/4/2022    Admit Date: 5/1/2022     Discharge Plan     Row Name 05/04/22 1028       Plan    Plan Home    Patient/Family in Agreement with Plan yes    Plan Comments Spoke with pt. at bedside. Very SOA with any exertion. On 3L O2 NC. Creatinine slightly elevated and being monitored.    Final Discharge Disposition Code 01 - home or self-care               Discharge Codes    No documentation.                     Cherise Stephen RN

## 2022-05-04 NOTE — PROGRESS NOTES
" [facility] - Progress Note    Antony Monahan  1950  [id number]    05/04/22, 09:56 EDT      Chief Complaint: Following for CHF and AFib    Subjective:   Breathing continues to improve but still with episodic dyspnea/cough.  No chest pain.  Mild dizziness - no pre syncope.    Review of Systems:  Pertinent positives are listed above and in physical exam.  All others have been reviewed and are negative.    allopurinol, 100 mg, Oral, BID  amiodarone, 400 mg, Oral, Q12H  amLODIPine, 5 mg, Oral, Q PM  apixaban, 5 mg, Oral, Q12H  budesonide, 0.5 mg, Nebulization, BID - RT  carvedilol, 25 mg, Oral, BID  clopidogrel, 75 mg, Oral, Daily  doxycycline, 100 mg, Oral, Q12H  insulin lispro, 0-7 Units, Subcutaneous, TID AC  ipratropium-albuterol, 3 mL, Nebulization, 4x Daily - RT  levothyroxine, 100 mcg, Oral, Q AM  methylPREDNISolone sodium succinate, 60 mg, Intravenous, Q12H  pantoprazole, 40 mg, Oral, Daily  pharmacy consult - MTM, , Does not apply, Daily  rosuvastatin, 40 mg, Oral, Nightly  sacubitril-valsartan, 1 tablet, Oral, Q12H  sodium chloride, 10 mL, Intravenous, Q12H  spironolactone, 25 mg, Oral, Daily        Objective:  Vitals:   height is 170.2 cm (67.01\") and weight is 102 kg (225 lb 6.4 oz). His oral temperature is 97.2 °F (36.2 °C). His blood pressure is 117/82 and his pulse is 82. His respiration is 18 and oxygen saturation is 96%.       Intake/Output Summary (Last 24 hours) at 5/4/2022 0956  Last data filed at 5/4/2022 0300  Gross per 24 hour   Intake 250 ml   Output 700 ml   Net -450 ml       Physical Exam:  General:  WN, NAD, A and O x3.  CV:  Irregular. No murmur, rub, or gallop.  Resp:  CTA [place] , equal, nonlabored.  Abd:  Soft, + BS, no organomegaly. Nontender to palpation.  Extrem:  No edema BLE, 2+ pedal/PT pulses.            Results from last 7 days   Lab Units 05/04/22  0435   WBC 10*3/mm3 10.71   HEMOGLOBIN g/dL 12.6*   HEMATOCRIT % 38.7   PLATELETS 10*3/mm3 256     Results from last " 7 days   Lab Units 05/04/22  0435 05/02/22  0349 05/01/22  1017   SODIUM mmol/L 137   < > 139   POTASSIUM mmol/L 3.8   < > 3.3*   CHLORIDE mmol/L 95*   < > 97*   CO2 mmol/L 28.0   < > 28.0   BUN mg/dL 80*   < > 35*   CREATININE mg/dL 2.46*   < > 1.89*   CALCIUM mg/dL 9.2   < > 9.2   BILIRUBIN mg/dL  --   --  0.9   ALK PHOS U/L  --   --  136*   ALT (SGPT) U/L  --   --  33   AST (SGOT) U/L  --   --  39   GLUCOSE mg/dL 383*   < > 142*    < > = values in this interval not displayed.         Results from last 7 days   Lab Units 05/02/22  0349   TSH uIU/mL 0.682         Results from last 7 days   Lab Units 05/01/22  1017   PROBNP pg/mL 5,480.0*       Tele:  AFib    Assessment and Plan:  1.  Acute on Chronic Systolic CHF              -  73 yo CM with known CHF/EF 25-30% (echo 6/2021) with increased pedal edema that responded to change in diuretics and progressive dyspnea - in setting of URI(rhinovirus)              -  Repeat echo this admission shows EF 46-50%, mild global HK              -  ProBNP 5480 (up from 1373 a few days ago). Repeat in a.m.              -  [employer] DDD ICD              -  73 yo CM with acute URI/Rhinovirus presents in AFib which has exacerbated his CHF.  Admitted on no AA -   started Amiodarone 400mg twice daily, continue Entresto, beta blocker.          2.  COPD               -  Exacerbation with URI/Rhinovirus but historically well managed.              -  IV steroids/abx per primary service   -  Showing some improvement.     3.  Paroxysmal AFib              -  Interrogate device, SJM.  Was in NSR on 4/28/22.                -  Continue Coreg, Eliquis.  Added amiodarone with close monitoring of lung disease, thyroid function.  400mg twice daily x 7 days then taper down afterwards.     4.  HTN              -  Controlled.              -  Continue to monitor.      5.  HLP     6.  CKDz              -  Labs reviewed.   -  Cr 2.46.  Diuretics discontinued by primary service.           73 yo CM with  COPD, PAF, Sys CHF who presents with exacerbation from URI/Rhinovirus which has caused occurrence of AFib which in turn has exacerbated CHF.  Now holding diuretics with monitoring of CKDz.  Continue  Amiodarone for rhythm control while monitoring thyroid, liver and COPD - rates controlled.        I discussed the patient's findings and my recommendations with the patient, any present family members, and the nursing staff.  Dandre Mcfarland MD saw and examined patient, verified hx and PE, read all radiographic studies, reviewed labs and micro data, and formulated dx, plan for treatment and all medical decision making.      Na Elkins PA-C  05/04/22, 10:00 EDT

## 2022-05-04 NOTE — PROGRESS NOTES
[facility] Medicine Services  PROGRESS NOTE    Patient Name: Antony Monahan  : 1950  MRN: [id number]    Date of Admission: 2022  Primary Care Physician: Franky Carrington MD    Subjective   Subjective     CC:  SOA    HPI:  Still having significant SOA with exertion.  Continues to have cough as well    ROS:  Gen- No fevers, chills  CV- No chest pain, palpitations  Resp- +cough, +dyspnea  GI- No N/V/D, abd pain        Objective   Objective     Vital Signs:   Temp:  [97.2 °F (36.2 °C)-98 °F (36.7 °C)] 97.2 °F (36.2 °C)  Heart Rate:  [] 82  Resp:  [17-22] 18  BP: ()/(57-91) 117/82  Flow (L/min):  [2-3] 3     Physical Exam:  Constitutional: No acute distress, awake, alert, sitting up in chair  HENT: NCAT, mucous membranes moist  Respiratory: poor air movement, scattered wheezing, respiratory effort normal   Cardiovascular: RRR, no murmurs, rubs, or gallops  Gastrointestinal: Positive bowel sounds, soft, nontender, nondistended  Musculoskeletal: No bilateral ankle edema  Psychiatric: Appropriate affect, cooperative  Neurologic: Oriented x 3, strength symmetric in all extremities, Cranial Nerves grossly intact to confrontation, speech clear  Skin: No rashes      Results Reviewed:  LAB RESULTS:      Lab 22  0435 22  0349 22  1017 22  1802   WBC 10.71 5.57 6.34 8.22   HEMOGLOBIN 12.6* 12.1* 12.1* 12.6*   HEMATOCRIT 38.7 36.9* 36.7* 38.5   PLATELETS 256 188 178 137*   NEUTROS ABS  --  4.75 4.67 6.63   IMMATURE GRANS (ABS)  --  0.03 0.02 0.02   LYMPHS ABS  --  0.57* 0.74 0.64*   MONOS ABS  --  0.21 0.82 0.70   EOS ABS  --  0.00 0.06 0.20   MCV 86.6 87.0 87.6 88.5   LACTATE  --   --  1.5  --          Lab 22  0435 22  0451 22  0349 22  1017 22  1802   SODIUM 137 139 134* 139 140   POTASSIUM 3.8 3.4* 3.3* 3.3* 3.5   CHLORIDE 95* 97* 96* 97* 103   CO2 28.0 28.0 24.0 28.0 27.0   ANION GAP 14.0 14.0 14.0 14.0 10.0   BUN  80* 58* 48* 35* 22   CREATININE 2.46* 1.96* 1.76* 1.89* 1.41*   EGFR 27.0* 35.4* 40.3* 37.0* 52.6*   GLUCOSE 383* 294* 241* 142* 122*   CALCIUM 9.2 9.7 9.2 9.2 8.6   MAGNESIUM  --  2.3 3.0*  --   --    TSH  --   --  0.682  --   --          Lab 05/01/22  1017 04/28/22  1802   TOTAL PROTEIN 7.3 7.1   ALBUMIN 3.70 4.00   GLOBULIN 3.6 3.1   ALT (SGPT) 33 16   AST (SGOT) 39 21   BILIRUBIN 0.9 0.8   ALK PHOS 136* 91         Lab 05/01/22  1017 04/28/22  1802   PROBNP 5,480.0* 1,373.0*   TROPONIN T 0.017 <0.010                 Brief Urine Lab Results  (Last result in the past 365 days)      Color   Clarity   Blood   Leuk Est   Nitrite   Protein   CREAT   Urine HCG        06/01/21 0140             50.3               Microbiology Results Abnormal     Procedure Component Value - Date/Time    Blood Culture - Blood, Hand, Left [[id number]]  (Normal) Collected: 05/01/22 1104    Lab Status: Preliminary result Specimen: Blood from Hand, Left Updated: 05/03/22 1132     Blood Culture No growth at 2 days    Blood Culture - Blood, Arm, Left [[id number]]  (Normal) Collected: 05/01/22 1104    Lab Status: Preliminary result Specimen: Blood from Arm, Left Updated: 05/03/22 1131     Blood Culture No growth at 2 days          No radiology results from the last 24 hrs    Results for orders placed during the hospital encounter of 05/01/22    Adult Transthoracic Echo Complete W/ Cont if Necessary Per Protocol    Interpretation Summary  · The left ventricular cavity is mildly dilated.  · Left ventricular wall thickness is consistent with mild concentric hypertrophy.  · Left ventricular ejection fraction appears to be 46 - 50%.  · Left atrial volume is moderately increased.  · Mild global LV hypokinesis      I have reviewed the medications:  Scheduled Meds:allopurinol, 100 mg, Oral, BID  amiodarone, 400 mg, Oral, Q12H  amLODIPine, 5 mg, Oral, Q PM  apixaban, 5 mg, Oral, Q12H  budesonide, 0.5 mg, Nebulization, BID - RT  bumetanide, 2 mg, Oral,  BID  carvedilol, 25 mg, Oral, BID  clopidogrel, 75 mg, Oral, Daily  doxycycline, 100 mg, Oral, Q12H  insulin lispro, 0-7 Units, Subcutaneous, TID AC  ipratropium-albuterol, 3 mL, Nebulization, 4x Daily - RT  levothyroxine, 100 mcg, Oral, Q AM  methylPREDNISolone sodium succinate, 60 mg, Intravenous, Q12H  pantoprazole, 40 mg, Oral, Daily  pharmacy consult - MTM, , Does not apply, Daily  rosuvastatin, 40 mg, Oral, Nightly  sacubitril-valsartan, 1 tablet, Oral, Q12H  sodium chloride, 10 mL, Intravenous, Q12H  spironolactone, 25 mg, Oral, Daily      Continuous Infusions:   PRN Meds:.•  acetaminophen **OR** acetaminophen **OR** acetaminophen  •  benzonatate  •  dextrose  •  dextrose  •  glucagon (human recombinant)  •  ondansetron **OR** ondansetron  •  sodium chloride  •  sodium chloride    Assessment/Plan   Assessment & Plan     Active Hospital Problems    Diagnosis  POA   • Obesity (BMI 30-39.9) [E66.9]  Yes   • COPD exacerbation (HCC) [J44.1]  Yes      Resolved Hospital Problems   No resolved problems to display.        Brief Hospital Course to date:  Antony Monahan is a 72 y.o. male     COPD with acute exacerbation  Viral URI (rhinovirus/enterovirus)  - not improving.  Increasing solumedrol to 60mg q8h.  - duonebs and budesonide  - doxycycline day 4  - CXR shows no acute     Acute on Chronic Systolic Heart Failure  S/p [employer] [facility] DDD ICD  - proBNP 5480  - recent leg edema, improved with metolazone  - echo showed EF 46-50%, mild global HK  - diuresis per cardiology/Dr. Mcfarland.  Worsening kidney function so will stop bumex.  - cont home entresto, beta blocker     Atrial fibrillation  - anticoagulated  - cardiology following, recs amiodarone 400mg BID for rhythm control    Hypokalemia  --replaced    T2DM  --exacerbated by steroids  --add levemir 10 units daily  --increase SSI to moderate to high dose  --HbA1c 6.60     SUNIL on CKD IIIa  - baseline creat 1.3-1.4  - diuresis per cards.  Creatinine much worse.  Stop  bumex.      PAD  - recent left iliac stent     HTN     Dyslipidemia     Hypothyroid  - TSH wnl    DVT prophylaxis:  Medical DVT prophylaxis orders are present.       AM-PAC 6 Clicks Score (PT): 22 (05/03/22 0800)    Disposition: I expect the patient to be discharged TBD    CODE STATUS:   Code Status and Medical Interventions:   Ordered at: 05/01/22 1233     Level Of Support Discussed With:    Patient     Code Status (Patient has no pulse and is not breathing):    CPR (Attempt to Resuscitate)     Medical Interventions (Patient has pulse or is breathing):    Full Support       Marbin Marie MD  05/04/22

## 2022-05-04 NOTE — PLAN OF CARE
Goal Outcome Evaluation:  Plan of Care Reviewed With: patient        Progress: improving  Outcome Evaluation: Patient pleasant w/ good effort. He completed transfers w/ supervision and ambulated in room w/ SPC and SBA; gait distance limited by fatigue. PT continues to recommend home w/ assist at D/C.

## 2022-05-05 ENCOUNTER — APPOINTMENT (OUTPATIENT)
Dept: GENERAL RADIOLOGY | Facility: HOSPITAL | Age: 72
End: 2022-05-05

## 2022-05-05 LAB
ALBUMIN SERPL-MCNC: 3.5 G/DL (ref 3.5–5.2)
ALBUMIN/GLOB SERPL: 1.1 G/DL
ALP SERPL-CCNC: 113 U/L (ref 39–117)
ALT SERPL W P-5'-P-CCNC: 53 U/L (ref 1–41)
ANION GAP SERPL CALCULATED.3IONS-SCNC: 13 MMOL/L (ref 5–15)
AST SERPL-CCNC: 29 U/L (ref 1–40)
BILIRUB SERPL-MCNC: 0.2 MG/DL (ref 0–1.2)
BUN SERPL-MCNC: 90 MG/DL (ref 8–23)
BUN/CREAT SERPL: 33.2 (ref 7–25)
CALCIUM SPEC-SCNC: 8.8 MG/DL (ref 8.6–10.5)
CHLORIDE SERPL-SCNC: 94 MMOL/L (ref 98–107)
CO2 SERPL-SCNC: 29 MMOL/L (ref 22–29)
CREAT SERPL-MCNC: 2.71 MG/DL (ref 0.76–1.27)
EGFRCR SERPLBLD CKD-EPI 2021: 24 ML/MIN/1.73
GLOBULIN UR ELPH-MCNC: 3.2 GM/DL
GLUCOSE BLDC GLUCOMTR-MCNC: 141 MG/DL (ref 70–130)
GLUCOSE BLDC GLUCOMTR-MCNC: 222 MG/DL (ref 70–130)
GLUCOSE BLDC GLUCOMTR-MCNC: 238 MG/DL (ref 70–130)
GLUCOSE BLDC GLUCOMTR-MCNC: 290 MG/DL (ref 70–130)
GLUCOSE SERPL-MCNC: 309 MG/DL (ref 65–99)
NT-PROBNP SERPL-MCNC: 1289 PG/ML (ref 0–900)
POTASSIUM SERPL-SCNC: 3.7 MMOL/L (ref 3.5–5.2)
PROT SERPL-MCNC: 6.7 G/DL (ref 6–8.5)
SODIUM SERPL-SCNC: 136 MMOL/L (ref 136–145)

## 2022-05-05 PROCEDURE — 99232 SBSQ HOSP IP/OBS MODERATE 35: CPT | Performed by: INTERNAL MEDICINE

## 2022-05-05 PROCEDURE — 83880 ASSAY OF NATRIURETIC PEPTIDE: CPT | Performed by: PHYSICIAN ASSISTANT

## 2022-05-05 PROCEDURE — 94799 UNLISTED PULMONARY SVC/PX: CPT

## 2022-05-05 PROCEDURE — 94664 DEMO&/EVAL PT USE INHALER: CPT

## 2022-05-05 PROCEDURE — 25010000002 METHYLPREDNISOLONE PER 125 MG: Performed by: INTERNAL MEDICINE

## 2022-05-05 PROCEDURE — 71045 X-RAY EXAM CHEST 1 VIEW: CPT

## 2022-05-05 PROCEDURE — 93005 ELECTROCARDIOGRAM TRACING: CPT | Performed by: PHYSICIAN ASSISTANT

## 2022-05-05 PROCEDURE — 63710000001 INSULIN DETEMIR PER 5 UNITS: Performed by: INTERNAL MEDICINE

## 2022-05-05 PROCEDURE — 63710000001 INSULIN LISPRO (HUMAN) PER 5 UNITS: Performed by: INTERNAL MEDICINE

## 2022-05-05 PROCEDURE — 82962 GLUCOSE BLOOD TEST: CPT

## 2022-05-05 PROCEDURE — 80053 COMPREHEN METABOLIC PANEL: CPT | Performed by: PHYSICIAN ASSISTANT

## 2022-05-05 RX ADMIN — AMIODARONE HYDROCHLORIDE 400 MG: 200 TABLET ORAL at 21:41

## 2022-05-05 RX ADMIN — LEVOTHYROXINE SODIUM 100 MCG: 0.17 TABLET ORAL at 05:35

## 2022-05-05 RX ADMIN — IPRATROPIUM BROMIDE AND ALBUTEROL SULFATE 3 ML: .5; 3 SOLUTION RESPIRATORY (INHALATION) at 16:16

## 2022-05-05 RX ADMIN — CARVEDILOL 25 MG: 12.5 TABLET, FILM COATED ORAL at 21:41

## 2022-05-05 RX ADMIN — BUDESONIDE 0.5 MG: 0.5 SUSPENSION RESPIRATORY (INHALATION) at 07:03

## 2022-05-05 RX ADMIN — SODIUM CHLORIDE 500 ML: 9 INJECTION, SOLUTION INTRAVENOUS at 14:40

## 2022-05-05 RX ADMIN — ALLOPURINOL 100 MG: 100 TABLET ORAL at 21:41

## 2022-05-05 RX ADMIN — APIXABAN 5 MG: 5 TABLET, FILM COATED ORAL at 21:41

## 2022-05-05 RX ADMIN — IPRATROPIUM BROMIDE AND ALBUTEROL SULFATE 3 ML: .5; 3 SOLUTION RESPIRATORY (INHALATION) at 11:48

## 2022-05-05 RX ADMIN — ALLOPURINOL 100 MG: 100 TABLET ORAL at 08:18

## 2022-05-05 RX ADMIN — DOXYCYCLINE 100 MG: 100 CAPSULE ORAL at 08:18

## 2022-05-05 RX ADMIN — AMIODARONE HYDROCHLORIDE 400 MG: 200 TABLET ORAL at 08:18

## 2022-05-05 RX ADMIN — INSULIN DETEMIR 10 UNITS: 100 INJECTION, SOLUTION SUBCUTANEOUS at 08:30

## 2022-05-05 RX ADMIN — BUDESONIDE 0.5 MG: 0.5 SUSPENSION RESPIRATORY (INHALATION) at 18:56

## 2022-05-05 RX ADMIN — INSULIN LISPRO 8 UNITS: 100 INJECTION, SOLUTION INTRAVENOUS; SUBCUTANEOUS at 08:18

## 2022-05-05 RX ADMIN — Medication 10 ML: at 21:42

## 2022-05-05 RX ADMIN — METHYLPREDNISOLONE SODIUM SUCCINATE 60 MG: 125 INJECTION, POWDER, FOR SOLUTION INTRAMUSCULAR; INTRAVENOUS at 06:06

## 2022-05-05 RX ADMIN — ACETAMINOPHEN 650 MG: 325 TABLET ORAL at 14:26

## 2022-05-05 RX ADMIN — METHYLPREDNISOLONE SODIUM SUCCINATE 60 MG: 125 INJECTION, POWDER, FOR SOLUTION INTRAMUSCULAR; INTRAVENOUS at 14:26

## 2022-05-05 RX ADMIN — PANTOPRAZOLE SODIUM 40 MG: 40 TABLET, DELAYED RELEASE ORAL at 08:17

## 2022-05-05 RX ADMIN — CLOPIDOGREL BISULFATE 75 MG: 75 TABLET ORAL at 08:17

## 2022-05-05 RX ADMIN — IPRATROPIUM BROMIDE AND ALBUTEROL SULFATE 3 ML: .5; 3 SOLUTION RESPIRATORY (INHALATION) at 07:03

## 2022-05-05 RX ADMIN — INSULIN LISPRO 5 UNITS: 100 INJECTION, SOLUTION INTRAVENOUS; SUBCUTANEOUS at 11:53

## 2022-05-05 RX ADMIN — IPRATROPIUM BROMIDE AND ALBUTEROL SULFATE 3 ML: .5; 3 SOLUTION RESPIRATORY (INHALATION) at 18:56

## 2022-05-05 RX ADMIN — CARVEDILOL 25 MG: 12.5 TABLET, FILM COATED ORAL at 08:17

## 2022-05-05 RX ADMIN — ACETAMINOPHEN 650 MG: 325 TABLET ORAL at 20:07

## 2022-05-05 RX ADMIN — APIXABAN 5 MG: 5 TABLET, FILM COATED ORAL at 08:17

## 2022-05-05 RX ADMIN — SPIRONOLACTONE 25 MG: 25 TABLET ORAL at 08:17

## 2022-05-05 RX ADMIN — Medication 10 ML: at 08:19

## 2022-05-05 RX ADMIN — DOXYCYCLINE 100 MG: 100 CAPSULE ORAL at 21:41

## 2022-05-05 NOTE — PROGRESS NOTES
" [facility] - Progress Note    Antony Monahan  1950  N605/1    05/05/22, 08:27 EDT      Chief Complaint: Following for CHF and AFib    Subjective:   Sitting up in chair.  Reports he sleeps in recliner at home - and never sleeps well.  He is not sleeping well here, concerned about drop in BP overnight and continued dyspnea.  Still on 2L O2 via nasal cannula    Review of Systems:  Pertinent positives are listed above and in physical exam.  All others have been reviewed and are negative.    allopurinol, 100 mg, Oral, BID  amiodarone, 400 mg, Oral, Q12H  amLODIPine, 5 mg, Oral, Q PM  apixaban, 5 mg, Oral, Q12H  budesonide, 0.5 mg, Nebulization, BID - RT  carvedilol, 25 mg, Oral, BID  clopidogrel, 75 mg, Oral, Daily  doxycycline, 100 mg, Oral, Q12H  insulin detemir, 10 Units, Subcutaneous, Daily  insulin lispro, 0-14 Units, Subcutaneous, TID AC  ipratropium-albuterol, 3 mL, Nebulization, 4x Daily - RT  levothyroxine, 100 mcg, Oral, Q AM  methylPREDNISolone sodium succinate, 60 mg, Intravenous, Q8H  pantoprazole, 40 mg, Oral, Daily  pharmacy consult - MTM, , Does not apply, Daily  rosuvastatin, 40 mg, Oral, Nightly  sodium chloride, 10 mL, Intravenous, Q12H  spironolactone, 25 mg, Oral, Daily        Objective:  Vitals:   height is 170.2 cm (67.01\") and weight is 102 kg (225 lb 6.4 oz). His oral temperature is 97.5 °F (36.4 °C). His blood pressure is 119/90 and his pulse is 78. His respiration is 18 and oxygen saturation is 88% (abnormal).       Intake/Output Summary (Last 24 hours) at 5/5/2022 0824  Last data filed at 5/5/2022 0200  Gross per 24 hour   Intake 960 ml   Output --   Net 960 ml       Physical Exam:  General:  WN, NAD, A and O x3.  CV:  Irregular. No murmur, rub, or gallop.  Resp:  CTA [place] but course, equal, nonlabored.  Abd:  Soft, + BS, no organomegaly. Nontender to palpation.  Extrem:  No edema BLE, 2+ pedal/PT pulses.            Results from last 7 days   Lab Units 05/04/22  0435 "   WBC 10*3/mm3 10.71   HEMOGLOBIN g/dL 12.6*   HEMATOCRIT % 38.7   PLATELETS 10*3/mm3 256     Results from last 7 days   Lab Units 05/05/22  0416   SODIUM mmol/L 136   POTASSIUM mmol/L 3.7   CHLORIDE mmol/L 94*   CO2 mmol/L 29.0   BUN mg/dL 90*   CREATININE mg/dL 2.71*   CALCIUM mg/dL 8.8   BILIRUBIN mg/dL 0.2   ALK PHOS U/L 113   ALT (SGPT) U/L 53*   AST (SGOT) U/L 29   GLUCOSE mg/dL 309*         Results from last 7 days   Lab Units 05/02/22  0349   TSH uIU/mL 0.682         Results from last 7 days   Lab Units 05/05/22  0416   PROBNP pg/mL 1,289.0*       Tele:  AFib    Assessment and Plan:  1.  Acute on Chronic Systolic CHF              -  73 yo CM with known CHF/EF 25-30% (echo 6/2021) with increased pedal edema that responded to change in diuretics and progressive dyspnea - in setting of URI(rhinovirus)              -  Repeat echo this admission shows EF 46-50%, mild global HK              -  ProBNP 5480 (up from 1373 a few days ago). Repeat in a.m.              -  [employer] DDD ICD              -  73 yo CM with acute URI/Rhinovirus presents in AFib which has exacerbated his CHF.  Admitted on no AA -   started Amiodarone 400mg twice daily, continue Entresto, beta blocker.          2.  COPD               -  Exacerbation with URI/Rhinovirus but historically well managed.              -  IV steroids/abx per primary service   -  Repeat CXR     3.  Paroxysmal AFib              -  Interrogate device, SJM.  Was in NSR on 4/28/22.                -  Continue Coreg, Eliquis.  Added amiodarone with close monitoring of lung disease, thyroid function.  400mg twice daily x 7 days then taper down afterwards. Will be difficult to maintain NSR in setting of URI/steroid use.     4.  HTN              -  Marginal.              -  Continue to monitor.      5.  HLP     6.  CKDz              -  Labs reviewed.   -  Cr up to 2.71.  Diuretics discontinued by primary service.           73 yo CM with COPD, PAF, Sys CHF who presents with  exacerbation from URI/Rhinovirus which has caused occurrence of AFib which in turn has exacerbated CHF.  Now holding diuretics with monitoring of CKDz.  Continue  Amiodarone for rhythm control while monitoring thyroid, liver and COPD - rates controlled.        I discussed the patient's findings and my recommendations with the patient, any present family members, and the nursing staff.  Dandre Mcfarland MD saw and examined patient, verified hx and PE, read all radiographic studies, reviewed labs and micro data, and formulated dx, plan for treatment and all medical decision making.      Na Elkins PA-C  05/05/22, 08:27 EDT

## 2022-05-05 NOTE — PROGRESS NOTES
[facility] Medicine Services  PROGRESS NOTE    Patient Name: Antony Monahan  : 1950  MRN: [id number]    Date of Admission: 2022  Primary Care Physician: Franky Carrington MD    Subjective   Subjective     CC:  SOA    HPI:  States that he feels ok.  Breathing is about the same.      ROS:  Gen- No fevers, chills  CV- No chest pain, palpitations  Resp- +cough, +dyspnea  GI- No N/V/D, abd pain        Objective   Objective     Vital Signs:   Temp:  [97.5 °F (36.4 °C)-97.6 °F (36.4 °C)] 97.5 °F (36.4 °C)  Heart Rate:  [70-94] 78  Resp:  [16-20] 18  BP: ()/(61-90) 119/90  Flow (L/min):  [3-6] 3     Physical Exam:  Constitutional: No acute distress, awake, alert, sitting up in chair  HENT: NCAT, mucous membranes moist  Respiratory: moving better air, fine bibasilar crackles respiratory effort normal   Cardiovascular: RRR, no murmurs, rubs, or gallops  Gastrointestinal: Positive bowel sounds, soft, nontender, nondistended  Musculoskeletal: No bilateral ankle edema  Psychiatric: Appropriate affect, cooperative  Neurologic: Oriented x 3, strength symmetric in all extremities, Cranial Nerves grossly intact to confrontation, speech clear  Skin: skin dry and flaking      Results Reviewed:  LAB RESULTS:      Lab 22  0435 22  0349 22  1017 22  1802   WBC 10.71 5.57 6.34 8.22   HEMOGLOBIN 12.6* 12.1* 12.1* 12.6*   HEMATOCRIT 38.7 36.9* 36.7* 38.5   PLATELETS 256 188 178 137*   NEUTROS ABS  --  4.75 4.67 6.63   IMMATURE GRANS (ABS)  --  0.03 0.02 0.02   LYMPHS ABS  --  0.57* 0.74 0.64*   MONOS ABS  --  0.21 0.82 0.70   EOS ABS  --  0.00 0.06 0.20   MCV 86.6 87.0 87.6 88.5   LACTATE  --   --  1.5  --          Lab 22  0416 22  0435 22  0451 22  0349 22  1017   SODIUM 136 137 139 134* 139   POTASSIUM 3.7 3.8 3.4* 3.3* 3.3*   CHLORIDE 94* 95* 97* 96* 97*   CO2 29.0 28.0 28.0 24.0 28.0   ANION GAP 13.0 14.0 14.0 14.0 14.0   BUN 90*  80* 58* 48* 35*   CREATININE 2.71* 2.46* 1.96* 1.76* 1.89*   EGFR 24.0* 27.0* 35.4* 40.3* 37.0*   GLUCOSE 309* 383* 294* 241* 142*   CALCIUM 8.8 9.2 9.7 9.2 9.2   MAGNESIUM  --   --  2.3 3.0*  --    TSH  --   --   --  0.682  --          Lab 05/05/22  0416 05/01/22  1017 04/28/22  1802   TOTAL PROTEIN 6.7 7.3 7.1   ALBUMIN 3.50 3.70 4.00   GLOBULIN 3.2 3.6 3.1   ALT (SGPT) 53* 33 16   AST (SGOT) 29 39 21   BILIRUBIN 0.2 0.9 0.8   ALK PHOS 113 136* 91         Lab 05/05/22  0416 05/01/22  1017 04/28/22  1802   PROBNP 1,289.0* 5,480.0* 1,373.0*   TROPONIN T  --  0.017 <0.010                 Brief Urine Lab Results  (Last result in the past 365 days)      Color   Clarity   Blood   Leuk Est   Nitrite   Protein   CREAT   Urine HCG        06/01/21 0140             50.3               Microbiology Results Abnormal     Procedure Component Value - Date/Time    Blood Culture - Blood, Hand, Left [[id number]]  (Normal) Collected: 05/01/22 1104    Lab Status: Preliminary result Specimen: Blood from Hand, Left Updated: 05/04/22 1132     Blood Culture No growth at 3 days    Blood Culture - Blood, Arm, Left [[id number]]  (Normal) Collected: 05/01/22 1104    Lab Status: Preliminary result Specimen: Blood from Arm, Left Updated: 05/04/22 1132     Blood Culture No growth at 3 days          No radiology results from the last 24 hrs    Results for orders placed during the hospital encounter of 05/01/22    Adult Transthoracic Echo Complete W/ Cont if Necessary Per Protocol    Interpretation Summary  · The left ventricular cavity is mildly dilated.  · Left ventricular wall thickness is consistent with mild concentric hypertrophy.  · Left ventricular ejection fraction appears to be 46 - 50%.  · Left atrial volume is moderately increased.  · Mild global LV hypokinesis      I have reviewed the medications:  Scheduled Meds:allopurinol, 100 mg, Oral, BID  amiodarone, 400 mg, Oral, Q12H  amLODIPine, 5 mg, Oral, Q PM  apixaban, 5 mg, Oral,  Q12H  budesonide, 0.5 mg, Nebulization, BID - RT  carvedilol, 25 mg, Oral, BID  clopidogrel, 75 mg, Oral, Daily  doxycycline, 100 mg, Oral, Q12H  insulin detemir, 10 Units, Subcutaneous, Daily  insulin lispro, 0-14 Units, Subcutaneous, TID AC  ipratropium-albuterol, 3 mL, Nebulization, 4x Daily - RT  levothyroxine, 100 mcg, Oral, Q AM  methylPREDNISolone sodium succinate, 60 mg, Intravenous, Q8H  pantoprazole, 40 mg, Oral, Daily  pharmacy consult - MTM, , Does not apply, Daily  rosuvastatin, 40 mg, Oral, Nightly  sacubitril-valsartan, 1 tablet, Oral, Q12H  sodium chloride, 10 mL, Intravenous, Q12H  spironolactone, 25 mg, Oral, Daily      Continuous Infusions:   PRN Meds:.•  acetaminophen **OR** acetaminophen **OR** acetaminophen  •  benzonatate  •  dextrose  •  dextrose  •  glucagon (human recombinant)  •  guaiFENesin-codeine  •  ondansetron **OR** ondansetron  •  sodium chloride  •  sodium chloride    Assessment/Plan   Assessment & Plan     Active Hospital Problems    Diagnosis  POA   • Obesity (BMI 30-39.9) [E66.9]  Yes   • COPD exacerbation (HCC) [J44.1]  Yes      Resolved Hospital Problems   No resolved problems to display.        Brief Hospital Course to date:  Antony Monahan is a 72 y.o. male     COPD with acute exacerbation  Viral URI (rhinovirus/enterovirus)  - not improving.  Increased solumedrol to 60mg q8h Day 2  - duonebs and budesonide  - doxycycline day 5  - CXR shows no acute  - add flutter valve     Acute on Chronic Systolic Heart Failure  S/p [employer] DDD ICD  - proBNP 5480  - recent leg edema, improved with metolazone  - echo showed EF 46-50%, mild global HK  -  cardiology/Dr. Mcfarland following.  Worsening kidney function so stopped bumex on 5/4  - cont  beta blocker.  Hold entresto d/t worsening creatinine     Atrial fibrillation  - anticoagulated  - cardiology following, recs amiodarone 400mg BID for rhythm control    Hypokalemia  --replaced    T2DM  --exacerbated by steroids  --increase  levemir 10 units BID  --cont SSI moderate to high dose  --HbA1c 6.60     SUNIL on CKD IIIa  - baseline creat 1.3-1.4  - Creatinine worsening.  Stopped bumex yesterday,  Hold entresto     PAD  - recent left iliac stent     HTN     Dyslipidemia     Hypothyroid  - TSH wnl    DVT prophylaxis:  Medical DVT prophylaxis orders are present.       AM-PAC 6 Clicks Score (PT): 22 (05/04/22 1614)    Disposition: I expect the patient to be discharged TBD    CODE STATUS:   Code Status and Medical Interventions:   Ordered at: 05/01/22 1233     Level Of Support Discussed With:    Patient     Code Status (Patient has no pulse and is not breathing):    CPR (Attempt to Resuscitate)     Medical Interventions (Patient has pulse or is breathing):    Full Support       Marbin Marie MD  05/05/22

## 2022-05-05 NOTE — PLAN OF CARE
Pt a/o, stable and cooperative. 02 up to 6 L/NC due to sats dropping into 80s while sitting up in chair. Has not rested well this evening.     Problem: Adult Inpatient Plan of Care  Goal: Plan of Care Review  Outcome: Ongoing, Progressing  Goal: Absence of Hospital-Acquired Illness or Injury  Outcome: Ongoing, Progressing  Intervention: Identify and Manage Fall Risk  Recent Flowsheet Documentation  Taken 5/5/2022 0200 by Yulia Ingram RN  Safety Promotion/Fall Prevention:   safety round/check completed   nonskid shoes/slippers when out of bed   clutter free environment maintained   assistive device/personal items within reach  Taken 5/5/2022 0000 by Yulia Ingram RN  Safety Promotion/Fall Prevention:   safety round/check completed   nonskid shoes/slippers when out of bed   lighting adjusted   fall prevention program maintained   clutter free environment maintained   assistive device/personal items within reach  Taken 5/4/2022 2048 by Yulia Ingram RN  Safety Promotion/Fall Prevention: safety round/check completed  Taken 5/4/2022 2000 by Yulia Ingram RN  Safety Promotion/Fall Prevention:   safety round/check completed   nonskid shoes/slippers when out of bed   assistive device/personal items within reach   clutter free environment maintained  Intervention: Prevent Skin Injury  Recent Flowsheet Documentation  Taken 5/5/2022 0200 by Yulia Ingram RN  Body Position: position changed independently  Taken 5/5/2022 0000 by Yulia Ingram RN  Body Position: side-lying  Intervention: Prevent and Manage VTE (Venous Thromboembolism) Risk  Recent Flowsheet Documentation  Taken 5/5/2022 0200 by Yulia Ingram RN  Activity Management: up in chair  Taken 5/4/2022 2000 by Yulia Ingram RN  Activity Management: up in chair  Intervention: Prevent Infection  Recent Flowsheet Documentation  Taken 5/5/2022 0200 by Yulia Ingram RN  Infection Prevention:   rest/sleep promoted   environmental surveillance  performed  Taken 5/5/2022 0000 by Yulai Ingram RN  Infection Prevention:   environmental surveillance performed   rest/sleep promoted  Taken 5/4/2022 2000 by Yulia Ingram RN  Infection Prevention: environmental surveillance performed  Goal: Optimal Comfort and Wellbeing  Outcome: Ongoing, Progressing  Intervention: Monitor Pain and Promote Comfort  Recent Flowsheet Documentation  Taken 5/4/2022 2048 by Yulia Ingram RN  Pain Management Interventions: see MAR  Goal: Readiness for Transition of Care  Outcome: Ongoing, Progressing     Problem: COPD (Chronic Obstructive Pulmonary Disease) Comorbidity  Goal: Maintenance of COPD Symptom Control  Outcome: Ongoing, Progressing     Problem: Heart Failure Comorbidity  Goal: Maintenance of Heart Failure Symptom Control  Outcome: Ongoing, Progressing     Problem: Adjustment to Illness COPD (Chronic Obstructive Pulmonary Disease)  Goal: Optimal Chronic Illness Coping  Outcome: Ongoing, Progressing     Problem: Functional Ability Impaired COPD (Chronic Obstructive Pulmonary Disease)  Goal: Optimal Level of Functional [place]  Outcome: Ongoing, Progressing  Intervention: Optimize Functional Ability  Recent Flowsheet Documentation  Taken 5/5/2022 0200 by Yulia Ingram RN  Activity Management: up in chair  Taken 5/4/2022 2000 by Yulia Ingram RN  Activity Management: up in chair     Problem: Infection COPD (Chronic Obstructive Pulmonary Disease)  Goal: Absence of Infection Signs and Symptoms  Outcome: Ongoing, Progressing  Intervention: Prevent or Manage Infection  Recent Flowsheet Documentation  Taken 5/5/2022 0200 by Yulia Ingram RN  Isolation Precautions: precautions maintained  Taken 5/5/2022 0000 by Yulia Ingram RN  Isolation Precautions:   precautions maintained   droplet  Taken 5/4/2022 2000 by Yulia Ingram RN  Isolation Precautions: precautions maintained     Problem: Oral Intake Inadequate COPD (Chronic Obstructive Pulmonary Disease)  Goal:  Improved Nutrition Intake  Outcome: Ongoing, Progressing     Problem: Respiratory Compromise COPD (Chronic Obstructive Pulmonary Disease)  Goal: Effective Oxygenation and Ventilation  Outcome: Ongoing, Progressing  Intervention: Promote Airway Secretion Clearance  Recent Flowsheet Documentation  Taken 5/5/2022 0200 by Yulia Ingram RN  Activity Management: up in chair  Taken 5/4/2022 2000 by Yulia Ingram RN  Activity Management: up in chair  Intervention: Optimize Oxygenation and Ventilation  Recent Flowsheet Documentation  Taken 5/5/2022 0000 by Yulia Ingram RN  Head of Bed (HOB) Positioning: HOB at 45 degrees     Problem: Adjustment to Illness (Heart Failure)  Goal: Optimal Coping  Outcome: Ongoing, Progressing     Problem: Cardiac Output Decreased (Heart Failure)  Goal: Optimal Cardiac Output  Outcome: Ongoing, Progressing     Problem: Dysrhythmia (Heart Failure)  Goal: Stable Heart Rate and Rhythm  Outcome: Ongoing, Progressing     Problem: Fluid Imbalance (Heart Failure)  Goal: Fluid Balance  Outcome: Ongoing, Progressing     Problem: Functional Ability Impaired (Heart Failure)  Goal: Optimal Functional Ability  Outcome: Ongoing, Progressing  Intervention: Optimize Functional Ability  Recent Flowsheet Documentation  Taken 5/5/2022 0200 by Yulia Ingram RN  Activity Management: up in chair  Taken 5/4/2022 2000 by Yulia Ingram RN  Activity Management: up in chair     Problem: Oral Intake Inadequate (Heart Failure)  Goal: Optimal Nutrition Intake  Outcome: Ongoing, Progressing     Problem: Respiratory Compromise (Heart Failure)  Goal: Effective Oxygenation and Ventilation  Outcome: Ongoing, Progressing     Problem: Sleep Disordered Breathing (Heart Failure)  Goal: Effective Breathing Pattern During Sleep  Outcome: Ongoing, Progressing     Problem: Arrhythmia/Dysrhythmia (Cardiac Catheterization)  Goal: Stable Heart Rate and Rhythm  Outcome: Ongoing, Progressing     Problem: Bleeding (Cardiac  Catheterization)  Goal: Absence of Bleeding  Outcome: Ongoing, Progressing     Problem: Contrast-Induced Injury Risk (Cardiac Catheterization)  Goal: Absence of Contrast-Induced Injury  Outcome: Ongoing, Progressing     Problem: Embolism (Cardiac Catheterization)  Goal: Absence of Embolism Signs and Symptoms  Outcome: Ongoing, Progressing     Problem: Ongoing Anesthesia/Sedation Effects (Cardiac Catheterization)  Goal: Anesthesia/Sedation Recovery  Outcome: Ongoing, Progressing  Intervention: Optimize Anesthesia Recovery  Recent Flowsheet Documentation  Taken 5/5/2022 0200 by Yulia Ingram RN  Safety Promotion/Fall Prevention:   safety round/check completed   nonskid shoes/slippers when out of bed   clutter free environment maintained   assistive device/personal items within reach  Taken 5/5/2022 0000 by Yulia Ingram RN  Safety Promotion/Fall Prevention:   safety round/check completed   nonskid shoes/slippers when out of bed   lighting adjusted   fall prevention program maintained   clutter free environment maintained   assistive device/personal items within reach  Taken 5/4/2022 2048 by Yulia Ingram RN  Safety Promotion/Fall Prevention: safety round/check completed  Taken 5/4/2022 2000 by Yulia Ingram RN  Safety Promotion/Fall Prevention:   safety round/check completed   nonskid shoes/slippers when out of bed   assistive device/personal items within reach   clutter free environment maintained     Problem: Pain (Cardiac Catheterization)  Goal: Acceptable Pain Control  Outcome: Ongoing, Progressing  Intervention: Prevent or Manage Pain  Recent Flowsheet Documentation  Taken 5/4/2022 2048 by Yulia Ingram RN  Pain Management Interventions: see MAR     Problem: Vascular Access Protection (Cardiac Catheterization)  Goal: Absence of Vascular Access Complication  Outcome: Ongoing, Progressing  Intervention: Prevent Access Site Complications  Recent Flowsheet Documentation  Taken 5/5/2022 0200 by Nataly  Yulia, RN  Activity Management: up in chair  Taken 5/5/2022 0000 by Yulia Ingram, RN  Head of Bed (HOB) Positioning: HOB at 45 degrees  Taken 5/4/2022 2000 by Yulia Ingram, RN  Activity Management: up in chair   Goal Outcome Evaluation:

## 2022-05-05 NOTE — CASE MANAGEMENT/SOCIAL WORK
Continued Stay Note  [facility]     Patient Name: Antony Monahan  MRN: [id number]  Today's Date: 5/5/2022    Admit Date: 5/1/2022     Discharge Plan     Row Name 05/05/22 1150       Plan    Plan Home    Patient/Family in Agreement with Plan yes    Plan Comments No changes. Still very SOA with any exertion. On 3L O2 NC.    Final Discharge Disposition Code 01 - home or self-care               Discharge Codes    No documentation.                     Cherise Stephen RN

## 2022-05-06 LAB
ANION GAP SERPL CALCULATED.3IONS-SCNC: 16 MMOL/L (ref 5–15)
BACTERIA SPEC AEROBE CULT: NORMAL
BACTERIA SPEC AEROBE CULT: NORMAL
BUN SERPL-MCNC: 101 MG/DL (ref 8–23)
BUN/CREAT SERPL: 38.5 (ref 7–25)
CALCIUM SPEC-SCNC: 8.5 MG/DL (ref 8.6–10.5)
CHLORIDE SERPL-SCNC: 96 MMOL/L (ref 98–107)
CO2 SERPL-SCNC: 25 MMOL/L (ref 22–29)
CREAT SERPL-MCNC: 2.62 MG/DL (ref 0.76–1.27)
EGFRCR SERPLBLD CKD-EPI 2021: 25 ML/MIN/1.73
GLUCOSE BLDC GLUCOMTR-MCNC: 301 MG/DL (ref 70–130)
GLUCOSE BLDC GLUCOMTR-MCNC: 315 MG/DL (ref 70–130)
GLUCOSE BLDC GLUCOMTR-MCNC: 334 MG/DL (ref 70–130)
GLUCOSE SERPL-MCNC: 341 MG/DL (ref 65–99)
POTASSIUM SERPL-SCNC: 3.7 MMOL/L (ref 3.5–5.2)
QT INTERVAL: 390 MS
QT INTERVAL: 420 MS
QT INTERVAL: 452 MS
QT INTERVAL: 452 MS
QTC INTERVAL: 427 MS
QTC INTERVAL: 504 MS
QTC INTERVAL: 505 MS
QTC INTERVAL: 525 MS
SODIUM SERPL-SCNC: 137 MMOL/L (ref 136–145)

## 2022-05-06 PROCEDURE — 25010000002 METHYLPREDNISOLONE PER 125 MG: Performed by: INTERNAL MEDICINE

## 2022-05-06 PROCEDURE — 94799 UNLISTED PULMONARY SVC/PX: CPT

## 2022-05-06 PROCEDURE — 82962 GLUCOSE BLOOD TEST: CPT

## 2022-05-06 PROCEDURE — 93005 ELECTROCARDIOGRAM TRACING: CPT | Performed by: PHYSICIAN ASSISTANT

## 2022-05-06 PROCEDURE — 63710000001 INSULIN DETEMIR PER 5 UNITS: Performed by: INTERNAL MEDICINE

## 2022-05-06 PROCEDURE — 63710000001 INSULIN LISPRO (HUMAN) PER 5 UNITS: Performed by: INTERNAL MEDICINE

## 2022-05-06 PROCEDURE — 80048 BASIC METABOLIC PNL TOTAL CA: CPT | Performed by: INTERNAL MEDICINE

## 2022-05-06 PROCEDURE — 99232 SBSQ HOSP IP/OBS MODERATE 35: CPT | Performed by: INTERNAL MEDICINE

## 2022-05-06 PROCEDURE — 94664 DEMO&/EVAL PT USE INHALER: CPT

## 2022-05-06 RX ORDER — INSULIN LISPRO 100 [IU]/ML
2 INJECTION, SOLUTION INTRAVENOUS; SUBCUTANEOUS
Status: DISCONTINUED | OUTPATIENT
Start: 2022-05-06 | End: 2022-05-09

## 2022-05-06 RX ORDER — AMIODARONE HYDROCHLORIDE 200 MG/1
400 TABLET ORAL
Status: DISCONTINUED | OUTPATIENT
Start: 2022-05-07 | End: 2022-05-10 | Stop reason: HOSPADM

## 2022-05-06 RX ORDER — ROSUVASTATIN CALCIUM 20 MG/1
20 TABLET, COATED ORAL NIGHTLY
Status: DISCONTINUED | OUTPATIENT
Start: 2022-05-06 | End: 2022-05-10 | Stop reason: HOSPADM

## 2022-05-06 RX ADMIN — METHYLPREDNISOLONE SODIUM SUCCINATE 60 MG: 125 INJECTION, POWDER, FOR SOLUTION INTRAMUSCULAR; INTRAVENOUS at 08:20

## 2022-05-06 RX ADMIN — CLOPIDOGREL BISULFATE 75 MG: 75 TABLET ORAL at 08:19

## 2022-05-06 RX ADMIN — Medication 10 ML: at 08:20

## 2022-05-06 RX ADMIN — Medication 10 ML: at 22:19

## 2022-05-06 RX ADMIN — INSULIN DETEMIR 10 UNITS: 100 INJECTION, SOLUTION SUBCUTANEOUS at 08:30

## 2022-05-06 RX ADMIN — AMIODARONE HYDROCHLORIDE 400 MG: 200 TABLET ORAL at 08:18

## 2022-05-06 RX ADMIN — METHYLPREDNISOLONE SODIUM SUCCINATE 60 MG: 125 INJECTION, POWDER, FOR SOLUTION INTRAMUSCULAR; INTRAVENOUS at 14:23

## 2022-05-06 RX ADMIN — METHYLPREDNISOLONE SODIUM SUCCINATE 60 MG: 125 INJECTION, POWDER, FOR SOLUTION INTRAMUSCULAR; INTRAVENOUS at 02:04

## 2022-05-06 RX ADMIN — BENZONATATE 100 MG: 100 CAPSULE ORAL at 22:18

## 2022-05-06 RX ADMIN — INSULIN LISPRO 10 UNITS: 100 INJECTION, SOLUTION INTRAVENOUS; SUBCUTANEOUS at 08:17

## 2022-05-06 RX ADMIN — DOXYCYCLINE 100 MG: 100 CAPSULE ORAL at 08:19

## 2022-05-06 RX ADMIN — ALLOPURINOL 100 MG: 100 TABLET ORAL at 22:12

## 2022-05-06 RX ADMIN — SPIRONOLACTONE 25 MG: 25 TABLET ORAL at 08:19

## 2022-05-06 RX ADMIN — APIXABAN 5 MG: 5 TABLET, FILM COATED ORAL at 22:12

## 2022-05-06 RX ADMIN — IPRATROPIUM BROMIDE AND ALBUTEROL SULFATE 3 ML: .5; 3 SOLUTION RESPIRATORY (INHALATION) at 07:36

## 2022-05-06 RX ADMIN — BUDESONIDE 0.5 MG: 0.5 SUSPENSION RESPIRATORY (INHALATION) at 07:36

## 2022-05-06 RX ADMIN — METHYLPREDNISOLONE SODIUM SUCCINATE 60 MG: 125 INJECTION, POWDER, FOR SOLUTION INTRAMUSCULAR; INTRAVENOUS at 22:13

## 2022-05-06 RX ADMIN — ACETAMINOPHEN 650 MG: 325 TABLET ORAL at 01:06

## 2022-05-06 RX ADMIN — IPRATROPIUM BROMIDE AND ALBUTEROL SULFATE 3 ML: .5; 3 SOLUTION RESPIRATORY (INHALATION) at 19:15

## 2022-05-06 RX ADMIN — APIXABAN 5 MG: 5 TABLET, FILM COATED ORAL at 08:19

## 2022-05-06 RX ADMIN — CARVEDILOL 25 MG: 12.5 TABLET, FILM COATED ORAL at 08:18

## 2022-05-06 RX ADMIN — IPRATROPIUM BROMIDE AND ALBUTEROL SULFATE 3 ML: .5; 3 SOLUTION RESPIRATORY (INHALATION) at 16:10

## 2022-05-06 RX ADMIN — SODIUM CHLORIDE 500 ML: 9 INJECTION, SOLUTION INTRAVENOUS at 08:54

## 2022-05-06 RX ADMIN — BUDESONIDE 0.5 MG: 0.5 SUSPENSION RESPIRATORY (INHALATION) at 19:15

## 2022-05-06 RX ADMIN — IPRATROPIUM BROMIDE AND ALBUTEROL SULFATE 3 ML: .5; 3 SOLUTION RESPIRATORY (INHALATION) at 12:35

## 2022-05-06 RX ADMIN — INSULIN LISPRO 10 UNITS: 100 INJECTION, SOLUTION INTRAVENOUS; SUBCUTANEOUS at 17:11

## 2022-05-06 RX ADMIN — INSULIN LISPRO 2 UNITS: 100 INJECTION, SOLUTION INTRAVENOUS; SUBCUTANEOUS at 17:11

## 2022-05-06 RX ADMIN — GUAIFENESIN AND CODEINE PHOSPHATE 5 ML: 100; 10 SOLUTION ORAL at 22:18

## 2022-05-06 RX ADMIN — DOXYCYCLINE 100 MG: 100 CAPSULE ORAL at 22:12

## 2022-05-06 RX ADMIN — PANTOPRAZOLE SODIUM 40 MG: 40 TABLET, DELAYED RELEASE ORAL at 08:18

## 2022-05-06 RX ADMIN — LEVOTHYROXINE SODIUM 100 MCG: 0.17 TABLET ORAL at 06:22

## 2022-05-06 RX ADMIN — ALLOPURINOL 100 MG: 100 TABLET ORAL at 08:19

## 2022-05-06 RX ADMIN — INSULIN LISPRO 10 UNITS: 100 INJECTION, SOLUTION INTRAVENOUS; SUBCUTANEOUS at 11:58

## 2022-05-06 RX ADMIN — ACETAMINOPHEN 650 MG: 325 TABLET ORAL at 06:22

## 2022-05-06 RX ADMIN — INSULIN DETEMIR 10 UNITS: 100 INJECTION, SOLUTION SUBCUTANEOUS at 22:14

## 2022-05-06 RX ADMIN — ROSUVASTATIN 20 MG: 20 TABLET, FILM COATED ORAL at 22:12

## 2022-05-06 RX ADMIN — ACETAMINOPHEN 650 MG: 325 TABLET ORAL at 14:28

## 2022-05-06 NOTE — PROGRESS NOTES
" [facility] - Progress Note    Antony Monahan  1950  [id number]    05/06/22, 09:46 EDT      Chief Complaint: Following for CHF and AFib    Subjective:   Up walking in room.  Still feels \"so so.\" Still coughing a lot.      Review of Systems:  Pertinent positives are listed above and in physical exam.  All others have been reviewed and are negative.    allopurinol, 100 mg, Oral, BID  amiodarone, 400 mg, Oral, Q12H  apixaban, 5 mg, Oral, Q12H  budesonide, 0.5 mg, Nebulization, BID - RT  carvedilol, 25 mg, Oral, BID  clopidogrel, 75 mg, Oral, Daily  doxycycline, 100 mg, Oral, Q12H  insulin detemir, 10 Units, Subcutaneous, Q12H  insulin lispro, 0-14 Units, Subcutaneous, TID AC  ipratropium-albuterol, 3 mL, Nebulization, 4x Daily - RT  levothyroxine, 100 mcg, Oral, Q AM  methylPREDNISolone sodium succinate, 60 mg, Intravenous, Q8H  pantoprazole, 40 mg, Oral, Daily  pharmacy consult - MTM, , Does not apply, Daily  rosuvastatin, 40 mg, Oral, Nightly  sodium chloride, 500 mL, Intravenous, Once  sodium chloride, 10 mL, Intravenous, Q12H        Objective:  Vitals:   height is 170.2 cm (67.01\") and weight is 104 kg (229 lb 9.6 oz). His oral temperature is 97.7 °F (36.5 °C). His blood pressure is 98/62 and his pulse is 70. His respiration is 18 and oxygen saturation is 95%.     No intake or output data in the 24 hours ending 05/06/22 0906    Physical Exam:  General:  WN, NAD, A and O x3.  CV:  Irregular. No murmur, rub, or gallop.  Resp:  CTA [place] but course, equal, nonlabored.  Abd:  Soft, + BS, no organomegaly. Nontender to palpation.  Extrem:  No edema BLE, 2+ pedal/PT pulses.            Results from last 7 days   Lab Units 05/04/22  0435   WBC 10*3/mm3 10.71   HEMOGLOBIN g/dL 12.6*   HEMATOCRIT % 38.7   PLATELETS 10*3/mm3 256     Results from last 7 days   Lab Units 05/06/22  0342 05/05/22  0416   SODIUM mmol/L 137 136   POTASSIUM mmol/L 3.7 3.7   CHLORIDE mmol/L 96* 94*   CO2 mmol/L 25.0 29.0   BUN mg/dL " 101* 90*   CREATININE mg/dL 2.62* 2.71*   CALCIUM mg/dL 8.5* 8.8   BILIRUBIN mg/dL  --  0.2   ALK PHOS U/L  --  113   ALT (SGPT) U/L  --  53*   AST (SGOT) U/L  --  29   GLUCOSE mg/dL 341* 309*         Results from last 7 days   Lab Units 05/02/22  0349   TSH uIU/mL 0.682         Results from last 7 days   Lab Units 05/05/22  0416   PROBNP pg/mL 1,289.0*       Tele:  AFib    Assessment and Plan:  1.  Acute on Chronic Systolic CHF              -  73 yo CM with known CHF/EF 25-30% (echo 6/2021) with increased pedal edema that responded to change in diuretics and progressive dyspnea - in setting of URI(rhinovirus)              -  Repeat echo this admission shows EF 46-50%, mild global HK              -  ProBNP 5480 (up from 1373 a few days ago). Repeat 1289 today.              -  [place] [place] DDD ICD              -  73 yo CM with acute URI/Rhinovirus presents in AFib which has exacerbated his CHF.  Admitted on no AA -   started Amiodarone, continue Entresto, beta blocker.          2.  COPD               -  Exacerbation with URI/Rhinovirus but historically well managed.              -  IV steroids/abx per primary service   -  Repeat CXR reviewed, no progression of lung disease.     3.  Paroxysmal AFib              -  Interrogate device, SJM.  Was in NSR on 4/28/22.                -  Continue Coreg, Eliquis.  Added amiodarone with close monitoring of lung disease, thyroid function.  400mg twice daily - will decrease to 400mg daily today. Will be difficult to maintain NSR in setting of URI/steroid use.     4.  HTN              -  Marginal.              -  Continue to monitor.      5.  HLP     6.  CKDz              -  Labs reviewed.   -  Cr down to 2.62.  Diuretics discontinued by primary service.           73 yo CM with COPD, PAF, Sys CHF who presents with exacerbation from URI/Rhinovirus which has caused occurrence of AFib which in turn has exacerbated CHF.  Now holding diuretics with monitoring of CKDz.  Continue  Amiodarone  for rhythm control while monitoring thyroid, liver and COPD - rates controlled.  Cardiology will revisit Monday.  In the interim we have cut his amiodarone to 400 mg daily        I discussed the patient's findings and my recommendations with the patient, any present family members, and the nursing staff.  Dandre Mcfarland MD saw and examined patient, verified hx and PE, read all radiographic studies, reviewed labs and micro data, and formulated dx, plan for treatment and all medical decision making.        MD Na Marcos PA-C  05/06/22, 09:46 EDT

## 2022-05-06 NOTE — PROGRESS NOTES
NN spoke with pt at BS.  Pt alert and able to answer questions appropriately. Pt O2 sat  95% on   3L currently, home O2 use 3 L PRN. COPD action plan reviewed. Deep breathing exercises encouraged.  Follow up appointment date and time reviewed.  No new concerns or questions voiced at this time.  NN will continue to follow as needed.         Per current GOLD Standards, please consider:  LAMA/LABA/ICS in place ([place]), Outpatient PFT, Rehab as appropriate, Palliative Care consult,  Annual LDCT per current screening guidelines (age 50-80 years old, smoking history of 20 pack years or more or has quit within past 15 years)      Patient has been scheduled to establish care with [facility] for 5/27/2022 @ 9:45 am with Dr. GABBY Constantino MD.  Pre testing COVID swab scheduled for 5/25/2022 at 11 am.

## 2022-05-06 NOTE — PROGRESS NOTES
[facility] Medicine Services  PROGRESS NOTE    Patient Name: Antony Monahan  : 1950  MRN: [id number]    Date of Admission: 2022  Primary Care Physician: Franky Carrington MD    Subjective   Subjective     CC:  SOA    HPI:  Not feeling good.  Has been dizzy with hypotension.  Continues to have SOA and coughing.  SBP was 80-90's yesterday and improved with 500NS.       ROS:  Gen- No fevers, chills  CV- No chest pain, palpitations  Resp- +cough, +dyspnea  GI- No N/V/D, abd pain        Objective   Objective     Vital Signs:   Temp:  [97.7 °F (36.5 °C)-98.6 °F (37 °C)] 97.7 °F (36.5 °C)  Heart Rate:  [69-88] 70  Resp:  [16-18] 18  BP: ()/(56-78) 98/62  Flow (L/min):  [3] 3     Physical Exam:  Constitutional: No acute distress, awake, alert, sitting up in chair  HENT: NCAT, mucous membranes moist  Respiratory: poor air movement, respiratory effort normal   Cardiovascular: RRR, no murmurs, rubs, or gallops  Gastrointestinal: Positive bowel sounds, soft, nontender, nondistended  Musculoskeletal: No bilateral ankle edema  Psychiatric: Appropriate affect, cooperative  Neurologic: Oriented x 3, strength symmetric in all extremities, Cranial Nerves grossly intact to confrontation, speech clear  Skin: skin dry and flaking      Results Reviewed:  LAB RESULTS:      Lab 22  0435 22  0349 22  1017   WBC 10.71 5.57 6.34   HEMOGLOBIN 12.6* 12.1* 12.1*   HEMATOCRIT 38.7 36.9* 36.7*   PLATELETS 256 188 178   NEUTROS ABS  --  4.75 4.67   IMMATURE GRANS (ABS)  --  0.03 0.02   LYMPHS ABS  --  0.57* 0.74   MONOS ABS  --  0.21 0.82   EOS ABS  --  0.00 0.06   MCV 86.6 87.0 87.6   LACTATE  --   --  1.5         Lab 22  0342 22  0416 22  0435 22  0451 22  0349   SODIUM 137 136 137 139 134*   POTASSIUM 3.7 3.7 3.8 3.4* 3.3*   CHLORIDE 96* 94* 95* 97* 96*   CO2 25.0 29.0 28.0 28.0 24.0   ANION GAP 16.0* 13.0 14.0 14.0 14.0   * 90* 80* 58*  48*   CREATININE 2.62* 2.71* 2.46* 1.96* 1.76*   EGFR 25.0* 24.0* 27.0* 35.4* 40.3*   GLUCOSE 341* 309* 383* 294* 241*   CALCIUM 8.5* 8.8 9.2 9.7 9.2   MAGNESIUM  --   --   --  2.3 3.0*   TSH  --   --   --   --  0.682         Lab 05/05/22  0416 05/01/22  1017   TOTAL PROTEIN 6.7 7.3   ALBUMIN 3.50 3.70   GLOBULIN 3.2 3.6   ALT (SGPT) 53* 33   AST (SGOT) 29 39   BILIRUBIN 0.2 0.9   ALK PHOS 113 136*         Lab 05/05/22  0416 05/01/22  1017   PROBNP 1,289.0* 5,480.0*   TROPONIN T  --  0.017                 Brief Urine Lab Results  (Last result in the past 365 days)      Color   Clarity   Blood   Leuk Est   Nitrite   Protein   CREAT   Urine HCG        06/01/21 0140             50.3               Microbiology Results Abnormal     Procedure Component Value - Date/Time    Blood Culture - Blood, Hand, Left [[id number]]  (Normal) Collected: 05/01/22 1104    Lab Status: Preliminary result Specimen: Blood from Hand, Left Updated: 05/05/22 1133     Blood Culture No growth at 4 days    Blood Culture - Blood, Arm, Left [[id number]]  (Normal) Collected: 05/01/22 1104    Lab Status: Preliminary result Specimen: Blood from Arm, Left Updated: 05/05/22 1133     Blood Culture No growth at 4 days          XR Chest 1 View    Result Date: 5/5/2022  DATE OF EXAM: 5/5/2022 10:06 AM  PROCEDURE: XR CHEST 1 VW-  INDICATIONS: dyspnea, COPD; J44.1-Chronic obstructive pulmonary disease with (acute) exacerbation; E86.9-Volume depletion, unspecified; J18.9-Pneumonia, unspecified organism; N17.9-Acute kidney failure, unspecified  COMPARISON: 5/1/2022  TECHNIQUE: Single radiographic AP view of the chest was obtained.  FINDINGS: Stable aeration from comparison including mild bibasilar atelectasis. Left chest wall ICD projects in place. No significant effusion or distinct pneumothorax. Unchanged degree of cardiac enlargement.      Impression: Stable chest as above without evidence of acute cardiopulmonary abnormality.  This report was finalized on  5/5/2022 10:51 AM by Drew Cochran.        Results for orders placed during the hospital encounter of 05/01/22    Adult Transthoracic Echo Complete W/ Cont if Necessary Per Protocol    Interpretation Summary  · The left ventricular cavity is mildly dilated.  · Left ventricular wall thickness is consistent with mild concentric hypertrophy.  · Left ventricular ejection fraction appears to be 46 - 50%.  · Left atrial volume is moderately increased.  · Mild global LV hypokinesis      I have reviewed the medications:  Scheduled Meds:allopurinol, 100 mg, Oral, BID  amiodarone, 400 mg, Oral, Q12H  apixaban, 5 mg, Oral, Q12H  budesonide, 0.5 mg, Nebulization, BID - RT  carvedilol, 25 mg, Oral, BID  clopidogrel, 75 mg, Oral, Daily  doxycycline, 100 mg, Oral, Q12H  insulin detemir, 10 Units, Subcutaneous, Q12H  insulin lispro, 0-14 Units, Subcutaneous, TID AC  ipratropium-albuterol, 3 mL, Nebulization, 4x Daily - RT  levothyroxine, 100 mcg, Oral, Q AM  methylPREDNISolone sodium succinate, 60 mg, Intravenous, Q8H  pantoprazole, 40 mg, Oral, Daily  pharmacy consult - MTM, , Does not apply, Daily  rosuvastatin, 40 mg, Oral, Nightly  sodium chloride, 10 mL, Intravenous, Q12H  spironolactone, 25 mg, Oral, Daily      Continuous Infusions:   PRN Meds:.•  acetaminophen **OR** acetaminophen **OR** acetaminophen  •  benzonatate  •  dextrose  •  dextrose  •  glucagon (human recombinant)  •  guaiFENesin-codeine  •  ondansetron **OR** ondansetron  •  sodium chloride  •  sodium chloride    Assessment/Plan   Assessment & Plan     Active Hospital Problems    Diagnosis  POA   • Obesity (BMI 30-39.9) [E66.9]  Yes   • COPD exacerbation (HCC) [J44.1]  Yes      Resolved Hospital Problems   No resolved problems to display.        Brief Hospital Course to date:  Antony Monahan is a 72 y.o. male     COPD with acute exacerbation  Viral URI (rhinovirus/enterovirus)  - not improving.  Increased solumedrol to 60mg q8h Day 3  - adrienne and  budesonide  - doxycycline day 6  - CXR shows no acute  - add flutter valve     Acute on Chronic Systolic Heart Failure  S/p [facility] [place] DDD ICD  - proBNP 5480  - recent leg edema, improved with metolazone  - echo showed EF 46-50%, mild global HK  -  cardiology/Dr. Mcfarland following.  Worsening kidney function so stopped bumex on 5/4  - cont  beta blocker.  Hold entresto d/t worsening creatinine     Atrial fibrillation  - anticoagulated  - cardiology following, recs amiodarone 400mg BID for rhythm control    Hypotension  --stopped amlodipine.  Hold spironolactone.  S/p 500NS bolus yesterday that tolerated and creatinine mildly better today.  Will repeat 500NS over 3 hours today.      Hypokalemia  --replaced    T2DM  --exacerbated by steroids  --increased levemir 10 units BID yesterday but was not given last night??  --cont SSI moderate to high dose.  Schedule lispro 2units tid with meals  --HbA1c 6.60     SUNIL on CKD IIIa  - baseline creat 1.3-1.4  - Stopped bumex,  Hold entresto.  S/p 500NS bolus yesterday d/t hypotension and mild improvement in creatinine today.  Still a little hypotensive so will give another 500NS bolus this AM.       PAD  - recent left iliac stent     HTN     Dyslipidemia     Hypothyroid  - TSH wnl    DVT prophylaxis:  Medical DVT prophylaxis orders are present.       AM-PAC 6 Clicks Score (PT): 22 (05/05/22 2020)    Disposition: I expect the patient to be discharged TBD    CODE STATUS:   Code Status and Medical Interventions:   Ordered at: 05/01/22 1233     Level Of Support Discussed With:    Patient     Code Status (Patient has no pulse and is not breathing):    CPR (Attempt to Resuscitate)     Medical Interventions (Patient has pulse or is breathing):    Full Support       Marbin Marie MD  05/06/22

## 2022-05-07 LAB
ANION GAP SERPL CALCULATED.3IONS-SCNC: 10 MMOL/L (ref 5–15)
BUN SERPL-MCNC: 80 MG/DL (ref 8–23)
BUN/CREAT SERPL: 41.7 (ref 7–25)
CALCIUM SPEC-SCNC: 8.9 MG/DL (ref 8.6–10.5)
CHLORIDE SERPL-SCNC: 98 MMOL/L (ref 98–107)
CO2 SERPL-SCNC: 29 MMOL/L (ref 22–29)
CREAT SERPL-MCNC: 1.92 MG/DL (ref 0.76–1.27)
DEPRECATED RDW RBC AUTO: 44.2 FL (ref 37–54)
EGFRCR SERPLBLD CKD-EPI 2021: 36.3 ML/MIN/1.73
ERYTHROCYTE [DISTWIDTH] IN BLOOD BY AUTOMATED COUNT: 14.2 % (ref 12.3–15.4)
GLUCOSE BLDC GLUCOMTR-MCNC: 197 MG/DL (ref 70–130)
GLUCOSE BLDC GLUCOMTR-MCNC: 208 MG/DL (ref 70–130)
GLUCOSE BLDC GLUCOMTR-MCNC: 233 MG/DL (ref 70–130)
GLUCOSE BLDC GLUCOMTR-MCNC: 242 MG/DL (ref 70–130)
GLUCOSE SERPL-MCNC: 260 MG/DL (ref 65–99)
HCT VFR BLD AUTO: 36.8 % (ref 37.5–51)
HGB BLD-MCNC: 12.3 G/DL (ref 13–17.7)
MCH RBC QN AUTO: 28.3 PG (ref 26.6–33)
MCHC RBC AUTO-ENTMCNC: 33.4 G/DL (ref 31.5–35.7)
MCV RBC AUTO: 84.6 FL (ref 79–97)
PLATELET # BLD AUTO: 231 10*3/MM3 (ref 140–450)
PMV BLD AUTO: 12.2 FL (ref 6–12)
POTASSIUM SERPL-SCNC: 3.8 MMOL/L (ref 3.5–5.2)
RBC # BLD AUTO: 4.35 10*6/MM3 (ref 4.14–5.8)
SODIUM SERPL-SCNC: 137 MMOL/L (ref 136–145)
WBC NRBC COR # BLD: 9.84 10*3/MM3 (ref 3.4–10.8)

## 2022-05-07 PROCEDURE — 94664 DEMO&/EVAL PT USE INHALER: CPT

## 2022-05-07 PROCEDURE — 63710000001 INSULIN DETEMIR PER 5 UNITS: Performed by: INTERNAL MEDICINE

## 2022-05-07 PROCEDURE — 94799 UNLISTED PULMONARY SVC/PX: CPT

## 2022-05-07 PROCEDURE — 93005 ELECTROCARDIOGRAM TRACING: CPT | Performed by: PHYSICIAN ASSISTANT

## 2022-05-07 PROCEDURE — 63710000001 INSULIN LISPRO (HUMAN) PER 5 UNITS: Performed by: INTERNAL MEDICINE

## 2022-05-07 PROCEDURE — 80048 BASIC METABOLIC PNL TOTAL CA: CPT | Performed by: INTERNAL MEDICINE

## 2022-05-07 PROCEDURE — 99232 SBSQ HOSP IP/OBS MODERATE 35: CPT | Performed by: INTERNAL MEDICINE

## 2022-05-07 PROCEDURE — 93010 ELECTROCARDIOGRAM REPORT: CPT | Performed by: INTERNAL MEDICINE

## 2022-05-07 PROCEDURE — 82962 GLUCOSE BLOOD TEST: CPT

## 2022-05-07 PROCEDURE — 25010000002 METHYLPREDNISOLONE PER 125 MG: Performed by: INTERNAL MEDICINE

## 2022-05-07 PROCEDURE — 85027 COMPLETE CBC AUTOMATED: CPT | Performed by: INTERNAL MEDICINE

## 2022-05-07 RX ORDER — METHYLPREDNISOLONE SODIUM SUCCINATE 125 MG/2ML
60 INJECTION, POWDER, LYOPHILIZED, FOR SOLUTION INTRAMUSCULAR; INTRAVENOUS DAILY
Status: DISCONTINUED | OUTPATIENT
Start: 2022-05-08 | End: 2022-05-09

## 2022-05-07 RX ORDER — GUAIFENESIN 600 MG/1
1200 TABLET, EXTENDED RELEASE ORAL EVERY 12 HOURS SCHEDULED
Status: DISCONTINUED | OUTPATIENT
Start: 2022-05-07 | End: 2022-05-10 | Stop reason: HOSPADM

## 2022-05-07 RX ADMIN — IPRATROPIUM BROMIDE AND ALBUTEROL SULFATE 3 ML: .5; 3 SOLUTION RESPIRATORY (INHALATION) at 16:31

## 2022-05-07 RX ADMIN — GUAIFENESIN AND CODEINE PHOSPHATE 5 ML: 100; 10 SOLUTION ORAL at 05:45

## 2022-05-07 RX ADMIN — CARVEDILOL 25 MG: 12.5 TABLET, FILM COATED ORAL at 08:04

## 2022-05-07 RX ADMIN — INSULIN LISPRO 5 UNITS: 100 INJECTION, SOLUTION INTRAVENOUS; SUBCUTANEOUS at 17:10

## 2022-05-07 RX ADMIN — INSULIN DETEMIR 10 UNITS: 100 INJECTION, SOLUTION SUBCUTANEOUS at 20:10

## 2022-05-07 RX ADMIN — Medication 10 ML: at 08:04

## 2022-05-07 RX ADMIN — BENZONATATE 100 MG: 100 CAPSULE ORAL at 20:09

## 2022-05-07 RX ADMIN — BUDESONIDE 0.5 MG: 0.5 SUSPENSION RESPIRATORY (INHALATION) at 19:35

## 2022-05-07 RX ADMIN — METHYLPREDNISOLONE SODIUM SUCCINATE 60 MG: 125 INJECTION, POWDER, FOR SOLUTION INTRAMUSCULAR; INTRAVENOUS at 08:04

## 2022-05-07 RX ADMIN — INSULIN DETEMIR 10 UNITS: 100 INJECTION, SOLUTION SUBCUTANEOUS at 08:06

## 2022-05-07 RX ADMIN — CLOPIDOGREL BISULFATE 75 MG: 75 TABLET ORAL at 08:04

## 2022-05-07 RX ADMIN — ACETAMINOPHEN 650 MG: 325 TABLET ORAL at 04:39

## 2022-05-07 RX ADMIN — AMIODARONE HYDROCHLORIDE 400 MG: 200 TABLET ORAL at 08:04

## 2022-05-07 RX ADMIN — IPRATROPIUM BROMIDE AND ALBUTEROL SULFATE 3 ML: .5; 3 SOLUTION RESPIRATORY (INHALATION) at 13:20

## 2022-05-07 RX ADMIN — BENZONATATE 100 MG: 100 CAPSULE ORAL at 05:45

## 2022-05-07 RX ADMIN — CARVEDILOL 25 MG: 12.5 TABLET, FILM COATED ORAL at 20:08

## 2022-05-07 RX ADMIN — INSULIN LISPRO 2 UNITS: 100 INJECTION, SOLUTION INTRAVENOUS; SUBCUTANEOUS at 17:10

## 2022-05-07 RX ADMIN — ACETAMINOPHEN 650 MG: 325 TABLET ORAL at 20:09

## 2022-05-07 RX ADMIN — PANTOPRAZOLE SODIUM 40 MG: 40 TABLET, DELAYED RELEASE ORAL at 08:04

## 2022-05-07 RX ADMIN — ROSUVASTATIN 20 MG: 20 TABLET, FILM COATED ORAL at 20:09

## 2022-05-07 RX ADMIN — INSULIN LISPRO 5 UNITS: 100 INJECTION, SOLUTION INTRAVENOUS; SUBCUTANEOUS at 08:05

## 2022-05-07 RX ADMIN — APIXABAN 5 MG: 5 TABLET, FILM COATED ORAL at 08:04

## 2022-05-07 RX ADMIN — METHYLPREDNISOLONE SODIUM SUCCINATE 60 MG: 125 INJECTION, POWDER, FOR SOLUTION INTRAMUSCULAR; INTRAVENOUS at 14:26

## 2022-05-07 RX ADMIN — ALLOPURINOL 100 MG: 100 TABLET ORAL at 08:04

## 2022-05-07 RX ADMIN — GUAIFENESIN 1200 MG: 600 TABLET, EXTENDED RELEASE ORAL at 12:09

## 2022-05-07 RX ADMIN — GUAIFENESIN 1200 MG: 600 TABLET, EXTENDED RELEASE ORAL at 20:08

## 2022-05-07 RX ADMIN — IPRATROPIUM BROMIDE AND ALBUTEROL SULFATE 3 ML: .5; 3 SOLUTION RESPIRATORY (INHALATION) at 08:13

## 2022-05-07 RX ADMIN — Medication 10 ML: at 20:09

## 2022-05-07 RX ADMIN — LEVOTHYROXINE SODIUM 100 MCG: 0.17 TABLET ORAL at 05:44

## 2022-05-07 RX ADMIN — BUDESONIDE 0.5 MG: 0.5 SUSPENSION RESPIRATORY (INHALATION) at 08:14

## 2022-05-07 RX ADMIN — APIXABAN 5 MG: 5 TABLET, FILM COATED ORAL at 20:09

## 2022-05-07 RX ADMIN — ALLOPURINOL 100 MG: 100 TABLET ORAL at 20:09

## 2022-05-07 RX ADMIN — IPRATROPIUM BROMIDE AND ALBUTEROL SULFATE 3 ML: .5; 3 SOLUTION RESPIRATORY (INHALATION) at 19:35

## 2022-05-07 RX ADMIN — GUAIFENESIN AND CODEINE PHOSPHATE 5 ML: 100; 10 SOLUTION ORAL at 20:09

## 2022-05-07 RX ADMIN — DOXYCYCLINE 100 MG: 100 CAPSULE ORAL at 08:04

## 2022-05-07 RX ADMIN — INSULIN LISPRO 3 UNITS: 100 INJECTION, SOLUTION INTRAVENOUS; SUBCUTANEOUS at 12:09

## 2022-05-07 RX ADMIN — INSULIN LISPRO 2 UNITS: 100 INJECTION, SOLUTION INTRAVENOUS; SUBCUTANEOUS at 12:09

## 2022-05-07 RX ADMIN — INSULIN LISPRO 2 UNITS: 100 INJECTION, SOLUTION INTRAVENOUS; SUBCUTANEOUS at 08:05

## 2022-05-07 NOTE — PROGRESS NOTES
[facility] Medicine Services  PROGRESS NOTE    Patient Name: Antony Monahan  : 1950  MRN: [id number]    Date of Admission: 2022  Primary Care Physician: Franky Carrington MD    Subjective   Subjective     CC:  SOA    HPI:  Feels a little better.  Cough better.  Has sore throat.  Feels congested, would like mucinex.       ROS:  Gen- No fevers, chills  CV- No chest pain, palpitations  Resp- +cough, +dyspnea, +sore throat  GI- No N/V/D, abd pain        Objective   Objective     Vital Signs:   Temp:  [97.7 °F (36.5 °C)-98.2 °F (36.8 °C)] 98.2 °F (36.8 °C)  Heart Rate:  [70-99] 78  Resp:  [18] 18  BP: (101-136)/(61-82) 123/73  Flow (L/min):  [3] 3     Physical Exam:  Constitutional: No acute distress, awake, alert, sitting up in chair  HENT: NCAT, mucous membranes moist  Respiratory: poor air movement, respiratory effort normal   Cardiovascular: RRR, no murmurs, rubs, or gallops  Gastrointestinal: Positive bowel sounds, soft, nontender, nondistended  Musculoskeletal: No bilateral ankle edema  Psychiatric: Appropriate affect, cooperative  Neurologic: Oriented x 3, strength symmetric in all extremities, Cranial Nerves grossly intact to confrontation, speech clear  Skin: skin dry and flaking      Results Reviewed:  LAB RESULTS:      Lab 22  0403 22  0435 22  0349 22  1017   WBC 9.84 10.71 5.57 6.34   HEMOGLOBIN 12.3* 12.6* 12.1* 12.1*   HEMATOCRIT 36.8* 38.7 36.9* 36.7*   PLATELETS 231 256 188 178   NEUTROS ABS  --   --  4.75 4.67   IMMATURE GRANS (ABS)  --   --  0.03 0.02   LYMPHS ABS  --   --  0.57* 0.74   MONOS ABS  --   --  0.21 0.82   EOS ABS  --   --  0.00 0.06   MCV 84.6 86.6 87.0 87.6   LACTATE  --   --   --  1.5         Lab 22  0403 22  0342 22  0416 22  0435 22  0451 22  0349   SODIUM 137 137 136 137 139 134*   POTASSIUM 3.8 3.7 3.7 3.8 3.4* 3.3*   CHLORIDE 98 96* 94* 95* 97* 96*   CO2 29.0 25.0 29.0 28.0  28.0 24.0   ANION GAP 10.0 16.0* 13.0 14.0 14.0 14.0   BUN 80* 101* 90* 80* 58* 48*   CREATININE 1.92* 2.62* 2.71* 2.46* 1.96* 1.76*   EGFR 36.3* 25.0* 24.0* 27.0* 35.4* 40.3*   GLUCOSE 260* 341* 309* 383* 294* 241*   CALCIUM 8.9 8.5* 8.8 9.2 9.7 9.2   MAGNESIUM  --   --   --   --  2.3 3.0*   TSH  --   --   --   --   --  0.682         Lab 05/05/22  0416 05/01/22  1017   TOTAL PROTEIN 6.7 7.3   ALBUMIN 3.50 3.70   GLOBULIN 3.2 3.6   ALT (SGPT) 53* 33   AST (SGOT) 29 39   BILIRUBIN 0.2 0.9   ALK PHOS 113 136*         Lab 05/05/22  0416 05/01/22  1017   PROBNP 1,289.0* 5,480.0*   TROPONIN T  --  0.017                 Brief Urine Lab Results  (Last result in the past 365 days)      Color   Clarity   Blood   Leuk Est   Nitrite   Protein   CREAT   Urine HCG        06/01/21 0140             50.3               Microbiology Results Abnormal     Procedure Component Value - Date/Time    Blood Culture - Blood, Hand, Left [[id number]]  (Normal) Collected: 05/01/22 1104    Lab Status: Final result Specimen: Blood from Hand, Left Updated: 05/06/22 1132     Blood Culture No growth at 5 days    Blood Culture - Blood, Arm, Left [[id number]]  (Normal) Collected: 05/01/22 1104    Lab Status: Final result Specimen: Blood from Arm, Left Updated: 05/06/22 1132     Blood Culture No growth at 5 days          XR Chest 1 View    Result Date: 5/5/2022  DATE OF EXAM: 5/5/2022 10:06 AM  PROCEDURE: XR CHEST 1 VW-  INDICATIONS: dyspnea, COPD; J44.1-Chronic obstructive pulmonary disease with (acute) exacerbation; E86.9-Volume depletion, unspecified; J18.9-Pneumonia, unspecified organism; N17.9-Acute kidney failure, unspecified  COMPARISON: 5/1/2022  TECHNIQUE: Single radiographic AP view of the chest was obtained.  FINDINGS: Stable aeration from comparison including mild bibasilar atelectasis. Left chest wall ICD projects in place. No significant effusion or distinct pneumothorax. Unchanged degree of cardiac enlargement.      Impression: Stable chest  as above without evidence of acute cardiopulmonary abnormality.  This report was finalized on 5/5/2022 10:51 AM by Drew Cochran.        Results for orders placed during the hospital encounter of 05/01/22    Adult Transthoracic Echo Complete W/ Cont if Necessary Per Protocol    Interpretation Summary  · The left ventricular cavity is mildly dilated.  · Left ventricular wall thickness is consistent with mild concentric hypertrophy.  · Left ventricular ejection fraction appears to be 46 - 50%.  · Left atrial volume is moderately increased.  · Mild global LV hypokinesis      I have reviewed the medications:  Scheduled Meds:allopurinol, 100 mg, Oral, BID  amiodarone, 400 mg, Oral, Q24H  apixaban, 5 mg, Oral, Q12H  budesonide, 0.5 mg, Nebulization, BID - RT  carvedilol, 25 mg, Oral, BID  clopidogrel, 75 mg, Oral, Daily  insulin detemir, 10 Units, Subcutaneous, Q12H  insulin lispro, 0-14 Units, Subcutaneous, TID AC  Insulin Lispro, 2 Units, Subcutaneous, TID With Meals  ipratropium-albuterol, 3 mL, Nebulization, 4x Daily - RT  levothyroxine, 100 mcg, Oral, Q AM  methylPREDNISolone sodium succinate, 60 mg, Intravenous, Q8H  pantoprazole, 40 mg, Oral, Daily  pharmacy consult - MTM, , Does not apply, Daily  rosuvastatin, 20 mg, Oral, Nightly  sodium chloride, 10 mL, Intravenous, Q12H      Continuous Infusions:   PRN Meds:.•  acetaminophen **OR** acetaminophen **OR** acetaminophen  •  benzonatate  •  dextrose  •  dextrose  •  glucagon (human recombinant)  •  guaiFENesin-codeine  •  ondansetron **OR** ondansetron  •  sodium chloride  •  sodium chloride    Assessment/Plan   Assessment & Plan     Active Hospital Problems    Diagnosis  POA   • Obesity (BMI 30-39.9) [E66.9]  Yes   • COPD exacerbation (HCC) [J44.1]  Yes      Resolved Hospital Problems   No resolved problems to display.        Brief Hospital Course to date:  Antony Monahan is a 72 y.o. male     COPD with acute exacerbation  Viral URI  (rhinovirus/enterovirus)  - not improving.  Increased solumedrol to 60mg q8h Day 4--wean to solumedrol 60mg daily  - duonebs and budesonide  - doxycycline day 7  - CXR shows no acute  - cont flutter valve  - add mucinex     Acute on Chronic Systolic Heart Failure  S/p [facility] DDD ICD  - proBNP 5480  - recent leg edema, improved with metolazone  - echo showed EF 46-50%, mild global HK  -  cardiology/Dr. Mcfarland following.  Worsening kidney function so stopped bumex on 5/4  - cont  beta blocker.  Holding entresto d/t worsening creatinine a few days ago     Atrial fibrillation  - anticoagulated  - cardiology following, recs amiodarone for rhythm control which decreased dose from 400mg BID to 400mg daily yesterday    Hypotension with h/o HTN  --stopped amlodipine.  Hold spironolactone.  S/p 500NS bolus x 2 the last 2 days.  BP better today but continue to hold meds.    Hypokalemia  --replaced    T2DM  --exacerbated by steroids  --cont levemir 10 units BID  --cont SSI moderate to high dose.  Scheduled lispro 2units tid with meals  --sugars still 200's but decreasing steroids today so insulin requirement should start to go down, watch close as wean steroids.   --HbA1c 6.60     SUNIL on CKD IIIa  - baseline creat 1.3-1.4  - Stopped bumex,  Holding entresto.  S/p total of 1L NS given back over last couple of days and creatinine looks much better today.     PAD  - recent left iliac stent        Dyslipidemia     Hypothyroid  - TSH wnl    DVT prophylaxis:  Medical DVT prophylaxis orders are present.       AM-PAC 6 Clicks Score (PT): 22 (05/05/22 2020)    Disposition: I expect the patient to be discharged TBD    CODE STATUS:   Code Status and Medical Interventions:   Ordered at: 05/01/22 1233     Level Of Support Discussed With:    Patient     Code Status (Patient has no pulse and is not breathing):    CPR (Attempt to Resuscitate)     Medical Interventions (Patient has pulse or is breathing):    Full Support       Marbin MITCHELL  MD Frank  05/07/22

## 2022-05-08 LAB
ANION GAP SERPL CALCULATED.3IONS-SCNC: 11 MMOL/L (ref 5–15)
BUN SERPL-MCNC: 68 MG/DL (ref 8–23)
BUN/CREAT SERPL: 38 (ref 7–25)
CALCIUM SPEC-SCNC: 8.5 MG/DL (ref 8.6–10.5)
CHLORIDE SERPL-SCNC: 100 MMOL/L (ref 98–107)
CO2 SERPL-SCNC: 27 MMOL/L (ref 22–29)
CREAT SERPL-MCNC: 1.79 MG/DL (ref 0.76–1.27)
DEPRECATED RDW RBC AUTO: 43.5 FL (ref 37–54)
EGFRCR SERPLBLD CKD-EPI 2021: 39.5 ML/MIN/1.73
ERYTHROCYTE [DISTWIDTH] IN BLOOD BY AUTOMATED COUNT: 14.3 % (ref 12.3–15.4)
GLUCOSE BLDC GLUCOMTR-MCNC: 145 MG/DL (ref 70–130)
GLUCOSE BLDC GLUCOMTR-MCNC: 152 MG/DL (ref 70–130)
GLUCOSE BLDC GLUCOMTR-MCNC: 261 MG/DL (ref 70–130)
GLUCOSE BLDC GLUCOMTR-MCNC: 264 MG/DL (ref 70–130)
GLUCOSE SERPL-MCNC: 299 MG/DL (ref 65–99)
HCT VFR BLD AUTO: 35.6 % (ref 37.5–51)
HGB BLD-MCNC: 11.9 G/DL (ref 13–17.7)
MAGNESIUM SERPL-MCNC: 2.7 MG/DL (ref 1.6–2.4)
MCH RBC QN AUTO: 28.1 PG (ref 26.6–33)
MCHC RBC AUTO-ENTMCNC: 33.4 G/DL (ref 31.5–35.7)
MCV RBC AUTO: 84.2 FL (ref 79–97)
PLATELET # BLD AUTO: 231 10*3/MM3 (ref 140–450)
PMV BLD AUTO: 12.1 FL (ref 6–12)
POTASSIUM SERPL-SCNC: 3.9 MMOL/L (ref 3.5–5.2)
RBC # BLD AUTO: 4.23 10*6/MM3 (ref 4.14–5.8)
SODIUM SERPL-SCNC: 138 MMOL/L (ref 136–145)
WBC NRBC COR # BLD: 8.49 10*3/MM3 (ref 3.4–10.8)

## 2022-05-08 PROCEDURE — 63710000001 ONDANSETRON PER 8 MG: Performed by: INTERNAL MEDICINE

## 2022-05-08 PROCEDURE — 94761 N-INVAS EAR/PLS OXIMETRY MLT: CPT

## 2022-05-08 PROCEDURE — 80048 BASIC METABOLIC PNL TOTAL CA: CPT | Performed by: INTERNAL MEDICINE

## 2022-05-08 PROCEDURE — 85027 COMPLETE CBC AUTOMATED: CPT | Performed by: INTERNAL MEDICINE

## 2022-05-08 PROCEDURE — 83735 ASSAY OF MAGNESIUM: CPT | Performed by: INTERNAL MEDICINE

## 2022-05-08 PROCEDURE — 94799 UNLISTED PULMONARY SVC/PX: CPT

## 2022-05-08 PROCEDURE — 63710000001 INSULIN LISPRO (HUMAN) PER 5 UNITS: Performed by: INTERNAL MEDICINE

## 2022-05-08 PROCEDURE — 25010000002 METHYLPREDNISOLONE PER 125 MG: Performed by: INTERNAL MEDICINE

## 2022-05-08 PROCEDURE — 63710000001 INSULIN DETEMIR PER 5 UNITS: Performed by: INTERNAL MEDICINE

## 2022-05-08 PROCEDURE — 99233 SBSQ HOSP IP/OBS HIGH 50: CPT | Performed by: INTERNAL MEDICINE

## 2022-05-08 PROCEDURE — 94664 DEMO&/EVAL PT USE INHALER: CPT

## 2022-05-08 PROCEDURE — 82962 GLUCOSE BLOOD TEST: CPT

## 2022-05-08 RX ORDER — MAGNESIUM SULFATE HEPTAHYDRATE 40 MG/ML
4 INJECTION, SOLUTION INTRAVENOUS AS NEEDED
Status: DISCONTINUED | OUTPATIENT
Start: 2022-05-08 | End: 2022-05-10 | Stop reason: HOSPADM

## 2022-05-08 RX ORDER — MAGNESIUM SULFATE HEPTAHYDRATE 40 MG/ML
2 INJECTION, SOLUTION INTRAVENOUS AS NEEDED
Status: DISCONTINUED | OUTPATIENT
Start: 2022-05-08 | End: 2022-05-10 | Stop reason: HOSPADM

## 2022-05-08 RX ORDER — CEFUROXIME AXETIL 250 MG/1
500 TABLET ORAL EVERY 12 HOURS SCHEDULED
Status: DISCONTINUED | OUTPATIENT
Start: 2022-05-08 | End: 2022-05-10 | Stop reason: HOSPADM

## 2022-05-08 RX ORDER — BUMETANIDE 0.25 MG/ML
1 INJECTION INTRAMUSCULAR; INTRAVENOUS ONCE
Status: COMPLETED | OUTPATIENT
Start: 2022-05-08 | End: 2022-05-08

## 2022-05-08 RX ORDER — MAGNESIUM SULFATE 1 G/100ML
1 INJECTION INTRAVENOUS AS NEEDED
Status: DISCONTINUED | OUTPATIENT
Start: 2022-05-08 | End: 2022-05-10 | Stop reason: HOSPADM

## 2022-05-08 RX ORDER — BUMETANIDE 1 MG/1
2 TABLET ORAL 2 TIMES DAILY
Status: DISCONTINUED | OUTPATIENT
Start: 2022-05-08 | End: 2022-05-10 | Stop reason: HOSPADM

## 2022-05-08 RX ADMIN — GUAIFENESIN AND CODEINE PHOSPHATE 5 ML: 100; 10 SOLUTION ORAL at 12:24

## 2022-05-08 RX ADMIN — ALLOPURINOL 100 MG: 100 TABLET ORAL at 20:32

## 2022-05-08 RX ADMIN — INSULIN LISPRO 2 UNITS: 100 INJECTION, SOLUTION INTRAVENOUS; SUBCUTANEOUS at 12:24

## 2022-05-08 RX ADMIN — PANTOPRAZOLE SODIUM 40 MG: 40 TABLET, DELAYED RELEASE ORAL at 08:57

## 2022-05-08 RX ADMIN — CARVEDILOL 25 MG: 12.5 TABLET, FILM COATED ORAL at 20:32

## 2022-05-08 RX ADMIN — APIXABAN 5 MG: 5 TABLET, FILM COATED ORAL at 20:32

## 2022-05-08 RX ADMIN — ACETAMINOPHEN 650 MG: 325 TABLET ORAL at 09:11

## 2022-05-08 RX ADMIN — CLOPIDOGREL BISULFATE 75 MG: 75 TABLET ORAL at 08:57

## 2022-05-08 RX ADMIN — ACETAMINOPHEN 650 MG: 325 TABLET ORAL at 20:32

## 2022-05-08 RX ADMIN — BUMETANIDE 1 MG: 0.25 INJECTION INTRAMUSCULAR; INTRAVENOUS at 12:24

## 2022-05-08 RX ADMIN — GUAIFENESIN 1200 MG: 600 TABLET, EXTENDED RELEASE ORAL at 20:32

## 2022-05-08 RX ADMIN — BUDESONIDE 0.5 MG: 0.5 SUSPENSION RESPIRATORY (INHALATION) at 21:22

## 2022-05-08 RX ADMIN — INSULIN DETEMIR 10 UNITS: 100 INJECTION, SOLUTION SUBCUTANEOUS at 10:46

## 2022-05-08 RX ADMIN — AMIODARONE HYDROCHLORIDE 400 MG: 200 TABLET ORAL at 08:59

## 2022-05-08 RX ADMIN — IPRATROPIUM BROMIDE AND ALBUTEROL SULFATE 3 ML: .5; 3 SOLUTION RESPIRATORY (INHALATION) at 11:20

## 2022-05-08 RX ADMIN — BENZONATATE 100 MG: 100 CAPSULE ORAL at 05:54

## 2022-05-08 RX ADMIN — IPRATROPIUM BROMIDE AND ALBUTEROL SULFATE 3 ML: .5; 3 SOLUTION RESPIRATORY (INHALATION) at 21:22

## 2022-05-08 RX ADMIN — CARVEDILOL 25 MG: 12.5 TABLET, FILM COATED ORAL at 08:56

## 2022-05-08 RX ADMIN — INSULIN LISPRO 2 UNITS: 100 INJECTION, SOLUTION INTRAVENOUS; SUBCUTANEOUS at 08:58

## 2022-05-08 RX ADMIN — APIXABAN 5 MG: 5 TABLET, FILM COATED ORAL at 08:57

## 2022-05-08 RX ADMIN — CEFUROXIME AXETIL 500 MG: 250 TABLET ORAL at 10:57

## 2022-05-08 RX ADMIN — INSULIN LISPRO 8 UNITS: 100 INJECTION, SOLUTION INTRAVENOUS; SUBCUTANEOUS at 08:58

## 2022-05-08 RX ADMIN — CEFUROXIME AXETIL 500 MG: 250 TABLET ORAL at 20:32

## 2022-05-08 RX ADMIN — INSULIN DETEMIR 10 UNITS: 100 INJECTION, SOLUTION SUBCUTANEOUS at 21:06

## 2022-05-08 RX ADMIN — IPRATROPIUM BROMIDE AND ALBUTEROL SULFATE 3 ML: .5; 3 SOLUTION RESPIRATORY (INHALATION) at 14:57

## 2022-05-08 RX ADMIN — BUMETANIDE 2 MG: 1 TABLET ORAL at 20:32

## 2022-05-08 RX ADMIN — LEVOTHYROXINE SODIUM 100 MCG: 0.17 TABLET ORAL at 05:44

## 2022-05-08 RX ADMIN — GUAIFENESIN AND CODEINE PHOSPHATE 5 ML: 100; 10 SOLUTION ORAL at 21:54

## 2022-05-08 RX ADMIN — ONDANSETRON HYDROCHLORIDE 4 MG: 4 TABLET, FILM COATED ORAL at 00:14

## 2022-05-08 RX ADMIN — Medication 10 ML: at 20:32

## 2022-05-08 RX ADMIN — INSULIN LISPRO 2 UNITS: 100 INJECTION, SOLUTION INTRAVENOUS; SUBCUTANEOUS at 17:25

## 2022-05-08 RX ADMIN — ALLOPURINOL 100 MG: 100 TABLET ORAL at 08:57

## 2022-05-08 RX ADMIN — GUAIFENESIN AND CODEINE PHOSPHATE 5 ML: 100; 10 SOLUTION ORAL at 05:54

## 2022-05-08 RX ADMIN — IPRATROPIUM BROMIDE AND ALBUTEROL SULFATE 3 ML: .5; 3 SOLUTION RESPIRATORY (INHALATION) at 07:00

## 2022-05-08 RX ADMIN — BUDESONIDE 0.5 MG: 0.5 SUSPENSION RESPIRATORY (INHALATION) at 07:00

## 2022-05-08 RX ADMIN — GUAIFENESIN 1200 MG: 600 TABLET, EXTENDED RELEASE ORAL at 08:57

## 2022-05-08 RX ADMIN — BENZONATATE 100 MG: 100 CAPSULE ORAL at 17:26

## 2022-05-08 RX ADMIN — ROSUVASTATIN 20 MG: 20 TABLET, FILM COATED ORAL at 20:32

## 2022-05-08 RX ADMIN — METHYLPREDNISOLONE SODIUM SUCCINATE 60 MG: 125 INJECTION, POWDER, FOR SOLUTION INTRAMUSCULAR; INTRAVENOUS at 08:56

## 2022-05-08 RX ADMIN — Medication 10 ML: at 08:58

## 2022-05-08 RX ADMIN — INSULIN LISPRO 8 UNITS: 100 INJECTION, SOLUTION INTRAVENOUS; SUBCUTANEOUS at 17:25

## 2022-05-08 NOTE — PROGRESS NOTES
[facility] Medicine Services  PROGRESS NOTE    Patient Name: Antony Monahan  : 1950  MRN: [id number]    Date of Admission: 2022  Primary Care Physician: Franky Carrington MD    Subjective   Subjective     CC:  SOA    HPI:  Green sputum, no chest pain currently. Wheezing improved. Mild LE edema    ROS:  Gen- No fevers, chills  CV- No chest pain, palpitations  Resp- +cough, +dyspnea, +sore throat  GI- No N/V/D, abd pain        Objective   Objective     Vital Signs:   Temp:  [97.5 °F (36.4 °C)-98 °F (36.7 °C)] 97.9 °F (36.6 °C)  Heart Rate:  [74-99] 80  Resp:  [18-20] 18  BP: (107-157)/() 126/105  Flow (L/min):  [3-3.5] 3     Physical Exam:  Constitutional: No acute distress, awake, alert, sitting up in chair  HENT: NCAT, mucous membranes moist, nasal canula in place  Respiratory: faint insp crackles scattered bilaterally, no overt wheezes today  Cardiovascular: RRR, no murmurs, rubs, or gallops  Gastrointestinal: Positive bowel sounds, soft, nontender, nondistended  Musculoskeletal: 1+ BLE edema  Psychiatric: Appropriate affect, cooperative  Neurologic: Oriented x 3, strength symmetric in all extremities, Cranial Nerves grossly intact to confrontation, speech clear  Skin: skin dry and flaking      Results Reviewed:  LAB RESULTS:      Lab 22  0431 22  0403 22  0435 22  0349 22  1017   WBC 8.49 9.84 10.71 5.57 6.34   HEMOGLOBIN 11.9* 12.3* 12.6* 12.1* 12.1*   HEMATOCRIT 35.6* 36.8* 38.7 36.9* 36.7*   PLATELETS 231 231 256 188 178   NEUTROS ABS  --   --   --  4.75 4.67   IMMATURE GRANS (ABS)  --   --   --  0.03 0.02   LYMPHS ABS  --   --   --  0.57* 0.74   MONOS ABS  --   --   --  0.21 0.82   EOS ABS  --   --   --  0.00 0.06   MCV 84.2 84.6 86.6 87.0 87.6   LACTATE  --   --   --   --  1.5         Lab 22  0431 22  0403 22  0342 22  0416 22  0435 22  0451 22  0349   SODIUM 138 137 137 136 137 139  134*   POTASSIUM 3.9 3.8 3.7 3.7 3.8 3.4* 3.3*   CHLORIDE 100 98 96* 94* 95* 97* 96*   CO2 27.0 29.0 25.0 29.0 28.0 28.0 24.0   ANION GAP 11.0 10.0 16.0* 13.0 14.0 14.0 14.0   BUN 68* 80* 101* 90* 80* 58* 48*   CREATININE 1.79* 1.92* 2.62* 2.71* 2.46* 1.96* 1.76*   EGFR 39.5* 36.3* 25.0* 24.0* 27.0* 35.4* 40.3*   GLUCOSE 299* 260* 341* 309* 383* 294* 241*   CALCIUM 8.5* 8.9 8.5* 8.8 9.2 9.7 9.2   MAGNESIUM  --   --   --   --   --  2.3 3.0*   TSH  --   --   --   --   --   --  0.682         Lab 05/05/22  0416 05/01/22  1017   TOTAL PROTEIN 6.7 7.3   ALBUMIN 3.50 3.70   GLOBULIN 3.2 3.6   ALT (SGPT) 53* 33   AST (SGOT) 29 39   BILIRUBIN 0.2 0.9   ALK PHOS 113 136*         Lab 05/05/22  0416 05/01/22  1017   PROBNP 1,289.0* 5,480.0*   TROPONIN T  --  0.017                 Brief Urine Lab Results  (Last result in the past 365 days)      Color   Clarity   Blood   Leuk Est   Nitrite   Protein   CREAT   Urine HCG        06/01/21 0140             50.3               Microbiology Results Abnormal     Procedure Component Value - Date/Time    Blood Culture - Blood, Hand, Left [[id number]]  (Normal) Collected: 05/01/22 1104    Lab Status: Final result Specimen: Blood from Hand, Left Updated: 05/06/22 1132     Blood Culture No growth at 5 days    Blood Culture - Blood, Arm, Left [[id number]]  (Normal) Collected: 05/01/22 1104    Lab Status: Final result Specimen: Blood from Arm, Left Updated: 05/06/22 1132     Blood Culture No growth at 5 days          No radiology results from the last 24 hrs    Results for orders placed during the hospital encounter of 05/01/22    Adult Transthoracic Echo Complete W/ Cont if Necessary Per Protocol    Interpretation Summary  · The left ventricular cavity is mildly dilated.  · Left ventricular wall thickness is consistent with mild concentric hypertrophy.  · Left ventricular ejection fraction appears to be 46 - 50%.  · Left atrial volume is moderately increased.  · Mild global LV hypokinesis      I  have reviewed the medications:  Scheduled Meds:allopurinol, 100 mg, Oral, BID  amiodarone, 400 mg, Oral, Q24H  apixaban, 5 mg, Oral, Q12H  budesonide, 0.5 mg, Nebulization, BID - RT  bumetanide, 1 mg, Intravenous, Once  bumetanide, 2 mg, Oral, BID  carvedilol, 25 mg, Oral, BID  cefuroxime, 500 mg, Oral, Q12H  clopidogrel, 75 mg, Oral, Daily  guaiFENesin, 1,200 mg, Oral, Q12H  insulin detemir, 10 Units, Subcutaneous, Q12H  insulin lispro, 0-14 Units, Subcutaneous, TID AC  Insulin Lispro, 2 Units, Subcutaneous, TID With Meals  ipratropium-albuterol, 3 mL, Nebulization, 4x Daily - RT  levothyroxine, 100 mcg, Oral, Q AM  methylPREDNISolone sodium succinate, 60 mg, Intravenous, Daily  pantoprazole, 40 mg, Oral, Daily  pharmacy consult - MTM, , Does not apply, Daily  rosuvastatin, 20 mg, Oral, Nightly  sodium chloride, 10 mL, Intravenous, Q12H      Continuous Infusions:   PRN Meds:.•  acetaminophen **OR** acetaminophen **OR** acetaminophen  •  benzonatate  •  dextrose  •  dextrose  •  glucagon (human recombinant)  •  guaiFENesin-codeine  •  magnesium sulfate **OR** magnesium sulfate in D5W 1g/100mL (PREMIX) **OR** magnesium sulfate  •  ondansetron **OR** ondansetron  •  sodium chloride  •  sodium chloride    Assessment/Plan   Assessment & Plan     Active Hospital Problems    Diagnosis  POA   • Obesity (BMI 30-39.9) [E66.9]  Yes   • COPD exacerbation (HCC) [J44.1]  Yes      Resolved Hospital Problems   No resolved problems to display.        Brief Hospital Course to date:  Antony Monahan is a 72 y.o. male     This patient's problems and plans were partially entered by my partner and updated as appropriate by me 05/08/22. Today is my first day evaluating this patient's active medical problems. I Personally reviewed chart and adjusted note to reflect daily changes in management/clinical condition    COPD with acute exacerbation  Viral URI (rhinovirus/enterovirus)  Cannot rule out bacterial superinfection/bronchitis  - on  prn 2Lnc supplemental oxygen at home  -s/p 7 days doxycycline (last dose 5/7/22)  -partner decreased solumedrol from 60mg iv q8h to once daily on 5/8/22, continue for now and change to prednisone soon  -5/8/22: greenish yellow sputum. Slightly increased edema legs today (s/p aggressive iv diuresis initially, then stopped bp meds and diuretics due to sunil and hypotension). Add ceftin 500mg bid; continue scheduled and prn bronchodilators/inhalants; continue solumedrol 60mg iv daily for today (see above) and consider change to prednisone soon if improving (will also start back bumex as has some mild edema and crackles on exam w/ improved renal function, details below)       Acute on Chronic Systolic Heart Failure  S/p [facility] DDD ICD  Parox afib  - proBNP 5480  - s/p aggressive iv diuresis  - echo showed EF 46-50%, mild global HK  -  cardiology/Dr. Mcfarland following.  Worsening kidney function so stopped bumex & entresto on 5/4 (and received gentle iv fluids); continuing amiodarone 400mg daily (decreased from 400mg bid on 5/6 by Dr. Mcfarland); continue eliquis  -5/8/22: will give bumex 1mg iv x 1 now, restart twice daily bumex 2mg po bid & monitor renal function; consider start back entresto soon/tomorrow if renal function improved/stable. Cards to see again 5/9/22       SUNIL on CKD IIIa  - baseline creat ~1.5  -s/p aggressive iv diuresis, then had hypotension and sunil (creatinine peak of 2.7)  -creatinine now improved to 1.79  -5/8/22: patient w/ crackles on exam, some LE edema, mild increased dyspnea, sbp 120's. bumex 1mg iv x 1 now, restart home scheduled bumex 2mg po bid (continue holding aldactone, entresto) w/ hold parameters to hold po bumex if sbp <100 or creatinine >2.0. Recheck renal function in a.m. if stable/improved likely add back entresto if bp ok and cards agrees.      Hypotension, resolved  Hx HTN  --hypotension was due to aggressive diuresis; had stopped amlodipine and held spironolactone.  -  received  500NS bolus daily x 2 days; held diuretics  - BP better today but continue to hold meds.    T2DM  --exacerbated by steroids  --cont levemir 10 units BID  -titrate insulins prn  --cont SSI moderate to high dose.  Scheduled lispro 2units tid with meals  --sugars still 200's but decreasing steroids today so insulin requirement should start to go down, watch close as wean steroids.   --HbA1c 6.60     PAD  - recent left iliac stent; plavix      Dyslipidemia     Hypothyroid  - TSH wnl    Am labs: cbc,bmp,mag    DVT prophylaxis:  Medical DVT prophylaxis orders are present.       AM-PAC 6 Clicks Score (PT): 22 (05/07/22 2020)    Disposition: I expect the patient to be discharged TBD    CODE STATUS:   Code Status and Medical Interventions:   Ordered at: 05/01/22 1233     Level Of Support Discussed With:    Patient     Code Status (Patient has no pulse and is not breathing):    CPR (Attempt to Resuscitate)     Medical Interventions (Patient has pulse or is breathing):    Full Support       Savage Ontiveros MD  05/08/22

## 2022-05-09 LAB
ANION GAP SERPL CALCULATED.3IONS-SCNC: 7 MMOL/L (ref 5–15)
BUN SERPL-MCNC: 61 MG/DL (ref 8–23)
BUN/CREAT SERPL: 34.9 (ref 7–25)
CALCIUM SPEC-SCNC: 8.4 MG/DL (ref 8.6–10.5)
CHLORIDE SERPL-SCNC: 103 MMOL/L (ref 98–107)
CO2 SERPL-SCNC: 33 MMOL/L (ref 22–29)
CREAT SERPL-MCNC: 1.75 MG/DL (ref 0.76–1.27)
DEPRECATED RDW RBC AUTO: 46.3 FL (ref 37–54)
EGFRCR SERPLBLD CKD-EPI 2021: 40.6 ML/MIN/1.73
ERYTHROCYTE [DISTWIDTH] IN BLOOD BY AUTOMATED COUNT: 14.5 % (ref 12.3–15.4)
GLUCOSE BLDC GLUCOMTR-MCNC: 105 MG/DL (ref 70–130)
GLUCOSE BLDC GLUCOMTR-MCNC: 107 MG/DL (ref 70–130)
GLUCOSE BLDC GLUCOMTR-MCNC: 248 MG/DL (ref 70–130)
GLUCOSE SERPL-MCNC: 151 MG/DL (ref 65–99)
HCT VFR BLD AUTO: 37.6 % (ref 37.5–51)
HGB BLD-MCNC: 12.5 G/DL (ref 13–17.7)
MAGNESIUM SERPL-MCNC: 2.7 MG/DL (ref 1.6–2.4)
MCH RBC QN AUTO: 28.6 PG (ref 26.6–33)
MCHC RBC AUTO-ENTMCNC: 33.2 G/DL (ref 31.5–35.7)
MCV RBC AUTO: 86 FL (ref 79–97)
NT-PROBNP SERPL-MCNC: 3355 PG/ML (ref 0–900)
PLATELET # BLD AUTO: 217 10*3/MM3 (ref 140–450)
PMV BLD AUTO: 12.3 FL (ref 6–12)
POTASSIUM SERPL-SCNC: 4.7 MMOL/L (ref 3.5–5.2)
QT INTERVAL: 444 MS
QTC INTERVAL: 515 MS
RBC # BLD AUTO: 4.37 10*6/MM3 (ref 4.14–5.8)
SODIUM SERPL-SCNC: 143 MMOL/L (ref 136–145)
WBC NRBC COR # BLD: 7.34 10*3/MM3 (ref 3.4–10.8)

## 2022-05-09 PROCEDURE — 94799 UNLISTED PULMONARY SVC/PX: CPT

## 2022-05-09 PROCEDURE — 82962 GLUCOSE BLOOD TEST: CPT

## 2022-05-09 PROCEDURE — 63710000001 INSULIN LISPRO (HUMAN) PER 5 UNITS: Performed by: INTERNAL MEDICINE

## 2022-05-09 PROCEDURE — 94761 N-INVAS EAR/PLS OXIMETRY MLT: CPT

## 2022-05-09 PROCEDURE — 85027 COMPLETE CBC AUTOMATED: CPT | Performed by: INTERNAL MEDICINE

## 2022-05-09 PROCEDURE — 94664 DEMO&/EVAL PT USE INHALER: CPT

## 2022-05-09 PROCEDURE — 25010000002 METHYLPREDNISOLONE PER 125 MG: Performed by: INTERNAL MEDICINE

## 2022-05-09 PROCEDURE — 80048 BASIC METABOLIC PNL TOTAL CA: CPT | Performed by: INTERNAL MEDICINE

## 2022-05-09 PROCEDURE — 83735 ASSAY OF MAGNESIUM: CPT | Performed by: INTERNAL MEDICINE

## 2022-05-09 PROCEDURE — 97116 GAIT TRAINING THERAPY: CPT

## 2022-05-09 PROCEDURE — 97110 THERAPEUTIC EXERCISES: CPT

## 2022-05-09 PROCEDURE — 99232 SBSQ HOSP IP/OBS MODERATE 35: CPT | Performed by: INTERNAL MEDICINE

## 2022-05-09 PROCEDURE — 83880 ASSAY OF NATRIURETIC PEPTIDE: CPT | Performed by: PHYSICIAN ASSISTANT

## 2022-05-09 PROCEDURE — 63710000001 INSULIN DETEMIR PER 5 UNITS: Performed by: INTERNAL MEDICINE

## 2022-05-09 RX ORDER — INSULIN LISPRO 100 [IU]/ML
0-9 INJECTION, SOLUTION INTRAVENOUS; SUBCUTANEOUS
Status: DISCONTINUED | OUTPATIENT
Start: 2022-05-09 | End: 2022-05-10 | Stop reason: HOSPADM

## 2022-05-09 RX ORDER — PREDNISONE 20 MG/1
40 TABLET ORAL
Status: DISCONTINUED | OUTPATIENT
Start: 2022-05-10 | End: 2022-05-10 | Stop reason: HOSPADM

## 2022-05-09 RX ADMIN — INSULIN LISPRO 4 UNITS: 100 INJECTION, SOLUTION INTRAVENOUS; SUBCUTANEOUS at 17:21

## 2022-05-09 RX ADMIN — Medication 10 ML: at 20:48

## 2022-05-09 RX ADMIN — BUDESONIDE 0.5 MG: 0.5 SUSPENSION RESPIRATORY (INHALATION) at 19:49

## 2022-05-09 RX ADMIN — IPRATROPIUM BROMIDE AND ALBUTEROL SULFATE 3 ML: .5; 3 SOLUTION RESPIRATORY (INHALATION) at 12:29

## 2022-05-09 RX ADMIN — ALLOPURINOL 100 MG: 100 TABLET ORAL at 20:45

## 2022-05-09 RX ADMIN — APIXABAN 5 MG: 5 TABLET, FILM COATED ORAL at 20:45

## 2022-05-09 RX ADMIN — TOPICAL ANESTHETIC 1 SPRAY: 200 SPRAY DENTAL; PERIODONTAL at 17:23

## 2022-05-09 RX ADMIN — BUMETANIDE 2 MG: 1 TABLET ORAL at 08:51

## 2022-05-09 RX ADMIN — ALLOPURINOL 100 MG: 100 TABLET ORAL at 08:51

## 2022-05-09 RX ADMIN — IPRATROPIUM BROMIDE AND ALBUTEROL SULFATE 3 ML: .5; 3 SOLUTION RESPIRATORY (INHALATION) at 19:49

## 2022-05-09 RX ADMIN — LEVOTHYROXINE SODIUM 100 MCG: 0.17 TABLET ORAL at 06:20

## 2022-05-09 RX ADMIN — PANTOPRAZOLE SODIUM 40 MG: 40 TABLET, DELAYED RELEASE ORAL at 08:51

## 2022-05-09 RX ADMIN — CARVEDILOL 25 MG: 12.5 TABLET, FILM COATED ORAL at 20:45

## 2022-05-09 RX ADMIN — IPRATROPIUM BROMIDE AND ALBUTEROL SULFATE 3 ML: .5; 3 SOLUTION RESPIRATORY (INHALATION) at 16:28

## 2022-05-09 RX ADMIN — CEFUROXIME AXETIL 500 MG: 250 TABLET ORAL at 20:45

## 2022-05-09 RX ADMIN — INSULIN LISPRO 2 UNITS: 100 INJECTION, SOLUTION INTRAVENOUS; SUBCUTANEOUS at 08:52

## 2022-05-09 RX ADMIN — METHYLPREDNISOLONE SODIUM SUCCINATE 60 MG: 125 INJECTION, POWDER, FOR SOLUTION INTRAMUSCULAR; INTRAVENOUS at 08:51

## 2022-05-09 RX ADMIN — GUAIFENESIN 1200 MG: 600 TABLET, EXTENDED RELEASE ORAL at 08:51

## 2022-05-09 RX ADMIN — INSULIN LISPRO 2 UNITS: 100 INJECTION, SOLUTION INTRAVENOUS; SUBCUTANEOUS at 12:03

## 2022-05-09 RX ADMIN — INSULIN DETEMIR 10 UNITS: 100 INJECTION, SOLUTION SUBCUTANEOUS at 09:00

## 2022-05-09 RX ADMIN — AMIODARONE HYDROCHLORIDE 400 MG: 200 TABLET ORAL at 08:51

## 2022-05-09 RX ADMIN — ACETAMINOPHEN 650 MG: 325 TABLET ORAL at 22:14

## 2022-05-09 RX ADMIN — BUMETANIDE 2 MG: 1 TABLET ORAL at 20:45

## 2022-05-09 RX ADMIN — IPRATROPIUM BROMIDE AND ALBUTEROL SULFATE 3 ML: .5; 3 SOLUTION RESPIRATORY (INHALATION) at 06:48

## 2022-05-09 RX ADMIN — CARVEDILOL 25 MG: 12.5 TABLET, FILM COATED ORAL at 08:51

## 2022-05-09 RX ADMIN — CLOPIDOGREL BISULFATE 75 MG: 75 TABLET ORAL at 08:51

## 2022-05-09 RX ADMIN — CEFUROXIME AXETIL 500 MG: 250 TABLET ORAL at 08:51

## 2022-05-09 RX ADMIN — APIXABAN 5 MG: 5 TABLET, FILM COATED ORAL at 08:51

## 2022-05-09 RX ADMIN — BUDESONIDE 0.5 MG: 0.5 SUSPENSION RESPIRATORY (INHALATION) at 06:48

## 2022-05-09 RX ADMIN — GUAIFENESIN 1200 MG: 600 TABLET, EXTENDED RELEASE ORAL at 20:45

## 2022-05-09 RX ADMIN — Medication 10 ML: at 08:52

## 2022-05-09 RX ADMIN — ROSUVASTATIN 20 MG: 20 TABLET, FILM COATED ORAL at 20:45

## 2022-05-09 NOTE — PLAN OF CARE
Goal Outcome Evaluation:           Progress: no change  Outcome Evaluation: Pt VSS, maintaining 3L/NC. no complaints of pain. Resting on and off throughout the shift. will cont to monitor.

## 2022-05-09 NOTE — PLAN OF CARE
Goal Outcome Evaluation:  Plan of Care Reviewed With: patient        Progress: improving  Outcome Evaluation: Patient pleasant and eager to go home. He demonstrates improving independence w/ mobility. Pt. ambulated 150ft w/out AD, w/ SBA and participated in seated BUE/BLE TherEx. VSS. PT continues to recommend home w/ assist at D/C.

## 2022-05-09 NOTE — PROGRESS NOTES
NN spoke with pt at BS.  Pt alert and able to answer questions appropriately. Pt O2 sat 95% on  3 L currently, home O2 use 2 L HS. COPD action plan reviewed. Deep breathing exercises encouraged. No new concerns or questions voiced at this time.  NN will continue to follow as needed.       Per current GOLD Standards, please consider:  LAMA/LABA/ICS in place ([place]), Outpatient PFT, Rehab as appropriate, Palliative Care consult,  Annual LDCT per current screening guidelines (age 50-80 years old, smoking history of 20 pack years or more or has quit within past 15 years)      Patient has been scheduled to establish care with [facility] for 5/27/2022 @ 9:45 am with Dr. GABBY Constantino MD.  Pre testing COVID swab scheduled for 5/25/2022 at 11 am.

## 2022-05-09 NOTE — PROGRESS NOTES
[facility] Medicine Services  PROGRESS NOTE    Patient Name: Antony Monahan  : 1950  MRN: [id number]    Date of Admission: 2022  Primary Care Physician: Franky Carrington MD    Subjective   Subjective     CC:  SOA    HPI:  Still occasional cough, but patient feels this is secondary to sinus drainage.  Frustrated at the viral precautions.    ROS:  Gen- No fevers, chills  CV- No chest pain, palpitations  Resp- + cough  GI- No N/V/D, abd pain          Objective   Objective     Vital Signs:   Temp:  [97 °F (36.1 °C)-98.2 °F (36.8 °C)] 97.6 °F (36.4 °C)  Heart Rate:  [74-93] 89  Resp:  [16-18] 16  BP: (132-151)/(76-91) 139/91  Flow (L/min):  [3] 3     Physical Exam:  Constitutional - no acute distress, nontoxic, up in chair  HEENT-NCAT, mucous membranes moist  CV-RRR  Resp-diminished bilaterally  Abd-soft, nontender, nondistended, normoactive bowel sounds  Ext-trace edema LE bilaterally  Neuro-alert, speech clear, moves all extremities   Psych-normal affect   Skin- No rash on exposed UE or LE bilaterally      Results Reviewed:  LAB RESULTS:      Lab 22  04222  04322  0403 05/04/22  0435   WBC 7.34 8.49 9.84 10.71   HEMOGLOBIN 12.5* 11.9* 12.3* 12.6*   HEMATOCRIT 37.6 35.6* 36.8* 38.7   PLATELETS 217 231 231 256   MCV 86.0 84.2 84.6 86.6         Lab 22  04222  0431 22  0403 22  0342 22  0416 22  0435 22  0451   SODIUM 143 138 137 137 136   < > 139   POTASSIUM 4.7 3.9 3.8 3.7 3.7   < > 3.4*   CHLORIDE 103 100 98 96* 94*   < > 97*   CO2 33.0* 27.0 29.0 25.0 29.0   < > 28.0   ANION GAP 7.0 11.0 10.0 16.0* 13.0   < > 14.0   BUN 61* 68* 80* 101* 90*   < > 58*   CREATININE 1.75* 1.79* 1.92* 2.62* 2.71*   < > 1.96*   EGFR 40.6* 39.5* 36.3* 25.0* 24.0*   < > 35.4*   GLUCOSE 151* 299* 260* 341* 309*   < > 294*   CALCIUM 8.4* 8.5* 8.9 8.5* 8.8   < > 9.7   MAGNESIUM 2.7* 2.7*  --   --   --   --  2.3    < > = values in  this interval not displayed.         Lab 05/05/22  0416   TOTAL PROTEIN 6.7   ALBUMIN 3.50   GLOBULIN 3.2   ALT (SGPT) 53*   AST (SGOT) 29   BILIRUBIN 0.2   ALK PHOS 113         Lab 05/09/22  0923 05/05/22  0416   PROBNP 3,355.0* 1,289.0*                 Brief Urine Lab Results  (Last result in the past 365 days)      Color   Clarity   Blood   Leuk Est   Nitrite   Protein   CREAT   Urine HCG        06/01/21 0140             50.3               Microbiology Results Abnormal     Procedure Component Value - Date/Time    Blood Culture - Blood, Hand, Left [[id number]]  (Normal) Collected: 05/01/22 1104    Lab Status: Final result Specimen: Blood from Hand, Left Updated: 05/06/22 1132     Blood Culture No growth at 5 days    Blood Culture - Blood, Arm, Left [[id number]]  (Normal) Collected: 05/01/22 1104    Lab Status: Final result Specimen: Blood from Arm, Left Updated: 05/06/22 1132     Blood Culture No growth at 5 days          No radiology results from the last 24 hrs    Results for orders placed during the hospital encounter of 05/01/22    Adult Transthoracic Echo Complete W/ Cont if Necessary Per Protocol    Interpretation Summary  · The left ventricular cavity is mildly dilated.  · Left ventricular wall thickness is consistent with mild concentric hypertrophy.  · Left ventricular ejection fraction appears to be 46 - 50%.  · Left atrial volume is moderately increased.  · Mild global LV hypokinesis      I have reviewed the medications:  Scheduled Meds:allopurinol, 100 mg, Oral, BID  amiodarone, 400 mg, Oral, Q24H  apixaban, 5 mg, Oral, Q12H  budesonide, 0.5 mg, Nebulization, BID - RT  bumetanide, 2 mg, Oral, BID  carvedilol, 25 mg, Oral, BID  cefuroxime, 500 mg, Oral, Q12H  clopidogrel, 75 mg, Oral, Daily  guaiFENesin, 1,200 mg, Oral, Q12H  insulin detemir, 10 Units, Subcutaneous, Q12H  insulin lispro, 0-14 Units, Subcutaneous, TID AC  Insulin Lispro, 2 Units, Subcutaneous, TID With Meals  ipratropium-albuterol, 3 mL,  Nebulization, 4x Daily - RT  levothyroxine, 100 mcg, Oral, Q AM  methylPREDNISolone sodium succinate, 60 mg, Intravenous, Daily  pantoprazole, 40 mg, Oral, Daily  pharmacy consult - MTM, , Does not apply, Daily  rosuvastatin, 20 mg, Oral, Nightly  sodium chloride, 10 mL, Intravenous, Q12H      Continuous Infusions:   PRN Meds:.•  acetaminophen **OR** acetaminophen **OR** acetaminophen  •  benzocaine-menthol  •  benzonatate  •  dextrose  •  dextrose  •  glucagon (human recombinant)  •  guaiFENesin-codeine  •  magnesium sulfate **OR** magnesium sulfate in D5W 1g/100mL (PREMIX) **OR** magnesium sulfate  •  ondansetron **OR** ondansetron  •  sodium chloride  •  sodium chloride    Assessment/Plan   Assessment & Plan     Active Hospital Problems    Diagnosis  POA   • Obesity (BMI 30-39.9) [E66.9]  Yes   • COPD exacerbation (HCC) [J44.1]  Yes      Resolved Hospital Problems   No resolved problems to display.        Brief Hospital Course to date:  Antony Monahan is a 72 y.o. male     This patient's problems and plans were partially entered by my partner and updated as appropriate by me 05/09/22. Today is my first day evaluating this patient's active medical problems. I Personally reviewed chart and adjusted note to reflect daily changes in management/clinical condition    COPD with acute exacerbation  Viral URI (rhinovirus/enterovirus)  Cannot rule out bacterial superinfection/bronchitis  - on prn 2Lnc supplemental oxygen at home - 3 liters via NC here  - s/p 7 days doxycycline (last dose 5/7/22), Ceftin started 5/8  - change solumedrol to prednisone in am with taper    Acute on Chronic Systolic Heart Failure  S/p [facility] DDD ICD  Parox afib  - proBNP 3355  - s/p aggressive iv diuresis -- diuretics and entresto held briefly due to SUNIL, bumex 2 mg PO BID resumed  - echo showed EF 46-50%, mild global HK  - amiodarone 400 mg daily  - eliquis  - entresto/aldactone currently held (hypotension)    SUNIL on CKD IIIa  - baseline  creat ~1.5  -creatinine now improved to 1.7  - bmp am    Hypotension, resolved  Hx HTN  --hypotension likely due to aggressive diuresis  - home norvasc, aldactone and entresto currently held    T2DM  --glucose improved with steroid taper  --lower levemir from 10 units BID to 10 units qam  --lower SSI insulin scale, hold further scheduled lispro  --check midnight and 3 am glucose  --HbA1c 6.60     PAD  - recent left iliac stent; plavix      Dyslipidemia     Hypothyroid  - TSH wnl      DVT prophylaxis:  Medical DVT prophylaxis orders are present.       AM-PAC 6 Clicks Score (PT): 22 (05/07/22 2020)    Disposition: I expect the patient to be discharged TBD    CODE STATUS:   Code Status and Medical Interventions:   Ordered at: 05/01/22 1233     Level Of Support Discussed With:    Patient     Code Status (Patient has no pulse and is not breathing):    CPR (Attempt to Resuscitate)     Medical Interventions (Patient has pulse or is breathing):    Full Support       Keith Akbar MD  05/09/22

## 2022-05-09 NOTE — THERAPY TREATMENT NOTE
Patient Name: Antony Monahan  : 1950    MRN: [id number]                              Today's Date: 2022       Admit Date: 2022    Visit Dx:     ICD-10-CM ICD-9-CM   1. COPD exacerbation ([facility])  J44.1 491.21   2. Volume depletion  E86.9 276.50   3. Pneumonia due to infectious organism, unspecified laterality, unspecified part of lung  J18.9 486   4. SUNIL (acute kidney injury) ([facility])  N17.9 584.9     Patient Active Problem List   Diagnosis   • ST elevation myocardial infarction involving left anterior descending (LAD) coronary artery ([facility])   • GERD (gastroesophageal reflux disease)   • Hyperlipidemia LDL goal <70   • PAD (peripheral artery disease) ([facility])   • COPD (chronic obstructive pulmonary disease) ([facility])   • Ischemic cardiomyopathy   • Hypothyroid   • Essential hypertension   • Coronary artery disease involving native coronary artery of native heart   • Claudication ([facility])   • Bradycardia   • Renal insufficiency   • Chronic fatigue   • Vitamin D deficiency   • Hypersomnolence   • Obesity (BMI 30.0-34.9)   • Intermittent claudication of both lower extremities due to atherosclerosis ([facility])   • Hypotension   • Arrhythmia   • Cardiac related syncope   • VT (ventricular tachycardia) ([facility])   • Symptomatic stenosis of left carotid artery without infarction   • Carotid stenosis, asymptomatic, left, s/p carotid stent   • Atherosclerosis of native artery of both lower extremities with intermittent claudication ([facility])   • Morbidly obese ([facility])   • HTN (hypertension)   • Hypercholesterolemia   • Systolic CHF ([facility])   • Pacemaker   • CAD (coronary artery disease)   • Back pain   • Chronic narcotic use   • Borderline diabetes   • Shortness of breath   • Hypernatremia   • SUNIL (acute kidney injury) ([facility])   • Abdominal distention   • COPD exacerbation ([facility])   • Obesity (BMI 30-39.9)     Past Medical History:   Diagnosis Date   • Anxiety    • Arthritis    • Back pain     past injections, years ago   • Borderline diabetes     • CAD (coronary artery disease)    • Chronic narcotic use     prn   • COPD (chronic obstructive pulmonary disease) ([facility]) 07/29/2016   • Depression     intermittent   • Diabetes mellitus ([facility])     checsk sugar once per week   • Full dentures    • Headache    • HTN (hypertension) 07/29/2016   • Hypercholesterolemia 07/29/2016   • Hypothyroid 07/29/2016   • Joint pain     hydrocodone prn   • Obesity    • Pacemaker    • PAD (peripheral artery disease) ([facility]) 07/29/2016   • SOBOE (shortness of breath on exertion)    • Systolic CHF ([facility]) 07/29/2016   • Tinnitus    • Tobacco abuse 07/29/2016   • Wears glasses     readers     Past Surgical History:   Procedure Laterality Date   • AORTAGRAM Left 3/30/2022    Procedure: ABDOMINAL AORTAGRAM WITH RUNOFF,  LEFT EXTERNAL ILIAC DRUG ELUTING BALLOON ANGIOPLASTY, LEFT INTERNAL ILIAC STENT, LEFT LEG ANGIOGRAM;  Surgeon: Mono Marley MD;  Location:  [facility];  Service: Vascular;  Laterality: Left;   • CARDIAC CATHETERIZATION N/A 07/29/2016    Procedure: Left Heart Cath;  Surgeon: Drew Gurrola MD;  Location:  [facility];  Service:    • CARDIAC CATHETERIZATION N/A 07/29/2019    Procedure: LEFT HEART CATH;  Surgeon: Drew Gurrola MD;  Location:  [facility];  Service: Cardiovascular   • CARDIAC CATHETERIZATION N/A 06/03/2021    Procedure: LEFT HEART CATH;  Surgeon: Drew Gurrola MD;  Location:  [facility];  Service: Cardiovascular;  Laterality: N/A;   • CARDIAC ELECTROPHYSIOLOGY PROCEDURE N/A 07/30/2019    Procedure: ICD SC new;  Surgeon: Musa Harden MD;  Location:  [facility];  Service: Cardiology   • COLONOSCOPY     • INTERVENTIONAL RADIOLOGY PROCEDURE N/A 09/14/2016    Procedure: AORTIC Aortagram with Runoff;  Surgeon: Drew Gurrola MD;  Location:  [facility];  Service:    • INTERVENTIONAL RADIOLOGY PROCEDURE N/A 05/09/2017    Procedure: Abdominal  Aortagram with Runoff;  Surgeon: Drew Gurrola MD;  Location:  [facility];  Service:    • INTERVENTIONAL RADIOLOGY PROCEDURE N/A 09/05/2018    Procedure: Abdominal Aortagram with Runoff;  Surgeon: Drew Gurrola MD;  Location:  [facility];  Service: Cardiovascular   • INTERVENTIONAL RADIOLOGY PROCEDURE Bilateral 07/29/2019    Procedure: Carotid Cerebral Angiogram;  Surgeon: Claude Atkins MD;  Location:  [facility];  Service: Interventional Radiology   • INTERVENTIONAL RADIOLOGY PROCEDURE N/A 08/18/2020    Procedure: AARO+/-;  Surgeon: Drew Gurrola MD;  Location:  [facility];  Service: Cardiovascular;  Laterality: N/A;   • INTERVENTIONAL RADIOLOGY PROCEDURE Bilateral 11/20/2020    Procedure: Carotid Cerebral Angiogram with stent;  Surgeon: Claude Atkins MD;  Location:  [facility];  Service: Interventional Radiology;  Laterality: Bilateral;   • KNEE SURGERY Left 08/17/2015   • OTHER SURGICAL HISTORY      L LEG STENT   • MT TCAT IV STENT CRV CRTD ART EMBOLIC PROTECJ N/A 09/25/2019    Left Carotid Stent; Dr. Claude Atkins   • UMBILICAL HERNIA REPAIR  2006    no mesh, done in [place]   • WRIST SURGERY Right       General Information     Row Name 05/09/22 1112          Physical Therapy Time and Intention    Document Type therapy note (daily note)  -MB     Mode of Treatment physical therapy  -MB     Row Name 05/09/22 1112          General Information    Patient Profile Reviewed yes  -MB     Existing Precautions/Restrictions oxygen therapy device and L/min  -MB     Barriers to Rehab previous functional deficit  -MB     Row Name 05/09/22 1112          Cognition    Orientation Status (Cognition) oriented x 4  -MB     Row Name 05/09/22 1112          Safety Issues, Functional Mobility    Safety Issues Affecting Function (Mobility) safety precaution awareness  -MB     Impairments Affecting Function  (Mobility) endurance/activity tolerance;shortness of breath;strength  -MB           User Key  (r) = Recorded By, (t) = Taken By, (c) = Cosigned By    Initials Name Provider Type    Sejal Flores, PT Physical Therapist               Mobility     Row Name 05/09/22 1612          Bed Mobility    Comment, (Bed Mobility) UIC  -MB     Row Name 05/09/22 1612          Transfers    Comment, (Transfers) No safety concerns.  -MB     Row Name 05/09/22 1612          Sit-Stand Transfer    Sit-Stand [place] (Transfers) supervision  -MB     Row Name 05/09/22 1612          Gait/Stairs (Locomotion)    [place] Level (Gait) standby assist;verbal cues  -MB     Distance in Feet (Gait) 150  -MB     Deviations/Abnormal Patterns (Gait) sherri decreased;gait speed decreased;stride length decreased  -MB     Bilateral Gait Deviations forward flexed posture;heel strike decreased  -MB     Comment, (Gait/Stairs) Pt. amb. in room w/ step through gait pattern and slower pace. VCs for upright posture and PLB. No LOB observed.  -MB           User Key  (r) = Recorded By, (t) = Taken By, (c) = Cosigned By    Initials Name Provider Type    Sejal Flores, PT Physical Therapist               Obj/Interventions     Row Name 05/09/22 1613          Motor Skills    Therapeutic Exercise shoulder;hip;knee;ankle  -MB     Row Name 05/09/22 1613          Shoulder (Therapeutic Exercise)    Shoulder AROM (Therapeutic Exercise) bilateral;flexion;aBduction;scapular retraction;10 repetitions  -MB     Row Name 05/09/22 1613          Hip (Therapeutic Exercise)    Hip (Therapeutic Exercise) strengthening exercise  -MB     Hip Strengthening (Therapeutic Exercise) bilateral;marching while seated;aBduction;15 repititions  -MB     Row Name 05/09/22 1613          Knee (Therapeutic Exercise)    Knee (Therapeutic Exercise) strengthening exercise  -MB     Knee Strengthening (Therapeutic Exercise) bilateral;LAQ (long arc quad);15 repititions  -[facility]  Name 05/09/22 1613          Ankle (Therapeutic Exercise)    Ankle (Therapeutic Exercise) strengthening exercise  -MB     Ankle Strengthening (Therapeutic Exercise) bilateral;dorsiflexion;plantarflexion;15 repititions  -MB     Row Name 05/09/22 1613          Balance    Dynamic Standing Balance supervision  -MB     Position/Device Used, Standing Balance unsupported  -MB     Balance Interventions standing;sit to stand;weight shifting activity;dynamic reaching  -MB           User Key  (r) = Recorded By, (t) = Taken By, (c) = Cosigned By    Initials Name Provider Type    Sejal Flores, PT Physical Therapist               Goals/Plan    No documentation.                Clinical Impression     Row Name 05/09/22 1614          Pain    Pretreatment Pain Rating 0/10 - no pain  -MB     Posttreatment Pain Rating 0/10 - no pain  -MB     Row Name 05/09/22 1614          Plan of Care Review    Plan of Care Reviewed With patient  -MB     Progress improving  -MB     Outcome Evaluation Patient pleasant and eager to go home. He demonstrates improving independence w/ mobility. Pt. ambulated 150ft w/out AD, w/ SBA and participated in seated BUE/BLE TherEx. VSS. PT continues to recommend home w/ assist at D/C.  -MB     Row Name 05/09/22 1614          Vital Signs    Pre SpO2 (%) 65  -MB     O2 Delivery Pre Treatment nasal cannula  -MB     O2 Delivery Intra Treatment nasal cannula  -MB     Post SpO2 (%) 93  -MB     O2 Delivery Post Treatment nasal cannula  -MB     Pre Patient Position Sitting  -MB     Intra Patient Position Standing  -MB     Post Patient Position Sitting  -MB     Row Name 05/09/22 1614          Positioning and Restraints    Pre-Treatment Position sitting in chair/recliner  -MB     Post Treatment Position chair  -MB     In Chair notified nsg;reclined;call light within reach;encouraged to call for assist;legs elevated  -MB           User Key  (r) = Recorded By, (t) = Taken By, (c) = Cosigned By    Initials Name  Provider Type    Sejal Flores, PT Physical Therapist               Outcome Measures     Row Name 05/09/22 1616          How much help from another person do you currently need...    Turning from your back to your side while in flat bed without using bedrails? 4  -MB     Moving from lying on back to sitting on the side of a flat bed without bedrails? 4  -MB     Moving to and from a bed to a chair (including a wheelchair)? 4  -MB     Standing up from a chair using your arms (e.g., wheelchair, bedside chair)? 4  -MB     Climbing 3-5 steps with a railing? 3  -MB     To walk in hospital room? 3  -MB     AM-PAC 6 Clicks Score (PT) 22  -MB     Highest level of mobility 7 --> Walked 25 feet or more  -MB     Row Name 05/09/22 1616          Functional Assessment    Outcome Measure Options AM-PAC 6 Clicks Basic Mobility (PT)  -MB           User Key  (r) = Recorded By, (t) = Taken By, (c) = Cosigned By    Initials Name Provider Type    Sejal Flores, PT Physical Therapist                             Physical Therapy Education                 Title: PT OT SLP Therapies (Done)     Topic: Physical Therapy (Done)     Point: Mobility training (Done)     Learning Progress Summary           Patient Eager, E,D,H, VU,DU by DM at 5/2/2022 1107                   Point: Home exercise program (Done)     Learning Progress Summary           Patient Eager, E,D,H, VU,DU by DM at 5/2/2022 1107                   Point: Body mechanics (Done)     Learning Progress Summary           Patient Eager, E,D,H, VU,DU by DM at 5/2/2022 1107                   Point: Precautions (Done)     Learning Progress Summary           Patient Eager, E,D,H, VU,DU by DM at 5/2/2022 1107                               User Key     Initials Effective Dates Name Provider Type Discipline    DM 06/16/21 -  Nubia Barrow, PT Physical Therapist PT              PT Recommendation and Plan     Plan of Care Reviewed With: patient  Progress: improving  Outcome  Evaluation: Patient pleasant and eager to go home. He demonstrates improving independence w/ mobility. Pt. ambulated 150ft w/out AD, w/ SBA and participated in seated BUE/BLE TherEx. VSS. PT continues to recommend home w/ assist at D/C.     Time Calculation:    PT Charges     Row Name 05/09/22 1616             Time Calculation    Start Time 1112  -MB      PT Received On 05/09/22  -MB      PT Goal Re-Cert Due Date 05/12/22  -MB              Time Calculation- PT    Total Timed Code Minutes- PT 32 minute(s)  -MB              Timed Charges    32215 - PT Therapeutic Exercise Minutes 20  -MB      87652 - Gait Training Minutes  12  -MB              Total Minutes    Timed Charges Total Minutes 32  -MB       Total Minutes 32  -MB            User Key  (r) = Recorded By, (t) = Taken By, (c) = Cosigned By    Initials Name Provider Type    Sejal Flores, PT Physical Therapist              Therapy Charges for Today     Code Description Service Date Service Provider Modifiers Qty    31863190470 HC PT THER PROC EA 15 MIN 5/9/2022 Sejal D eLuna, PT GP 1    34778637126 HC GAIT TRAINING EA 15 MIN 5/9/2022 Sejal De Luna, PT GP 1          PT G-Codes  Outcome Measure Options: AM-PAC 6 Clicks Basic Mobility (PT)  AM-PAC 6 Clicks Score (PT): 22    Sejal De Luna, EMILIANO  5/9/2022

## 2022-05-09 NOTE — CASE MANAGEMENT/SOCIAL WORK
Continued Stay Note  [facility]     Patient Name: Antony Monahan  MRN: [id number]  Today's Date: 5/9/2022    Admit Date: 5/1/2022     Discharge Plan     Row Name 05/09/22 1700       Plan    Plan Home    Plan Comments Spoke with Mr Monahan at the bedside. Reports feeling and breathing better. Was on 3LNC earlier today, but is now on RA. Still reports having a cough. Ambulated with PT without an AD, and they rec home with assist. No DC needs voiced. Plan is home and his family will transport.    Final Discharge Disposition Code 30 - still a patient               Discharge Codes    No documentation.                     Audrey Sharp RN

## 2022-05-09 NOTE — PROGRESS NOTES
" [facility] - Progress Note    Antony Monahan  1950  [id number]    05/09/22, 10:01 EDT      Chief Complaint: Following for CHF and AFib    Subjective:   Sitting up in chair.  Reports scratch throat which makes it hard for him to eat/swallow.  Still with cough.  Denies chest pain, denies dizziness.  Frustrated by being kept in room.    Review of Systems:  Pertinent positives are listed above and in physical exam.  All others have been reviewed and are negative.    allopurinol, 100 mg, Oral, BID  amiodarone, 400 mg, Oral, Q24H  apixaban, 5 mg, Oral, Q12H  budesonide, 0.5 mg, Nebulization, BID - RT  bumetanide, 2 mg, Oral, BID  carvedilol, 25 mg, Oral, BID  cefuroxime, 500 mg, Oral, Q12H  clopidogrel, 75 mg, Oral, Daily  guaiFENesin, 1,200 mg, Oral, Q12H  insulin detemir, 10 Units, Subcutaneous, Q12H  insulin lispro, 0-14 Units, Subcutaneous, TID AC  Insulin Lispro, 2 Units, Subcutaneous, TID With Meals  ipratropium-albuterol, 3 mL, Nebulization, 4x Daily - RT  levothyroxine, 100 mcg, Oral, Q AM  methylPREDNISolone sodium succinate, 60 mg, Intravenous, Daily  pantoprazole, 40 mg, Oral, Daily  pharmacy consult - MTM, , Does not apply, Daily  rosuvastatin, 20 mg, Oral, Nightly  sodium chloride, 10 mL, Intravenous, Q12H        Objective:  Vitals:   height is 170.2 cm (67.01\") and weight is 104 kg (229 lb 6.4 oz). His oral temperature is 97.6 °F (36.4 °C). His blood pressure is 132/80 and his pulse is 74. His respiration is 16 and oxygen saturation is 94%.       Intake/Output Summary (Last 24 hours) at 5/9/2022 1001  Last data filed at 5/9/2022 0900  Gross per 24 hour   Intake 1680 ml   Output --   Net 1680 ml       Physical Exam:  General:  WN, NAD, A and O x3.  CV:  Irregular. No murmur, rub, or gallop.  Resp:  CTA [place] but course, equal, nonlabored.  Abd:  Soft, + BS, no organomegaly. Nontender to palpation.  Extrem:  No edema BLE, 2+ pedal/PT pulses.            Results from last 7 days   Lab Units " 05/09/22  0421   WBC 10*3/mm3 7.34   HEMOGLOBIN g/dL 12.5*   HEMATOCRIT % 37.6   PLATELETS 10*3/mm3 217     Results from last 7 days   Lab Units 05/09/22  0421 05/06/22  0342 05/05/22  0416   SODIUM mmol/L 143   < > 136   POTASSIUM mmol/L 4.7   < > 3.7   CHLORIDE mmol/L 103   < > 94*   CO2 mmol/L 33.0*   < > 29.0   BUN mg/dL 61*   < > 90*   CREATININE mg/dL 1.75*   < > 2.71*   CALCIUM mg/dL 8.4*   < > 8.8   BILIRUBIN mg/dL  --   --  0.2   ALK PHOS U/L  --   --  113   ALT (SGPT) U/L  --   --  53*   AST (SGOT) U/L  --   --  29   GLUCOSE mg/dL 151*   < > 309*    < > = values in this interval not displayed.                 Results from last 7 days   Lab Units 05/05/22  0416   PROBNP pg/mL 1,289.0*       Tele:  AFib    Assessment and Plan:  1.  Acute on Chronic Systolic CHF              -  73 yo CM with known CHF/EF 25-30% (echo 6/2021) with increased pedal edema that responded to change in diuretics and progressive dyspnea - in setting of URI(rhinovirus)              -  Repeat echo this admission shows EF 46-50%, mild global HK              -  ProBNP 5480 (up from 1373 a few days ago). Repeat in a.m.              -  [facility] DDD ICD              -  73 yo CM with acute URI/Rhinovirus presents in AFib which has exacerbated his CHF.  Admitted on no AA -   started Amiodarone, continue Entresto, beta blocker.     -  Repeat proBNP        2.  COPD               -  Exacerbation with URI/Rhinovirus but historically well managed.              -  IV steroids/abx per primary service   -  Repeat CXR reviewed.     3.  Paroxysmal AFib              -  Interrogate device, JAVED.  Was in NSR on 4/28/22.                -  Continue Coreg, Eliquis.  Added amiodarone, now on 400mg daily.   -  Will be difficult to maintain NSR in setting of URI/steroid use.     4.  HTN              -  Marginal.              -  Continue to monitor.      5.  HLP     6.  CKDz              -  Cr down to 1.75.     -  Diuretics discontinued initially, now resumed.   Bumex 2mg PO twice daily.  Monitor renal function.           71 yo CM with COPD, PAF, Sys CHF who presents with exacerbation from URI/Rhinovirus which has caused occurrence of AFib which in turn has exacerbated CHF.  slowly restarting diuretics with monitoring of CKDz.  Continue Amiodarone - rates controlled.        I discussed the patient's findings and my recommendations with the patient, any present family members, and the nursing staff.  Dandre Mcfarland MD saw and examined patient, verified hx and PE, read all radiographic studies, reviewed labs and micro data, and formulated dx, plan for treatment and all medical decision making.      Na Elkins PA-C  05/09/22, 10:05 EDT

## 2022-05-10 ENCOUNTER — READMISSION MANAGEMENT (OUTPATIENT)
Dept: CALL CENTER | Facility: HOSPITAL | Age: 72
End: 2022-05-10

## 2022-05-10 VITALS
SYSTOLIC BLOOD PRESSURE: 131 MMHG | DIASTOLIC BLOOD PRESSURE: 82 MMHG | HEART RATE: 78 BPM | OXYGEN SATURATION: 95 % | TEMPERATURE: 98.3 F | BODY MASS INDEX: 35.94 KG/M2 | RESPIRATION RATE: 22 BRPM | HEIGHT: 67 IN | WEIGHT: 229 LBS

## 2022-05-10 LAB
ALBUMIN SERPL-MCNC: 3.3 G/DL (ref 3.5–5.2)
ALBUMIN/GLOB SERPL: 1.3 G/DL
ALP SERPL-CCNC: 78 U/L (ref 39–117)
ALT SERPL W P-5'-P-CCNC: 39 U/L (ref 1–41)
ANION GAP SERPL CALCULATED.3IONS-SCNC: 10 MMOL/L (ref 5–15)
AST SERPL-CCNC: 25 U/L (ref 1–40)
BILIRUB SERPL-MCNC: 0.3 MG/DL (ref 0–1.2)
BUN SERPL-MCNC: 55 MG/DL (ref 8–23)
BUN/CREAT SERPL: 35 (ref 7–25)
CALCIUM SPEC-SCNC: 7.9 MG/DL (ref 8.6–10.5)
CHLORIDE SERPL-SCNC: 100 MMOL/L (ref 98–107)
CO2 SERPL-SCNC: 30 MMOL/L (ref 22–29)
CREAT SERPL-MCNC: 1.57 MG/DL (ref 0.76–1.27)
DEPRECATED RDW RBC AUTO: 45.3 FL (ref 37–54)
EGFRCR SERPLBLD CKD-EPI 2021: 46.3 ML/MIN/1.73
ERYTHROCYTE [DISTWIDTH] IN BLOOD BY AUTOMATED COUNT: 14.6 % (ref 12.3–15.4)
GLOBULIN UR ELPH-MCNC: 2.5 GM/DL
GLUCOSE BLDC GLUCOMTR-MCNC: 163 MG/DL (ref 70–130)
GLUCOSE BLDC GLUCOMTR-MCNC: 182 MG/DL (ref 70–130)
GLUCOSE BLDC GLUCOMTR-MCNC: 185 MG/DL (ref 70–130)
GLUCOSE SERPL-MCNC: 189 MG/DL (ref 65–99)
HCT VFR BLD AUTO: 36.5 % (ref 37.5–51)
HGB BLD-MCNC: 12.2 G/DL (ref 13–17.7)
MCH RBC QN AUTO: 28.5 PG (ref 26.6–33)
MCHC RBC AUTO-ENTMCNC: 33.4 G/DL (ref 31.5–35.7)
MCV RBC AUTO: 85.3 FL (ref 79–97)
PLATELET # BLD AUTO: 199 10*3/MM3 (ref 140–450)
PMV BLD AUTO: 12.3 FL (ref 6–12)
POTASSIUM SERPL-SCNC: 4.1 MMOL/L (ref 3.5–5.2)
PROCALCITONIN SERPL-MCNC: 0.09 NG/ML (ref 0–0.25)
PROT SERPL-MCNC: 5.8 G/DL (ref 6–8.5)
RBC # BLD AUTO: 4.28 10*6/MM3 (ref 4.14–5.8)
SODIUM SERPL-SCNC: 140 MMOL/L (ref 136–145)
WBC NRBC COR # BLD: 7.31 10*3/MM3 (ref 3.4–10.8)

## 2022-05-10 PROCEDURE — 63710000001 PREDNISONE PER 1 MG: Performed by: INTERNAL MEDICINE

## 2022-05-10 PROCEDURE — 84145 PROCALCITONIN (PCT): CPT | Performed by: INTERNAL MEDICINE

## 2022-05-10 PROCEDURE — 94799 UNLISTED PULMONARY SVC/PX: CPT

## 2022-05-10 PROCEDURE — 82962 GLUCOSE BLOOD TEST: CPT

## 2022-05-10 PROCEDURE — 99239 HOSP IP/OBS DSCHRG MGMT >30: CPT | Performed by: INTERNAL MEDICINE

## 2022-05-10 PROCEDURE — 85027 COMPLETE CBC AUTOMATED: CPT | Performed by: INTERNAL MEDICINE

## 2022-05-10 PROCEDURE — 63710000001 INSULIN LISPRO (HUMAN) PER 5 UNITS: Performed by: INTERNAL MEDICINE

## 2022-05-10 PROCEDURE — 80053 COMPREHEN METABOLIC PANEL: CPT | Performed by: INTERNAL MEDICINE

## 2022-05-10 PROCEDURE — 94664 DEMO&/EVAL PT USE INHALER: CPT

## 2022-05-10 RX ORDER — BUMETANIDE 2 MG/1
2 TABLET ORAL 2 TIMES DAILY
Qty: 60 TABLET | Refills: 6 | Status: SHIPPED | OUTPATIENT
Start: 2022-05-10

## 2022-05-10 RX ORDER — ROSUVASTATIN CALCIUM 20 MG/1
20 TABLET, COATED ORAL NIGHTLY
Qty: 90 TABLET | Refills: 3 | Status: SHIPPED | OUTPATIENT
Start: 2022-05-10

## 2022-05-10 RX ORDER — PREDNISONE 10 MG/1
10 TABLET ORAL TAKE AS DIRECTED
Qty: 6 TABLET | Refills: 0 | Status: SHIPPED | OUTPATIENT
Start: 2022-05-11

## 2022-05-10 RX ORDER — AMIODARONE HYDROCHLORIDE 400 MG/1
400 TABLET ORAL
Qty: 30 TABLET | Refills: 6 | Status: SHIPPED | OUTPATIENT
Start: 2022-05-11

## 2022-05-10 RX ORDER — CEFUROXIME AXETIL 500 MG/1
500 TABLET ORAL EVERY 12 HOURS SCHEDULED
Qty: 5 TABLET | Refills: 0 | Status: SHIPPED | OUTPATIENT
Start: 2022-05-10 | End: 2022-05-13

## 2022-05-10 RX ADMIN — INSULIN LISPRO 2 UNITS: 100 INJECTION, SOLUTION INTRAVENOUS; SUBCUTANEOUS at 12:48

## 2022-05-10 RX ADMIN — INSULIN LISPRO 2 UNITS: 100 INJECTION, SOLUTION INTRAVENOUS; SUBCUTANEOUS at 09:15

## 2022-05-10 RX ADMIN — IPRATROPIUM BROMIDE AND ALBUTEROL SULFATE 3 ML: .5; 3 SOLUTION RESPIRATORY (INHALATION) at 11:35

## 2022-05-10 RX ADMIN — BUMETANIDE 2 MG: 1 TABLET ORAL at 09:12

## 2022-05-10 RX ADMIN — ALLOPURINOL 100 MG: 100 TABLET ORAL at 09:13

## 2022-05-10 RX ADMIN — APIXABAN 5 MG: 5 TABLET, FILM COATED ORAL at 09:13

## 2022-05-10 RX ADMIN — PREDNISONE 40 MG: 20 TABLET ORAL at 09:13

## 2022-05-10 RX ADMIN — CEFUROXIME AXETIL 500 MG: 250 TABLET ORAL at 09:13

## 2022-05-10 RX ADMIN — BUDESONIDE 0.5 MG: 0.5 SUSPENSION RESPIRATORY (INHALATION) at 07:55

## 2022-05-10 RX ADMIN — PANTOPRAZOLE SODIUM 40 MG: 40 TABLET, DELAYED RELEASE ORAL at 09:12

## 2022-05-10 RX ADMIN — Medication 10 ML: at 09:13

## 2022-05-10 RX ADMIN — IPRATROPIUM BROMIDE AND ALBUTEROL SULFATE 3 ML: .5; 3 SOLUTION RESPIRATORY (INHALATION) at 07:55

## 2022-05-10 RX ADMIN — CARVEDILOL 25 MG: 12.5 TABLET, FILM COATED ORAL at 09:12

## 2022-05-10 RX ADMIN — AMIODARONE HYDROCHLORIDE 400 MG: 200 TABLET ORAL at 09:12

## 2022-05-10 RX ADMIN — GUAIFENESIN 1200 MG: 600 TABLET, EXTENDED RELEASE ORAL at 09:13

## 2022-05-10 RX ADMIN — CLOPIDOGREL BISULFATE 75 MG: 75 TABLET ORAL at 09:13

## 2022-05-10 RX ADMIN — LEVOTHYROXINE SODIUM 100 MCG: 0.17 TABLET ORAL at 06:04

## 2022-05-10 NOTE — PLAN OF CARE
Goal Outcome Evaluation:  Plan of Care Reviewed With: patient   Assumed care of the patient at 1900 on 5/09/22. Patient is alert and oriented x 4.  C/O headache and given PRN Tylenol. Tele monitor shows V Paced 75% of the time, mild edema noted at BLE. Lung sounds noted course at bases with fine crackles. Pt 96% on room air, desaturates with exertion and occasionally wears 02. Up walking halls independently. Urinal at bedside table. LBM on 05/09/22. Call light within patients reach.     Progress: improving

## 2022-05-10 NOTE — CASE MANAGEMENT/SOCIAL WORK
Continued Stay Note  [facility]     Patient Name: Antony Monahan  MRN: [id number]  Today's Date: 5/10/2022    Admit Date: 5/1/2022     Discharge Plan     Row Name 05/10/22 1202       Plan    Plan Home    Patient/Family in Agreement with Plan yes    Plan Comments Spoke with pt. at bedside. His plan remains home at dc. Has home O2 in place. Family to transport.    Final Discharge Disposition Code 01 - home or self-care               Discharge Codes    No documentation.                     Cherise Stephen RN

## 2022-05-10 NOTE — DISCHARGE SUMMARY
[facility] Medicine Services  DISCHARGE SUMMARY    Patient Name: Antony Monahan  : 1950  MRN: [id number]    Date of Admission: 2022  9:48 AM  Date of Discharge:  5/10/22  Primary Care Physician: Franky Carrington MD    Consults     Date and Time Order Name Status Description    2022 12:33 AM Inpatient Cardiology Consult Completed           Hospital Course     Presenting Problem:   COPD exacerbation (HCC) [J44.1]    Active Hospital Problems    Diagnosis  POA   • Obesity (BMI 30-39.9) [E66.9]  Yes   • COPD exacerbation (HCC) [J44.1]  Yes      Resolved Hospital Problems   No resolved problems to display.          Hospital Course:  with history of CAD, ischemic cardiomyopathy, PAF, COPD on 2 liter supplemental oxygen PRN, PAD s/p left iliac stent 3/30/22, HTN, dyslipidemia, hypothyroid, depression and anxiety who presented with shortness of breath, cough, fatigue and leg edema.  Mr Monahan was seen several days prior to admission in the ED for shortness of breath and edema and was prescribed metolazone to take along with his usual diuretic.  Leg edema has improved, but dyspnea continued with a worsening productive cough, green sputum.      COPD with acute exacerbation  Viral URI (rhinovirus/enterovirus)  Cannot rule out bacterial superinfection/bronchitis  - patient provided with IV steroids and empiric antibiotics (doxycycline --> ceftin)  - clinically improved by time of discharge (on 2 liters supplemental oxygen, near baseline)  - prednisone taper at discharge  [facility]  - follow up with Pulmonary Dr Constantino in two weeks.     Acute on Chronic Systolic Heart Failure  S/p [facility] [place] [facility] DDD ICD  Parox afib  - patient seen by Cardiology Dr Mcfarland  - echo showed EF 46-50%, mild global HK  - s/p aggressive iv diuresis -- diuretics and entresto held briefly due to SUNIL, bumex 2 mg PO BID thereafter resumed with improvement in both edema and renal function  -  amiodarone 400 mg daily  - continue eliquis  - hold entresto/aldactone at discharge due to SUNIL  - follow up with Cardiology in one month      SUNIL on CKD IIIa  - creatinine improved to 1.5 on the day of discharge  - BMP late this week with PCP     Hypotension, resolved  Hx HTN  --hypotension likely due to aggressive diuresis  - home norvasc, aldactone and entresto currently held  - BP check at PCP and Cardiology followup     T2DM  --HbA1c 6.60 - discussed diagnosis of diabetes with patient and family  - some steroid induced hyperglycemia requiring insulin therapy while hospitalized and receiving IV solumedrol.  Will taper prednisone fairly quickly upon discharge as Mr Monahan does not wish to use insulin at home.  --follow up with PCP, initially may wish to follow diet and lifestyle changes     PAD  - recent left iliac stent; plavix      Dyslipidemia     Hypothyroid  - TSH wnl            Discharge Follow Up Recommendations for outpatient labs/diagnostics:    BMP at PCP followup    Blood pressure check at PCP followup    Day of Discharge     HPI:   Feels better. Denies current shortness of breath. No cough. Wants to go home today.    Review of Systems  Gen- No fevers, chills  CV- No chest pain, palpitations  Resp- No cough, dyspnea  GI- No N/V/D, abd pain        Vital Signs:   Temp:  [98 °F (36.7 °C)-98.3 °F (36.8 °C)] 98.3 °F (36.8 °C)  Heart Rate:  [72-97] 78  Resp:  [16-22] 22  BP: (103-155)/(60-88) 131/82  Flow (L/min):  [2] 2      Physical Exam:  Constitutional - no acute distress, nontoxic, up in chair  HEENT-NCAT, mucous membranes moist  CV-irregular  Resp-diminished bilaterally, with a few rare crackles at bases  Abd-soft, nontender, nondistended, normoactive bowel sounds  Ext-trace to 1+ LE edema bilaterally  Neuro-alert and oriented speech clear, moves all extremities   Psych-normal affect   Skin- No rash on exposed UE or LE bilaterally      Pertinent  and/or Most Recent Results     LAB RESULTS:      Lab  05/10/22  0533 05/09/22 0421 05/08/22 0431 05/07/22  0403 05/04/22  0435   WBC 7.31 7.34 8.49 9.84 10.71   HEMOGLOBIN 12.2* 12.5* 11.9* 12.3* 12.6*   HEMATOCRIT 36.5* 37.6 35.6* 36.8* 38.7   PLATELETS 199 217 231 231 256   MCV 85.3 86.0 84.2 84.6 86.6   PROCALCITONIN 0.09  --   --   --   --          Lab 05/10/22  0533 05/09/22  0421 05/08/22  0431 05/07/22  0403 05/06/22  0342   SODIUM 140 143 138 137 137   POTASSIUM 4.1 4.7 3.9 3.8 3.7   CHLORIDE 100 103 100 98 96*   CO2 30.0* 33.0* 27.0 29.0 25.0   ANION GAP 10.0 7.0 11.0 10.0 16.0*   BUN 55* 61* 68* 80* 101*   CREATININE 1.57* 1.75* 1.79* 1.92* 2.62*   EGFR 46.3* 40.6* 39.5* 36.3* 25.0*   GLUCOSE 189* 151* 299* 260* 341*   CALCIUM 7.9* 8.4* 8.5* 8.9 8.5*   MAGNESIUM  --  2.7* 2.7*  --   --          Lab 05/10/22  0533 05/05/22  0416   TOTAL PROTEIN 5.8* 6.7   ALBUMIN 3.30* 3.50   GLOBULIN 2.5 3.2   ALT (SGPT) 39 53*   AST (SGOT) 25 29   BILIRUBIN 0.3 0.2   ALK PHOS 78 113         Lab 05/09/22  0923 05/05/22  0416   PROBNP 3,355.0* 1,289.0*                 Brief Urine Lab Results  (Last result in the past 365 days)      Color   Clarity   Blood   Leuk Est   Nitrite   Protein   CREAT   Urine HCG        06/01/21 0140             50.3             Microbiology Results (last 10 days)     Procedure Component Value - Date/Time    Blood Culture - Blood, Hand, Left [[id number]]  (Normal) Collected: 05/01/22 1104    Lab Status: Final result Specimen: Blood from Hand, Left Updated: 05/06/22 1132     Blood Culture No growth at 5 days    Blood Culture - Blood, Arm, Left [[id number]]  (Normal) Collected: 05/01/22 1104    Lab Status: Final result Specimen: Blood from Arm, Left Updated: 05/06/22 1132     Blood Culture No growth at 5 days    Respiratory Panel PCR w/COVID-19(SARS-CoV-2) MARY JO/ELAINE/ERIKA/PAD/COR/MAD/JESE In-House, NP Swab in UTM/VTM, 3-4 HR TAT - Swab, Nasopharynx [[id number]]  (Abnormal) Collected: 05/01/22 1101    Lab Status: Final result Specimen: Swab from Nasopharynx  Updated: 05/01/22 1204     ADENOVIRUS, PCR Not Detected     Coronavirus 229E Not Detected     Coronavirus HKU1 Not Detected     Coronavirus NL63 Not Detected     Coronavirus OC43 Not Detected     COVID19 Not Detected     Human Metapneumovirus Not Detected     Human Rhinovirus/Enterovirus Detected     Influenza A PCR Not Detected     Influenza B PCR Not Detected     Parainfluenza Virus 1 Not Detected     Parainfluenza Virus 2 Not Detected     Parainfluenza Virus 3 Not Detected     Parainfluenza Virus 4 Not Detected     RSV, PCR Not Detected     Bordetella pertussis pcr Not Detected     Bordetella parapertussis PCR Not Detected     Chlamydophila pneumoniae PCR Not Detected     Mycoplasma pneumo by PCR Not Detected    Narrative:      In the setting of a positive respiratory panel with a viral infection PLUS a negative procalcitonin without other underlying concern for bacterial infection, consider observing off antibiotics or discontinuation of antibiotics and continue supportive care. If the respiratory panel is positive for atypical bacterial infection (Bordetella pertussis, Chlamydophila pneumoniae, or Mycoplasma pneumoniae), consider antibiotic de-escalation to target atypical bacterial infection.          Adult Transthoracic Echo Complete W/ Cont if Necessary Per Protocol    Result Date: 5/2/2022  · The left ventricular cavity is mildly dilated. · Left ventricular wall thickness is consistent with mild concentric hypertrophy. · Left ventricular ejection fraction appears to be 46 - 50%. · Left atrial volume is moderately increased. · Mild global LV hypokinesis      XR Chest 1 View    Result Date: 5/5/2022  DATE OF EXAM: 5/5/2022 10:06 AM  PROCEDURE: XR CHEST 1 VW-  INDICATIONS: dyspnea, COPD; J44.1-Chronic obstructive pulmonary disease with (acute) exacerbation; E86.9-Volume depletion, unspecified; J18.9-Pneumonia, unspecified organism; N17.9-Acute kidney failure, unspecified  COMPARISON: 5/1/2022  TECHNIQUE:  Single radiographic AP view of the chest was obtained.  FINDINGS: Stable aeration from comparison including mild bibasilar atelectasis. Left chest wall ICD projects in place. No significant effusion or distinct pneumothorax. Unchanged degree of cardiac enlargement.      Stable chest as above without evidence of acute cardiopulmonary abnormality.  This report was finalized on 5/5/2022 10:51 AM by Drew Cochran.      XR Chest 1 View    Result Date: 5/1/2022  EXAMINATION: XR CHEST 1 VW-  DATE OF EXAM: 5/1/2022 10:28 AM  INDICATION: Shortness of air  COMPARISON: Chest radiograph dated 04/28/2022  TECHNIQUE: Portable AP view of the chest was obtained.  FINDINGS: There is a left chest wall pacemaker with leads terminating at the right atrium and right ventricle, stable from prior. There is stable cardiomegaly. Pulmonary vascularity appears normal. There is no acute airspace consolidation, pleural effusion, or pneumothorax. There are degenerative changes of the thoracic spine.      1. Stable cardiomegaly without acute pulmonary process.  This report was finalized on 5/1/2022 10:37 AM by Phil Davies MD.        Results for orders placed during the hospital encounter of 11/20/20    Bilateral Carotid Duplex    Interpretation Summary  · Left carotid stent imaging indicates patent flow.  · Right internal carotid artery stenosis of 50-69%.      Results for orders placed during the hospital encounter of 11/20/20    Bilateral Carotid Duplex    Interpretation Summary  · Left carotid stent imaging indicates patent flow.  · Right internal carotid artery stenosis of 50-69%.      Results for orders placed during the hospital encounter of 05/01/22    Adult Transthoracic Echo Complete W/ Cont if Necessary Per Protocol    Interpretation Summary  · The left ventricular cavity is mildly dilated.  · Left ventricular wall thickness is consistent with mild concentric hypertrophy.  · Left ventricular ejection fraction appears to be 46 -  50%.  · Left atrial volume is moderately increased.  · Mild global LV hypokinesis      Plan for Follow-up of Pending Labs/Results:    Discharge Details        Discharge Medications      New Medications      Instructions Start Date   amiodarone 400 MG tablet  Commonly known as: PACERONE  Notes to patient: Heart rhythm   400 mg, Oral, Every 24 Hours Scheduled   Start Date: May 11, 2022     cefuroxime 500 MG tablet  Commonly known as: CEFTIN   500 mg, Oral, Every 12 Hours Scheduled      predniSONE 10 MG tablet  Commonly known as: DELTASONE   10 mg, Oral, Take As Directed, Take 2 tablets per day for the next two days, then take 1 tablet daily for two days, then stop   Start Date: May 11, 2022        Changes to Medications      Instructions Start Date   apixaban 5 MG tablet tablet  Commonly known as: ELIQUIS  What changed: when to take this   5 mg, Oral, Every 12 Hours Scheduled      bumetanide 2 MG tablet  Commonly known as: BUMEX  What changed: how much to take   2 mg, Oral, 2 Times Daily      rosuvastatin 20 MG tablet  Commonly known as: CRESTOR  What changed:   · medication strength  · how much to take   20 mg, Oral, Nightly         Continue These Medications      Instructions Start Date   allopurinol 100 MG tablet  Commonly known as: ZYLOPRIM   100 mg, Oral, 2 Times Daily      carvedilol 25 MG tablet  Commonly known as: COREG   37.5 mg, Oral, 2 Times Daily With Meals, Take 1 and 1/2 tables by mouth twice a day      clopidogrel 75 MG tablet  Commonly known as: PLAVIX   75 mg, Oral, Daily      HYDROcodone-acetaminophen  MG per tablet  Commonly known as: NORCO   1 tablet, Oral, Every 6 Hours PRN      levothyroxine 100 MCG tablet  Commonly known as: SYNTHROID, LEVOTHROID   100 mcg, Oral, Every Morning      niacin 500 MG tablet   500 mg, Oral, Nightly      nitroglycerin 0.4 MG SL tablet  Commonly known as: NITROSTAT   0.4 mg, Sublingual, Every 5 Minutes PRN, Take no more than 3 doses in 15 minutes.        pantoprazole 40 MG EC tablet  Commonly known as: PROTONIX   40 mg, Oral, Every Morning      Trelegy Ellipta 100-62.5-25 MCG/INH inhaler  Generic drug: Fluticasone-Umeclidin-Vilant   1 puff, Inhalation, Daily      vitamin D3 125 MCG (5000 UT) capsule capsule   5,000 Units, Oral, Daily         Stop These Medications    amLODIPine 5 MG tablet  Commonly known as: NORVASC     metOLazone 5 MG tablet  Commonly known as: ZAROXOLYN     sacubitril-valsartan  MG tablet  Commonly known as: ENTRESTO     spironolactone 25 MG tablet  Commonly known as: ALDACTONE            No Known Allergies      Discharge Disposition:  Home or Self Care    Diet:  Hospital:  Diet Order   Procedures   • Diet Regular; Consistent Carbohydrate       Activity:      Restrictions or Other Recommendations:         CODE STATUS:    Code Status and Medical Interventions:   Ordered at: 05/01/22 1233     Level Of Support Discussed With:    Patient     Code Status (Patient has no pulse and is not breathing):    CPR (Attempt to Resuscitate)     Medical Interventions (Patient has pulse or is breathing):    Full Support       Future Appointments   Date Time Provider Department Center   5/25/2022 11:00 AM NURSE [facility]   5/27/2022  9:45 AM RAD TECH [facility]   5/27/2022 10:00 AM [facility], PFT LAB 1 [facility]   5/27/2022 10:30 AM Alessandro Constantino MD [facility]       Additional Instructions for the Follow-ups that You Need to Schedule     Discharge Follow-up with PCP   As directed       Currently Documented PCP:    Franky Carrington MD    PCP Phone Number:    [phone]     Follow Up Details: follow up with PCP late this week with labs         Discharge Follow-up with Specialty: follow up with Cardiology Dr Mcfarland/Izabela in one month; 1 Month   As directed      Specialty: follow up with Cardiology Dr Mcfarland/Izabela in one month    Follow Up: 1 Month          Discharge Follow-up with Specialty: follow up with Pulmonary Dr Constantino later this month as scheduled   As directed      Specialty: follow up with Pulmonary Dr Constantino later this month as scheduled                     Keith Akbar MD  05/10/22      Time Spent on Discharge:  I spent  40  minutes on this discharge activity which included: face-to-face encounter with the patient, reviewing the data in the system, coordination of the care with the nursing staff as well as consultants, documentation, and entering orders.

## 2022-05-10 NOTE — PROGRESS NOTES
NN spoke with pt at BS.  Pt alert and able to answer questions appropriately. Pt O2 sat 96 % on  2.5 L currently, home O2 use  2L HS. COPD action plan reviewed. Deep breathing exercises encouraged. No new concerns or questions voiced at this time.  NN will continue to follow as needed.       Per current GOLD Standards, please consider:  LAMA/LABA/ICS in place (Trelegy), Outpatient PFT, Rehab as appropriate, Palliative Care consult,  Annual LDCT per current screening guidelines (age 50-80 years old, smoking history of 20 pack years or more or has quit within past 15 years)      Patient has been scheduled to establish care with [facility] for 5/27/2022 @ 9:45 am with Dr. GABBY Constantino MD.  Pre testing COVID swab scheduled for 5/25/2022 at 11 am.

## 2022-05-10 NOTE — CASE MANAGEMENT/SOCIAL WORK
Case Management Discharge Note      Final Note: Plan is home. Family to transport. Has home O2 supplied by [employer].    Provided Post Acute Provider List?: N/A    Selected Continued Care - Admitted Since 5/1/2022     Destination    No services have been selected for the patient.              Durable Medical Equipment    No services have been selected for the patient.              Dialysis/Infusion    No services have been selected for the patient.              Home Medical Care    No services have been selected for the patient.              Therapy    No services have been selected for the patient.              Community Resources    No services have been selected for the patient.              Community & DME    No services have been selected for the patient.                       Final Discharge Disposition Code: 01 - home or self-care

## 2022-05-10 NOTE — PROGRESS NOTES
" [facility] - Progress Note    Antony Monahan  1950  [id number]    05/10/22, 13:52 EDT      Chief Complaint: Following for CHF and AFib    Subjective:   Sitting up in chair daughter at bedside.  Patient wants to go home and feels better and almost back to baseline.  Has been up walking out of room - feels better about that.      Review of Systems:  Pertinent positives are listed above and in physical exam.  All others have been reviewed and are negative.    allopurinol, 100 mg, Oral, BID  amiodarone, 400 mg, Oral, Q24H  apixaban, 5 mg, Oral, Q12H  budesonide, 0.5 mg, Nebulization, BID - RT  bumetanide, 2 mg, Oral, BID  carvedilol, 25 mg, Oral, BID  cefuroxime, 500 mg, Oral, Q12H  clopidogrel, 75 mg, Oral, Daily  guaiFENesin, 1,200 mg, Oral, Q12H  insulin detemir, 10 Units, Subcutaneous, Daily  insulin lispro, 0-9 Units, Subcutaneous, TID AC  ipratropium-albuterol, 3 mL, Nebulization, 4x Daily - RT  levothyroxine, 100 mcg, Oral, Q AM  pantoprazole, 40 mg, Oral, Daily  pharmacy consult - MTM, , Does not apply, Daily  predniSONE, 40 mg, Oral, Daily With Breakfast  rosuvastatin, 20 mg, Oral, Nightly  sodium chloride, 10 mL, Intravenous, Q12H        Objective:  Vitals:   height is 170.2 cm (67.01\") and weight is 104 kg (229 lb). His oral temperature is 98.3 °F (36.8 °C). His blood pressure is 131/82 and his pulse is 78. His respiration is 22 and oxygen saturation is 95%.       Intake/Output Summary (Last 24 hours) at 5/10/2022 1349  Last data filed at 5/9/2022 1800  Gross per 24 hour   Intake 720 ml   Output --   Net 720 ml       Physical Exam:  General:  WN, NAD, A and O x3.  CV:  Irregular. No murmur, rub, or gallop.  Resp:  CTA [place] but course, equal, nonlabored.  Abd:  Soft, + BS, no organomegaly. Nontender to palpation.  Extrem:  No edema BLE, 2+ pedal/PT pulses.            Results from last 7 days   Lab Units 05/10/22  0533   WBC 10*3/mm3 7.31   HEMOGLOBIN g/dL 12.2*   HEMATOCRIT % 36.5* "   PLATELETS 10*3/mm3 199     Results from last 7 days   Lab Units 05/10/22  0533   SODIUM mmol/L 140   POTASSIUM mmol/L 4.1   CHLORIDE mmol/L 100   CO2 mmol/L 30.0*   BUN mg/dL 55*   CREATININE mg/dL 1.57*   CALCIUM mg/dL 7.9*   BILIRUBIN mg/dL 0.3   ALK PHOS U/L 78   ALT (SGPT) U/L 39   AST (SGOT) U/L 25   GLUCOSE mg/dL 189*                 Results from last 7 days   Lab Units 05/09/22  0923   PROBNP pg/mL 3,355.0*       Tele:  AFib    Assessment and Plan:  1.  Acute on Chronic Systolic CHF              -  71 yo CM with known CHF/EF 25-30% (echo 6/2021) with increased pedal edema that responded to change in diuretics and progressive dyspnea - in setting of URI(rhinovirus)              -  Repeat echo this admission shows EF 46-50%, mild global HK              -  ProBNP 5480 (up from 1373 a few days ago). Repeat in a.m.              -  [facility] DDD ICD              -  71 yo CM with acute URI/Rhinovirus presents in AFib which has exacerbated his CHF.  Admitted on no AA -   started Amiodarone   -  Repeat proBNP 3355   -  Entresto dc'd secondary to worsening renal function.  Okay to hold now but will likely need to restart as out pt.  Continue Coreg.        2.  COPD               -  Exacerbation with URI/Rhinovirus but historically well managed.              -  IV steroids/abx per primary service   -  Will need to arrange home O2.  Patient has appt with [facility] pulmonology on 5/27 and should keep this appt.     3.  Paroxysmal AFib              -  Interrogate device, SJM.  Was in NSR on 4/28/22.                -  Continue Coreg, Eliquis.  Added amiodarone, now on 400mg daily.   -  Will be difficult to maintain NSR in setting of URI/steroid use.   -  Okay for DC from cardiology standpoint.  Will have pt see Dr. Mcfarland in office with SJM interrogation in 1 month (after Pulm appt).       4.  HTN              -  Marginal.              -  Continue to monitor.      5.  HLP     6.  CKDz              -  Cr down to 1.57.     -   Diuretics discontinued initially, now resumed.  Bumex 2mg PO twice daily.  Monitor renal function. Entresto dc'd           73 yo CM with COPD, PAF, Sys CHF who presents with exacerbation from URI/Rhinovirus which has caused occurrence of AFib which in turn has exacerbated CHF.  slowly restarting diuretics with monitoring of CKDz.  Continue Amiodarone - rates controlled.        I discussed the patient's findings and my recommendations with the patient, any present family members, and the nursing staff.  Dandre Mcfarland MD saw and examined patient, verified hx and PE, read all radiographic studies, reviewed labs and micro data, and formulated dx, plan for treatment and all medical decision making.      Na Elkins PA-C  05/10/22, 7452 EDT

## 2022-05-11 NOTE — OUTREACH NOTE
Prep Survey    Flowsheet Row Responses   [facility] facility patient discharged from? [facility]   Is LACE score < 7 ? No   Emergency Room discharge w/ pulse ox? No   Eligibility Readm Mgmt   Discharge diagnosis Obesity COPD exacerbation    Does the patient have one of the following disease processes/diagnoses(primary or secondary)? COPD/Pneumonia   Does the patient have Home health ordered? No   Is there a DME ordered? Yes   What DME was ordered? O2 supplied by [employer]   Prep survey completed? Yes          BRYSON NEWTON - Registered Nurse

## 2022-05-12 LAB
QT INTERVAL: 404 MS
QTC INTERVAL: 483 MS

## 2022-05-13 ENCOUNTER — READMISSION MANAGEMENT (OUTPATIENT)
Dept: CALL CENTER | Facility: HOSPITAL | Age: 72
End: 2022-05-13

## 2022-05-13 ENCOUNTER — TRANSCRIBE ORDERS (OUTPATIENT)
Dept: ADMINISTRATIVE | Facility: HOSPITAL | Age: 72
End: 2022-05-13

## 2022-05-13 DIAGNOSIS — I50.9 HEART FAILURE, UNSPECIFIED HF CHRONICITY, UNSPECIFIED HEART FAILURE TYPE: Primary | ICD-10-CM

## 2022-05-13 DIAGNOSIS — R05.1 ACUTE COUGH: ICD-10-CM

## 2022-05-13 DIAGNOSIS — R06.02 SHORTNESS OF BREATH: ICD-10-CM

## 2022-05-13 NOTE — OUTREACH NOTE
COPD/PN Week 1 Survey    Flowsheet Row Responses   [facility] patient discharged from? [place]   Does the patient have one of the following disease processes/diagnoses(primary or secondary)? COPD/Pneumonia   Week 1 attempt successful? Yes   Call start time 1452   Call end time 1455   Discharge diagnosis Obesity COPD exacerbation    Meds reviewed with patient/caregiver? Yes   Is the patient having any side effects they believe may be caused by any medication additions or changes? No   Does the patient have all medications ordered at discharge? Yes   Is the patient taking all medications as directed (includes completed medication regime)? Yes   Does the patient have a primary care provider?  Yes   Does the patient have an appointment with their PCP or specialist within 7 days of discharge? Yes   Has the patient kept scheduled appointments due by today? Yes   Comments PCP apt today 5-13-22 ,  lab work taken    What DME was ordered? O2 supplied by [employer]   DME comments O2 intermittently - 94-95% RA    Pulse Ox monitoring Intermittent   Pulse Ox device source Patient   O2 Sat: education provided Sat levels, Monitoring frequency, When to seek care   Psychosocial issues? No   Did the patient receive a copy of their discharge instructions? Yes   Nursing interventions Reviewed instructions with patient   What is the patient's perception of their health status since discharge? Improving   If the patient is a current smoker, are they able to teach back resources for cessation? Not a smoker   Is the patient able to teach back COPD zones? Yes   Nursing interventions Education provided on various zones   Patient reports what zone on this call? Yellow Zone   Yellow Zone Unable to complete daily activities, Increased shortness of air  [SOB to some extent per pt ]   Yellow interventions Get plenty of rest, Call provider immediatly if symptoms do not improve   Week 1 call completed? Yes          YORDAN JONES - Registered Nurse

## 2022-05-24 DIAGNOSIS — Z01.812 BLOOD TESTS PRIOR TO TREATMENT OR PROCEDURE: Primary | ICD-10-CM

## 2022-05-25 ENCOUNTER — CLINICAL SUPPORT NO REQUIREMENTS (OUTPATIENT)
Dept: PULMONOLOGY | Facility: CLINIC | Age: 72
End: 2022-05-25

## 2022-05-25 DIAGNOSIS — Z01.812 BLOOD TESTS PRIOR TO TREATMENT OR PROCEDURE: ICD-10-CM

## 2022-05-25 PROCEDURE — 99211 OFF/OP EST MAY X REQ PHY/QHP: CPT | Performed by: INTERNAL MEDICINE

## 2022-05-25 PROCEDURE — U0004 COV-19 TEST NON-CDC HGH THRU: HCPCS | Performed by: INTERNAL MEDICINE

## 2022-05-26 LAB — SARS-COV-2 RNA NOSE QL NAA+PROBE: NOT DETECTED

## 2022-05-27 ENCOUNTER — OFFICE VISIT (OUTPATIENT)
Dept: PULMONOLOGY | Facility: CLINIC | Age: 72
End: 2022-05-27

## 2022-05-27 VITALS
OXYGEN SATURATION: 96 % | WEIGHT: 223 LBS | BODY MASS INDEX: 38.07 KG/M2 | TEMPERATURE: 97.3 F | SYSTOLIC BLOOD PRESSURE: 142 MMHG | DIASTOLIC BLOOD PRESSURE: 92 MMHG | HEIGHT: 64 IN | HEART RATE: 90 BPM

## 2022-05-27 DIAGNOSIS — R05.3 CHRONIC COUGH: ICD-10-CM

## 2022-05-27 DIAGNOSIS — I25.5 ISCHEMIC CARDIOMYOPATHY: Chronic | ICD-10-CM

## 2022-05-27 DIAGNOSIS — E66.9 OBESITY, CLASS II, BMI 35-39.9: ICD-10-CM

## 2022-05-27 DIAGNOSIS — K21.9 CHRONIC GERD: ICD-10-CM

## 2022-05-27 DIAGNOSIS — Z00.6 EXAMINATION FOR NORMAL COMPARISON OR CONTROL IN CLINICAL RESEARCH: Primary | ICD-10-CM

## 2022-05-27 DIAGNOSIS — R06.09 DOE (DYSPNEA ON EXERTION): Primary | ICD-10-CM

## 2022-05-27 DIAGNOSIS — R91.1 PULMONARY NODULE: ICD-10-CM

## 2022-05-27 PROBLEM — E66.812 OBESITY, CLASS II, BMI 35-39.9: Status: ACTIVE | Noted: 2022-05-27

## 2022-05-27 PROCEDURE — 94726 PLETHYSMOGRAPHY LUNG VOLUMES: CPT | Performed by: INTERNAL MEDICINE

## 2022-05-27 PROCEDURE — 99214 OFFICE O/P EST MOD 30 MIN: CPT | Performed by: INTERNAL MEDICINE

## 2022-05-27 PROCEDURE — 94375 RESPIRATORY FLOW VOLUME LOOP: CPT | Performed by: INTERNAL MEDICINE

## 2022-05-27 PROCEDURE — 94729 DIFFUSING CAPACITY: CPT | Performed by: INTERNAL MEDICINE

## 2022-05-27 RX ORDER — SACUBITRIL AND VALSARTAN 24; 26 MG/1; MG/1
1 TABLET, FILM COATED ORAL 2 TIMES DAILY
COMMUNITY

## 2022-05-27 RX ORDER — DOXYCYCLINE 100 MG/1
100 CAPSULE ORAL 2 TIMES DAILY
COMMUNITY
Start: 2022-05-18

## 2022-05-27 RX ORDER — ALBUTEROL SULFATE 90 UG/1
2 AEROSOL, METERED RESPIRATORY (INHALATION) EVERY 4 HOURS PRN
Qty: 18 G | Refills: 11 | Status: SHIPPED | OUTPATIENT
Start: 2022-05-27

## 2022-05-27 RX ORDER — SPIRONOLACTONE 25 MG/1
1 TABLET ORAL DAILY
COMMUNITY
Start: 2022-05-23

## 2022-05-27 RX ORDER — TRIAMCINOLONE ACETONIDE 1 MG/G
1 CREAM TOPICAL 2 TIMES DAILY
COMMUNITY
Start: 2022-05-23

## 2022-05-27 NOTE — PROGRESS NOTES
"  PULMONARY  NOTE    Chief Complaint     Dyspnea on exertion, chronic cough, former smoker, cardiomyopathy, atrial arrhythmias, rule out MATHEW, obesity class II    History of Present Illness     72-year-old male referred for evaluation of cough and dyspnea    He has a history of tobacco abuse up through 2019    Has been told in the past that he had COPD  Has been on a variety of inhalers, none of which have seemed to really help    He has dyspnea on exertion, not at rest  He has been short of breath for the last several years  At times he gets short of breath simply walking from one side of the house to the other    He has intermittent cough  It does occur on a daily basis  It \"comes and goes\"  He is never had hemoptysis    He has reflux  He does not follow reflux precautions  Control of his reflux symptoms is dependent upon daily pantoprazole use    He has a history of an ischemic cardiomyopathy    He has atrial fibrillation    He has symptoms suggestive of obstructive sleep apnea  He is scheduled for a sleep study    Patient Active Problem List   Diagnosis   • ST elevation myocardial infarction involving left anterior descending (LAD) coronary artery ([facility])   • Hyperlipidemia LDL goal <70   • PAD (peripheral artery disease) ([facility])   • Ischemic cardiomyopathy   • Hypothyroid   • Essential hypertension   • Coronary artery disease involving native coronary artery of native heart   • Claudication ([facility])   • Bradycardia   • Renal insufficiency   • Chronic fatigue   • Vitamin D deficiency   • Hypersomnolence   • Intermittent claudication of both lower extremities due to atherosclerosis ([facility])   • Hypotension   • Arrhythmia   • Cardiac related syncope   • VT (ventricular tachycardia) ([facility])   • Symptomatic stenosis of left carotid artery without infarction   • Carotid stenosis, asymptomatic, left, s/p carotid stent   • Atherosclerosis of native artery of both lower extremities with intermittent claudication ([facility])   • Morbidly " obese (HCC)   • HTN (hypertension)   • Hypercholesterolemia   • Systolic CHF (HCC)   • Pacemaker   • CAD (coronary artery disease)   • Back pain   • Chronic narcotic use   • Borderline diabetes   • Hypernatremia   • SUNIL (acute kidney injury) (HCC)   • Abdominal distention   • Dyspnea on exertion   • Chronic cough   • Chronic GERD   • Obesity, Class II, BMI 35-39.9   • Pulmonary nodule (RLL 5mm new from 12/26/2020)     No Known Allergies    Current Outpatient Medications:   •  ALBUTEROL SULFATE IN, Inhale 1 puff Daily. HFA 90mcg, Disp: , Rfl:   •  allopurinol (ZYLOPRIM) 100 MG tablet, Take 100 mg by mouth 2 (Two) Times a Day., Disp: , Rfl:   •  apixaban (ELIQUIS) 5 MG tablet tablet, Take 1 tablet by mouth Every 12 (Twelve) Hours., Disp: 60 tablet, Rfl:   •  bumetanide (BUMEX) 2 MG tablet, Take 1 tablet by mouth 2 (Two) Times a Day. (Patient taking differently: Take 2 mg by mouth Daily. 1.5 daily), Disp: 60 tablet, Rfl: 6  •  carvedilol (COREG) 25 MG tablet, Take 37.5 mg by mouth 2 (Two) Times a Day With Meals. Take 1 and 1/2 tables by mouth twice a day, Disp: , Rfl:   •  clopidogrel (PLAVIX) 75 MG tablet, Take 1 tablet by mouth Daily., Disp: 30 tablet, Rfl: 11  •  doxycycline (MONODOX) 100 MG capsule, Take 100 mg by mouth 2 (Two) Times a Day., Disp: , Rfl:   •  HYDROcodone-acetaminophen (NORCO)  MG per tablet, Take 1 tablet by mouth Every 6 (Six) Hours As Needed for Moderate Pain ., Disp: , Rfl:   •  levothyroxine (SYNTHROID, LEVOTHROID) 100 MCG tablet, Take 100 mcg by mouth Every Morning., Disp: , Rfl:   •  niacin 500 MG tablet, Take 500 mg by mouth Every Night., Disp: , Rfl:   •  nitroglycerin (NITROSTAT) 0.4 MG SL tablet, Place 0.4 mg under the tongue Every 5 (Five) Minutes As Needed for Chest Pain. Take no more than 3 doses in 15 minutes., Disp: , Rfl:   •  pantoprazole (PROTONIX) 40 MG EC tablet, Take 40 mg by mouth Every Morning., Disp: , Rfl:   •  rosuvastatin (CRESTOR) 20 MG tablet, Take 1 tablet by  mouth Every Night., Disp: 90 tablet, Rfl: 3  •  sacubitril-valsartan (Entresto) 24-26 MG tablet, Take 1 tablet by mouth 2 (Two) Times a Day., Disp: , Rfl:   •  spironolactone (ALDACTONE) 25 MG tablet, Take 1 tablet by mouth Daily., Disp: , Rfl:   •  Trelegy Ellipta 100-62.5-25 MCG/INH aerosol powder , Inhale 1 puff Daily., Disp: , Rfl:   •  triamcinolone (KENALOG) 0.1 % cream, Apply 1 application topically to the appropriate area as directed 2 (Two) Times a Day., Disp: , Rfl:   •  vitamin D3 125 MCG (5000 UT) capsule capsule, Take 5,000 Units by mouth Daily., Disp: , Rfl:   •  albuterol sulfate  (90 Base) MCG/ACT inhaler, Inhale 2 puffs Every 4 (Four) Hours As Needed for Wheezing., Disp: 18 g, Rfl: 11  •  amiodarone (PACERONE) 400 MG tablet, Take 1 tablet by mouth Daily., Disp: 30 tablet, Rfl: 6  •  predniSONE (DELTASONE) 10 MG tablet, Take 2 tablets by mouth once daily for the next two days, then take 1 tablet daily for two day. Then stop, Disp: 6 tablet, Rfl: 0  MEDICATION LIST AND ALLERGIES REVIEWED.    Family History   Problem Relation Age of Onset   • Hypertension Mother    • Hypertension Father    • Heart failure Father    • Heart attack Father    • Diabetes Sister    • Arrhythmia Sister      Social History     Tobacco Use   • Smoking status: Former Smoker     Packs/day: 2.00     Years: 50.00     Pack years: 100.00     Types: Cigarettes     Quit date: 2019     Years since quitting: 3.4   • Smokeless tobacco: Never Used   Vaping Use   • Vaping Use: Never used   Substance Use Topics   • Alcohol use: Yes     Alcohol/week: 2.0 standard drinks     Types: 2 Cans of beer per week     Comment: 2-3 Beers per Week   • Drug use: No     Social History     Social History Narrative    Pt consumes caffeine: 2-3 drinks per day    Lives in [facility] with wife. prior .     Stop smoking in 2019     FAMILY AND SOCIAL HISTORY REVIEWED.    Review of Systems  ALSO REFER TO SCANNED ROS SHEET FROM SAME DATE.    BP  "142/92 (BP Location: Left arm, Patient Position: Sitting, Cuff Size: Adult)   Pulse 90   Temp 97.3 °F (36.3 °C) (Infrared)   Ht 162.6 cm (64\")   Wt 101 kg (223 lb)   SpO2 96% Comment: resting, room air  BMI 38.28 kg/m²   Physical Exam  Vitals and nursing note reviewed.   Constitutional:       General: He is not in acute distress.     Appearance: He is well-developed. He is not diaphoretic.   HENT:      Head: Normocephalic and atraumatic.   Neck:      Thyroid: No thyromegaly.   Cardiovascular:      Rate and Rhythm: Normal rate. Rhythm irregular.      Heart sounds: No murmur heard.  Pulmonary:      Effort: Pulmonary effort is normal.      Breath sounds: No stridor.      Comments: No wheezes or rhonchi  Chest:   Breasts:      Right: No supraclavicular adenopathy.      Left: No supraclavicular adenopathy.       Abdominal:      Comments: Obese, soft, nontender   Musculoskeletal:      Comments: Trace ankle edema   Lymphadenopathy:      Cervical: No cervical adenopathy.      Upper Body:      Right upper body: No supraclavicular or epitrochlear adenopathy.      Left upper body: No supraclavicular or epitrochlear adenopathy.   Skin:     General: Skin is warm and dry.   Neurological:      Mental Status: He is alert and oriented to person, place, and time.   Psychiatric:         Behavior: Behavior normal.         Results     CT scan of the chest from his local hospital reviewed on PACS  This is from 5/18/2022  No parenchymal lesions  Hiatal hernia noted  Multichamber cardiac enlargement noted  Mild bronchiectasis    PFTs reveal no airway obstruction, no restriction, and a normal diffusion capacity  Immunization History   Administered Date(s) Administered   • COVID-19 (PFIZER) PURPLE CAP 01/29/2021, 02/23/2021, 10/07/2021   • Fluzone High Dose =>65 Years ([employer] ONLY) 10/23/2019   • Fluzone High-Dose 65+yrs 10/07/2020, 10/07/2021     Problem List       ICD-10-CM ICD-9-CM   1. VILLEGAS (dyspnea on exertion)  R06.00 786.09   2. " "Chronic cough  R05.3 786.2   3. Chronic GERD  K21.9 530.81   4. Obesity, Class II, BMI 35-39.9  E66.9 278.00   5. Ischemic cardiomyopathy  I25.5 414.8   6. Pulmonary nodule (RLL 5mm new from 12/26/2020)  R91.1 793.11       Discussion     We reviewed his test results  I reassured the patient he does not have COPD  He could have asthma but has not benefited from inhalers  I do not think maintenance inhaler therapy is going to be useful    I suspect his dyspnea on the basis of his atrial arrhythmias, cardiomyopathy, obesity, and deconditioning    He may have underlying obstructive sleep apnea  He is scheduled for sleep study    Would recommend an annual LDCT, the next would be in May 2023 given his smoking history    His cough is probably aggravated by his reflux  He is not following reflux precautions  We discussed reflux precautions today    I will plan to see him back in a couple of months for follow-up    Moderate level of Medical Decision Making complexity based on 1 undiagnosed new problem, independent interpretation of tests, and/or prescription drug management     Addendum:  I got the [place] CT scan report for the scan that was performed on 5/18/2022  There is a 5 mm right lower lobe nodule that apparently is \"larger\" than seen on a prior CT scan of the chest from 12/26/2020  This does bear further follow-up with a 6-month CT scan of the chest.  We will discuss that with the patient on his return    Alessandro Constantino MD  Note electronically signed    CC: Franky Carrington MD  "

## 2022-07-08 ENCOUNTER — TELEPHONE (OUTPATIENT)
Dept: PULMONOLOGY | Facility: CLINIC | Age: 72
End: 2022-07-08

## 2022-07-08 NOTE — TELEPHONE ENCOUNTER
Daughter called in requesting PFT Report to be sent to patient's PCP and cardiologist Dr. Drew Gurrola.    She also wanted a copy sent to Mr. Greenwoodneys address.

## 2022-08-04 ENCOUNTER — OFFICE VISIT (OUTPATIENT)
Dept: PULMONOLOGY | Facility: CLINIC | Age: 72
End: 2022-08-04

## 2022-08-04 VITALS
HEIGHT: 64 IN | HEART RATE: 96 BPM | OXYGEN SATURATION: 97 % | BODY MASS INDEX: 40.43 KG/M2 | DIASTOLIC BLOOD PRESSURE: 88 MMHG | SYSTOLIC BLOOD PRESSURE: 140 MMHG | TEMPERATURE: 98.2 F | WEIGHT: 236.8 LBS

## 2022-08-04 DIAGNOSIS — R05.3 CHRONIC COUGH: ICD-10-CM

## 2022-08-04 DIAGNOSIS — R91.1 PULMONARY NODULE: Primary | ICD-10-CM

## 2022-08-04 DIAGNOSIS — K21.9 CHRONIC GERD: ICD-10-CM

## 2022-08-04 DIAGNOSIS — E66.9 OBESITY, CLASS II, BMI 35-39.9: ICD-10-CM

## 2022-08-04 PROCEDURE — 99214 OFFICE O/P EST MOD 30 MIN: CPT | Performed by: INTERNAL MEDICINE

## 2022-08-04 RX ORDER — EZETIMIBE 10 MG/1
10 TABLET ORAL DAILY
COMMUNITY
Start: 2022-07-26

## 2022-08-04 RX ORDER — ASCORBIC ACID 500 MG
500 TABLET ORAL DAILY
COMMUNITY
Start: 2022-07-21

## 2022-08-04 NOTE — PROGRESS NOTES
PULMONARY  NOTE    Chief Complaint     Dyspnea on exertion, former smoker, cardiomyopathy, atrial arrhythmias, rule out MATHEW, obesity class II, pulmonary nodule    History of Present Illness     72-year-old male returns today for follow-up  I saw him on 5/27/2022    He has a history of tobacco abuse, up through 2019    He has carried a diagnosis of COPD but PFTs on prior visit revealed no evidence of airway obstruction  He has had no symptomatic benefit from any inhalers that he is used in the past  Since I last saw him he has not used any inhalers and overall he feels that he is doing somewhat better    He has symptoms suggestive of obstructive sleep apnea  He underwent a sleep study last week and is following up with his physician next week    He had a CT scan of the chest locally which I reviewed and he does have a 5 mm pulmonary nodule that may have shown some interval change so I recommended a 6-month CT scan of the chest  That would be in November 2022    Patient Active Problem List   Diagnosis   • ST elevation myocardial infarction involving left anterior descending (LAD) coronary artery ([facility])   • Hyperlipidemia LDL goal <70   • PAD (peripheral artery disease) ([facility])   • Ischemic cardiomyopathy   • Hypothyroid   • Essential hypertension   • Coronary artery disease involving native coronary artery of native heart   • Claudication ([facility])   • Bradycardia   • Renal insufficiency   • Chronic fatigue   • Vitamin D deficiency   • Hypersomnolence   • Intermittent claudication of both lower extremities due to atherosclerosis ([facility])   • Hypotension   • Arrhythmia   • Cardiac related syncope   • VT (ventricular tachycardia) ([facility])   • Symptomatic stenosis of left carotid artery without infarction   • Carotid stenosis, asymptomatic, left, s/p carotid stent   • Atherosclerosis of native artery of both lower extremities with intermittent claudication ([facility])   • Morbidly obese ([facility])   • HTN (hypertension)   •  Hypercholesterolemia   • Systolic CHF ([facility])   • Pacemaker   • CAD (coronary artery disease)   • Back pain   • Chronic narcotic use   • Borderline diabetes   • Hypernatremia   • SUNIL (acute kidney injury) ([facility])   • Abdominal distention   • Dyspnea on exertion   • Chronic cough   • Chronic GERD   • Obesity, Class II, BMI 35-39.9   • Pulmonary nodule (RLL 5mm new from 12/26/2020)     No Known Allergies    Current Outpatient Medications:   •  albuterol sulfate  (90 Base) MCG/ACT inhaler, Inhale 2 puffs Every 4 (Four) Hours As Needed for Wheezing., Disp: 18 g, Rfl: 11  •  ALBUTEROL SULFATE IN, Inhale 1 puff Daily. HFA 90mcg, Disp: , Rfl:   •  allopurinol (ZYLOPRIM) 100 MG tablet, Take 100 mg by mouth 2 (Two) Times a Day., Disp: , Rfl:   •  amiodarone (PACERONE) 400 MG tablet, Take 1 tablet by mouth Daily., Disp: 30 tablet, Rfl: 6  •  apixaban (ELIQUIS) 5 MG tablet tablet, Take 1 tablet by mouth Every 12 (Twelve) Hours., Disp: 60 tablet, Rfl:   •  ascorbic acid (VITAMIN C) 500 MG tablet, Take 500 mg by mouth Daily. with food, Disp: , Rfl:   •  bumetanide (BUMEX) 2 MG tablet, Take 1 tablet by mouth 2 (Two) Times a Day. (Patient taking differently: Take 2 mg by mouth Daily. 1.5 daily), Disp: 60 tablet, Rfl: 6  •  carvedilol (COREG) 25 MG tablet, Take 37.5 mg by mouth 2 (Two) Times a Day With Meals. Take 1 and 1/2 tables by mouth twice a day, Disp: , Rfl:   •  clopidogrel (PLAVIX) 75 MG tablet, Take 1 tablet by mouth Daily., Disp: 30 tablet, Rfl: 11  •  doxycycline (MONODOX) 100 MG capsule, Take 100 mg by mouth 2 (Two) Times a Day., Disp: , Rfl:   •  ezetimibe (ZETIA) 10 MG tablet, Take 10 mg by mouth Daily., Disp: , Rfl:   •  HYDROcodone-acetaminophen (NORCO)  MG per tablet, Take 1 tablet by mouth Every 6 (Six) Hours As Needed for Moderate Pain ., Disp: , Rfl:   •  levothyroxine (SYNTHROID, LEVOTHROID) 100 MCG tablet, Take 100 mcg by mouth Every Morning., Disp: , Rfl:   •  niacin 500 MG tablet, Take 500 mg by  mouth Every Night., Disp: , Rfl:   •  nitroglycerin (NITROSTAT) 0.4 MG SL tablet, Place 0.4 mg under the tongue Every 5 (Five) Minutes As Needed for Chest Pain. Take no more than 3 doses in 15 minutes., Disp: , Rfl:   •  pantoprazole (PROTONIX) 40 MG EC tablet, Take 40 mg by mouth Every Morning., Disp: , Rfl:   •  predniSONE (DELTASONE) 10 MG tablet, Take 2 tablets by mouth once daily for the next two days, then take 1 tablet daily for two day. Then stop, Disp: 6 tablet, Rfl: 0  •  rosuvastatin (CRESTOR) 20 MG tablet, Take 1 tablet by mouth Every Night., Disp: 90 tablet, Rfl: 3  •  sacubitril-valsartan (Entresto) 24-26 MG tablet, Take 1 tablet by mouth 2 (Two) Times a Day., Disp: , Rfl:   •  spironolactone (ALDACTONE) 25 MG tablet, Take 1 tablet by mouth Daily., Disp: , Rfl:   •  triamcinolone (KENALOG) 0.1 % cream, Apply 1 application topically to the appropriate area as directed 2 (Two) Times a Day., Disp: , Rfl:   •  vitamin D3 125 MCG (5000 UT) capsule capsule, Take 5,000 Units by mouth Daily., Disp: , Rfl:   •  Trelegy Ellipta 100-62.5-25 MCG/INH aerosol powder , Inhale 1 puff Daily., Disp: , Rfl:   MEDICATION LIST AND ALLERGIES REVIEWED.    Family History   Problem Relation Age of Onset   • Hypertension Mother    • Hypertension Father    • Heart failure Father    • Heart attack Father    • Diabetes Sister    • Arrhythmia Sister      Social History     Tobacco Use   • Smoking status: Former Smoker     Packs/day: 2.00     Years: 50.00     Pack years: 100.00     Types: Cigarettes     Quit date: 2019     Years since quitting: 3.5   • Smokeless tobacco: Never Used   Vaping Use   • Vaping Use: Never used   Substance Use Topics   • Alcohol use: Yes     Alcohol/week: 2.0 standard drinks     Types: 2 Cans of beer per week     Comment: 2-3 Beers per Week   • Drug use: No     Social History     Social History Narrative    Pt consumes caffeine: 2-3 drinks per day    Lives in [facility] with [facility]. prior .      "Stop smoking in 2019     FAMILY AND SOCIAL HISTORY REVIEWED.    Review of Systems  ALSO REFER TO SCANNED ROS SHEET FROM SAME DATE.    /88 (BP Location: Right arm, Patient Position: Sitting, Cuff Size: Adult)   Pulse 96   Temp 98.2 °F (36.8 °C) (Infrared)   Ht 162.6 cm (64\")   Wt 107 kg (236 lb 12.8 oz)   SpO2 97% Comment: resting, room air  BMI 40.65 kg/m²   Physical Exam  Vitals and nursing note reviewed.   Constitutional:       General: He is not in acute distress.     Appearance: He is well-developed. He is not diaphoretic.   HENT:      Head: Normocephalic and atraumatic.   Neck:      Thyroid: No thyromegaly.   Cardiovascular:      Rate and Rhythm: Normal rate and regular rhythm.      Heart sounds: Normal heart sounds. No murmur heard.  Pulmonary:      Effort: Pulmonary effort is normal.      Breath sounds: Normal breath sounds. No stridor.   Chest:   Breasts:      Right: No supraclavicular adenopathy.      Left: No supraclavicular adenopathy.       Musculoskeletal:      Comments: Trace pitting bilateral lower extremity edema   Lymphadenopathy:      Cervical: No cervical adenopathy.      Upper Body:      Right upper body: No supraclavicular or epitrochlear adenopathy.      Left upper body: No supraclavicular or epitrochlear adenopathy.   Skin:     General: Skin is warm and dry.   Neurological:      Mental Status: He is alert and oriented to person, place, and time.   Psychiatric:         Behavior: Behavior normal.         Results     Immunization History   Administered Date(s) Administered   • COVID-19 (PFIZER) PURPLE CAP 01/29/2021, 02/23/2021, 10/07/2021   • Fluzone High Dose =>65 Years ([employer] ONLY) 10/23/2019   • Fluzone High-Dose 65+yrs 10/07/2020, 10/07/2021     Problem List       ICD-10-CM ICD-9-CM   1. Pulmonary nodule (RLL 5mm new from 12/26/2020)  R91.1 793.11   2. Obesity, Class II, BMI 35-39.9  E66.9 278.00   3. Chronic GERD  K21.9 530.81   4. Chronic cough  R05.3 786.2       Discussion "     We reviewed his chest imaging  There is a pulmonary nodule that as I noted previously does bear further follow-up  I recommended a repeat CT scan of the chest in November 2022    We again reviewed his PFTs  He has normal spirometry with no evidence of COPD  Asthma is a possibility but he has not responded to any inhaled medications and feels that he is doing about the same off of the inhalers    I recommended efforts at regular exercise and weight loss    He is can follow-up with his physicians regarding his sleep study    I will plan to see him back in 6 months or earlier if there is any enlargement of the pulmonary nodule on his repeat CT scan in November 2022    Moderate level of Medical Decision Making complexity based on 1 undiagnosed new problem, independent interpretation of tests, and/or prescription drug management     Alessandro Constantino MD  Note electronically signed    CC: Franky Carrington MD

## 2022-08-05 DIAGNOSIS — R91.1 PULMONARY NODULE: Primary | ICD-10-CM

## 2023-04-18 NOTE — PROGRESS NOTES
Letter sent to patient regarding need for CT of the chest follow-up which is overdue and pending scheduling.  We will await receipt of the letter and cancel order if patient refuses follow-up.

## 2024-01-22 NOTE — PATIENT INSTRUCTIONS

## 2024-05-29 ENCOUNTER — TRANSCRIBE ORDERS (OUTPATIENT)
Dept: ADMINISTRATIVE | Facility: HOSPITAL | Age: 74
End: 2024-05-29
Payer: MEDICARE

## 2024-05-29 DIAGNOSIS — M54.2 NECK PAIN: ICD-10-CM

## 2024-05-29 DIAGNOSIS — M51.36 DEGENERATIVE DISC DISEASE, LUMBAR: Primary | ICD-10-CM

## 2024-05-29 DIAGNOSIS — M79.602 LEFT ARM PAIN: ICD-10-CM

## 2024-06-06 ENCOUNTER — TELEPHONE (OUTPATIENT)
Dept: INFUSION THERAPY | Facility: HOSPITAL | Age: 74
End: 2024-06-06
Payer: MEDICARE

## 2024-06-06 NOTE — TELEPHONE ENCOUNTER
Mr. Monahna's daughter, Kathleen returned called to outpatient prep and recovery to report that she spoke with Dr. Gurrola's office. Per Kathleen's report, Dr. Gurrola does not want Mr. Monahan to proceed with myelogram on his Eliquis; he is okay to hold x 3 days for procedure; and patient is not taking Plavix. Transferred Kathleen to Central Scheduling so that she could get him back on the schedule after he had been off of his Eliquis for 3 days.

## 2024-06-13 ENCOUNTER — TELEPHONE (OUTPATIENT)
Dept: INFUSION THERAPY | Facility: HOSPITAL | Age: 74
End: 2024-06-13
Payer: MEDICARE

## 2024-06-13 NOTE — TELEPHONE ENCOUNTER
Received a fax from Dr. Gurrola's office approving Mr. Monahan to be able to hold his Eliquis for 3 days prior to his ordered myelogram and Aspirin for 7 days prior to myelogram. It is not a requirement that he hold his aspirin prior to myelogram. The fax received had message requesting that Mr. Monahan's daughter be contacted once this form was filled out at [phone]. Since I personally had spoke with Kathleen a couple of weeks ago concerning this matter, I attempted to reach out to her to make sure she was aware that this cardiac clearance to hold his Eliquis was approved and to let her know it was okay for her to contact central scheduling at [phone] to get his myelogram rescheduled. Left voicemail.

## 2024-07-26 ENCOUNTER — OFFICE VISIT (OUTPATIENT)
Dept: PULMONOLOGY | Facility: CLINIC | Age: 74
End: 2024-07-26
Payer: MEDICARE

## 2024-07-26 VITALS
HEIGHT: 64 IN | OXYGEN SATURATION: 94 % | WEIGHT: 240 LBS | TEMPERATURE: 98 F | BODY MASS INDEX: 40.97 KG/M2 | HEART RATE: 78 BPM | SYSTOLIC BLOOD PRESSURE: 116 MMHG | DIASTOLIC BLOOD PRESSURE: 78 MMHG

## 2024-07-26 DIAGNOSIS — K21.9 CHRONIC GERD: ICD-10-CM

## 2024-07-26 DIAGNOSIS — I50.22 CHRONIC SYSTOLIC CONGESTIVE HEART FAILURE: ICD-10-CM

## 2024-07-26 DIAGNOSIS — E66.01 OBESITY, CLASS III, BMI 40-49.9 (MORBID OBESITY): ICD-10-CM

## 2024-07-26 DIAGNOSIS — R06.09 DYSPNEA ON EXERTION: ICD-10-CM

## 2024-07-26 DIAGNOSIS — R05.3 CHRONIC COUGH: Primary | ICD-10-CM

## 2024-07-26 DIAGNOSIS — R91.1 PULMONARY NODULE: ICD-10-CM

## 2024-07-26 PROBLEM — E66.813 OBESITY, CLASS III, BMI 40-49.9 (MORBID OBESITY): Status: ACTIVE | Noted: 2020-11-09

## 2024-07-26 RX ORDER — ASPIRIN 81 MG/1
81 TABLET ORAL DAILY
COMMUNITY

## 2024-07-26 RX ORDER — CITALOPRAM HYDROBROMIDE 10 MG/1
10 TABLET ORAL DAILY
COMMUNITY

## 2024-07-26 RX ORDER — BUDESONIDE AND FORMOTEROL FUMARATE DIHYDRATE 80; 4.5 UG/1; UG/1
2 AEROSOL RESPIRATORY (INHALATION)
COMMUNITY

## 2024-07-26 RX ORDER — CEFDINIR 300 MG/1
300 CAPSULE ORAL 2 TIMES DAILY
COMMUNITY

## 2024-07-26 RX ORDER — AZITHROMYCIN 500 MG/1
500 TABLET, FILM COATED ORAL DAILY
COMMUNITY

## 2024-07-26 RX ORDER — PREGABALIN 100 MG/1
100 CAPSULE ORAL 2 TIMES DAILY
COMMUNITY

## 2024-07-26 NOTE — PROGRESS NOTES
PULMONARY  NOTE    Chief Complaint     Dyspnea on exertion, obesity class III, cardiomyopathy, atrial arrhythmias, rule out MATHEW, pulmonary nodules, former smoker    History of Present Illness     74-year-old male returns today for follow-up  I last saw him 8/4/2022    He has a history of tobacco abuse up through 2019    He has carried a diagnosis of COPD but repeated spirometry of revealed no evidence of airway obstruction  Has been on no regular respiratory medications    He has chronic dyspnea on exertion  He has a history of a cardiomyopathy  He has a AICD/permanent pacemaker  He recently saw Dr. Gurrola and no medication changes were made  He does not recollect having a recent transthoracic echocardiogram    Patient Active Problem List   Diagnosis    ST elevation myocardial infarction involving left anterior descending (LAD) coronary artery    Hyperlipidemia LDL goal <70    PAD (peripheral artery disease)    Ischemic cardiomyopathy    Hypothyroid    Essential hypertension    Coronary artery disease involving native coronary artery of native heart    Claudication    Bradycardia    Renal insufficiency    Chronic fatigue    Vitamin D deficiency    Hypersomnolence    Intermittent claudication of both lower extremities due to atherosclerosis    Hypotension    Arrhythmia    Cardiac related syncope    VT (ventricular tachycardia)    Symptomatic stenosis of left carotid artery without infarction    Carotid stenosis, asymptomatic, left, s/p carotid stent    Atherosclerosis of native artery of both lower extremities with intermittent claudication    Obesity, Class III, BMI 40-49.9 (morbid obesity)    HTN (hypertension)    Hypercholesterolemia    Systolic CHF    Pacemaker    CAD (coronary artery disease)    Back pain    Chronic narcotic use    Borderline diabetes    Hypernatremia    SUNIL (acute kidney injury)    Abdominal distention    Dyspnea on exertion    Chronic cough    Chronic GERD    Pulmonary nodule (RLL 5mm new  from 12/26/2020)      No Known Allergies    Current Outpatient Medications:     apixaban (ELIQUIS) 5 MG tablet tablet, Take 1 tablet by mouth Every 12 (Twelve) Hours., Disp: 60 tablet, Rfl:     aspirin 81 MG EC tablet, Take 1 tablet by mouth Daily., Disp: , Rfl:     azithromycin (ZITHROMAX) 500 MG tablet, Take 1 tablet by mouth Daily., Disp: , Rfl:     budesonide-formoterol (SYMBICORT) 80-4.5 MCG/ACT inhaler, Inhale 2 puffs 2 (Two) Times a Day., Disp: , Rfl:     bumetanide (BUMEX) 2 MG tablet, Take 1 tablet by mouth 2 (Two) Times a Day. (Patient taking differently: Take 1 tablet by mouth Daily. 1.5 daily), Disp: 60 tablet, Rfl: 6    cefdinir (OMNICEF) 300 MG capsule, Take 1 capsule by mouth 2 (Two) Times a Day., Disp: , Rfl:     citalopram (CeleXA) 10 MG tablet, Take 1 tablet by mouth Daily., Disp: , Rfl:     empagliflozin (Jardiance) 10 MG tablet tablet, Take  by mouth Daily., Disp: , Rfl:     ezetimibe (ZETIA) 10 MG tablet, Take 1 tablet by mouth Daily., Disp: , Rfl:     HYDROcodone-acetaminophen (NORCO)  MG per tablet, Take 1 tablet by mouth Every 6 (Six) Hours As Needed for Moderate Pain., Disp: , Rfl:     levothyroxine (SYNTHROID, LEVOTHROID) 100 MCG tablet, Take 1 tablet by mouth Every Morning., Disp: , Rfl:     nitroglycerin (NITROSTAT) 0.4 MG SL tablet, Place 1 tablet under the tongue Every 5 (Five) Minutes As Needed for Chest Pain. Take no more than 3 doses in 15 minutes., Disp: , Rfl:     pantoprazole (PROTONIX) 40 MG EC tablet, Take 1 tablet by mouth Every Morning., Disp: , Rfl:     predniSONE (DELTASONE) 10 MG tablet, Take 2 tablets by mouth once daily for the next two days, then take 1 tablet daily for two day. Then stop, Disp: 6 tablet, Rfl: 0    pregabalin (LYRICA) 100 MG capsule, Take 1 capsule by mouth 2 (Two) Times a Day., Disp: , Rfl:     rosuvastatin (CRESTOR) 20 MG tablet, Take 1 tablet by mouth Every Night., Disp: 90 tablet, Rfl: 3    sacubitril-valsartan (Entresto) 24-26 MG tablet, Take  1 tablet by mouth 2 (Two) Times a Day., Disp: , Rfl:     spironolactone (ALDACTONE) 25 MG tablet, Take 1 tablet by mouth Daily., Disp: , Rfl:     vitamin D3 125 MCG (5000 UT) capsule capsule, Take 1 capsule by mouth Daily., Disp: , Rfl:     albuterol sulfate  (90 Base) MCG/ACT inhaler, Inhale 2 puffs Every 4 (Four) Hours As Needed for Wheezing. (Patient not taking: Reported on 7/26/2024), Disp: 18 g, Rfl: 11    ALBUTEROL SULFATE IN, Inhale 1 puff Daily. HFA 90mcg (Patient not taking: Reported on 7/26/2024), Disp: , Rfl:     allopurinol (ZYLOPRIM) 100 MG tablet, Take 100 mg by mouth 2 (Two) Times a Day. (Patient not taking: Reported on 7/26/2024), Disp: , Rfl:     amiodarone (PACERONE) 400 MG tablet, Take 1 tablet by mouth Daily. (Patient not taking: Reported on 7/26/2024), Disp: 30 tablet, Rfl: 6    ascorbic acid (VITAMIN C) 500 MG tablet, Take 500 mg by mouth Daily. with food (Patient not taking: Reported on 7/26/2024), Disp: , Rfl:     carvedilol (COREG) 25 MG tablet, Take 37.5 mg by mouth 2 (Two) Times a Day With Meals. Take 1 and 1/2 tables by mouth twice a day (Patient not taking: Reported on 7/26/2024), Disp: , Rfl:     clopidogrel (PLAVIX) 75 MG tablet, Take 1 tablet by mouth Daily. (Patient not taking: Reported on 7/26/2024), Disp: 30 tablet, Rfl: 11    doxycycline (MONODOX) 100 MG capsule, Take 100 mg by mouth 2 (Two) Times a Day. (Patient not taking: Reported on 7/26/2024), Disp: , Rfl:     niacin 500 MG tablet, Take 500 mg by mouth Every Night. (Patient not taking: Reported on 7/26/2024), Disp: , Rfl:     Trelegy Ellipta 100-62.5-25 MCG/INH aerosol powder , Inhale 1 puff Daily. (Patient not taking: Reported on 7/26/2024), Disp: , Rfl:     triamcinolone (KENALOG) 0.1 % cream, Apply 1 application topically to the appropriate area as directed 2 (Two) Times a Day. (Patient not taking: Reported on 7/26/2024), Disp: , Rfl:   MEDICATION LIST AND ALLERGIES REVIEWED.    Family History   Problem Relation Age  "of Onset    Hypertension Mother     Hypertension Father     Heart failure Father     Heart attack Father     Diabetes Sister     Arrhythmia Sister      Social History     Tobacco Use    Smoking status: Former     Current packs/day: 0.00     Average packs/day: 2.0 packs/day for 50.0 years (100.0 ttl pk-yrs)     Types: Cigarettes     Start date:      Quit date:      Years since quittin.5    Smokeless tobacco: Never   Vaping Use    Vaping status: Never Used   Substance Use Topics    Alcohol use: Yes     Alcohol/week: 2.0 standard drinks of alcohol     Types: 2 Cans of beer per week     Comment: 2-3 Beers per Week    Drug use: No     Social History     Social History Narrative    Pt consumes caffeine: 2-3 drinks per day    Lives in [facility] with [facility]. prior .     Stop smoking in 2019     FAMILY AND SOCIAL HISTORY REVIEWED.    Review of Systems  IF PRESENT REFER TO SCANNED ROS SHEET FROM SAME DATE  OTHERWISE ROS OBTAINED AND NON-CONTRIBUTORY OVER HPI.    /78   Pulse 78   Temp 98 °F (36.7 °C) (Infrared)   Ht 162.6 cm (64.02\")   Wt 109 kg (240 lb)   SpO2 94% Comment: room air at rest  BMI 41.18 kg/m²   Physical Exam  Vitals and nursing note reviewed.   Constitutional:       General: He is not in acute distress.     Appearance: He is well-developed. He is not diaphoretic.   HENT:      Head: Normocephalic and atraumatic.   Neck:      Thyroid: No thyromegaly.   Cardiovascular:      Rate and Rhythm: Normal rate and regular rhythm.      Heart sounds: Normal heart sounds. No murmur heard.  Pulmonary:      Effort: Pulmonary effort is normal.      Breath sounds: Normal breath sounds. No stridor.   Abdominal:      General: Bowel sounds are normal.   Musculoskeletal:      Comments: Trace pitting ankle edema   Lymphadenopathy:      Cervical: No cervical adenopathy.      Upper Body:      Right upper body: No supraclavicular or epitrochlear adenopathy.      Left upper body: No supraclavicular or " epitrochlear adenopathy.   Skin:     General: Skin is warm and dry.   Neurological:      Mental Status: He is alert and oriented to person, place, and time.   Psychiatric:         Behavior: Behavior normal.         Results     CT scan of the chest done at [facility] in June 2023 reviewed  Several pulmonary nodules, max 5 mm in size, stable    PFTs today reveal no airway obstruction, no restriction, and a reduced diffusion capacity    Immunization History   Administered Date(s) Administered    COVID-19 (PFIZER) Purple Cap Monovalent 01/29/2021, 02/23/2021, 10/07/2021    Covid-19 (Pfizer) Gray Cap Monovalent 08/11/2022    Fluzone High Dose =>65 Years ([employer] ONLY) 10/23/2019    Fluzone High-Dose 65+yrs 10/07/2020, 10/07/2021, 10/28/2022, 11/08/2023    Pneumococcal Conjugate 20-Valent (PCV20) 11/08/2023     Problem List       ICD-10-CM ICD-9-CM   1. Chronic cough  R05.3 786.2   2. Dyspnea on exertion  R06.09 786.09   3. Obesity, Class III, BMI 40-49.9 (morbid obesity)  E66.01 278.01   4. Chronic GERD  K21.9 530.81   5. Pulmonary nodule (RLL 5mm new from 12/26/2020)  R91.1 793.11   6. Chronic systolic congestive heart failure  I50.22 428.22     428.0       Discussion     We reviewed his test results  Last CT scan of the chest that I can find was done a year ago  No suspicious lesions and stable pulmonary nodules  Given his smoking history up through 2019 I recommended a repeat screening CT scan of the chest which we will arrange    We again reviewed his PFTs  He has had 3 sets of PFTs over the years none of which have exhibited airway obstruction  I reassured him he does not have COPD  He could still have asthma, however although he has never had evidence of asthma on spirometry    He is concerned about pulmonary hypertension  Given his cardiac disease I would not be surprised if he had pulmonary hypertension but will take a echocardiogram to further evaluate that  I called Dr. Gurrola's office and the last echocardiogram  he had there was in 2020  The last echocardiogram in the [facility] system was 2022  We will schedule him for a transthoracic echocardiogram    I will plan to see him back in 1-2 months for follow-up    Moderate level of Medical Decision Making complexity based on 1 undiagnosed new problem or 2 stable chronic conditions, independent interpretation of tests, and/or prescription drug management     Alessandro Constantino MD  Note electronically signed    CC: Franky Carrington MD

## 2024-07-29 DIAGNOSIS — R06.09 DYSPNEA ON EXERTION: ICD-10-CM

## 2024-07-29 DIAGNOSIS — Z87.891 PERSONAL HISTORY OF NICOTINE DEPENDENCE: Primary | ICD-10-CM

## 2024-09-05 ENCOUNTER — HOSPITAL ENCOUNTER (OUTPATIENT)
Facility: HOSPITAL | Age: 74
Discharge: HOME | End: 2024-09-05
Payer: MEDICARE

## 2024-09-05 VITALS — BODY MASS INDEX: 41.03 KG/M2 | WEIGHT: 240.3 LBS | HEIGHT: 64 IN

## 2024-09-05 DIAGNOSIS — Z87.891 PERSONAL HISTORY OF NICOTINE DEPENDENCE: ICD-10-CM

## 2024-09-05 DIAGNOSIS — R06.09 DYSPNEA ON EXERTION: ICD-10-CM

## 2024-09-05 LAB
BH CV ECHO MEAS - AO MAX PG: 5.2 MMHG
BH CV ECHO MEAS - AO MEAN PG: 3 MMHG
BH CV ECHO MEAS - AO ROOT DIAM: 2.9 CM
BH CV ECHO MEAS - AO V2 MAX: 114 CM/SEC
BH CV ECHO MEAS - AO V2 VTI: 21.8 CM
BH CV ECHO MEAS - AVA(I,D): 1.55 CM2
BH CV ECHO MEAS - EDV(CUBED): 250 ML
BH CV ECHO MEAS - EDV(MOD-SP2): 139 ML
BH CV ECHO MEAS - EDV(MOD-SP4): 165 ML
BH CV ECHO MEAS - EF(MOD-BP): 24.8 %
BH CV ECHO MEAS - EF(MOD-SP2): 23.7 %
BH CV ECHO MEAS - EF(MOD-SP4): 32.1 %
BH CV ECHO MEAS - ESV(CUBED): 148.9 ML
BH CV ECHO MEAS - ESV(MOD-SP2): 106 ML
BH CV ECHO MEAS - ESV(MOD-SP4): 112 ML
BH CV ECHO MEAS - FS: 15.9 %
BH CV ECHO MEAS - IVS/LVPW: 1 CM
BH CV ECHO MEAS - IVSD: 1.1 CM
BH CV ECHO MEAS - LA DIMENSION: 5.5 CM
BH CV ECHO MEAS - LAT PEAK E' VEL: 10.9 CM/SEC
BH CV ECHO MEAS - LV DIASTOLIC VOL/BSA (35-75): 78.1 CM2
BH CV ECHO MEAS - LV MASS(C)D: 303.5 GRAMS
BH CV ECHO MEAS - LV MAX PG: 1.81 MMHG
BH CV ECHO MEAS - LV MEAN PG: 1 MMHG
BH CV ECHO MEAS - LV SYSTOLIC VOL/BSA (12-30): 53 CM2
BH CV ECHO MEAS - LV V1 MAX: 67.1 CM/SEC
BH CV ECHO MEAS - LV V1 VTI: 10.8 CM
BH CV ECHO MEAS - LVIDD: 6.3 CM
BH CV ECHO MEAS - LVIDS: 5.3 CM
BH CV ECHO MEAS - LVOT AREA: 3.1 CM2
BH CV ECHO MEAS - LVOT DIAM: 2 CM
BH CV ECHO MEAS - LVPWD: 1.1 CM
BH CV ECHO MEAS - MED PEAK E' VEL: 6 CM/SEC
BH CV ECHO MEAS - MV DEC SLOPE: 297 CM/SEC2
BH CV ECHO MEAS - MV DEC TIME: 0.2 SEC
BH CV ECHO MEAS - MV E MAX VEL: 96.4 CM/SEC
BH CV ECHO MEAS - MV MAX PG: 4.9 MMHG
BH CV ECHO MEAS - MV MEAN PG: 2 MMHG
BH CV ECHO MEAS - MV P1/2T: 96.7 MSEC
BH CV ECHO MEAS - MV V2 VTI: 28.4 CM
BH CV ECHO MEAS - MVA(P1/2T): 2.27 CM2
BH CV ECHO MEAS - MVA(VTI): 1.19 CM2
BH CV ECHO MEAS - PA ACC TIME: 0.05 SEC
BH CV ECHO MEAS - RAP SYSTOLE: 3 MMHG
BH CV ECHO MEAS - RVSP: 20 MMHG
BH CV ECHO MEAS - SV(LVOT): 33.8 ML
BH CV ECHO MEAS - SV(MOD-SP2): 33 ML
BH CV ECHO MEAS - SV(MOD-SP4): 53 ML
BH CV ECHO MEAS - SVI(LVOT): 16 ML/M2
BH CV ECHO MEAS - SVI(MOD-SP2): 15.6 ML/M2
BH CV ECHO MEAS - SVI(MOD-SP4): 25.1 ML/M2
BH CV ECHO MEAS - TAPSE (>1.6): 1.88 CM
BH CV ECHO MEAS - TR MAX PG: 16.7 MMHG
BH CV ECHO MEAS - TR MAX VEL: 204.5 CM/SEC
BH CV ECHO MEASUREMENTS AVERAGE E/E' RATIO: 11.41
BH CV VAS BP RIGHT ARM: NORMAL MMHG
BH CV XLRA - RV BASE: 3.7 CM
BH CV XLRA - RV LENGTH: 8 CM
BH CV XLRA - RV MID: 2.6 CM
BH CV XLRA - TDI S': 8.6 CM/SEC
LEFT ATRIUM VOLUME INDEX: 36.9 ML/M2
MAXIMAL PREDICTED HEART RATE: 145 BPM
STRESS TARGET HR: 123 BPM

## 2024-09-05 PROCEDURE — 93306 TTE W/DOPPLER COMPLETE: CPT

## 2024-09-05 PROCEDURE — 71271 CT THORAX LUNG CANCER SCR C-: CPT

## 2024-09-05 PROCEDURE — 25010000002 SULFUR HEXAFLUORIDE MICROSPH 60.7-25 MG RECONSTITUTED SUSPENSION: Performed by: INTERNAL MEDICINE

## 2024-09-05 RX ADMIN — SULFUR HEXAFLUORIDE 2 ML: KIT at 11:07

## 2024-09-23 ENCOUNTER — DOCUMENTATION (OUTPATIENT)
Dept: PULMONOLOGY | Facility: CLINIC | Age: 74
End: 2024-09-23
Payer: MEDICARE

## 2024-10-23 ENCOUNTER — OFFICE VISIT (OUTPATIENT)
Dept: PULMONOLOGY | Facility: CLINIC | Age: 74
End: 2024-10-23
Payer: MEDICARE

## 2024-10-23 VITALS
DIASTOLIC BLOOD PRESSURE: 76 MMHG | OXYGEN SATURATION: 94 % | BODY MASS INDEX: 39.95 KG/M2 | TEMPERATURE: 97.6 F | WEIGHT: 234 LBS | HEART RATE: 96 BPM | SYSTOLIC BLOOD PRESSURE: 122 MMHG | HEIGHT: 64 IN

## 2024-10-23 DIAGNOSIS — R91.1 PULMONARY NODULE: ICD-10-CM

## 2024-10-23 DIAGNOSIS — I25.5 ISCHEMIC CARDIOMYOPATHY: Primary | Chronic | ICD-10-CM

## 2024-10-23 DIAGNOSIS — E66.01 OBESITY, CLASS III, BMI 40-49.9 (MORBID OBESITY): ICD-10-CM

## 2024-10-23 DIAGNOSIS — K21.9 CHRONIC GERD: ICD-10-CM

## 2024-10-23 NOTE — PROGRESS NOTES
PULMONARY  NOTE    Chief Complaint     Dyspnea on exertion, ischemic cardiomyopathy, former smoker, pulmonary nodules, rule out MATHEW    History of Present Illness     74-year-old male returns today for follow-up  I last saw him 7/26/2024    He has a history of tobacco abuse through 2019    He carries a diagnosis of COPD but repeated spirometry have failed to show airway obstruction    He has chronic dyspnea on exertion  He has a cardiomyopathy with a prior AICD  On his last visit we obtained echocardiogram.  Those results are as noted below  I tried to contact him by phone multiple times prior to this visit was able to do so    Has had no acute exacerbation of symptoms since I last saw him    He underwent a low-dose CT scan of the chest with results as noted below    Patient Active Problem List   Diagnosis    ST elevation myocardial infarction involving left anterior descending (LAD) coronary artery    Hyperlipidemia LDL goal <70    PAD (peripheral artery disease)    Ischemic cardiomyopathy (EF 21-25%)    Hypothyroid    Essential hypertension    Coronary artery disease involving native coronary artery of native heart    Claudication    Bradycardia    Renal insufficiency    Chronic fatigue    Vitamin D deficiency    Hypersomnolence    Intermittent claudication of both lower extremities due to atherosclerosis    Hypotension    Arrhythmia    Cardiac related syncope    VT (ventricular tachycardia)    Symptomatic stenosis of left carotid artery without infarction    Carotid stenosis, asymptomatic, left, s/p carotid stent    Atherosclerosis of native artery of both lower extremities with intermittent claudication    Obesity, Class III, BMI 40-49.9 (morbid obesity)    HTN (hypertension)    Hypercholesterolemia    Systolic CHF    Pacemaker    CAD (coronary artery disease)    Back pain    Chronic narcotic use    Borderline diabetes    Hypernatremia    SUNIL (acute kidney injury)    Abdominal distention    Dyspnea on exertion     Chronic cough    Chronic GERD    Pulmonary nodule (RLL 5mm new from 12/26/2020) - Stable 9/5/2024      No Known Allergies    Current Outpatient Medications:     albuterol sulfate  (90 Base) MCG/ACT inhaler, Inhale 2 puffs Every 4 (Four) Hours As Needed for Wheezing. (Patient not taking: Reported on 7/26/2024), Disp: 18 g, Rfl: 11    ALBUTEROL SULFATE IN, Inhale 1 puff Daily. HFA 90mcg (Patient not taking: Reported on 7/26/2024), Disp: , Rfl:     allopurinol (ZYLOPRIM) 100 MG tablet, Take 100 mg by mouth 2 (Two) Times a Day. (Patient not taking: Reported on 7/26/2024), Disp: , Rfl:     amiodarone (PACERONE) 400 MG tablet, Take 1 tablet by mouth Daily. (Patient not taking: Reported on 7/26/2024), Disp: 30 tablet, Rfl: 6    apixaban (ELIQUIS) 5 MG tablet tablet, Take 1 tablet by mouth Every 12 (Twelve) Hours., Disp: 60 tablet, Rfl:     ascorbic acid (VITAMIN C) 500 MG tablet, Take 500 mg by mouth Daily. with food (Patient not taking: Reported on 7/26/2024), Disp: , Rfl:     aspirin 81 MG EC tablet, Take 1 tablet by mouth Daily., Disp: , Rfl:     azithromycin (ZITHROMAX) 500 MG tablet, Take 1 tablet by mouth Daily., Disp: , Rfl:     budesonide-formoterol (SYMBICORT) 80-4.5 MCG/ACT inhaler, Inhale 2 puffs 2 (Two) Times a Day., Disp: , Rfl:     bumetanide (BUMEX) 2 MG tablet, Take 1 tablet by mouth 2 (Two) Times a Day. (Patient taking differently: Take 1 tablet by mouth Daily. 1.5 daily), Disp: 60 tablet, Rfl: 6    carvedilol (COREG) 25 MG tablet, Take 37.5 mg by mouth 2 (Two) Times a Day With Meals. Take 1 and 1/2 tables by mouth twice a day (Patient not taking: Reported on 7/26/2024), Disp: , Rfl:     cefdinir (OMNICEF) 300 MG capsule, Take 1 capsule by mouth 2 (Two) Times a Day., Disp: , Rfl:     citalopram (CeleXA) 10 MG tablet, Take 1 tablet by mouth Daily., Disp: , Rfl:     clopidogrel (PLAVIX) 75 MG tablet, Take 1 tablet by mouth Daily. (Patient not taking: Reported on 7/26/2024), Disp: 30 tablet, Rfl:  11    doxycycline (MONODOX) 100 MG capsule, Take 100 mg by mouth 2 (Two) Times a Day. (Patient not taking: Reported on 7/26/2024), Disp: , Rfl:     empagliflozin (Jardiance) 10 MG tablet tablet, Take  by mouth Daily., Disp: , Rfl:     ezetimibe (ZETIA) 10 MG tablet, Take 1 tablet by mouth Daily., Disp: , Rfl:     HYDROcodone-acetaminophen (NORCO)  MG per tablet, Take 1 tablet by mouth Every 6 (Six) Hours As Needed for Moderate Pain., Disp: , Rfl:     levothyroxine (SYNTHROID, LEVOTHROID) 100 MCG tablet, Take 1 tablet by mouth Every Morning., Disp: , Rfl:     niacin 500 MG tablet, Take 500 mg by mouth Every Night. (Patient not taking: Reported on 7/26/2024), Disp: , Rfl:     nitroglycerin (NITROSTAT) 0.4 MG SL tablet, Place 1 tablet under the tongue Every 5 (Five) Minutes As Needed for Chest Pain. Take no more than 3 doses in 15 minutes., Disp: , Rfl:     pantoprazole (PROTONIX) 40 MG EC tablet, Take 1 tablet by mouth Every Morning., Disp: , Rfl:     predniSONE (DELTASONE) 10 MG tablet, Take 2 tablets by mouth once daily for the next two days, then take 1 tablet daily for two day. Then stop, Disp: 6 tablet, Rfl: 0    pregabalin (LYRICA) 100 MG capsule, Take 1 capsule by mouth 2 (Two) Times a Day., Disp: , Rfl:     rosuvastatin (CRESTOR) 20 MG tablet, Take 1 tablet by mouth Every Night., Disp: 90 tablet, Rfl: 3    sacubitril-valsartan (Entresto) 24-26 MG tablet, Take 1 tablet by mouth 2 (Two) Times a Day., Disp: , Rfl:     spironolactone (ALDACTONE) 25 MG tablet, Take 1 tablet by mouth Daily., Disp: , Rfl:     Trelegy Ellipta 100-62.5-25 MCG/INH aerosol powder , Inhale 1 puff Daily. (Patient not taking: Reported on 7/26/2024), Disp: , Rfl:     triamcinolone (KENALOG) 0.1 % cream, Apply 1 application topically to the appropriate area as directed 2 (Two) Times a Day. (Patient not taking: Reported on 7/26/2024), Disp: , Rfl:     vitamin D3 125 MCG (5000 UT) capsule capsule, Take 1 capsule by mouth Daily., Disp: ,  "Rfl:   MEDICATION LIST AND ALLERGIES REVIEWED.    Family History   Problem Relation Age of Onset    Hypertension Mother     Hypertension Father     Heart failure Father     Heart attack Father     Diabetes Sister     Arrhythmia Sister      Social History     Tobacco Use    Smoking status: Former     Current packs/day: 0.00     Average packs/day: 2.0 packs/day for 50.0 years (100.0 ttl pk-yrs)     Types: Cigarettes     Start date:      Quit date: 2019     Years since quittin.8    Smokeless tobacco: Never   Vaping Use    Vaping status: Never Used   Substance Use Topics    Alcohol use: Yes     Alcohol/week: 2.0 standard drinks of alcohol     Types: 2 Cans of beer per week     Comment: 2-3 Beers per Week    Drug use: No     Social History     Social History Narrative    Pt consumes caffeine: 2-3 drinks per day    Lives in [facility] with wife. prior .     Stop smoking in 2019     FAMILY AND SOCIAL HISTORY REVIEWED.    Review of Systems  IF PRESENT REFER TO SCANNED ROS SHEET FROM SAME DATE  OTHERWISE ROS OBTAINED AND NON-CONTRIBUTORY OVER HPI.    /76   Pulse 96   Temp 97.6 °F (36.4 °C) (Infrared)   Ht 162.6 cm (64.02\")   Wt 106 kg (234 lb)   SpO2 94%   BMI 40.15 kg/m²   Physical Exam  Vitals and nursing note reviewed.   Constitutional:       General: He is not in acute distress.     Appearance: He is well-developed. He is not diaphoretic.   HENT:      Head: Normocephalic and atraumatic.   Neck:      Thyroid: No thyromegaly.   Cardiovascular:      Rate and Rhythm: Normal rate and regular rhythm.      Heart sounds: Normal heart sounds. No murmur heard.  Pulmonary:      Effort: Pulmonary effort is normal.      Breath sounds: Normal breath sounds. No stridor.   Musculoskeletal:      Comments: Trace pitting ankle edema   Lymphadenopathy:      Cervical: No cervical adenopathy.      Upper Body:      Right upper body: No supraclavicular or epitrochlear adenopathy.      Left upper body: No " supraclavicular or epitrochlear adenopathy.   Skin:     General: Skin is warm and dry.   Neurological:      Mental Status: He is alert and oriented to person, place, and time.   Psychiatric:         Behavior: Behavior normal.         Results     Low-dose CT scan of the chest from 9/5/2024 revealed stability and previously noted pulmonary nodules with no new or progressive intrathoracic findings    Transthoracic echocardiogram revealed ejection fraction of 21-25%  Significantly decreased from 2022    Immunization History   Administered Date(s) Administered    COVID-19 (PFIZER) Purple Cap Monovalent 01/29/2021, 02/23/2021, 10/07/2021    Covid-19 (Pfizer) Gray Cap Monovalent 08/11/2022    Fluzone High-Dose 65+YRS 10/23/2019    Fluzone High-Dose 65+yrs 10/07/2020, 10/07/2021, 10/28/2022, 11/08/2023    Pneumococcal Conjugate 20-Valent (PCV20) 11/08/2023     Problem List       ICD-10-CM ICD-9-CM   1. Ischemic cardiomyopathy (EF 21-25%)  I25.5 414.8   2. Obesity, Class III, BMI 40-49.9 (morbid obesity)  E66.01 278.01   3. Pulmonary nodule (RLL 5mm new from 12/26/2020) - Stable 9/5/2024  R91.1 793.11   4. Chronic GERD  K21.9 530.81       Discussion     His daughter accompanied him to the office  We reviewed his test results    I again reassured him he does not have COPD  Spirometry have been normal  He could still have asthma  Would recommend albuterol on an as needed basis    We reviewed his chest imaging which reveals no new or progressive intrathoracic findings  Have recommended a repeat LDCT and September 2025    We reviewed his echocardiogram  There has been a significant decrease in ejection fraction in comparison to 2022  I have recommended that they follow-up with Dr. Gurrola  I printed out his most recent and previous echocardiogram reports for the daughter    I will plan to see him back in a year or earlier if there are any problems in the meantime    Level of service justified based on 44 minutes spent in patient  care on this date of service including, but not limited to: preparing to see the patient, obtaining and/or reviewing history, performing medically appropriate examination, ordering tests/medicine/procedures, independently interpreting results, documenting clinical information in EHR, and counseling/education of patient/family/caregiver (excluding time spent on other separate services such as performing procedures or test interpretation, if applicable). (Level 4 30-39 minutes; Level 5 40-54 minutes)    Alessandro Constantino MD  Note electronically signed    CC: Franky Carrington MD

## 2024-10-23 NOTE — LETTER
October 23, 2024     Drew Gurrola MD  [address] [facility]  [address]  [facility] [id number]    Patient: Antony Monahan   YOB: 1950   Date of Visit: 10/23/2024     Dear Dr. Izabela MD:    Thank you for referring Antony Monahan to me for evaluation. Below are the relevant portions of my assessment and plan of care.    If you have questions, please do not hesitate to call me. I look forward to following Antony along with you.         Sincerely,        Alessandro Constantino MD        CC: No Recipients      Progress Notes:    PULMONARY  NOTE    Chief Complaint     Dyspnea on exertion, ischemic cardiomyopathy, former smoker, pulmonary nodules, rule out MATHEW    History of Present Illness     74-year-old male returns today for follow-up  I last saw him 7/26/2024    He has a history of tobacco abuse through 2019    He carries a diagnosis of COPD but repeated spirometry have failed to show airway obstruction    He has chronic dyspnea on exertion  He has a cardiomyopathy with a prior AICD  On his last visit we obtained echocardiogram.  Those results are as noted below  I tried to contact him by phone multiple times prior to this visit was able to do so    Has had no acute exacerbation of symptoms since I last saw him    He underwent a low-dose CT scan of the chest with results as noted below    Patient Active Problem List   Diagnosis   • ST elevation myocardial infarction involving left anterior descending (LAD) coronary artery   • Hyperlipidemia LDL goal <70   • PAD (peripheral artery disease)   • Ischemic cardiomyopathy (EF 21-25%)   • Hypothyroid   • Essential hypertension   • Coronary artery disease involving native coronary artery of native heart   • Claudication   • Bradycardia   • Renal insufficiency   • Chronic fatigue   • Vitamin D deficiency   • Hypersomnolence   • Intermittent claudication of both lower extremities due to atherosclerosis   • Hypotension   • Arrhythmia   • Cardiac related  syncope   • VT (ventricular tachycardia)   • Symptomatic stenosis of left carotid artery without infarction   • Carotid stenosis, asymptomatic, left, s/p carotid stent   • Atherosclerosis of native artery of both lower extremities with intermittent claudication   • Obesity, Class III, BMI 40-49.9 (morbid obesity)   • HTN (hypertension)   • Hypercholesterolemia   • Systolic CHF   • Pacemaker   • CAD (coronary artery disease)   • Back pain   • Chronic narcotic use   • Borderline diabetes   • Hypernatremia   • SUNIL (acute kidney injury)   • Abdominal distention   • Dyspnea on exertion   • Chronic cough   • Chronic GERD   • Pulmonary nodule (RLL 5mm new from 12/26/2020) - Stable 9/5/2024      No Known Allergies    Current Outpatient Medications:   •  albuterol sulfate  (90 Base) MCG/ACT inhaler, Inhale 2 puffs Every 4 (Four) Hours As Needed for Wheezing. (Patient not taking: Reported on 7/26/2024), Disp: 18 g, Rfl: 11  •  ALBUTEROL SULFATE IN, Inhale 1 puff Daily. HFA 90mcg (Patient not taking: Reported on 7/26/2024), Disp: , Rfl:   •  allopurinol (ZYLOPRIM) 100 MG tablet, Take 100 mg by mouth 2 (Two) Times a Day. (Patient not taking: Reported on 7/26/2024), Disp: , Rfl:   •  amiodarone (PACERONE) 400 MG tablet, Take 1 tablet by mouth Daily. (Patient not taking: Reported on 7/26/2024), Disp: 30 tablet, Rfl: 6  •  apixaban (ELIQUIS) 5 MG tablet tablet, Take 1 tablet by mouth Every 12 (Twelve) Hours., Disp: 60 tablet, Rfl:   •  ascorbic acid (VITAMIN C) 500 MG tablet, Take 500 mg by mouth Daily. with food (Patient not taking: Reported on 7/26/2024), Disp: , Rfl:   •  aspirin 81 MG EC tablet, Take 1 tablet by mouth Daily., Disp: , Rfl:   •  azithromycin (ZITHROMAX) 500 MG tablet, Take 1 tablet by mouth Daily., Disp: , Rfl:   •  budesonide-formoterol (SYMBICORT) 80-4.5 MCG/ACT inhaler, Inhale 2 puffs 2 (Two) Times a Day., Disp: , Rfl:   •  bumetanide (BUMEX) 2 MG tablet, Take 1 tablet by mouth 2 (Two) Times a Day.  (Patient taking differently: Take 1 tablet by mouth Daily. 1.5 daily), Disp: 60 tablet, Rfl: 6  •  carvedilol (COREG) 25 MG tablet, Take 37.5 mg by mouth 2 (Two) Times a Day With Meals. Take 1 and 1/2 tables by mouth twice a day (Patient not taking: Reported on 7/26/2024), Disp: , Rfl:   •  cefdinir (OMNICEF) 300 MG capsule, Take 1 capsule by mouth 2 (Two) Times a Day., Disp: , Rfl:   •  citalopram (CeleXA) 10 MG tablet, Take 1 tablet by mouth Daily., Disp: , Rfl:   •  clopidogrel (PLAVIX) 75 MG tablet, Take 1 tablet by mouth Daily. (Patient not taking: Reported on 7/26/2024), Disp: 30 tablet, Rfl: 11  •  doxycycline (MONODOX) 100 MG capsule, Take 100 mg by mouth 2 (Two) Times a Day. (Patient not taking: Reported on 7/26/2024), Disp: , Rfl:   •  empagliflozin (Jardiance) 10 MG tablet tablet, Take  by mouth Daily., Disp: , Rfl:   •  ezetimibe (ZETIA) 10 MG tablet, Take 1 tablet by mouth Daily., Disp: , Rfl:   •  HYDROcodone-acetaminophen (NORCO)  MG per tablet, Take 1 tablet by mouth Every 6 (Six) Hours As Needed for Moderate Pain., Disp: , Rfl:   •  levothyroxine (SYNTHROID, LEVOTHROID) 100 MCG tablet, Take 1 tablet by mouth Every Morning., Disp: , Rfl:   •  niacin 500 MG tablet, Take 500 mg by mouth Every Night. (Patient not taking: Reported on 7/26/2024), Disp: , Rfl:   •  nitroglycerin (NITROSTAT) 0.4 MG SL tablet, Place 1 tablet under the tongue Every 5 (Five) Minutes As Needed for Chest Pain. Take no more than 3 doses in 15 minutes., Disp: , Rfl:   •  pantoprazole (PROTONIX) 40 MG EC tablet, Take 1 tablet by mouth Every Morning., Disp: , Rfl:   •  predniSONE (DELTASONE) 10 MG tablet, Take 2 tablets by mouth once daily for the next two days, then take 1 tablet daily for two day. Then stop, Disp: 6 tablet, Rfl: 0  •  pregabalin (LYRICA) 100 MG capsule, Take 1 capsule by mouth 2 (Two) Times a Day., Disp: , Rfl:   •  rosuvastatin (CRESTOR) 20 MG tablet, Take 1 tablet by mouth Every Night., Disp: 90 tablet,  "Rfl: 3  •  sacubitril-valsartan (Entresto) 24-26 MG tablet, Take 1 tablet by mouth 2 (Two) Times a Day., Disp: , Rfl:   •  spironolactone (ALDACTONE) 25 MG tablet, Take 1 tablet by mouth Daily., Disp: , Rfl:   •  Trelegy Ellipta 100-62.5-25 MCG/INH aerosol powder , Inhale 1 puff Daily. (Patient not taking: Reported on 2024), Disp: , Rfl:   •  triamcinolone (KENALOG) 0.1 % cream, Apply 1 application topically to the appropriate area as directed 2 (Two) Times a Day. (Patient not taking: Reported on 2024), Disp: , Rfl:   •  vitamin D3 125 MCG (5000 UT) capsule capsule, Take 1 capsule by mouth Daily., Disp: , Rfl:   MEDICATION LIST AND ALLERGIES REVIEWED.    Family History   Problem Relation Age of Onset   • Hypertension Mother    • Hypertension Father    • Heart failure Father    • Heart attack Father    • Diabetes Sister    • Arrhythmia Sister      Social History     Tobacco Use   • Smoking status: Former     Current packs/day: 0.00     Average packs/day: 2.0 packs/day for 50.0 years (100.0 ttl pk-yrs)     Types: Cigarettes     Start date:      Quit date: 2019     Years since quittin.8   • Smokeless tobacco: Never   Vaping Use   • Vaping status: Never Used   Substance Use Topics   • Alcohol use: Yes     Alcohol/week: 2.0 standard drinks of alcohol     Types: 2 Cans of beer per week     Comment: 2-3 Beers per Week   • Drug use: No     Social History     Social History Narrative    Pt consumes caffeine: 2-3 drinks per day    Lives in [facility] with [facility]. prior .     Stop smoking in 2019     FAMILY AND SOCIAL HISTORY REVIEWED.    Review of Systems  IF PRESENT REFER TO SCANNED ROS SHEET FROM SAME DATE  OTHERWISE ROS OBTAINED AND NON-CONTRIBUTORY OVER HPI.    /76   Pulse 96   Temp 97.6 °F (36.4 °C) (Infrared)   Ht 162.6 cm (64.02\")   Wt 106 kg (234 lb)   SpO2 94%   BMI 40.15 kg/m²   Physical Exam  Vitals and nursing note reviewed.   Constitutional:       General: He is not in acute " distress.     Appearance: He is well-developed. He is not diaphoretic.   HENT:      Head: Normocephalic and atraumatic.   Neck:      Thyroid: No thyromegaly.   Cardiovascular:      Rate and Rhythm: Normal rate and regular rhythm.      Heart sounds: Normal heart sounds. No murmur heard.  Pulmonary:      Effort: Pulmonary effort is normal.      Breath sounds: Normal breath sounds. No stridor.   Musculoskeletal:      Comments: Trace pitting ankle edema   Lymphadenopathy:      Cervical: No cervical adenopathy.      Upper Body:      Right upper body: No supraclavicular or epitrochlear adenopathy.      Left upper body: No supraclavicular or epitrochlear adenopathy.   Skin:     General: Skin is warm and dry.   Neurological:      Mental Status: He is alert and oriented to person, place, and time.   Psychiatric:         Behavior: Behavior normal.         Results     Low-dose CT scan of the chest from 9/5/2024 revealed stability and previously noted pulmonary nodules with no new or progressive intrathoracic findings    Transthoracic echocardiogram revealed ejection fraction of 21-25%  Significantly decreased from 2022    Immunization History   Administered Date(s) Administered   • COVID-19 (PFIZER) Purple Cap Monovalent 01/29/2021, 02/23/2021, 10/07/2021   • Covid-19 (Pfizer) Gray Cap Monovalent 08/11/2022   • Fluzone High-Dose 65+YRS 10/23/2019   • Fluzone High-Dose 65+yrs 10/07/2020, 10/07/2021, 10/28/2022, 11/08/2023   • Pneumococcal Conjugate 20-Valent (PCV20) 11/08/2023     Problem List       ICD-10-CM ICD-9-CM   1. Ischemic cardiomyopathy (EF 21-25%)  I25.5 414.8   2. Obesity, Class III, BMI 40-49.9 (morbid obesity)  E66.01 278.01   3. Pulmonary nodule (RLL 5mm new from 12/26/2020) - Stable 9/5/2024  R91.1 793.11   4. Chronic GERD  K21.9 530.81       Discussion     His daughter accompanied him to the office  We reviewed his test results    I again reassured him he does not have COPD  Spirometry have been normal  He  could still have asthma  Would recommend albuterol on an as needed basis    We reviewed his chest imaging which reveals no new or progressive intrathoracic findings  Have recommended a repeat LDCT and September 2025    We reviewed his echocardiogram  There has been a significant decrease in ejection fraction in comparison to 2022  I have recommended that they follow-up with Dr. Gurrola  I printed out his most recent and previous echocardiogram reports for the daughter    I will plan to see him back in a year or earlier if there are any problems in the meantime    Level of service justified based on 44 minutes spent in patient care on this date of service including, but not limited to: preparing to see the patient, obtaining and/or reviewing history, performing medically appropriate examination, ordering tests/medicine/procedures, independently interpreting results, documenting clinical information in EHR, and counseling/education of patient/family/caregiver (excluding time spent on other separate services such as performing procedures or test interpretation, if applicable). (Level 4 30-39 minutes; Level 5 40-54 minutes)    Alessandro Constantino MD  Note electronically signed    CC: Franky Carrington MD

## 2024-10-29 ENCOUNTER — OUTSIDE FACILITY SERVICE (OUTPATIENT)
Dept: CARDIOLOGY | Facility: CLINIC | Age: 74
End: 2024-10-29
Payer: MEDICARE

## 2024-10-29 PROCEDURE — 99222 1ST HOSP IP/OBS MODERATE 55: CPT | Performed by: INTERNAL MEDICINE

## 2024-10-30 ENCOUNTER — OUTSIDE FACILITY SERVICE (OUTPATIENT)
Dept: CARDIOLOGY | Facility: CLINIC | Age: 74
End: 2024-10-30
Payer: MEDICARE

## 2024-10-30 PROCEDURE — 99232 SBSQ HOSP IP/OBS MODERATE 35: CPT | Performed by: INTERNAL MEDICINE

## 2024-11-26 ENCOUNTER — TELEPHONE (OUTPATIENT)
Dept: CARDIOLOGY | Facility: CLINIC | Age: 74
End: 2024-11-26
Payer: MEDICARE

## 2024-11-26 NOTE — TELEPHONE ENCOUNTER
Caller: Gautam Vazquezie NOT ON [facility], NO INFO GIVEN     Relationship to patient: Emergency Contact    Best call back number: [phone]     Chief complaint: PT SEES [facility] IN [place], BUT SAW DR. HERNANDEZ IN THE HOS. PT HAD STRESS TEST WITH [facility] AND IS REQUESTING THAT DR. HERNANDEZ READ THIS FOR HIM/ WANTS TO ESTABLISH CARE US WITH. PT WAS SEEN BY PROVIDER @ [facility] AROUND THE END OF SEPT.     PT ALSO HAD AN ECHO AT THE BEGINNING OF THE YEAR THAT WAS NOT GOOD EITHER PER CALLER, THINKS HEART WAS FUNCTIONING AT 20%     Type of visit: F/U     Requested date: ASAP      If rescheduling, when is the original appointment: N/A

## 2024-11-26 NOTE — TELEPHONE ENCOUNTER
LVM FOR PT TO CALL BACK AND SCHEDULE APPOINTMENT     HUB CAN SCHEDULE EARLIEST AVAILABLE APPOINTMENT

## 2025-01-15 ENCOUNTER — HOSPITAL ENCOUNTER (OUTPATIENT)
Dept: CARDIOLOGY | Facility: HOSPITAL | Age: 75
Discharge: HOME | End: 2025-01-15
Attending: INTERNAL MEDICINE | Admitting: INTERNAL MEDICINE
Payer: MEDICARE

## 2025-01-15 VITALS
HEIGHT: 67 IN | OXYGEN SATURATION: 96 % | SYSTOLIC BLOOD PRESSURE: 139 MMHG | RESPIRATION RATE: 16 BRPM | BODY MASS INDEX: 34.33 KG/M2 | WEIGHT: 218.7 LBS | TEMPERATURE: 97 F | HEART RATE: 71 BPM | DIASTOLIC BLOOD PRESSURE: 84 MMHG

## 2025-01-15 DIAGNOSIS — I48.11 LONGSTANDING PERSISTENT ATRIAL FIBRILLATION: ICD-10-CM

## 2025-01-15 LAB
ANION GAP SERPL CALCULATED.3IONS-SCNC: 9 MMOL/L (ref 5–15)
BUN SERPL-MCNC: 28 MG/DL (ref 8–23)
BUN/CREAT SERPL: 20.7 (ref 7–25)
CALCIUM SPEC-SCNC: 9.3 MG/DL (ref 8.6–10.5)
CHLORIDE SERPL-SCNC: 107 MMOL/L (ref 98–107)
CO2 SERPL-SCNC: 27 MMOL/L (ref 22–29)
CREAT SERPL-MCNC: 1.35 MG/DL (ref 0.76–1.27)
DEPRECATED RDW RBC AUTO: 52.3 FL (ref 37–54)
EGFRCR SERPLBLD CKD-EPI 2021: 54.8 ML/MIN/1.73
ERYTHROCYTE [DISTWIDTH] IN BLOOD BY AUTOMATED COUNT: 15.4 % (ref 12.3–15.4)
GLUCOSE SERPL-MCNC: 111 MG/DL (ref 65–99)
HCT VFR BLD AUTO: 46.6 % (ref 37.5–51)
HGB BLD-MCNC: 14.5 G/DL (ref 13–17.7)
MCH RBC QN AUTO: 28.8 PG (ref 26.6–33)
MCHC RBC AUTO-ENTMCNC: 31.1 G/DL (ref 31.5–35.7)
MCV RBC AUTO: 92.5 FL (ref 79–97)
PLATELET # BLD AUTO: 166 10*3/MM3 (ref 140–450)
PMV BLD AUTO: 12.2 FL (ref 6–12)
POTASSIUM SERPL-SCNC: 4.6 MMOL/L (ref 3.5–5.2)
RBC # BLD AUTO: 5.04 10*6/MM3 (ref 4.14–5.8)
SODIUM SERPL-SCNC: 143 MMOL/L (ref 136–145)
WBC NRBC COR # BLD AUTO: 7.3 10*3/MM3 (ref 3.4–10.8)

## 2025-01-15 PROCEDURE — 80048 BASIC METABOLIC PNL TOTAL CA: CPT | Performed by: INTERNAL MEDICINE

## 2025-01-15 PROCEDURE — 93005 ELECTROCARDIOGRAM TRACING: CPT | Performed by: INTERNAL MEDICINE

## 2025-01-15 PROCEDURE — 92960 CARDIOVERSION ELECTRIC EXT: CPT

## 2025-01-15 PROCEDURE — 36415 COLL VENOUS BLD VENIPUNCTURE: CPT

## 2025-01-15 PROCEDURE — 85027 COMPLETE CBC AUTOMATED: CPT | Performed by: INTERNAL MEDICINE

## 2025-01-15 RX ORDER — ROSUVASTATIN CALCIUM 40 MG/1
40 TABLET, COATED ORAL NIGHTLY
COMMUNITY

## 2025-01-15 RX ORDER — FENTANYL CITRATE 50 UG/ML
50-200 INJECTION, SOLUTION INTRAMUSCULAR; INTRAVENOUS ONCE AS NEEDED
Status: DISCONTINUED | OUTPATIENT
Start: 2025-01-15 | End: 2025-01-15 | Stop reason: HOSPADM

## 2025-01-15 RX ORDER — NALOXONE HCL 0.4 MG/ML
0.4 VIAL (ML) INJECTION ONCE AS NEEDED
Status: DISCONTINUED | OUTPATIENT
Start: 2025-01-15 | End: 2025-01-15 | Stop reason: HOSPADM

## 2025-01-15 RX ORDER — ETOMIDATE 2 MG/ML
.05-.2 INJECTION INTRAVENOUS AS NEEDED
Status: DISCONTINUED | OUTPATIENT
Start: 2025-01-15 | End: 2025-01-15 | Stop reason: HOSPADM

## 2025-01-15 RX ORDER — SEMAGLUTIDE 1.34 MG/ML
0.5 INJECTION, SOLUTION SUBCUTANEOUS WEEKLY
COMMUNITY

## 2025-01-15 RX ORDER — POTASSIUM CHLORIDE 1500 MG/1
20 TABLET, EXTENDED RELEASE ORAL DAILY
COMMUNITY

## 2025-01-15 RX ORDER — FENTANYL CITRATE 50 UG/ML
50-100 INJECTION, SOLUTION INTRAMUSCULAR; INTRAVENOUS ONCE AS NEEDED
Status: DISCONTINUED | OUTPATIENT
Start: 2025-01-15 | End: 2025-01-15

## 2025-01-15 RX ORDER — FLUMAZENIL 0.1 MG/ML
0.5 INJECTION INTRAVENOUS ONCE AS NEEDED
Status: DISCONTINUED | OUTPATIENT
Start: 2025-01-15 | End: 2025-01-15 | Stop reason: HOSPADM

## 2025-01-15 RX ORDER — ETOMIDATE 2 MG/ML
INJECTION INTRAVENOUS
Status: COMPLETED | OUTPATIENT
Start: 2025-01-15 | End: 2025-01-15

## 2025-01-15 RX ORDER — AMIODARONE HYDROCHLORIDE 200 MG/1
200 TABLET ORAL DAILY
Qty: 30 TABLET | Refills: 6 | Status: SHIPPED | OUTPATIENT
Start: 2025-01-15

## 2025-01-15 RX ORDER — DAPAGLIFLOZIN 10 MG/1
10 TABLET, FILM COATED ORAL EVERY MORNING
COMMUNITY

## 2025-01-15 RX ORDER — MIDAZOLAM HYDROCHLORIDE 1 MG/ML
2-20 INJECTION, SOLUTION INTRAMUSCULAR; INTRAVENOUS ONCE AS NEEDED
Status: DISCONTINUED | OUTPATIENT
Start: 2025-01-15 | End: 2025-01-15 | Stop reason: HOSPADM

## 2025-01-15 RX ORDER — ALLOPURINOL 300 MG/1
300 TABLET ORAL NIGHTLY
COMMUNITY

## 2025-01-15 RX ORDER — GABAPENTIN 400 MG/1
400 CAPSULE ORAL 2 TIMES DAILY
COMMUNITY

## 2025-01-15 RX ORDER — TRAZODONE HYDROCHLORIDE 50 MG/1
50 TABLET, FILM COATED ORAL NIGHTLY
COMMUNITY

## 2025-01-15 RX ORDER — LEVOTHYROXINE SODIUM 112 UG/1
112 TABLET ORAL
COMMUNITY

## 2025-01-15 RX ADMIN — ETOMIDATE 9.92 MG: 40 INJECTION, SOLUTION INTRAVENOUS at 08:01

## 2025-01-15 NOTE — H&P
[facility] - History & Physical     Antony Monahan  1950  2507/1      01/15/25      Identification:  Antony Monahan is a 74 y.o. male.      PCP:  Franky Carrington MD        Chief Complaint: Persistent A-fib    Allergies:  has No Known Allergies.    Medications Prior to Admission   Medication Sig Dispense Refill Last Dose/Taking    allopurinol (ZYLOPRIM) 300 MG tablet Take 1 tablet by mouth Every Night.   1/14/2025    amiodarone (PACERONE) 400 MG tablet Take 1 tablet by mouth Daily. (Patient taking differently: Take 1 tablet by mouth Daily.) 30 tablet 6 1/14/2025    apixaban (ELIQUIS) 5 MG tablet tablet Take 1 tablet by mouth Every 12 (Twelve) Hours. 60 tablet  1/15/2025 Morning    aspirin 81 MG EC tablet Take 1 tablet by mouth Daily.   Past Week    dapagliflozin Propanediol (Farxiga) 10 MG tablet Take 10 mg by mouth Every Morning.   1/15/2025 Morning    gabapentin (NEURONTIN) 400 MG capsule Take 1 capsule by mouth 2 (Two) Times a Day.   1/14/2025    HYDROcodone-acetaminophen (NORCO)  MG per tablet Take 1 tablet by mouth Every 6 (Six) Hours As Needed for Moderate Pain.   1/14/2025    levothyroxine (SYNTHROID, LEVOTHROID) 112 MCG tablet Take 1 tablet by mouth Every Morning.   1/14/2025    potassium chloride (KLOR-CON M20) 20 MEQ CR tablet Take 1 tablet by mouth Daily.   1/14/2025    rosuvastatin (CRESTOR) 40 MG tablet Take 1 tablet by mouth Every Night.   1/14/2025    traZODone (DESYREL) 50 MG tablet Take 1 tablet by mouth Every Night.   1/14/2025    albuterol sulfate  (90 Base) MCG/ACT inhaler Inhale 2 puffs Every 4 (Four) Hours As Needed for Wheezing. (Patient not taking: Reported on 7/26/2024) 18 g 11     ALBUTEROL SULFATE IN Inhale 1 puff Daily. HFA 90mcg (Patient not taking: Reported on 7/26/2024)       ascorbic acid (VITAMIN C) 500 MG tablet Take 500 mg by mouth Daily. with food (Patient not taking: Reported on 7/26/2024)       budesonide-formoterol (SYMBICORT)  80-4.5 MCG/ACT inhaler Inhale 2 puffs 2 (Two) Times a Day.       bumetanide (BUMEX) 2 MG tablet Take 1 tablet by mouth 2 (Two) Times a Day. (Patient taking differently: Take 1 tablet by mouth Daily. 1.5 daily) 60 tablet 6     carvedilol (COREG) 25 MG tablet Take 37.5 mg by mouth 2 (Two) Times a Day With Meals. Take 1 and 1/2 tables by mouth twice a day (Patient not taking: Reported on 7/26/2024)       citalopram (CeleXA) 10 MG tablet Take 1 tablet by mouth Daily.   1/14/2025    doxycycline (MONODOX) 100 MG capsule Take 100 mg by mouth 2 (Two) Times a Day. (Patient not taking: Reported on 7/26/2024)       empagliflozin (Jardiance) 10 MG tablet tablet Take  by mouth Daily.       ezetimibe (ZETIA) 10 MG tablet Take 1 tablet by mouth Every Night.   1/14/2025    nitroglycerin (NITROSTAT) 0.4 MG SL tablet Place 1 tablet under the tongue Every 5 (Five) Minutes As Needed for Chest Pain. Take no more than 3 doses in 15 minutes.   Unknown    pantoprazole (PROTONIX) 40 MG EC tablet Take 1 tablet by mouth Every Morning.   1/14/2025    predniSONE (DELTASONE) 10 MG tablet Take 2 tablets by mouth once daily for the next two days, then take 1 tablet daily for two day. Then stop 6 tablet 0     pregabalin (LYRICA) 100 MG capsule Take 1 capsule by mouth 2 (Two) Times a Day.       sacubitril-valsartan (Entresto) 24-26 MG tablet Take 0.5 tablets by mouth As Needed (AS NEEDED FOR BP ISSUES).   Unknown    spironolactone (ALDACTONE) 25 MG tablet Take 1 tablet by mouth Daily.       Trelegy Ellipta 100-62.5-25 MCG/INH aerosol powder  Inhale 1 puff Daily. (Patient not taking: Reported on 7/26/2024)       triamcinolone (KENALOG) 0.1 % cream Apply 1 application topically to the appropriate area as directed 2 (Two) Times a Day. (Patient not taking: Reported on 7/26/2024)       vitamin D3 125 MCG (5000 UT) capsule capsule Take 1 capsule by mouth Daily.   1/14/2025              HPI:  Antony LYNN Hero is a 74-year-old  CM with PMHx  persistent A-fib, SSS with [employer] [facility] dual-chamber ICD, chronic HFpEF, obesity, COPD with former tobacco abuse.  He presents today to undergo elective ECV for his arrhythmia.  He was recently seen in the office for follow-up and device interrogation.  His chief complaint was persistent SOA, worse with exertion as well as palpitations.  He has been under a lot of stress taking care of his wife who suffers from dementia and just does not feel like he has the same amount of energy.          Social History     Socioeconomic History    Marital status:    Tobacco Use    Smoking status: Former     Current packs/day: 0.00     Average packs/day: 2.0 packs/day for 50.0 years (100.0 ttl pk-yrs)     Types: Cigarettes     Start date:      Quit date:      Years since quittin.0    Smokeless tobacco: Never   Vaping Use    Vaping status: Never Used   Substance and Sexual Activity    Alcohol use: Yes     Alcohol/week: 2.0 standard drinks of alcohol     Types: 2 Cans of beer per week     Comment: 2-3 Beers per Week    Drug use: No    Sexual activity: Defer     Family History   Problem Relation Age of Onset    Hypertension Mother     Hypertension Father     Heart failure Father     Heart attack Father     Diabetes Sister     Arrhythmia Sister      Past Surgical History:   Procedure Laterality Date    AORTAGRAM Left 3/30/2022    Procedure: ABDOMINAL AORTAGRAM WITH RUNOFF,  LEFT EXTERNAL ILIAC DRUG ELUTING BALLOON ANGIOPLASTY, LEFT INTERNAL ILIAC STENT, LEFT LEG ANGIOGRAM;  Surgeon: Mono Marley MD;  Location: [email or web address]  Service: Vascular;  Laterality: Left;    CARDIAC CATHETERIZATION N/A 2016    Procedure: Left Heart Cath;  Surgeon: Drew Gurrola MD;  Location:  [facility];  Service:     CARDIAC CATHETERIZATION N/A 2019    Procedure: LEFT HEART CATH;  Surgeon: Drew Gurrola MD;  Location:  [facility];  Service: Cardiovascular    CARDIAC  CATHETERIZATION N/A 06/03/2021    Procedure: LEFT HEART CATH;  Surgeon: Drew Gurrola MD;  Location:  [facility];  Service: Cardiovascular;  Laterality: N/A;    CARDIAC ELECTROPHYSIOLOGY PROCEDURE N/A 07/30/2019    Procedure: ICD SC new;  Surgeon: Musa Harden MD;  Location:  [facility];  Service: Cardiology    COLONOSCOPY      INTERVENTIONAL RADIOLOGY PROCEDURE N/A 09/14/2016    Procedure: AORTIC Aortagram with Runoff;  Surgeon: Drew Gurrola MD;  Location:  [facility];  Service:     INTERVENTIONAL RADIOLOGY PROCEDURE N/A 05/09/2017    Procedure: Abdominal Aortagram with Runoff;  Surgeon: Drew Gurrola MD;  Location:  [facility];  Service:     INTERVENTIONAL RADIOLOGY PROCEDURE N/A 09/05/2018    Procedure: Abdominal Aortagram with Runoff;  Surgeon: Drew Gurrola MD;  Location:  [facility];  Service: Cardiovascular    INTERVENTIONAL RADIOLOGY PROCEDURE Bilateral 07/29/2019    Procedure: Carotid Cerebral Angiogram;  Surgeon: Claude Atkins MD;  Location:  [facility];  Service: Interventional Radiology    INTERVENTIONAL RADIOLOGY PROCEDURE N/A 08/18/2020    Procedure: AARO+/-;  Surgeon: Drew Gurrola MD;  Location:  [facility];  Service: Cardiovascular;  Laterality: N/A;    INTERVENTIONAL RADIOLOGY PROCEDURE Bilateral 11/20/2020    Procedure: Carotid Cerebral Angiogram with stent;  Surgeon: Claude Atkins MD;  Location:  [facility];  Service: Interventional Radiology;  Laterality: Bilateral;    KNEE SURGERY Left 08/17/2015    OTHER SURGICAL HISTORY      L LEG STENT    ID TCAT IV STENT CRV CRTD ART EMBOLIC PROTECJ N/A 09/25/2019    Left Carotid Stent; Dr. Claude Atkins    UMBILICAL HERNIA REPAIR  2006    no mesh, done in [facility]    WRIST SURGERY Right        Review of Systems:  Pertinent positives are listed above and in  "physical exam.  All others have been reviewed and are negative.     Objective:   Vitals:   height is 170.2 cm (67\") and weight is 99.2 kg (218 lb 11.1 oz). His tympanic temperature is 97 °F (36.1 °C). His blood pressure is 167/87 and his pulse is 83. His respiration is 16 and oxygen saturation is 92%.  No intake or output data in the 24 hours ending 01/15/25 0817      Physical Exam:  General Appearance:    Alert, cooperative, in no acute distress   Head:    Normocephalic, without obvious abnormality, atraumatic   Eyes:            Lids and lashes normal, conjunctivae and sclerae normal, no   icterus, no pallor, corneas clear, PERRLA   Ears:    Ears appear intact with no abnormalities noted   Throat:   No oral lesions, no thrush, oral mucosa moist   Neck:   No adenopathy, supple, trachea midline, no thyromegaly, no carotid bruit, no JVD   Back:     No kyphosis present, no scoliosis present, no skin lesions,   erythema or scars, no tenderness to percussion or                  palpation,  range of motion normal   Lungs:     Clear to auscultation,respirations regular, even and               unlabored    Heart:    Irregular rhythm and normal rate, normal S1 and S2, no         murmur, no gallop, no rub, no click   Abdomen:     Normal bowel sounds, no masses, no organomegaly, soft     nontender, nondistended, no guarding, no rebound               tenderness   Extremities:   Moves all extremities well,  no cyanosis, no redness, no edema   Pulses:   Pulses palpable and equal bilaterally   Skin:   No bleeding, bruising or rash   Lymph nodes:   No palpable adenopathy   Neurologic:   Cranial nerves 2 - 12 grossly intact, sensation intact, DTR    present and equal bilaterally          Results Review:  I personally reviewed the patient's clinical results.    Results from last 7 days   Lab Units 01/15/25  0740   WBC 10*3/mm3 7.30   HEMOGLOBIN g/dL 14.5   HEMATOCRIT % 46.6   PLATELETS 10*3/mm3 166           Invalid input(s): " "\"LABALBU\", \"PROT\"                        Radiology:  Imaging Results (Last 72 Hours)       ** No results found for the last 72 hours. **            Tele: Atrial fibrillation    Assessment and Plan:    1.  74-year-old CM with persistent A-fib presents today to undergo elective ECV under the care of Dr. Mcfarland.  Risk and benefits have been reviewed with the patient and the daughter and he is willing to proceed.  Further recommendations based on outcomes.        I discussed the patient's findings and my recommendations with the patient, any present family members, and the nursing staff.  Dandre Mcfarland MD saw and examined patient, verified hx and PE, read all radiographic studies, reviewed labs and micro data, and formulated dx, plan for treatment and all medical decision making.      Na Elkins PA-C for Dandre Mcfarland MD  01/15/25 08:17 EST      "

## 2025-01-17 LAB
QT INTERVAL: 420 MS
QTC INTERVAL: 462 MS

## 2025-03-27 ENCOUNTER — TRANSCRIBE ORDERS (OUTPATIENT)
Dept: ADMINISTRATIVE | Facility: HOSPITAL | Age: 75
End: 2025-03-27
Payer: MEDICARE

## 2025-03-27 DIAGNOSIS — M54.50 LUMBAR PAIN: Primary | ICD-10-CM

## 2025-05-30 ENCOUNTER — OFFICE VISIT (OUTPATIENT)
Dept: CARDIOLOGY | Facility: CLINIC | Age: 75
End: 2025-05-30
Payer: MEDICARE

## 2025-05-30 VITALS
WEIGHT: 203.6 LBS | OXYGEN SATURATION: 97 % | BODY MASS INDEX: 31.96 KG/M2 | SYSTOLIC BLOOD PRESSURE: 110 MMHG | HEIGHT: 67 IN | HEART RATE: 81 BPM | DIASTOLIC BLOOD PRESSURE: 50 MMHG

## 2025-05-30 DIAGNOSIS — I47.20 VT (VENTRICULAR TACHYCARDIA): ICD-10-CM

## 2025-05-30 DIAGNOSIS — I25.5 ISCHEMIC CARDIOMYOPATHY: Primary | Chronic | ICD-10-CM

## 2025-05-30 DIAGNOSIS — I48.19 ATRIAL FIBRILLATION, PERSISTENT: ICD-10-CM

## 2025-05-30 RX ORDER — METOPROLOL SUCCINATE 25 MG/1
12.5 TABLET, EXTENDED RELEASE ORAL DAILY
COMMUNITY
Start: 2025-03-18

## 2025-05-30 RX ORDER — METHOCARBAMOL 500 MG/1
500 TABLET, FILM COATED ORAL 4 TIMES DAILY
COMMUNITY

## 2025-05-30 RX ORDER — SPIRONOLACTONE 25 MG/1
25 TABLET ORAL 2 TIMES DAILY
COMMUNITY

## 2025-05-30 RX ORDER — RANOLAZINE 500 MG/1
500 TABLET, EXTENDED RELEASE ORAL 2 TIMES DAILY
COMMUNITY
Start: 2025-03-18

## 2025-05-30 NOTE — PROGRESS NOTES
Cardiac Electrophysiology Outpatient Consult Note            [facility]    Consult Note     Antony Monahan  [id number]  05/30/2025  [phone]     Primary Care Physician: Franky Carrington MD    Referred By: Drew Gurrola MD    Subjective     Chief Complaint:   Diagnoses and all orders for this visit:    1. Ischemic cardiomyopathy (EF 21-25%) (Primary)    2. VT (ventricular tachycardia)    3. Atrial fibrillation, persistent      Chief Complaint   Patient presents with    VT (ventricular tachycardia       History of Present Illness:   Antony Monahan is a 75 y.o. male who presents to my electrophysiology clinic for evaluation of above referred by Dr. Mcfarland.  Rhythm control.  .      Past Medical History:   Past Medical History:   Diagnosis Date    Anxiety     Arthritis     Back pain     past injections, years ago    Borderline diabetes     CAD (coronary artery disease)     Chronic narcotic use     prn    COPD (chronic obstructive pulmonary disease) 07/29/2016    Depression     intermittent    Diabetes mellitus     checsk sugar once per week    Full dentures     Headache     HTN (hypertension) 07/29/2016    Hypercholesterolemia 07/29/2016    Hypothyroid 07/29/2016    Joint pain     hydrocodone prn    Obesity     Pacemaker     PAD (peripheral artery disease) 07/29/2016    SOBOE (shortness of breath on exertion)     Systolic CHF 07/29/2016    Tinnitus     Tobacco abuse 07/29/2016    Wears glasses     readers       Past Surgical History:   Past Surgical History:   Procedure Laterality Date    AORTOGRAM Left 03/30/2022    Procedure: ABDOMINAL AORTAGRAM WITH RUNOFF,  LEFT EXTERNAL ILIAC DRUG ELUTING BALLOON ANGIOPLASTY, LEFT INTERNAL ILIAC STENT, LEFT LEG ANGIOGRAM;  Surgeon: Mono Marley MD;  Location: [facility];  Service: Vascular;  Laterality: Left;    CARDIAC CATHETERIZATION N/A 07/29/2016    Procedure: Left Heart Cath;  Surgeon: Drew Salas  MD Izabela;  Location:  [facility];  Service:     CARDIAC CATHETERIZATION N/A 07/29/2019    Procedure: LEFT HEART CATH;  Surgeon: Drew Gurrola MD;  Location:  [facility];  Service: Cardiovascular    CARDIAC CATHETERIZATION N/A 06/03/2021    Procedure: LEFT HEART CATH;  Surgeon: Drew Gurrola MD;  Location:  [facility];  Service: Cardiovascular;  Laterality: N/A;    CARDIAC ELECTROPHYSIOLOGY PROCEDURE N/A 07/30/2019    Procedure: ICD SC new;  Surgeon: Musa Harden MD;  Location:  [facility];  Service: Cardiology    CARDIOVERSION  01/15/2025    TODAY 1/15/25 AND ON  7/12/21    COLONOSCOPY      INTERVENTIONAL RADIOLOGY PROCEDURE N/A 09/14/2016    Procedure: AORTIC Aortagram with Runoff;  Surgeon: Drew Gurrola MD;  Location:  [facility];  Service:     INTERVENTIONAL RADIOLOGY PROCEDURE N/A 05/09/2017    Procedure: Abdominal Aortagram with Runoff;  Surgeon: Drew Gurrola MD;  Location:  [facility];  Service:     INTERVENTIONAL RADIOLOGY PROCEDURE N/A 09/05/2018    Procedure: Abdominal Aortagram with Runoff;  Surgeon: Drew Gurrola MD;  Location:  [facility];  Service: Cardiovascular    INTERVENTIONAL RADIOLOGY PROCEDURE Bilateral 07/29/2019    Procedure: Carotid Cerebral Angiogram;  Surgeon: Claude Atkins MD;  Location:  [facility];  Service: Interventional Radiology    INTERVENTIONAL RADIOLOGY PROCEDURE N/A 08/18/2020    Procedure: AARO+/-;  Surgeon: Drew Gurrola MD;  Location:  [facility];  Service: Cardiovascular;  Laterality: N/A;    INTERVENTIONAL RADIOLOGY PROCEDURE Bilateral 11/20/2020    Procedure: Carotid Cerebral Angiogram with stent;  Surgeon: Claude Atkins MD;  Location:  [facility];  Service: Interventional Radiology;  Laterality: Bilateral;    KNEE SURGERY Left 08/17/2015     OTHER SURGICAL HISTORY      L LEG STENT    AK TCAT IV STENT CRV CRTD ART EMBOLIC PROTECJ N/A 2019    Left Carotid Stent; Dr. Arce Given    UMBILICAL HERNIA REPAIR      no mesh, done in [place]    WRIST SURGERY Right        Family History:   Family History   Problem Relation Age of Onset    Hypertension Mother     Hypertension Father     Heart failure Father     Heart attack Father     Diabetes Sister     Arrhythmia Sister        Social History:   Social History     Socioeconomic History    Marital status:    Tobacco Use    Smoking status: Former     Current packs/day: 0.00     Average packs/day: 2.0 packs/day for 50.0 years (100.0 ttl pk-yrs)     Types: Cigarettes     Start date:      Quit date:      Years since quittin.4    Smokeless tobacco: Never   Vaping Use    Vaping status: Never Used   Substance and Sexual Activity    Alcohol use: Not Currently     Alcohol/week: 2.0 standard drinks of alcohol     Types: 2 Cans of beer per week     Comment: 2-3 Beers per Week    Drug use: Never    Sexual activity: Defer       Medications:     Current Outpatient Medications:     albuterol sulfate  (90 Base) MCG/ACT inhaler, Inhale 2 puffs Every 4 (Four) Hours As Needed for Wheezing., Disp: 18 g, Rfl: 11    allopurinol (ZYLOPRIM) 300 MG tablet, Take 1 tablet by mouth Every Night., Disp: , Rfl:     amiodarone (Pacerone) 200 MG tablet, Take 1 tablet by mouth Daily., Disp: 30 tablet, Rfl: 6    apixaban (ELIQUIS) 5 MG tablet tablet, Take 1 tablet by mouth Every 12 (Twelve) Hours., Disp: 60 tablet, Rfl:     ascorbic acid (VITAMIN C) 500 MG tablet, Take 1 tablet by mouth Every Night. with food, Disp: , Rfl:     aspirin 81 MG EC tablet, Take 1 tablet by mouth Daily., Disp: , Rfl:     bumetanide (BUMEX) 2 MG tablet, Take 1 tablet by mouth 2 (Two) Times a Day. (Patient taking differently: Take 1 tablet by mouth As Needed (swelling).), Disp: 60 tablet, Rfl: 6    citalopram (CeleXA) 10 MG  tablet, Take 1 tablet by mouth Daily., Disp: , Rfl:     dapagliflozin Propanediol (Farxiga) 10 MG tablet, Take 10 mg by mouth Every Morning., Disp: , Rfl:     ezetimibe (ZETIA) 10 MG tablet, Take 1 tablet by mouth Every Night., Disp: , Rfl:     gabapentin (NEURONTIN) 400 MG capsule, Take 1 capsule by mouth 2 (Two) Times a Day., Disp: , Rfl:     HYDROcodone-acetaminophen (NORCO)  MG per tablet, Take 1 tablet by mouth Every 6 (Six) Hours As Needed for Moderate Pain., Disp: , Rfl:     levothyroxine (SYNTHROID, LEVOTHROID) 112 MCG tablet, Take 1 tablet by mouth Every Morning., Disp: , Rfl:     methocarbamol (ROBAXIN) 500 MG tablet, Take 1 tablet by mouth 4 (Four) Times a Day., Disp: , Rfl:     metoprolol succinate XL (TOPROL-XL) 25 MG 24 hr tablet, Take 0.5 tablets by mouth Daily., Disp: , Rfl:     nitroglycerin (NITROSTAT) 0.4 MG SL tablet, Place 1 tablet under the tongue Every 5 (Five) Minutes As Needed for Chest Pain. Take no more than 3 doses in 15 minutes., Disp: , Rfl:     pantoprazole (PROTONIX) 40 MG EC tablet, Take 1 tablet by mouth Every Morning., Disp: , Rfl:     potassium chloride (KLOR-CON M20) 20 MEQ CR tablet, Take 1 tablet by mouth Daily., Disp: , Rfl:     ranolazine (RANEXA) 500 MG 12 hr tablet, Take 1 tablet by mouth 2 (Two) Times a Day., Disp: , Rfl:     rosuvastatin (CRESTOR) 40 MG tablet, Take 1 tablet by mouth Every Night., Disp: , Rfl:     sacubitril-valsartan (Entresto) 24-26 MG tablet, Take 0.5 tablets by mouth As Needed (AS NEEDED FOR BP ISSUES)., Disp: , Rfl:     Semaglutide,0.25 or 0.5MG/DOS, (Ozempic, 0.25 or 0.5 MG/DOSE,) 2 MG/1.5ML solution pen-injector, Inject 0.5 mg under the skin into the appropriate area as directed 1 (One) Time Per Week. (Patient taking differently: Inject 1 mg under the skin into the appropriate area as directed 1 (One) Time Per Week.), Disp: , Rfl:     spironolactone (ALDACTONE) 25 MG tablet, 1 tablet 2 (Two) Times a Day., Disp: , Rfl:     traZODone (DESYREL)  "50 MG tablet, Take 1 tablet by mouth Every Night., Disp: , Rfl:     vitamin D3 125 MCG (5000 UT) capsule capsule, Take 1 capsule by mouth Daily., Disp: , Rfl:     cephalexin (KEFLEX) 500 MG capsule, Take 1 capsule by mouth 4 (Four) Times a Day. (Patient not taking: Reported on 5/30/2025), Disp: 40 capsule, Rfl: 0    Allergies:   No Known Allergies    Objective   Vital Signs:   Vitals:    05/30/25 1458   BP: 110/50   BP Location: Right arm   Patient Position: Sitting   Cuff Size: Adult   Pulse: 81   SpO2: 97%   Weight: 92.4 kg (203 lb 9.6 oz)   Height: 170.2 cm (67\")       PHYSICAL EXAM  General appearance: Awake, alert, cooperative  Head: Normocephalic, without obvious abnormality, atraumatic  Eyes: Conjunctivae/corneas clear, EOMs intact  Neck: no adenopathy, no carotid bruit, no JVD, and thyroid: not enlarged  Lungs: clear to auscultation bilaterally and no rhonchi or crackles\", ' symmetric  Heart: irregularly irregular rhythm  Abdomen: Soft, non-tender, bowel sounds normal,  no organomegaly  Extremities: extremities normal, atraumatic, no cyanosis or edema  Skin: Skin color, turgor normal, no rashes or lesions  Neurologic: Grossly normal     Lab Results   Component Value Date    GLUCOSE 111 (H) 01/15/2025    CALCIUM 9.3 01/15/2025     01/15/2025    K 4.6 01/15/2025    CO2 27.0 01/15/2025     01/15/2025    BUN 28 (H) 01/15/2025    CREATININE 1.35 (H) 01/15/2025    EGFRIFAFRI 45 04/19/2023    EGFRIFNONA 36 (L) 07/12/2021    BCR 20.7 01/15/2025    ANIONGAP 9.0 01/15/2025     Lab Results   Component Value Date    WBC 7.30 01/15/2025    HGB 14.5 01/15/2025    HCT 46.6 01/15/2025    MCV 92.5 01/15/2025     01/15/2025     Lab Results   Component Value Date    INR 1.05 03/28/2022    INR 1.02 07/26/2019    PROTIME 13.4 03/28/2022    PROTIME 12.9 07/26/2019     Lab Results   Component Value Date    TSH 0.682 05/02/2022       Cardiac Testing:      I personally viewed and interpreted the patient's " EKG/Telemetry/lab data    Procedures    Tobacco Cessation: N/A  Obstructive Sleep Apnea Screening: Completed    Advance Care Planning   ACP discussion was declined by the patient. Patient does not have an advance directive, declines further assistance.       Assessment & Plan    Diagnoses and all orders for this visit:    1. Ischemic cardiomyopathy (EF 21-25%) (Primary)    2. VT (ventricular tachycardia)    3. Atrial fibrillation, persistent         Diagnosis Plan   1. Ischemic cardiomyopathy (EF 21-25%)  Stable.  Euvolemic.  Near card association functional class III.      2. VT (ventricular tachycardia)  Dual-chamber [employer] [facility] ICD system in situ.      This patient's Cardiac Implanted Electronic Device was manually interrogated and reprogrammed during the patient encounter today.  Iterative programming changes were manually made to determine the sensing threshold, pacing threshold, lead impedance as well as underlying cardiac rhythm.  These programming changes were not limited to but included some or all of the following when appropriate: pacing mode, programmed AV delays, blanking periods, and refractory periods.  Data obtained as a result of these manual programing changes informed the patient's CIED permanent programming.        3. Atrial fibrillation, persistent  75-year-old gentleman with symptomatic persistent atrial fibrillation.    He referred by his cardiologist Dr. Rene.    Lengthy conversation with the patient about options which include further rhythm control strategy with left atrial ablation to eliminate his A-fib.  We also discussed the option of definitive rate control which would involve AV node ablation and upgrade to resynchronization ICD system.    Lengthy conversation with the patient about both of these above options.  After careful review and discussion back-and-forth including his daughter also we made a shared decision that the patient would be best served by A-fib ablation to eliminate  his A-fib that is to say a PVA.    I reviewed the specific risk-benefit profile the procedure.  I quoted the patient a 1 to 2% chance of significant procedure complication including but not limited to bleeding at the groin, cardiac perforation, tamponade, stroke, myocardial infarction, death phrenic nerve injury, pulmonary vein stenosis, catheter entrapment of the mitral valve annulus, esophageal injury as well as fistula and other potential complications.    Rhythmia  General anesthesia  Preop CT  Do not hold anticoagulation  Stay on amiodarone for now  If he does quite well and we are able to maintain sinus rhythm for a significant period of time we will discontinue amiodarone at that juncture    The patient and I made a mutually shared decision ( shared decision making model ) that the patient would be best served by proceeding with the above invasive heart procedure.  This is a high risk invasive cardiac procedure which comes with significant risk of morbidity and mortality.  This patient has significant underlying  heart disease.  Antony Monahan understands these risks and   has made an informed decision to proceed.            Body mass index is 31.89 kg/m².    I spent 45 minutes in consultation with this patient which included more than 65% of this time in direct face-to-face counseling, physical examination and discussion of my assessment and findings and this shared decision making with the patient.  The remainder of the time not spent face-to-face was performing one, some or all of the following actions: preparing to see the patient (e.g. reviewing tests, prior clinicians' notes, etc), ordering medications, tests or procedures, coordination of care, discussion of the plan with other healthcare providers, documenting clinical information in epic as well as independently interpreting results and communication of these results to the patient family and/or caregiver(s).  Please note that this explicitly  excludes time spent on other separate billable services such as performing procedures or test interpretation, when applicable.    Follow Up:       Thank you for allowing me to participate in the care of your patient. Please to not hesitate to contact me with additional questions or concerns.      Tariq Rowe DO, FACC, RS  Cardiac Electrophysiologist  [facility]

## 2025-06-02 DIAGNOSIS — Z01.818 PREOPERATIVE TESTING: ICD-10-CM

## 2025-06-02 DIAGNOSIS — I48.19 ATRIAL FIBRILLATION, PERSISTENT: Primary | ICD-10-CM

## 2025-06-17 DIAGNOSIS — I48.0 PAF (PAROXYSMAL ATRIAL FIBRILLATION): Primary | ICD-10-CM

## 2025-06-17 RX ORDER — NITROGLYCERIN 0.4 MG/1
0.4 TABLET SUBLINGUAL
OUTPATIENT
Start: 2025-06-17

## 2025-06-17 RX ORDER — ONDANSETRON 2 MG/ML
4 INJECTION INTRAMUSCULAR; INTRAVENOUS EVERY 6 HOURS PRN
OUTPATIENT
Start: 2025-06-17

## 2025-06-17 RX ORDER — ACETAMINOPHEN 325 MG/1
650 TABLET ORAL EVERY 4 HOURS PRN
OUTPATIENT
Start: 2025-06-17

## 2025-06-17 RX ORDER — SODIUM CHLORIDE 9 MG/ML
40 INJECTION, SOLUTION INTRAVENOUS AS NEEDED
OUTPATIENT
Start: 2025-06-17

## 2025-08-13 ENCOUNTER — PRE-ADMISSION TESTING (OUTPATIENT)
Dept: PREADMISSION TESTING | Facility: HOSPITAL | Age: 75
End: 2025-08-13
Payer: MEDICARE

## 2025-08-13 ENCOUNTER — HOSPITAL ENCOUNTER (OUTPATIENT)
Dept: CT IMAGING | Facility: HOSPITAL | Age: 75
Discharge: HOME | End: 2025-08-13
Payer: MEDICARE

## 2025-08-13 DIAGNOSIS — I48.0 PAF (PAROXYSMAL ATRIAL FIBRILLATION): ICD-10-CM

## 2025-08-13 DIAGNOSIS — Z01.818 PREOPERATIVE TESTING: ICD-10-CM

## 2025-08-13 DIAGNOSIS — I48.19 ATRIAL FIBRILLATION, PERSISTENT: ICD-10-CM

## 2025-08-13 LAB
ANION GAP SERPL CALCULATED.3IONS-SCNC: 8.3 MMOL/L (ref 5–15)
BUN SERPL-MCNC: 25.7 MG/DL (ref 8–23)
BUN/CREAT SERPL: 19.8 (ref 7–25)
CALCIUM SPEC-SCNC: 8.9 MG/DL (ref 8.6–10.5)
CHLORIDE SERPL-SCNC: 111 MMOL/L (ref 98–107)
CO2 SERPL-SCNC: 24.7 MMOL/L (ref 22–29)
CREAT SERPL-MCNC: 1.3 MG/DL (ref 0.76–1.27)
DEPRECATED RDW RBC AUTO: 56 FL (ref 37–54)
EGFRCR SERPLBLD CKD-EPI 2021: 56.9 ML/MIN/1.73
ERYTHROCYTE [DISTWIDTH] IN BLOOD BY AUTOMATED COUNT: 16.1 % (ref 12.3–15.4)
GLUCOSE SERPL-MCNC: 98 MG/DL (ref 65–99)
HCT VFR BLD AUTO: 47.6 % (ref 37.5–51)
HGB BLD-MCNC: 14.8 G/DL (ref 13–17.7)
MCH RBC QN AUTO: 29.3 PG (ref 26.6–33)
MCHC RBC AUTO-ENTMCNC: 31.1 G/DL (ref 31.5–35.7)
MCV RBC AUTO: 94.3 FL (ref 79–97)
PLATELET # BLD AUTO: 155 10*3/MM3 (ref 140–450)
PMV BLD AUTO: 11.8 FL (ref 6–12)
POTASSIUM SERPL-SCNC: 4.5 MMOL/L (ref 3.5–5.2)
RBC # BLD AUTO: 5.05 10*6/MM3 (ref 4.14–5.8)
SODIUM SERPL-SCNC: 144 MMOL/L (ref 136–145)
WBC NRBC COR # BLD AUTO: 6.67 10*3/MM3 (ref 3.4–10.8)

## 2025-08-13 PROCEDURE — 36415 COLL VENOUS BLD VENIPUNCTURE: CPT

## 2025-08-13 PROCEDURE — 85027 COMPLETE CBC AUTOMATED: CPT

## 2025-08-13 PROCEDURE — 71275 CT ANGIOGRAPHY CHEST: CPT

## 2025-08-13 PROCEDURE — 25510000001 IOPAMIDOL PER 1 ML: Performed by: INTERNAL MEDICINE

## 2025-08-13 PROCEDURE — 80048 BASIC METABOLIC PNL TOTAL CA: CPT

## 2025-08-13 RX ORDER — IOPAMIDOL 755 MG/ML
80 INJECTION, SOLUTION INTRAVASCULAR
Status: COMPLETED | OUTPATIENT
Start: 2025-08-13 | End: 2025-08-13

## 2025-08-13 RX ORDER — SACUBITRIL AND VALSARTAN 49; 51 MG/1; MG/1
1 TABLET, FILM COATED ORAL 2 TIMES DAILY
COMMUNITY

## 2025-08-13 RX ADMIN — IOPAMIDOL 80 ML: 755 INJECTION, SOLUTION INTRAVENOUS at 15:17

## 2025-08-18 ENCOUNTER — ANESTHESIA EVENT (OUTPATIENT)
Dept: CARDIOLOGY | Facility: HOSPITAL | Age: 75
End: 2025-08-18
Payer: MEDICARE

## 2025-08-18 ENCOUNTER — TELEPHONE (OUTPATIENT)
Dept: CARDIOLOGY | Facility: CLINIC | Age: 75
End: 2025-08-18
Payer: MEDICARE

## 2025-08-19 ENCOUNTER — ANESTHESIA (OUTPATIENT)
Dept: CARDIOLOGY | Facility: HOSPITAL | Age: 75
End: 2025-08-19
Payer: MEDICARE

## 2025-08-19 ENCOUNTER — HOSPITAL ENCOUNTER (OUTPATIENT)
Facility: HOSPITAL | Age: 75
Discharge: HOME | End: 2025-08-19
Attending: INTERNAL MEDICINE | Admitting: INTERNAL MEDICINE
Payer: MEDICARE

## 2025-08-19 VITALS
SYSTOLIC BLOOD PRESSURE: 114 MMHG | BODY MASS INDEX: 32.83 KG/M2 | OXYGEN SATURATION: 94 % | HEART RATE: 61 BPM | HEIGHT: 67 IN | RESPIRATION RATE: 22 BRPM | WEIGHT: 209.2 LBS | DIASTOLIC BLOOD PRESSURE: 67 MMHG | TEMPERATURE: 97.1 F

## 2025-08-19 DIAGNOSIS — I48.0 PAF (PAROXYSMAL ATRIAL FIBRILLATION): ICD-10-CM

## 2025-08-19 DIAGNOSIS — I48.19 ATRIAL FIBRILLATION, PERSISTENT: ICD-10-CM

## 2025-08-19 PROCEDURE — 25010000002 BUPIVACAINE 0.5 % SOLUTION: Performed by: INTERNAL MEDICINE

## 2025-08-19 PROCEDURE — 25010000002 DEXAMETHASONE PER 1 MG

## 2025-08-19 PROCEDURE — 25010000002 PROPOFOL 10 MG/ML EMULSION

## 2025-08-19 PROCEDURE — C1733 CATH, EP, OTHR THAN COOL-TIP: HCPCS | Performed by: INTERNAL MEDICINE

## 2025-08-19 PROCEDURE — 25010000002 PROTAMINE SULFATE PER 10 MG: Performed by: INTERNAL MEDICINE

## 2025-08-19 PROCEDURE — C1766 INTRO/SHEATH,STRBLE,NON-PEEL: HCPCS | Performed by: INTERNAL MEDICINE

## 2025-08-19 PROCEDURE — 25010000002 ADENOSINE PER 6 MG: Performed by: INTERNAL MEDICINE

## 2025-08-19 PROCEDURE — 25010000002 PHENYLEPHRINE 10 MG/ML SOLUTION 1 ML VIAL

## 2025-08-19 PROCEDURE — 25010000002 LIDOCAINE PF 1% 1 % SOLUTION: Performed by: INTERNAL MEDICINE

## 2025-08-19 PROCEDURE — 93656 COMPRE EP EVAL ABLTJ ATR FIB: CPT | Performed by: INTERNAL MEDICINE

## 2025-08-19 PROCEDURE — 25010000002 LIDOCAINE 1 % SOLUTION

## 2025-08-19 PROCEDURE — C1894 INTRO/SHEATH, NON-LASER: HCPCS | Performed by: INTERNAL MEDICINE

## 2025-08-19 PROCEDURE — 25010000002 GLYCOPYRROLATE 1 MG/5ML SOLUTION

## 2025-08-19 PROCEDURE — 93655 ICAR CATH ABLTJ DSCRT ARRHYT: CPT | Performed by: INTERNAL MEDICINE

## 2025-08-19 PROCEDURE — 25010000002 ONDANSETRON PER 1 MG

## 2025-08-19 PROCEDURE — C1730 CATH, EP, 19 OR FEW ELECT: HCPCS | Performed by: INTERNAL MEDICINE

## 2025-08-19 PROCEDURE — 25810000003 SODIUM CHLORIDE 0.9 % SOLUTION 250 ML FLEX CONT

## 2025-08-19 PROCEDURE — 25810000003 SODIUM CHLORIDE 0.9 % SOLUTION

## 2025-08-19 PROCEDURE — 93657 TX L/R ATRIAL FIB ADDL: CPT | Performed by: INTERNAL MEDICINE

## 2025-08-19 PROCEDURE — 0897T N-INVAS AUGMNT ARRHYT ALYS: CPT | Performed by: INTERNAL MEDICINE

## 2025-08-19 PROCEDURE — C1759 CATH, INTRA ECHOCARDIOGRAPHY: HCPCS | Performed by: INTERNAL MEDICINE

## 2025-08-19 PROCEDURE — 93005 ELECTROCARDIOGRAM TRACING: CPT | Performed by: ANESTHESIOLOGY

## 2025-08-19 PROCEDURE — 25010000002 HEPARIN (PORCINE) PER 1000 UNITS: Performed by: INTERNAL MEDICINE

## 2025-08-19 PROCEDURE — 25010000002 SUGAMMADEX 200 MG/2ML SOLUTION

## 2025-08-19 PROCEDURE — 93622 COMP EP EVAL L VENTR PAC&REC: CPT | Performed by: INTERNAL MEDICINE

## 2025-08-19 PROCEDURE — 93623 PRGRMD STIMJ&PACG IV RX NFS: CPT | Performed by: INTERNAL MEDICINE

## 2025-08-19 PROCEDURE — 25010000002 FUROSEMIDE PER 20 MG: Performed by: INTERNAL MEDICINE

## 2025-08-19 RX ORDER — PROPOFOL 10 MG/ML
VIAL (ML) INTRAVENOUS AS NEEDED
Status: DISCONTINUED | OUTPATIENT
Start: 2025-08-19 | End: 2025-08-19 | Stop reason: SURG

## 2025-08-19 RX ORDER — ETOMIDATE 2 MG/ML
INJECTION INTRAVENOUS AS NEEDED
Status: DISCONTINUED | OUTPATIENT
Start: 2025-08-19 | End: 2025-08-19 | Stop reason: SURG

## 2025-08-19 RX ORDER — LIDOCAINE HYDROCHLORIDE 10 MG/ML
INJECTION, SOLUTION EPIDURAL; INFILTRATION; INTRACAUDAL; PERINEURAL
Status: DISCONTINUED | OUTPATIENT
Start: 2025-08-19 | End: 2025-08-19 | Stop reason: HOSPADM

## 2025-08-19 RX ORDER — ROCURONIUM BROMIDE 10 MG/ML
INJECTION, SOLUTION INTRAVENOUS AS NEEDED
Status: DISCONTINUED | OUTPATIENT
Start: 2025-08-19 | End: 2025-08-19 | Stop reason: SURG

## 2025-08-19 RX ORDER — MIDAZOLAM HYDROCHLORIDE 1 MG/ML
0.5 INJECTION, SOLUTION INTRAMUSCULAR; INTRAVENOUS
Status: DISCONTINUED | OUTPATIENT
Start: 2025-08-19 | End: 2025-08-19 | Stop reason: HOSPADM

## 2025-08-19 RX ORDER — ONDANSETRON 2 MG/ML
INJECTION INTRAMUSCULAR; INTRAVENOUS AS NEEDED
Status: DISCONTINUED | OUTPATIENT
Start: 2025-08-19 | End: 2025-08-19 | Stop reason: SURG

## 2025-08-19 RX ORDER — ADENOSINE 3 MG/ML
INJECTION, SOLUTION INTRAVENOUS
Status: DISCONTINUED | OUTPATIENT
Start: 2025-08-19 | End: 2025-08-19 | Stop reason: HOSPADM

## 2025-08-19 RX ORDER — SODIUM CHLORIDE 0.9 % (FLUSH) 0.9 %
10 SYRINGE (ML) INJECTION AS NEEDED
Status: DISCONTINUED | OUTPATIENT
Start: 2025-08-19 | End: 2025-08-19 | Stop reason: HOSPADM

## 2025-08-19 RX ORDER — EPHEDRINE SULFATE 50 MG/ML
INJECTION, SOLUTION INTRAVENOUS AS NEEDED
Status: DISCONTINUED | OUTPATIENT
Start: 2025-08-19 | End: 2025-08-19 | Stop reason: SURG

## 2025-08-19 RX ORDER — SODIUM CHLORIDE, SODIUM LACTATE, POTASSIUM CHLORIDE, CALCIUM CHLORIDE 600; 310; 30; 20 MG/100ML; MG/100ML; MG/100ML; MG/100ML
9 INJECTION, SOLUTION INTRAVENOUS CONTINUOUS
Status: DISCONTINUED | OUTPATIENT
Start: 2025-08-20 | End: 2025-08-19 | Stop reason: HOSPADM

## 2025-08-19 RX ORDER — HEPARIN SODIUM 1000 [USP'U]/ML
INJECTION, SOLUTION INTRAVENOUS; SUBCUTANEOUS
Status: DISCONTINUED | OUTPATIENT
Start: 2025-08-19 | End: 2025-08-19 | Stop reason: HOSPADM

## 2025-08-19 RX ORDER — LIDOCAINE HYDROCHLORIDE 10 MG/ML
INJECTION, SOLUTION INFILTRATION; PERINEURAL AS NEEDED
Status: DISCONTINUED | OUTPATIENT
Start: 2025-08-19 | End: 2025-08-19 | Stop reason: SURG

## 2025-08-19 RX ORDER — DEXAMETHASONE SODIUM PHOSPHATE 4 MG/ML
INJECTION, SOLUTION INTRA-ARTICULAR; INTRALESIONAL; INTRAMUSCULAR; INTRAVENOUS; SOFT TISSUE AS NEEDED
Status: DISCONTINUED | OUTPATIENT
Start: 2025-08-19 | End: 2025-08-19 | Stop reason: SURG

## 2025-08-19 RX ORDER — FUROSEMIDE 10 MG/ML
INJECTION INTRAMUSCULAR; INTRAVENOUS
Status: DISCONTINUED | OUTPATIENT
Start: 2025-08-19 | End: 2025-08-19 | Stop reason: HOSPADM

## 2025-08-19 RX ORDER — LIDOCAINE HYDROCHLORIDE 10 MG/ML
0.5 INJECTION, SOLUTION EPIDURAL; INFILTRATION; INTRACAUDAL; PERINEURAL ONCE AS NEEDED
Status: DISCONTINUED | OUTPATIENT
Start: 2025-08-19 | End: 2025-08-19 | Stop reason: HOSPADM

## 2025-08-19 RX ORDER — GLYCOPYRROLATE 0.2 MG/ML
INJECTION INTRAMUSCULAR; INTRAVENOUS AS NEEDED
Status: DISCONTINUED | OUTPATIENT
Start: 2025-08-19 | End: 2025-08-19 | Stop reason: SURG

## 2025-08-19 RX ORDER — SODIUM CHLORIDE 9 MG/ML
INJECTION, SOLUTION INTRAVENOUS CONTINUOUS PRN
Status: DISCONTINUED | OUTPATIENT
Start: 2025-08-19 | End: 2025-08-19 | Stop reason: SURG

## 2025-08-19 RX ORDER — HEPARIN SODIUM 10000 [USP'U]/100ML
INJECTION, SOLUTION INTRAVENOUS
Status: COMPLETED | OUTPATIENT
Start: 2025-08-19 | End: 2025-08-19

## 2025-08-19 RX ORDER — FAMOTIDINE 20 MG/1
20 TABLET, FILM COATED ORAL ONCE
Status: COMPLETED | OUTPATIENT
Start: 2025-08-19 | End: 2025-08-19

## 2025-08-19 RX ORDER — SODIUM CHLORIDE 0.9 % (FLUSH) 0.9 %
10 SYRINGE (ML) INJECTION EVERY 12 HOURS SCHEDULED
Status: DISCONTINUED | OUTPATIENT
Start: 2025-08-19 | End: 2025-08-19 | Stop reason: HOSPADM

## 2025-08-19 RX ORDER — SODIUM CHLORIDE 9 MG/ML
40 INJECTION, SOLUTION INTRAVENOUS AS NEEDED
Status: DISCONTINUED | OUTPATIENT
Start: 2025-08-19 | End: 2025-08-19 | Stop reason: HOSPADM

## 2025-08-19 RX ORDER — ONDANSETRON 2 MG/ML
4 INJECTION INTRAMUSCULAR; INTRAVENOUS EVERY 6 HOURS PRN
Status: DISCONTINUED | OUTPATIENT
Start: 2025-08-19 | End: 2025-08-19 | Stop reason: HOSPADM

## 2025-08-19 RX ORDER — FAMOTIDINE 10 MG/ML
20 INJECTION, SOLUTION INTRAVENOUS ONCE
Status: DISCONTINUED | OUTPATIENT
Start: 2025-08-19 | End: 2025-08-19 | Stop reason: HOSPADM

## 2025-08-19 RX ORDER — ACETAMINOPHEN 325 MG/1
650 TABLET ORAL EVERY 4 HOURS PRN
Status: DISCONTINUED | OUTPATIENT
Start: 2025-08-19 | End: 2025-08-19 | Stop reason: HOSPADM

## 2025-08-19 RX ORDER — PROTAMINE SULFATE 10 MG/ML
INJECTION, SOLUTION INTRAVENOUS
Status: DISCONTINUED | OUTPATIENT
Start: 2025-08-19 | End: 2025-08-19 | Stop reason: HOSPADM

## 2025-08-19 RX ORDER — BUPIVACAINE HYDROCHLORIDE 5 MG/ML
INJECTION, SOLUTION PERINEURAL
Status: DISCONTINUED | OUTPATIENT
Start: 2025-08-19 | End: 2025-08-19 | Stop reason: HOSPADM

## 2025-08-19 RX ORDER — NITROGLYCERIN 0.4 MG/1
0.4 TABLET SUBLINGUAL
Status: DISCONTINUED | OUTPATIENT
Start: 2025-08-19 | End: 2025-08-19 | Stop reason: HOSPADM

## 2025-08-19 RX ADMIN — DEXAMETHASONE SODIUM PHOSPHATE 4 MG: 4 INJECTION, SOLUTION INTRAMUSCULAR; INTRAVENOUS at 09:48

## 2025-08-19 RX ADMIN — PHENYLEPHRINE HYDROCHLORIDE 0.2 MCG/KG/MIN: 10 INJECTION INTRAVENOUS at 09:47

## 2025-08-19 RX ADMIN — EPHEDRINE SULFATE 5 MG: 50 INJECTION INTRAVENOUS at 09:42

## 2025-08-19 RX ADMIN — LIDOCAINE HYDROCHLORIDE 50 MG: 10 INJECTION, SOLUTION INFILTRATION; PERINEURAL at 09:42

## 2025-08-19 RX ADMIN — SODIUM CHLORIDE: 9 INJECTION, SOLUTION INTRAVENOUS at 09:31

## 2025-08-19 RX ADMIN — ROCURONIUM BROMIDE 50 MG: 10 INJECTION INTRAVENOUS at 09:42

## 2025-08-19 RX ADMIN — PROPOFOL 20 MG: 10 INJECTION, EMULSION INTRAVENOUS at 09:42

## 2025-08-19 RX ADMIN — SUGAMMADEX 200 MG: 100 INJECTION, SOLUTION INTRAVENOUS at 10:43

## 2025-08-19 RX ADMIN — ONDANSETRON 4 MG: 2 INJECTION INTRAMUSCULAR; INTRAVENOUS at 09:41

## 2025-08-19 RX ADMIN — GLYCOPYRROLATE 0.2 MCG: 0.2 INJECTION, SOLUTION INTRAMUSCULAR; INTRAVENOUS at 10:44

## 2025-08-19 RX ADMIN — ETOMIDATE 14 MG: 40 INJECTION, SOLUTION INTRAVENOUS at 09:42

## 2025-08-19 RX ADMIN — FAMOTIDINE 20 MG: 20 TABLET, FILM COATED ORAL at 07:54

## 2025-08-20 ENCOUNTER — CALL CENTER PROGRAMS (OUTPATIENT)
Dept: CALL CENTER | Facility: HOSPITAL | Age: 75
End: 2025-08-20
Payer: MEDICARE

## 2025-08-22 LAB
QT INTERVAL: 494 MS
QTC INTERVAL: 505 MS

## 2025-08-28 LAB
ACT BLD: 320 SECONDS (ref 82–152)
ACT BLD: 389 SECONDS (ref 82–152)

## (undated) DEVICE — MODEL BT2000 P/N 700287-012KIT CONTENTS: MANIFOLD WITH SALINE AND CONTRAST PORTS, SALINE TUBING WITH SPIKE AND HAND SYRINGE, TRANSDUCER: Brand: BT2000 AUTOMATED MANIFOLD KIT

## (undated) DEVICE — Device

## (undated) DEVICE — CATH TEMPO 5F BER 100CM: Brand: TEMPO

## (undated) DEVICE — CATH MPAC INF F4 JR4/JL4/PIG: Brand: INFINITI

## (undated) DEVICE — CATH ANGIO SIM2 HNB5.0 .038 100 P NS

## (undated) DEVICE — GLV SURG BIOGEL LTX PF 7 1/2

## (undated) DEVICE — DEV INFL MONARCH 25W

## (undated) DEVICE — KT INTRO MINISTICK MAX W/GW NITNL/TUNG ECHO STFF 4F 21G 7CM

## (undated) DEVICE — ST INF PRI SMRTSTE 20DRP 2VLV 24ML 117

## (undated) DEVICE — PULSED FIELD ABLATION CATHETER: Brand: FARAWAVE™ NAV

## (undated) DEVICE — CATH DIAG EXPO M/ PK 5F FL4/FR4 PIG

## (undated) DEVICE — TBG EXT STD/BORE LL 48IN

## (undated) DEVICE — RUNTHROUGH NS EXTRA FLOPPY PTCA GUIDEWIRE: Brand: RUNTHROUGH

## (undated) DEVICE — ROTATING HEMOSTATIC VALVE .096": Brand: RHV

## (undated) DEVICE — ST EXT IV SMRTSTE 2VLV FIX M LL 6ML 41

## (undated) DEVICE — CAUTERY TIP POLISHER: Brand: DEVON

## (undated) DEVICE — PINNACLE INTRODUCER SHEATH: Brand: PINNACLE

## (undated) DEVICE — LEX ELECTRO PHYSIOLOGY: Brand: MEDLINE INDUSTRIES, INC.

## (undated) DEVICE — MODEL AT P65, P/N 701554-001KIT CONTENTS: HAND CONTROLLER, 3-WAY HIGH-PRESSURE STOPCOCK WITH ROTATING END AND PREMIUM HIGH-PRESSURE TUBING: Brand: ANGIOTOUCH® KIT

## (undated) DEVICE — KT DISP ARRHYTHMIA VMAP 91200008 DISP

## (undated) DEVICE — INTRO SHEATH ART/FEM ENGAGE .038 5F12CM

## (undated) DEVICE — ANGIO-SEAL VIP VASCULAR CLOSURE DEVICE: Brand: ANGIO-SEAL

## (undated) DEVICE — LEX NEURO ANGIOGRAPHY: Brand: MEDLINE INDUSTRIES, INC.

## (undated) DEVICE — EMBOSHIELD NAV6 EMBOLIC PROTECTION SYSTEM 7.2 MM X 190 CM: Brand: EMBOSHIELD  NAV6

## (undated) DEVICE — LIMB HOLDER, WRIST/ANKLE: Brand: DEROYAL

## (undated) DEVICE — UNDERGLV SURG BIOGEL INDICAT PI SZ8 BLU

## (undated) DEVICE — STPCK LP 1WY RA 200PSI

## (undated) DEVICE — CATH ULTRASND ECHO ACUNAV FOR GE VIVID1 8F 90CM

## (undated) DEVICE — ST ACC MICROPUNCTURE .018 TRANSLSS/SS/TP 5F/10CM 21G/7CM

## (undated) DEVICE — NON-STERILE (1.5 X 7CM) LATEX FINGERCOT: Brand: TRANSDUCER COVER

## (undated) DEVICE — VIATRAC 14 PLUS PERIPHERAL DILATATION CATHETER 5.0 MM X 30 MM X 135 CM: Brand: VIATRAC

## (undated) DEVICE — RADIFOCUS GLIDEWIRE ADVANTAGE GUIDEWIRE: Brand: GLIDEWIRE ADVANTAGE

## (undated) DEVICE — VIATRAC 14 PLUS PERIPHERAL DILATATION CATHETER 6.0 MM X 30 MM X 135 CM: Brand: VIATRAC

## (undated) DEVICE — GW J TP FIX CORE .035 150

## (undated) DEVICE — PRESSURE MONITORING SET: Brand: TRUWAVE

## (undated) DEVICE — DOME MONITORING W BONDED STPCK BIOTRANS2

## (undated) DEVICE — GW INQWIRE FC PTFE STD J/1.5 .035 260

## (undated) DEVICE — PK CATH CARD 10

## (undated) DEVICE — BALN ADMIRAL INPACT 7F 7X40MM

## (undated) DEVICE — 3M™ STERI-STRIP™ REINFORCED ADHESIVE SKIN CLOSURES, R1547, 1/2 IN X 4 IN (12 MM X 100 MM), 6 STRIPS/ENVELOPE: Brand: 3M™ STERI-STRIP™

## (undated) DEVICE — DRSNG SURESITE123 4X4.8IN

## (undated) DEVICE — RADIFOCUS GLIDEWIRE: Brand: GLIDEWIRE

## (undated) DEVICE — RADIFOCUS TORQUE DEVICE MULTI-TORQUE VISE: Brand: RADIFOCUS TORQUE DEVICE

## (undated) DEVICE — VIPERTRACK, 50 PACK: Brand: VIPERTRACK

## (undated) DEVICE — Device: Brand: WEBSTER CS

## (undated) DEVICE — SYRINGE, LOCKING, 10CC, STERILE: Brand: BABY GORILLA/GORILLA PLATING SYSTEM

## (undated) DEVICE — BALN EVERCROSS OTW .035 5F 6X100 135

## (undated) DEVICE — STPCK 3/WY HP M/RA W/OFF/HNDL 1050PSI STRL

## (undated) DEVICE — STEERABLE SHEATH CLEAR: Brand: FARADRIVE™

## (undated) DEVICE — INTRO TEAR AWAY/LVD W/SD PRT 8F 13CM

## (undated) DEVICE — SNAP KOVER: Brand: UNBRANDED

## (undated) DEVICE — BAREWIRE WORKHORSE FILTER DELIVERY WIRE 315 CM: Brand: BAREWIRE

## (undated) DEVICE — KT MANIFLD EP

## (undated) DEVICE — ELECTRD RETRN/GRND MEGADYNE SGL/PLT W/CORD 9FT DISP

## (undated) DEVICE — CANNULA,ADULT,SOFT-TOUCH,7'TUBE,UC: Brand: PENDING

## (undated) DEVICE — SYR LL BD 10CC

## (undated) DEVICE — STERILE (15.2 TAPERED TO 7.6 X 183CM) POLYETHYLENE ACCORDION-FOLDED COVER FOR USE WITH SIEMENS ACUNAV ULTRASOUND CATHETER FAMILY CONNECTOR: Brand: SWIFTLINK TRANSDUCER COVER

## (undated) DEVICE — INTRO SHEATH FLX 7F80CM

## (undated) DEVICE — DESTINATION RENAL GUIDING SHEATH: Brand: DESTINATION

## (undated) DEVICE — GUIDE CATHETER: Brand: MACH1™

## (undated) DEVICE — INTRO SHEATH PRELUDE IDEAL SPRNG COIL 021 6F 23X80CM

## (undated) DEVICE — AVANTI + 4F STD W/GW: Brand: AVANTI

## (undated) DEVICE — PK ANGIO OR 10

## (undated) DEVICE — ST EXT IV SMARTSITE 2VLV SP M LL 5ML IV1

## (undated) DEVICE — PENCL E/S HNDSWCH ROCKRBTN HOLSTR 10FT

## (undated) DEVICE — 1 X VERSACROSS CONNECT TRANSSEPTAL DILATOR;  1 X VERSACROSS RF WIRE (INCLUDING 1 X CONNECTOR CABLE (SINGLE USE)): Brand: VERSACROSS CONNECT ACCESS SOLUTION FOR FARADRIVE

## (undated) DEVICE — TUBING, SUCTION, 1/4" X 10', STRAIGHT: Brand: MEDLINE

## (undated) DEVICE — MODEL AT P54, P/N 700608-035KIT CONTENTS: HAND CONTROLLER, 3-WAY HIGH-PRESSURE STOPCOCK WITH ROTATING END AND PREMIUM HIGH-PRESSURE TUBING: Brand: ANGIOTOUCH® KIT

## (undated) DEVICE — CVR PROB ULTRASND/TRANSD W/GEL 18X120CM STRL

## (undated) DEVICE — APPL CHLORAPREP 26ML CLR

## (undated) DEVICE — SYR CONTRL LUERLOK 10CC

## (undated) DEVICE — SKIN PREP TRAY W/CHG: Brand: MEDLINE INDUSTRIES, INC.

## (undated) DEVICE — Device: Brand: ANGIOSCULPT® PTCA SCORING BALLOON CATHETER

## (undated) DEVICE — IRRIGATOR BULB ASEPTO 60CC STRL

## (undated) DEVICE — CATH SZ ACCUVU SEG/20CM OMNI .038IN 5F 70CM

## (undated) DEVICE — DEV COMP RAD PRELUDESYNC 24CM

## (undated) DEVICE — NDL HYPO ECLPS SFTY 22G 1 1/2IN

## (undated) DEVICE — TBG INJ CONTRL EXCITE RA 1200PSI 48IN

## (undated) DEVICE — KODAMA CATHETER, P/N 701470CONTENTS: IMAGING CATHETER, 3 ML SYRINGE, 10 ML SYRINGE, EXTENSION SET, STERILE BAG, IFU: Brand: ACIST KODAMA® CORONARY IMAGING CATHETER

## (undated) DEVICE — SOL NACL 0.9PCT 1000ML

## (undated) DEVICE — MEDI-VAC YANKAUER SUCTION HANDLE W/BULBOUS TIP: Brand: CARDINAL HEALTH

## (undated) DEVICE — CATH ANGIO SOFT/VU SELECTIVE RIM .035IN 4F 65CM

## (undated) DEVICE — INTROFLXOR CHECKFLO .087 ANL0 6F45

## (undated) DEVICE — GUIDEWIRE WITH ICE™ HYDROPHILIC COATING: Brand: TRANSEND™ EX

## (undated) DEVICE — CATH DIAG EXPO .045 FL3  5F 100CM

## (undated) DEVICE — DECANT BG O JET

## (undated) DEVICE — CORONARY IMAGING CATHETER: Brand: OPTICROSS™ 6 HD

## (undated) DEVICE — CATH OMNI FLUSH 5FR

## (undated) DEVICE — LOCATION REFERENCE PATCH KIT: Brand: RHYTHMIA™ MAPPING SYSTEM

## (undated) DEVICE — SET PRIMARY GRVTY 10DP MALE LL 104IN

## (undated) DEVICE — INTRO TEAR AWAY/LVD W/SD PRT 7F 13CM

## (undated) DEVICE — ADULT, W/LG. BACK PAD, RADIOTRANSPARENT ELEMENT AND LEAD WIRE: Brand: DEFIBRILLATION ELECTRODES

## (undated) DEVICE — CATHETER CONNECTION CABLE: Brand: FARASTAR™

## (undated) DEVICE — ACUMEN IQ CUFF ADULT: Brand: ACUMEN IQ CUFF ADULT

## (undated) DEVICE — ST EXT IV SMARTSITE PINCH/CLMP 5ML 46CM

## (undated) DEVICE — RADIFOCUS GLIDECATH: Brand: GLIDECATH

## (undated) DEVICE — DRSNG SURESITE123 4X10IN

## (undated) DEVICE — ADULT, W/LG. BACK PAD, RADIOTRANSPARENT ELEMENT AND LEAD WIRE COMPATIBLE W/: Brand: DEFIBRILLATION ELECTRODES

## (undated) DEVICE — CVR HNDL LIGHT RIGID

## (undated) DEVICE — CANN NASL CO2 DIVIDED A/

## (undated) DEVICE — PRESSURE MONITORING SET: Brand: TRUWAVE, VAMP